# Patient Record
Sex: FEMALE | Race: WHITE | Employment: OTHER | URBAN - METROPOLITAN AREA
[De-identification: names, ages, dates, MRNs, and addresses within clinical notes are randomized per-mention and may not be internally consistent; named-entity substitution may affect disease eponyms.]

---

## 2019-08-05 ENCOUNTER — IP HISTORICAL/CONVERTED ENCOUNTER (OUTPATIENT)
Dept: OTHER | Age: 74
End: 2019-08-05

## 2020-08-08 ENCOUNTER — IP HISTORICAL/CONVERTED ENCOUNTER (OUTPATIENT)
Dept: OTHER | Age: 75
End: 2020-08-08

## 2020-11-25 ENCOUNTER — APPOINTMENT (OUTPATIENT)
Dept: GENERAL RADIOLOGY | Age: 75
DRG: 853 | End: 2020-11-25
Attending: EMERGENCY MEDICINE
Payer: MEDICARE

## 2020-11-25 ENCOUNTER — HOSPITAL ENCOUNTER (INPATIENT)
Age: 75
LOS: 22 days | Discharge: NURSING, REHAB OR LONG-TERM CARE | DRG: 853 | End: 2020-12-17
Attending: EMERGENCY MEDICINE | Admitting: FAMILY MEDICINE
Payer: MEDICARE

## 2020-11-25 DIAGNOSIS — L03.119 CELLULITIS OF LOWER EXTREMITY, UNSPECIFIED LATERALITY: ICD-10-CM

## 2020-11-25 DIAGNOSIS — R41.82 ALTERED MENTAL STATUS, UNSPECIFIED ALTERED MENTAL STATUS TYPE: ICD-10-CM

## 2020-11-25 DIAGNOSIS — I96 GANGRENE (HCC): ICD-10-CM

## 2020-11-25 DIAGNOSIS — L97.929 ULCERS OF BOTH LOWER LEGS (HCC): ICD-10-CM

## 2020-11-25 DIAGNOSIS — L97.919 ULCERS OF BOTH LOWER LEGS (HCC): ICD-10-CM

## 2020-11-25 DIAGNOSIS — J18.9 PNEUMONIA DUE TO INFECTIOUS ORGANISM, UNSPECIFIED LATERALITY, UNSPECIFIED PART OF LUNG: Primary | ICD-10-CM

## 2020-11-25 LAB
ALBUMIN SERPL-MCNC: 2.5 G/DL (ref 3.5–5)
ALBUMIN/GLOB SERPL: 0.5 {RATIO} (ref 1.1–2.2)
ALP SERPL-CCNC: 147 U/L (ref 45–117)
ALT SERPL-CCNC: 12 U/L (ref 12–78)
ANION GAP SERPL CALC-SCNC: 6 MMOL/L (ref 5–15)
APPEARANCE UR: CLEAR
APTT PPP: 32 SEC (ref 23–35.7)
APTT PPP: 32.1 SEC (ref 23–35.7)
AST SERPL W P-5'-P-CCNC: 20 U/L (ref 15–37)
ATRIAL RATE: 133 BPM
BACTERIA URNS QL MICRO: NEGATIVE /HPF
BASOPHILS # BLD: 0 K/UL (ref 0–0.1)
BASOPHILS # BLD: 0 K/UL (ref 0–0.1)
BASOPHILS NFR BLD: 0 % (ref 0–1)
BASOPHILS NFR BLD: 0 % (ref 0–1)
BILIRUB SERPL-MCNC: 0.5 MG/DL (ref 0.2–1)
BILIRUB UR QL: NEGATIVE
BNP SERPL-MCNC: ABNORMAL PG/ML
BUN SERPL-MCNC: 65 MG/DL (ref 6–20)
BUN/CREAT SERPL: 72 (ref 12–20)
CA-I BLD-MCNC: 9.5 MG/DL (ref 8.5–10.1)
CALCULATED R AXIS, ECG10: 102 DEGREES
CALCULATED T AXIS, ECG11: 106 DEGREES
CHLORIDE SERPL-SCNC: 106 MMOL/L (ref 97–108)
CO2 SERPL-SCNC: 28 MMOL/L (ref 21–32)
COLOR UR: ABNORMAL
COVID-19 RAPID TEST, COVR: NOT DETECTED
CREAT SERPL-MCNC: 0.9 MG/DL (ref 0.55–1.02)
DIAGNOSIS, 93000: NORMAL
DIFFERENTIAL METHOD BLD: ABNORMAL
DIFFERENTIAL METHOD BLD: ABNORMAL
EOSINOPHIL # BLD: 0 K/UL (ref 0–0.4)
EOSINOPHIL # BLD: 0 K/UL (ref 0–0.4)
EOSINOPHIL NFR BLD: 0 % (ref 0–7)
EOSINOPHIL NFR BLD: 0 % (ref 0–7)
ERYTHROCYTE [DISTWIDTH] IN BLOOD BY AUTOMATED COUNT: 13.9 % (ref 11.5–14.5)
ERYTHROCYTE [DISTWIDTH] IN BLOOD BY AUTOMATED COUNT: 14.1 % (ref 11.5–14.5)
GLOBULIN SER CALC-MCNC: 5 G/DL (ref 2–4)
GLUCOSE SERPL-MCNC: 124 MG/DL (ref 65–100)
GLUCOSE UR STRIP.AUTO-MCNC: NEGATIVE MG/DL
HCT VFR BLD AUTO: 29.9 % (ref 35–47)
HCT VFR BLD AUTO: 32.1 % (ref 35–47)
HGB BLD-MCNC: 10.2 G/DL (ref 11.5–16)
HGB BLD-MCNC: 9.4 G/DL (ref 11.5–16)
HGB UR QL STRIP: ABNORMAL
HYALINE CASTS URNS QL MICRO: ABNORMAL /LPF (ref 0–5)
IMM GRANULOCYTES # BLD AUTO: 0.1 K/UL (ref 0–0.04)
IMM GRANULOCYTES # BLD AUTO: 0.1 K/UL (ref 0–0.04)
IMM GRANULOCYTES NFR BLD AUTO: 0 % (ref 0–0.5)
IMM GRANULOCYTES NFR BLD AUTO: 0 % (ref 0–0.5)
INR PPP: 1.3 (ref 0.9–1.1)
KETONES UR QL STRIP.AUTO: NEGATIVE MG/DL
LACTATE SERPL-SCNC: 2.1 MMOL/L (ref 0.4–2)
LEUKOCYTE ESTERASE UR QL STRIP.AUTO: NEGATIVE
LYMPHOCYTES # BLD: 0.3 K/UL (ref 0.8–3.5)
LYMPHOCYTES # BLD: 0.8 K/UL (ref 0.8–3.5)
LYMPHOCYTES NFR BLD: 2 % (ref 12–49)
LYMPHOCYTES NFR BLD: 3 % (ref 12–49)
MAGNESIUM SERPL-MCNC: 2.7 MG/DL (ref 1.6–2.4)
MCH RBC QN AUTO: 29 PG (ref 26–34)
MCH RBC QN AUTO: 29.7 PG (ref 26–34)
MCHC RBC AUTO-ENTMCNC: 31.4 G/DL (ref 30–36.5)
MCHC RBC AUTO-ENTMCNC: 31.8 G/DL (ref 30–36.5)
MCV RBC AUTO: 92.3 FL (ref 80–99)
MCV RBC AUTO: 93.6 FL (ref 80–99)
MONOCYTES # BLD: 0.4 K/UL (ref 0–1)
MONOCYTES # BLD: 0.6 K/UL (ref 0–1)
MONOCYTES NFR BLD: 2 % (ref 5–13)
MONOCYTES NFR BLD: 2 % (ref 5–13)
MUCOUS THREADS URNS QL MICRO: ABNORMAL /LPF
NEUTS SEG # BLD: 17.9 K/UL (ref 1.8–8)
NEUTS SEG # BLD: 25.7 K/UL (ref 1.8–8)
NEUTS SEG NFR BLD: 95 % (ref 32–75)
NEUTS SEG NFR BLD: 96 % (ref 32–75)
NITRITE UR QL STRIP.AUTO: NEGATIVE
P-R INTERVAL, ECG05: 64 MS
PH UR STRIP: 5 [PH] (ref 5–8)
PLATELET # BLD AUTO: 435 K/UL (ref 150–400)
PLATELET # BLD AUTO: 545 K/UL (ref 150–400)
PMV BLD AUTO: 9.2 FL (ref 8.9–12.9)
PMV BLD AUTO: 9.8 FL (ref 8.9–12.9)
POTASSIUM SERPL-SCNC: 4.6 MMOL/L (ref 3.5–5.1)
PROCALCITONIN SERPL-MCNC: 2.21 NG/ML
PROT SERPL-MCNC: 7.5 G/DL (ref 6.4–8.2)
PROT UR STRIP-MCNC: NEGATIVE MG/DL
PROTHROMBIN TIME: 15.7 SEC (ref 11.9–14.7)
Q-T INTERVAL, ECG07: 324 MS
QRS DURATION, ECG06: 182 MS
QTC CALCULATION (BEZET), ECG08: 482 MS
RBC # BLD AUTO: 3.24 M/UL (ref 3.8–5.2)
RBC # BLD AUTO: 3.43 M/UL (ref 3.8–5.2)
RBC #/AREA URNS HPF: ABNORMAL /HPF (ref 0–5)
SARS-COV-2, COV2: NORMAL
SODIUM SERPL-SCNC: 140 MMOL/L (ref 136–145)
SP GR UR REFRACTOMETRY: 1.01 (ref 1–1.03)
SPECIMEN SOURCE: NORMAL
THERAPEUTIC RANGE,PTTT: NORMAL SEC (ref 68–109)
THERAPEUTIC RANGE,PTTT: NORMAL SEC (ref 68–109)
TROPONIN I SERPL-MCNC: 0.26 NG/ML
TROPONIN I SERPL-MCNC: 0.27 NG/ML
TROPONIN I SERPL-MCNC: 0.29 NG/ML
TSH SERPL DL<=0.05 MIU/L-ACNC: 0.26 UIU/ML (ref 0.36–3.74)
UA: UC IF INDICATED,UAUC: ABNORMAL
UROBILINOGEN UR QL STRIP.AUTO: 0.1 EU/DL (ref 0.1–1)
VENTRICULAR RATE, ECG03: 133 BPM
WBC # BLD AUTO: 18.6 K/UL (ref 3.6–11)
WBC # BLD AUTO: 27.1 K/UL (ref 3.6–11)
WBC URNS QL MICRO: ABNORMAL /HPF (ref 0–4)

## 2020-11-25 PROCEDURE — 84443 ASSAY THYROID STIM HORMONE: CPT

## 2020-11-25 PROCEDURE — 84145 PROCALCITONIN (PCT): CPT

## 2020-11-25 PROCEDURE — 83605 ASSAY OF LACTIC ACID: CPT

## 2020-11-25 PROCEDURE — 93005 ELECTROCARDIOGRAM TRACING: CPT

## 2020-11-25 PROCEDURE — 36415 COLL VENOUS BLD VENIPUNCTURE: CPT

## 2020-11-25 PROCEDURE — 96375 TX/PRO/DX INJ NEW DRUG ADDON: CPT

## 2020-11-25 PROCEDURE — 71045 X-RAY EXAM CHEST 1 VIEW: CPT

## 2020-11-25 PROCEDURE — 74011250637 HC RX REV CODE- 250/637: Performed by: FAMILY MEDICINE

## 2020-11-25 PROCEDURE — 74011250637 HC RX REV CODE- 250/637: Performed by: EMERGENCY MEDICINE

## 2020-11-25 PROCEDURE — 74011250636 HC RX REV CODE- 250/636: Performed by: EMERGENCY MEDICINE

## 2020-11-25 PROCEDURE — 80053 COMPREHEN METABOLIC PANEL: CPT

## 2020-11-25 PROCEDURE — 83880 ASSAY OF NATRIURETIC PEPTIDE: CPT

## 2020-11-25 PROCEDURE — 87635 SARS-COV-2 COVID-19 AMP PRB: CPT

## 2020-11-25 PROCEDURE — 84484 ASSAY OF TROPONIN QUANT: CPT

## 2020-11-25 PROCEDURE — 74011250636 HC RX REV CODE- 250/636: Performed by: FAMILY MEDICINE

## 2020-11-25 PROCEDURE — 85730 THROMBOPLASTIN TIME PARTIAL: CPT

## 2020-11-25 PROCEDURE — 85025 COMPLETE CBC W/AUTO DIFF WBC: CPT

## 2020-11-25 PROCEDURE — 65270000029 HC RM PRIVATE

## 2020-11-25 PROCEDURE — 74011000258 HC RX REV CODE- 258: Performed by: FAMILY MEDICINE

## 2020-11-25 PROCEDURE — 83735 ASSAY OF MAGNESIUM: CPT

## 2020-11-25 PROCEDURE — 99284 EMERGENCY DEPT VISIT MOD MDM: CPT

## 2020-11-25 PROCEDURE — 74011000258 HC RX REV CODE- 258: Performed by: EMERGENCY MEDICINE

## 2020-11-25 PROCEDURE — 87205 SMEAR GRAM STAIN: CPT

## 2020-11-25 PROCEDURE — 81001 URINALYSIS AUTO W/SCOPE: CPT

## 2020-11-25 PROCEDURE — 85610 PROTHROMBIN TIME: CPT

## 2020-11-25 PROCEDURE — 96374 THER/PROPH/DIAG INJ IV PUSH: CPT

## 2020-11-25 RX ORDER — ATORVASTATIN CALCIUM 40 MG/1
40 TABLET, FILM COATED ORAL
Status: DISCONTINUED | OUTPATIENT
Start: 2020-11-25 | End: 2020-12-17 | Stop reason: HOSPADM

## 2020-11-25 RX ORDER — ACETAMINOPHEN 650 MG/1
650 SUPPOSITORY RECTAL
Status: DISCONTINUED | OUTPATIENT
Start: 2020-11-25 | End: 2020-12-17 | Stop reason: HOSPADM

## 2020-11-25 RX ORDER — ASPIRIN 325 MG
325 TABLET ORAL
Status: COMPLETED | OUTPATIENT
Start: 2020-11-25 | End: 2020-11-25

## 2020-11-25 RX ORDER — ONDANSETRON 4 MG/1
4 TABLET, ORALLY DISINTEGRATING ORAL
Status: DISCONTINUED | OUTPATIENT
Start: 2020-11-25 | End: 2020-12-17 | Stop reason: HOSPADM

## 2020-11-25 RX ORDER — ACETAMINOPHEN 325 MG/1
650 TABLET ORAL
Status: DISCONTINUED | OUTPATIENT
Start: 2020-11-25 | End: 2020-12-17 | Stop reason: HOSPADM

## 2020-11-25 RX ORDER — POLYETHYLENE GLYCOL 3350 17 G/17G
17 POWDER, FOR SOLUTION ORAL DAILY PRN
Status: DISCONTINUED | OUTPATIENT
Start: 2020-11-25 | End: 2020-12-17 | Stop reason: HOSPADM

## 2020-11-25 RX ORDER — LEVOTHYROXINE SODIUM 75 UG/1
75 TABLET ORAL
Status: DISCONTINUED | OUTPATIENT
Start: 2020-11-26 | End: 2020-12-17 | Stop reason: HOSPADM

## 2020-11-25 RX ORDER — HEPARIN SODIUM 10000 [USP'U]/100ML
12-25 INJECTION, SOLUTION INTRAVENOUS
Status: DISCONTINUED | OUTPATIENT
Start: 2020-11-25 | End: 2020-11-25

## 2020-11-25 RX ORDER — SODIUM CHLORIDE 9 MG/ML
50 INJECTION, SOLUTION INTRAVENOUS CONTINUOUS
Status: DISCONTINUED | OUTPATIENT
Start: 2020-11-25 | End: 2020-11-30

## 2020-11-25 RX ORDER — SODIUM CHLORIDE 9 MG/ML
50 INJECTION, SOLUTION INTRAVENOUS CONTINUOUS
Status: DISCONTINUED | OUTPATIENT
Start: 2020-11-25 | End: 2020-11-26

## 2020-11-25 RX ORDER — ONDANSETRON 2 MG/ML
4 INJECTION INTRAMUSCULAR; INTRAVENOUS
Status: DISCONTINUED | OUTPATIENT
Start: 2020-11-25 | End: 2020-12-17 | Stop reason: HOSPADM

## 2020-11-25 RX ORDER — SODIUM CHLORIDE 9 MG/ML
50 INJECTION, SOLUTION INTRAVENOUS CONTINUOUS
Status: DISCONTINUED | OUTPATIENT
Start: 2020-11-26 | End: 2020-11-30

## 2020-11-25 RX ORDER — FUROSEMIDE 10 MG/ML
40 INJECTION INTRAMUSCULAR; INTRAVENOUS
Status: COMPLETED | OUTPATIENT
Start: 2020-11-25 | End: 2020-11-25

## 2020-11-25 RX ORDER — ASPIRIN 325 MG
325 TABLET ORAL DAILY
Status: DISCONTINUED | OUTPATIENT
Start: 2020-11-26 | End: 2020-12-17 | Stop reason: HOSPADM

## 2020-11-25 RX ADMIN — SODIUM CHLORIDE 1000 ML: 9 INJECTION, SOLUTION INTRAVENOUS at 13:27

## 2020-11-25 RX ADMIN — ASPIRIN 325 MG ORAL TABLET 325 MG: 325 PILL ORAL at 15:40

## 2020-11-25 RX ADMIN — PIPERACILLIN AND TAZOBACTAM 3.38 G: 3; .375 INJECTION, POWDER, LYOPHILIZED, FOR SOLUTION INTRAVENOUS at 23:55

## 2020-11-25 RX ADMIN — SODIUM CHLORIDE 50 ML/HR: 9 INJECTION, SOLUTION INTRAVENOUS at 23:56

## 2020-11-25 RX ADMIN — VANCOMYCIN HYDROCHLORIDE 1000 MG: 1 INJECTION, POWDER, LYOPHILIZED, FOR SOLUTION INTRAVENOUS at 13:22

## 2020-11-25 RX ADMIN — PIPERACILLIN AND TAZOBACTAM 3.38 G: 3; .375 INJECTION, POWDER, LYOPHILIZED, FOR SOLUTION INTRAVENOUS at 13:22

## 2020-11-25 RX ADMIN — SODIUM CHLORIDE 50 ML/HR: 9 INJECTION, SOLUTION INTRAVENOUS at 15:40

## 2020-11-25 RX ADMIN — FUROSEMIDE 40 MG: 10 INJECTION, SOLUTION INTRAMUSCULAR; INTRAVENOUS at 15:40

## 2020-11-25 RX ADMIN — ATORVASTATIN CALCIUM 40 MG: 40 TABLET, FILM COATED ORAL at 23:56
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
## 2020-11-25 NOTE — PROGRESS NOTES
11/25/20. APS called via hot line ( [phone] ) informed of pts living  conditions & safety concerns. Report # [id number].

## 2020-11-25 NOTE — PROGRESS NOTES
CM asked by sister Ms. Daniel to call sister Brien Castillo @ [phone]) who knows more about pt clinically. Per [place]. [address] in home. Pt uses walker & cane on ambulation. PCP is Dr. Fam Merlos & pt sees Dr. Cailin Pantoja.
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
## 2020-11-25 NOTE — ACP (ADVANCE CARE PLANNING)
11/25/20. CM spoke with pts sister /healthcare decision maker ( Heidy Daniel @ [phone])  Regarding the Covid 19 questionnaire. Her back up is sister Helder Love @ [phone]). Preference is for CPR if pts heart stops, & Ventilator Support if health worsened from Covid - recovery likely/not.

## 2020-11-25 NOTE — ED NOTES
Called Dr. Jose Little and informed him of pt new troponin results of 0.28. Consult to cardiology was placed and he wants a low dose heparin drip ordered.

## 2020-11-26 ENCOUNTER — APPOINTMENT (OUTPATIENT)
Dept: NON INVASIVE DIAGNOSTICS | Age: 75
DRG: 853 | End: 2020-11-26
Attending: FAMILY MEDICINE
Payer: MEDICARE

## 2020-11-26 LAB
ALBUMIN SERPL-MCNC: 2.1 G/DL (ref 3.5–5)
ALBUMIN/GLOB SERPL: 0.5 {RATIO} (ref 1.1–2.2)
ALP SERPL-CCNC: 112 U/L (ref 45–117)
ALT SERPL-CCNC: 11 U/L (ref 12–78)
ANION GAP SERPL CALC-SCNC: 6 MMOL/L (ref 5–15)
APTT PPP: 34.8 SEC (ref 23–35.7)
APTT PPP: 37.2 SEC (ref 23–35.7)
APTT PPP: 39 SEC (ref 23–35.7)
AST SERPL W P-5'-P-CCNC: 25 U/L (ref 15–37)
BASOPHILS # BLD: 0 K/UL (ref 0–0.1)
BASOPHILS NFR BLD: 0 % (ref 0–1)
BILIRUB SERPL-MCNC: 0.5 MG/DL (ref 0.2–1)
BUN SERPL-MCNC: 54 MG/DL (ref 6–20)
BUN/CREAT SERPL: 70 (ref 12–20)
CA-I BLD-MCNC: 8.5 MG/DL (ref 8.5–10.1)
CHLORIDE SERPL-SCNC: 110 MMOL/L (ref 97–108)
CO2 SERPL-SCNC: 27 MMOL/L (ref 21–32)
CREAT SERPL-MCNC: 0.77 MG/DL (ref 0.55–1.02)
DIFFERENTIAL METHOD BLD: ABNORMAL
EOSINOPHIL # BLD: 0 K/UL (ref 0–0.4)
EOSINOPHIL NFR BLD: 0 % (ref 0–7)
ERYTHROCYTE [DISTWIDTH] IN BLOOD BY AUTOMATED COUNT: 14 % (ref 11.5–14.5)
GLOBULIN SER CALC-MCNC: 4.2 G/DL (ref 2–4)
GLUCOSE SERPL-MCNC: 108 MG/DL (ref 65–100)
HCT VFR BLD AUTO: 28.3 % (ref 35–47)
HGB BLD-MCNC: 8.9 G/DL (ref 11.5–16)
IMM GRANULOCYTES # BLD AUTO: 0.1 K/UL (ref 0–0.04)
IMM GRANULOCYTES NFR BLD AUTO: 0 % (ref 0–0.5)
LYMPHOCYTES # BLD: 0.4 K/UL (ref 0.8–3.5)
LYMPHOCYTES NFR BLD: 2 % (ref 12–49)
MCH RBC QN AUTO: 29.2 PG (ref 26–34)
MCHC RBC AUTO-ENTMCNC: 31.4 G/DL (ref 30–36.5)
MCV RBC AUTO: 92.8 FL (ref 80–99)
MONOCYTES # BLD: 0.5 K/UL (ref 0–1)
MONOCYTES NFR BLD: 2 % (ref 5–13)
NEUTS SEG # BLD: 19.6 K/UL (ref 1.8–8)
NEUTS SEG NFR BLD: 96 % (ref 32–75)
PLATELET # BLD AUTO: 450 K/UL (ref 150–400)
PMV BLD AUTO: 9.2 FL (ref 8.9–12.9)
POTASSIUM SERPL-SCNC: 3.7 MMOL/L (ref 3.5–5.1)
PROT SERPL-MCNC: 6.3 G/DL (ref 6.4–8.2)
RBC # BLD AUTO: 3.05 M/UL (ref 3.8–5.2)
SODIUM SERPL-SCNC: 143 MMOL/L (ref 136–145)
THERAPEUTIC RANGE,PTTT: ABNORMAL SEC (ref 68–109)
THERAPEUTIC RANGE,PTTT: ABNORMAL SEC (ref 68–109)
THERAPEUTIC RANGE,PTTT: NORMAL SEC (ref 68–109)
WBC # BLD AUTO: 20.4 K/UL (ref 3.6–11)

## 2020-11-26 PROCEDURE — 99221 1ST HOSP IP/OBS SF/LOW 40: CPT | Performed by: INTERNAL MEDICINE

## 2020-11-26 PROCEDURE — 74011250637 HC RX REV CODE- 250/637: Performed by: FAMILY MEDICINE

## 2020-11-26 PROCEDURE — 87077 CULTURE AEROBIC IDENTIFY: CPT

## 2020-11-26 PROCEDURE — 85730 THROMBOPLASTIN TIME PARTIAL: CPT

## 2020-11-26 PROCEDURE — 87040 BLOOD CULTURE FOR BACTERIA: CPT

## 2020-11-26 PROCEDURE — 65270000029 HC RM PRIVATE

## 2020-11-26 PROCEDURE — 36415 COLL VENOUS BLD VENIPUNCTURE: CPT

## 2020-11-26 PROCEDURE — 74011250636 HC RX REV CODE- 250/636: Performed by: FAMILY MEDICINE

## 2020-11-26 PROCEDURE — 80053 COMPREHEN METABOLIC PANEL: CPT

## 2020-11-26 PROCEDURE — 77010033678 HC OXYGEN DAILY

## 2020-11-26 PROCEDURE — 87086 URINE CULTURE/COLONY COUNT: CPT

## 2020-11-26 PROCEDURE — 87186 SC STD MICRODIL/AGAR DIL: CPT

## 2020-11-26 PROCEDURE — 94760 N-INVAS EAR/PLS OXIMETRY 1: CPT

## 2020-11-26 PROCEDURE — 74011000258 HC RX REV CODE- 258: Performed by: FAMILY MEDICINE

## 2020-11-26 PROCEDURE — 74011250636 HC RX REV CODE- 250/636: Performed by: INTERNAL MEDICINE

## 2020-11-26 PROCEDURE — 85025 COMPLETE CBC W/AUTO DIFF WBC: CPT

## 2020-11-26 RX ORDER — POTASSIUM CHLORIDE 20 MEQ/1
20 TABLET, EXTENDED RELEASE ORAL DAILY
COMMUNITY

## 2020-11-26 RX ORDER — CARVEDILOL 3.12 MG/1
6.25 TABLET ORAL 2 TIMES DAILY WITH MEALS
COMMUNITY
End: 2020-12-17

## 2020-11-26 RX ORDER — FUROSEMIDE 10 MG/ML
20 INJECTION INTRAMUSCULAR; INTRAVENOUS 2 TIMES DAILY
Status: DISCONTINUED | OUTPATIENT
Start: 2020-11-26 | End: 2020-11-29

## 2020-11-26 RX ORDER — METHOCARBAMOL 500 MG/1
500 TABLET, FILM COATED ORAL 3 TIMES DAILY
COMMUNITY
End: 2020-12-17

## 2020-11-26 RX ORDER — OMEPRAZOLE 20 MG/1
20 CAPSULE, DELAYED RELEASE ORAL DAILY
COMMUNITY

## 2020-11-26 RX ORDER — FUROSEMIDE 40 MG/1
40 TABLET ORAL DAILY
COMMUNITY

## 2020-11-26 RX ORDER — LISINOPRIL 5 MG/1
5 TABLET ORAL DAILY
COMMUNITY
End: 2020-12-17

## 2020-11-26 RX ORDER — TRAMADOL HYDROCHLORIDE 50 MG/1
50 TABLET ORAL
Status: COMPLETED | OUTPATIENT
Start: 2020-11-26 | End: 2020-11-26

## 2020-11-26 RX ADMIN — ACETAMINOPHEN 650 MG: 325 TABLET, FILM COATED ORAL at 21:34

## 2020-11-26 RX ADMIN — ATORVASTATIN CALCIUM 40 MG: 40 TABLET, FILM COATED ORAL at 21:32

## 2020-11-26 RX ADMIN — PIPERACILLIN SODIUM AND TAZOBACTAM SODIUM 3.38 G: 3; .375 INJECTION, POWDER, LYOPHILIZED, FOR SOLUTION INTRAVENOUS at 10:15

## 2020-11-26 RX ADMIN — SODIUM CHLORIDE 50 ML/HR: 9 INJECTION, SOLUTION INTRAVENOUS at 00:00

## 2020-11-26 RX ADMIN — SODIUM CHLORIDE 50 ML/HR: 9 INJECTION, SOLUTION INTRAVENOUS at 17:00

## 2020-11-26 RX ADMIN — FUROSEMIDE 20 MG: 10 INJECTION, SOLUTION INTRAMUSCULAR; INTRAVENOUS at 19:54

## 2020-11-26 RX ADMIN — VANCOMYCIN HYDROCHLORIDE 1000 MG: 1 INJECTION, POWDER, LYOPHILIZED, FOR SOLUTION INTRAVENOUS at 16:56

## 2020-11-26 RX ADMIN — ASPIRIN 325 MG ORAL TABLET 325 MG: 325 PILL ORAL at 10:16

## 2020-11-26 RX ADMIN — TRAMADOL HYDROCHLORIDE 50 MG: 50 TABLET, COATED ORAL at 21:32

## 2020-11-26 RX ADMIN — PIPERACILLIN SODIUM AND TAZOBACTAM SODIUM 3.38 G: 3; .375 INJECTION, POWDER, LYOPHILIZED, FOR SOLUTION INTRAVENOUS at 20:15

## 2020-11-26 NOTE — PROGRESS NOTES
Problem: Falls - Risk of  Goal: *Absence of Falls  Description: Document Melissa Vannesa Fall Risk and appropriate interventions in the flowsheet.   11/26/2020 0043 by Karolina Urena RN  Note: Fall Risk Interventions:  Mobility Interventions: Bed/chair exit alarm         Medication Interventions: Bed/chair exit alarm    Elimination Interventions: Bed/chair exit alarm, Patient to call for help with toileting needs           11/26/2020 0042 by Karolina Urena RN  Outcome: Progressing Towards Goal  Note: Fall Risk Interventions:  Mobility Interventions: Bed/chair exit alarm         Medication Interventions: Bed/chair exit alarm    Elimination Interventions: Bed/chair exit alarm, Patient to call for help with toileting needs

## 2020-11-26 NOTE — CONSULTS
[facility] CARDIOLOGY  CARDIOLOGY CONSULTATION    REASON FOR CONSULT: CHF    REQUESTING PROVIDER: Dr. Jackson Brayan:  Elevated trop/SOB    HISTORY OF PRESENT ILLNESS:  Charly Edwards is a 66y.o. year-old female with past medical history significant for hypothyroidism, PPM  who was evaluated today due to shortness of breath. Patient was found to be cyanotic and in poor living conditions at home. She is a poor historian and does not appear to be in pain. No c/o chest pain. Records from hospital admission course thus far reviewed. Telemetry reviewed. No events overnight. .    INPATIENT MEDICATIONS:  Home medications reviewed.     Current Facility-Administered Medications:     piperacillin-tazobactam (ZOSYN) 3.375 g in 0.9% sodium chloride (MBP/ADV) 100 mL MBP, 3.375 g, IntraVENous, Q8H, Keith Solano MD, Last Rate: 25 mL/hr at 11/26/20 1015, 3.375 g at 11/26/20 1015    furosemide (LASIX) injection 20 mg, 20 mg, IntraVENous, BID, Andrew Goins MD    0.9% sodium chloride infusion, 50 mL/hr, IntraVENous, CONTINUOUS, Blake Solano MD, Stopped at 11/25/20 1430    levothyroxine (SYNTHROID) tablet 75 mcg, 75 mcg, Oral, 6am, Keith Solano MD    0.9% sodium chloride infusion, 50 mL/hr, IntraVENous, CONTINUOUS, Keith Solano MD, Last Rate: 50 mL/hr at 11/26/20 0000, 50 mL/hr at 11/26/20 0000    acetaminophen (TYLENOL) tablet 650 mg, 650 mg, Oral, Q6H PRN **OR** acetaminophen (TYLENOL) suppository 650 mg, 650 mg, Rectal, Q6H PRN, Blake Solano MD    polyethylene glycol (MIRALAX) packet 17 g, 17 g, Oral, DAILY PRN, Blake Solano MD    ondansetron (ZOFRAN ODT) tablet 4 mg, 4 mg, Oral, Q8H PRN **OR** ondansetron (ZOFRAN) injection 4 mg, 4 mg, IntraVENous, Q6H PRN, Keith Solano MD    aspirin tablet 325 mg, 325 mg, Oral, DAILY, Keith Solano MD, 325 mg at 11/26/20 1016    atorvastatin (LIPITOR) tablet 40 mg, 40 mg, Oral, QHS, Keith Solano MD, 40 mg at 11/25/20 7394    Vancomycin Pharmacy Dosing, , Other, PRN, Lary Solano MD    vancomycin (VANCOCIN) 1,000 mg in 0.9% sodium chloride 250 mL (VIAL-MATE), 1,000 mg, IntraVENous, Q24H, Keith Solano MD    [START ON 11/27/2020] Vancomycin Trough Level to be Drawn at 1400, , Other, Franco Rodriguez MD     ALLERGIES:  Allergies reviewed with the patient,  Allergies   Allergen Reactions    Tetracycline Other (comments)     Causes mouth sores    Morphine Nausea and Vomiting    . FAMILY HISTORY:  Family history reviewed. SOCIAL HISTORY:  Unknown. REVIEW OF SYSTEMS:  Complete review of systems performed, pertinents noted above, all other systems are negative. PHYSICAL EXAMINATION:  Vital sign assessment reveal a blood pressure of 113/50    and pulse rate of . Cardiovascular exam has a heart with a regular rate and rhythm, normal S1 and S2. No murmur present. There are no rubs or gallops. Good peripheral pulses. Elevated jugular venous distension. No carotid bruits are present. Respiratory exam reveals decreased breath sounds. Gastrointestinal exam has soft, nontender abdomen with normal bowel sounds. Lymphatic exam reveals severe edema with dressings with purulent drainage. EKG shows paced rhythm. Recent labs results and imaging reviewed. .       IMPRESSION AND RECOMMENDATIONS:  1. Acute CHF: Unknown type. Could be from chronic RV pacing. Unclear medical history. Will start lasix 20 mg iv bid and follow up 2D TTE.  2. Elevated troponin: Likely demand ischemia from CHF. Will follow up echo. 3. Pneumonia: Rx per primary. Thank you for involving us in the care of this patient. Please do not hesitate to call if additional questions arise.

## 2020-11-26 NOTE — ED NOTES
Nurse spoke with Dr. Belen Thomas via phone regarding consult. Informed MD of elevated troponins. Dr. Belen Thomas gave verbal order for repeat trop at 0700 tomorrow and to DC heparin.

## 2020-11-26 NOTE — ROUTINE PROCESS
Bedside report given to [facility] RN. Report consisted of patients Situation, Background, Assessment and  
Recommendations(SBAR). Opportunity for questions and clarification was provided

## 2020-11-26 NOTE — H&P
History and Physical    NAME: Yosef Garcia   :  1945   MRN:  [id number]     Date/Time:  2020 7:23 PM    Patient PCP: Karl Bardales MD  ______________________________________________________________________             Subjective:     CHIEF COMPLAINT:     Generalized weakness generalized weakness    HISTORY OF PRESENT ILLNESS:       Patient is a 66y.o. year old female with past medical history unknown came to the ER ER under medical TDO patient was found by her home health aide on the last visit patient will hypoxic with her oxygen off there was trash around the house approximately 1 week patient in poor hygiene patient had bilateral leg wounds and patient using tissue paper and spraying with water seen by the ER physician chest x-ray shows pneumonia the patient was admitted for further evaluation treatment    Patient is alert awake and upset why she is in the hospital    No past medical history on file. Unknown    No past surgical history on file. Unknown    Social History     Tobacco Use    Smoking status: Not on file   Substance Use Topics    Alcohol use: Not on file        No family history on file. Allergies   Allergen Reactions    Tetracycline Other (comments)     Causes mouth sores    Morphine Nausea and Vomiting        Prior to Admission medications    Medication Sig Start Date End Date Taking? Authorizing Provider   levothyroxine (SYNTHROID) 75 mcg tablet Take 75 mcg by mouth daily (before breakfast).  Indications: HYPOTHYROIDISM    Provider, Historical         Current Facility-Administered Medications:     0.9% sodium chloride infusion, 50 mL/hr, IntraVENous, CONTINUOUS, Mila Adamson MD, Last Rate: 50 mL/hr at 20 1540, 50 mL/hr at 20 1540    0.9% sodium chloride infusion, 50 mL/hr, IntraVENous, CONTINUOUS, Mila Adamson MD, Stopped at 20 1430    heparin (porcine) 25,000 units in 0.45% saline 250 ml infusion, 12-25 Units/kg/hr (Adjusted), IntraVENous, TITRATE, René Solano MD    piperacillin-tazobactam (ZOSYN) 3.375 g in 0.9% sodium chloride (MBP/ADV) 100 mL MBP, 3.375 g, IntraVENous, Q8H, Keith Solano MD    [START ON 11/26/2020] aspirin tablet 325 mg, 325 mg, Oral, DAILY, Keith Solano MD    atorvastatin (LIPITOR) tablet 40 mg, 40 mg, Oral, QHS, Keith Solano MD    Current Outpatient Medications:     levothyroxine (SYNTHROID) 75 mcg tablet, Take 75 mcg by mouth daily (before breakfast). Indications: HYPOTHYROIDISM, Disp: , Rfl:     LAB DATA REVIEWED:    Recent Results (from the past 24 hour(s))   CBC WITH AUTOMATED DIFF    Collection Time: 11/25/20 12:30 PM   Result Value Ref Range    WBC 27.1 (H) 3.6 - 11.0 K/uL    RBC 3.43 (L) 3.80 - 5.20 M/uL    HGB 10.2 (L) 11.5 - 16.0 g/dL    HCT 32.1 (L) 35.0 - 47.0 %    MCV 93.6 80.0 - 99.0 FL    MCH 29.7 26.0 - 34.0 PG    MCHC 31.8 30.0 - 36.5 g/dL    RDW 14.1 11.5 - 14.5 %    PLATELET 078 (H) 857 - 400 K/uL    MPV 9.8 8.9 - 12.9 FL    NEUTROPHILS 95 (H) 32 - 75 %    LYMPHOCYTES 3 (L) 12 - 49 %    MONOCYTES 2 (L) 5 - 13 %    EOSINOPHILS 0 0 - 7 %    BASOPHILS 0 0 - 1 %    IMMATURE GRANULOCYTES 0 0.0 - 0.5 %    ABS. NEUTROPHILS 25.7 (H) 1.8 - 8.0 K/UL    ABS. LYMPHOCYTES 0.8 0.8 - 3.5 K/UL    ABS. MONOCYTES 0.6 0.0 - 1.0 K/UL    ABS. EOSINOPHILS 0.0 0.0 - 0.4 K/UL    ABS. BASOPHILS 0.0 0.0 - 0.1 K/UL    ABS. IMM.  GRANS. 0.1 (H) 0.00 - 0.04 K/UL    DF AUTOMATED     METABOLIC PANEL, COMPREHENSIVE    Collection Time: 11/25/20 12:30 PM   Result Value Ref Range    Sodium 140 136 - 145 mmol/L    Potassium 4.6 3.5 - 5.1 mmol/L    Chloride 106 97 - 108 mmol/L    CO2 28 21 - 32 mmol/L    Anion gap 6 5 - 15 mmol/L    Glucose 124 (H) 65 - 100 mg/dL    BUN 65 (H) 6 - 20 mg/dL    Creatinine 0.90 0.55 - 1.02 mg/dL    BUN/Creatinine ratio 72 (H) 12 - 20      GFR est AA >60 >60 ml/min/1.73m2    GFR est non-AA >60 >60 ml/min/1.73m2    Calcium 9.5 8.5 - 10.1 mg/dL    Bilirubin, total 0.5 0.2 - 1.0 mg/dL AST (SGOT) 20 15 - 37 U/L    ALT (SGPT) 12 12 - 78 U/L    Alk.  phosphatase 147 (H) 45 - 117 U/L    Protein, total 7.5 6.4 - 8.2 g/dL    Albumin 2.5 (L) 3.5 - 5.0 g/dL    Globulin 5.0 (H) 2.0 - 4.0 g/dL    A-G Ratio 0.5 (L) 1.1 - 2.2     TROPONIN I    Collection Time: 11/25/20 12:30 PM   Result Value Ref Range    Troponin-I, Qt. 0.26 (H) <0.05 ng/mL   BNP    Collection Time: 11/25/20 12:30 PM   Result Value Ref Range    NT pro-BNP 12,741 (H) <450 pg/mL   PROTHROMBIN TIME + INR    Collection Time: 11/25/20 12:30 PM   Result Value Ref Range    Prothrombin time 15.7 (H) 11.9 - 14.7 sec    INR 1.3 (H) 0.9 - 1.1     PTT    Collection Time: 11/25/20 12:30 PM   Result Value Ref Range    aPTT 32.0 23.0 - 35.7 sec    aPTT, therapeutic range   68 - 109 sec   LACTIC ACID    Collection Time: 11/25/20 12:30 PM   Result Value Ref Range    Lactic acid 2.1 (HH) 0.4 - 2.0 mmol/L   TSH 3RD GENERATION    Collection Time: 11/25/20 12:30 PM   Result Value Ref Range    TSH 0.26 (L) 0.36 - 3.74 uIU/mL   MAGNESIUM    Collection Time: 11/25/20 12:30 PM   Result Value Ref Range    Magnesium 2.7 (H) 1.6 - 2.4 mg/dL   SARS-COV-2    Collection Time: 11/25/20  2:45 PM   Result Value Ref Range    SARS-CoV-2 Please find results under separate order     COVID-19 RAPID TEST    Collection Time: 11/25/20  2:45 PM   Result Value Ref Range    Specimen source Nasopharyngeal      COVID-19 rapid test Not Detected Not Detected     URINALYSIS W/ REFLEX CULTURE    Collection Time: 11/25/20  4:50 PM    Specimen: Urine   Result Value Ref Range    Color Yellow/Straw      Appearance Clear Clear      Specific gravity 1.013 1.003 - 1.030      pH (UA) 5.0 5.0 - 8.0      Protein Negative Negative mg/dL    Glucose Negative Negative mg/dL    Ketone Negative Negative mg/dL    Bilirubin Negative Negative      Blood Moderate (A) Negative      Urobilinogen 0.1 0.1 - 1.0 EU/dL    Nitrites Negative Negative      Leukocyte Esterase Negative Negative      WBC 5-10 0 - 4 /hpf RBC 0-5 0 - 5 /hpf    Bacteria Negative Negative /hpf    UA:UC IF INDICATED Culture not indicated by UA result Culture not indicated by UA result      Mucus 1+ (A) Negative /lpf    Hyaline cast 5-10 0 - 5 /lpf   TROPONIN I    Collection Time: 11/25/20  5:25 PM   Result Value Ref Range    Troponin-I, Qt. 0.29 (H) <0.05 ng/mL       XR Results (most recent):  Results from Hospital Encounter encounter on 11/25/20   XR CHEST SNGL V    Narrative Chest single view. Comparison single view chest August 11, 2020    Stripe-like opacity noted involving inferior left upper lobe lung; finding  concerning for pneumonia. Right-sided cardiac device with unchanged leads right  heart. Cardiac silhouette enlargement. Atherosclerotic change thoracic aorta. No  pneumothorax or pleural effusion. XR CHEST SNGL V   Final Result           Review of Systems:  Constitutional: Negative for chills and fever. HENT: Negative. Eyes: Negative. Respiratory: Negative. Cardiovascular: Negative. Gastrointestinal: Negative for abdominal pain and nausea. Skin: Negative. Neurological: Negative. Objective:   VITALS:    Visit Vitals  BP (!) 102/55   Pulse 67   Temp 98 °F (36.7 °C)   Resp 16   Ht 5' 1\" (1.549 m)   Wt 56.7 kg (125 lb)   SpO2 90%   BMI 23.62 kg/m²       Physical Exam:   Constitutional: pt is oriented to person, place, and time. HENT:   Head: Normocephalic and atraumatic. Eyes: Pupils are equal, round, and reactive to light. EOM are normal.   Cardiovascular: Normal rate, regular rhythm and normal heart sounds. Pulmonary/Chest: Breath sounds normal. No wheezes. No rales. Exhibits no tenderness. Abdominal: Soft. Bowel sounds are normal. There is no abdominal tenderness. There is no rebound and no guarding. Musculoskeletal: Normal range of motion. Neurological: pt is alert and oriented to person, place, and time. Alert. Normal strength. No cranial nerve deficit or sensory deficit.  Displays a negative Romberg sign. Bilateral leg open wounds    ASSESSMENT & PLAN:      Acute cellulitis of bilateral legs  Community-acquired pneumonia  Poor hygiene  Evaded troponin        Current Facility-Administered Medications:     0.9% sodium chloride infusion, 50 mL/hr, IntraVENous, CONTINUOUS, Mila Adamson MD, Last Rate: 50 mL/hr at 11/25/20 1540, 50 mL/hr at 11/25/20 1540    0.9% sodium chloride infusion, 50 mL/hr, IntraVENous, CONTINUOUS, Mila Adamson MD, Stopped at 11/25/20 1430    heparin (porcine) 25,000 units in 0.45% saline 250 ml infusion, 12-25 Units/kg/hr (Adjusted), IntraVENous, TITRATE, Amy Solano MD    piperacillin-tazobactam (ZOSYN) 3.375 g in 0.9% sodium chloride (MBP/ADV) 100 mL MBP, 3.375 g, IntraVENous, Q8H, Keith Solano MD    [START ON 11/26/2020] aspirin tablet 325 mg, 325 mg, Oral, DAILY, Keith Solano MD    atorvastatin (LIPITOR) tablet 40 mg, 40 mg, Oral, QHS, Keith Solano MD    Current Outpatient Medications:     levothyroxine (SYNTHROID) 75 mcg tablet, Take 75 mcg by mouth daily (before breakfast).  Indications: HYPOTHYROIDISM, Disp: , Rfl:     Consult wound care nurse consult infectious disease consult  Order blood cultures wound cultures start on Zosyn and vancomycin    Elevated troponin started on heparin drip cardiology consult start on aspirin  Statin in order 2D complete echo    ________________________________________________________________________    Signed: Surjit Estrada MD

## 2020-11-26 NOTE — ROUTINE PROCESS
Primary Nurse Nayana Carlson RN and Horacio Starr RN performed a dual skin assessment on this patient on admission to [address] [place] [address]. Patient has large open draining wounds to bilateral anterior legs, xeroforn, abd and kerlix dressing applied to both legs, 3+ pitting edema noted to bilateral lower extremities with erythema. BLE hot to touch. Patient also has some blanchable redness and open wound to sacrum, mepilex dressing applied. No other skin issues identified at thiis time. Patient has wound care consult scheduled for tomorrow. marlon continue to monitor patient.

## 2020-11-26 NOTE — ROUTINE PROCESS
TRANSFER - OUT REPORT: 
 
Verbal report given to 2e(name) on Miguelina Oakland  being transferred to 2e(unit) for routine progression of care Report consisted of patients Situation, Background, Assessment and  
Recommendations(SBAR). Information from the following report(s) SBAR, ED Summary and MAR was reviewed with the receiving nurse. Lines:  
Peripheral IV 11/25/20 Left Antecubital (Active) Opportunity for questions and clarification was provided. Patient transported with: 
 O2 @ 2 liters Tech

## 2020-11-26 NOTE — PROGRESS NOTES
Per CM note, patient declined SNF, IRF, and HH. CM will continue to follow patient for discharge planning needs.

## 2020-11-26 NOTE — CONSULTS
Consult Date: 11/26/2020    Consults:  Sepsis    Subjective      This is a 66year old female who was brought to ED because of concern for hypoxia when she was apparently found at home cyanotic and without oxygen. Living conditiions described as deplorable. Noted to have leg swelling and drainage. She was afebrile but WBC was 27,100 elevated procalcitonin and lactic acid. Urinalysis showed mild pyuria but no bacteria. CXR showed stripe-like opacity noted involving inferior left upper lobe lung; finding concerning for pneumonia (image reviewed by me, see below). Blood cultures and leg wound culture  were sent and patient was started on Vancomycin and Zosyn. ID has been consulted for sepsis. Patient resting comfortably. She does not why she was brought in except \"to get check out\". She denies any cough or SOB and does not remember whether she is on oxygen at home. She was unable to provide any information on her leg swelling. No past medical history on file. No past surgical history on file. No family history on file.    Social History     Tobacco Use    Smoking status: Not on file   Substance Use Topics    Alcohol use: Not on file       Current Facility-Administered Medications   Medication Dose Route Frequency Provider Last Rate Last Dose    piperacillin-tazobactam (ZOSYN) 3.375 g in 0.9% sodium chloride (MBP/ADV) 100 mL MBP  3.375 g IntraVENous Q8H Keith Solano MD        0.9% sodium chloride infusion  50 mL/hr IntraVENous CONTINUOUS Keith Solano MD 50 mL/hr at 11/25/20 2356 50 mL/hr at 11/25/20 2356    0.9% sodium chloride infusion  50 mL/hr IntraVENous CONTINUOUS Nina Solano MD   Stopped at 11/25/20 1430    levothyroxine (SYNTHROID) tablet 75 mcg  75 mcg Oral 6am Kieth Solano MD        0.9% sodium chloride infusion  50 mL/hr IntraVENous CONTINUOUS Keith Solano MD 50 mL/hr at 11/26/20 0000 50 mL/hr at 11/26/20 0000    acetaminophen (TYLENOL) tablet 650 mg  650 mg Oral Q6H PRN Xiang Solano MD        Or   [facility] acetaminophen (TYLENOL) suppository 650 mg  650 mg Rectal Q6H PRN Xiang Solano MD        polyethylene glycol (MIRALAX) packet 17 g  17 g Oral DAILY PRN Xiang Solano MD        ondansetron (ZOFRAN ODT) tablet 4 mg  4 mg Oral Q8H PRN Xiang Solano MD        Or    ondansetron [facility]) injection 4 mg  4 mg IntraVENous Q6H PRN Xiang Solano MD        aspirin tablet 325 mg  325 mg Oral DAILY Xiang Solano MD        atorvastatin (LIPITOR) tablet 40 mg  40 mg Oral QHS Keith Solano MD   40 mg at 11/25/20 2356    Vancomycin Pharmacy Dosing   Other PRN Xiang Solano MD        vancomycin (VANCOCIN) 1,000 mg in 0.9% sodium chloride 250 mL (VIAL-MATE)  1,000 mg IntraVENous Q24H Xiang Solano MD       [facility] [START ON 11/27/2020] Vancomycin Trough Level to be Drawn at 1400   Other Myriam Diamond MD            Review of Systems   Unable to perform ROS: Mental status change       Objective     Vital signs for last 24 hours:  Visit Vitals  BP (!) 159/75 (BP 1 Location: Right arm, BP Patient Position: At rest)   Pulse (!) 58   Temp 98.1 °F (36.7 °C)   Resp 16   Ht 5' 1\" (1.549 m)   Wt 91 lb 7.9 oz (41.5 kg)   SpO2 100%   BMI 17.29 kg/m²       Intake/Output this shift:  Current Shift: No intake/output data recorded. Last 3 Shifts: No intake/output data recorded. Data Review:     WBC 20,400  Lactic acid 2.1  Procalcitonin 2.21    Blood cultures (11/26) Pending  Blood cultures (11/26) Pending    CXR (11/25)        Physical Exam  Constitutional:       General: She is not in acute distress. Appearance: She is ill-appearing. She is not diaphoretic. HENT:      Head: Normocephalic and atraumatic. Nose:      Comments: Nasal O2 cannula     Mouth/Throat:      Pharynx: Oropharynx is clear. Eyes:      Pupils: Pupils are equal, round, and reactive to light. Neck:      Musculoskeletal: Neck supple.    Cardiovascular:      Rate and Rhythm: Normal rate and regular rhythm. Heart sounds: No murmur. Gallop present. Pulmonary:      Breath sounds: No wheezing, rhonchi or rales. Abdominal:      Palpations: Abdomen is soft. Tenderness: There is no abdominal tenderness. Genitourinary:     Comments: Purewick catheter  Musculoskeletal:         General: Swelling present. No tenderness. Right lower leg: Edema present. Left lower leg: Edema present. Comments: Both calves are bandaged at this time with bulky gauze dressing, however, evidence of swelling obvious along with intense generalized erythem bilaterally   Skin:     Findings: Erythema (see above) present. No rash. Neurological:      General: No focal deficit present. Mental Status: She is alert. She is disoriented. Psychiatric:         Behavior: Behavior normal.       ASSESSMENT/PLAN    1. Cellulitis, bilateral lower extremities, severe, etiology unclear. 2. Sepsis with leukocytosis, elevated procalcitonin and lactic acid  3. Abnormal CXR with SHO infiltrate, significance unclear  4. SOB  5. Pyuria, rule out UTI    Comment:  It appears that the most likely source of her sepsis are her lower extremities, probably S. Aureus aureus or Beta strep. SOB and CXR findings raise concern for pneumonia but unable to elicit other symptoms to support this. Only mild pyuria, but should rule out UTI. 1. Continue IV Vancomycin and Zosyn  2. Follow-up blood and wound cultures  3. Send urine culture  4. In am, repeat CBC, lactic acid, procalcitonin; check CRP, ASO, Anti-Dnase beta strep antibody    Biju Moscoso MD

## 2020-11-26 NOTE — PROGRESS NOTES
General Daily Progress Note          Patient Name:   Tanner Mcdermott       YOB: 1945       Age:  66 y.o. Admit Date: 11/25/2020      Subjective:     Resting the bed alert awake confused  Eating breakfast             Objective:     Visit Vitals  BP (!) 159/75 (BP 1 Location: Right arm, BP Patient Position: At rest)   Pulse (!) 58   Temp 98.1 °F (36.7 °C)   Resp 16   Ht 5' 1\" (1.549 m)   Wt 41.5 kg (91 lb 7.9 oz)   SpO2 100%   BMI 17.29 kg/m²          [unfilled]      Review of Systems    Constitutional: Negative for chills and fever. HENT: Negative. Eyes: Negative. Respiratory: Negative. Cardiovascular: Negative. Gastrointestinal: Negative for abdominal pain and nausea. Skin: Negative. Neurological: Negative. Physical Exam:      Constitutional: Alert awake  HENT:   Head: Normocephalic and atraumatic. Eyes: Pupils are equal, round, and reactive to light. EOM are normal.   Cardiovascular: Normal rate, regular rhythm and normal heart sounds. Pulmonary/Chest: Breath sounds normal. No wheezes. No rales. Exhibits no tenderness. Abdominal: Soft. Bowel sounds are normal. There is no abdominal tenderness. There is no rebound and no guarding. Musculoskeletal: Normal range of motion.    Neurological: Bilateral lower leg red swollen    XR CHEST SNGL V   Final Result               Results     Procedure Component Value Units Date/Time    CULTURE, URINE [[id number]]     Order Status:  Sent Specimen:  Cath Urine     CULTURE, BLOOD #2 [[id number]] Collected:  11/26/20 0122    Order Status:  Completed Specimen:  Blood Updated:  11/26/20 0156    CULTURE, BLOOD #1 [[id number]] Collected:  11/26/20 0112    Order Status:  Completed Specimen:  Blood Updated:  11/26/20 0153    CULTURE, Jacquie Gold STAIN [[id number]]     Order Status:  Sent Specimen:  Leg     COVID-19 RAPID TEST [[id number]] Collected:  11/25/20 1445    Order Status:  Completed Specimen:  Nasopharyngeal Updated:  11/25/20 1553     Specimen source Nasopharyngeal        COVID-19 rapid test Not Detected        Comment: Rapid Abbott ID Now   Rapid NAAT:  The specimen is NEGATIVE for SARS-CoV-2, the novel coronavirus associated with COVID-19. Negative results should be treated as presumptive and, if inconsistent with clinical signs and symptoms or necessary for patient management, should be tested with an alternative molecular assay. Negative results do not preclude SARS-CoV-2 infection and should not be used as the sole basis for patient management decisions. This test has been authorized by the FDA under   an Emergency Use Authorization (EUA) for use by authorized laboratories. Fact sheet for Healthcare Providers: ConventionShmoopdate.co.nz Fact sheet for Patients: ConventionShmoopdate.co.nz   Methodology: Isothermal Nucleic Acid Amplification                Labs:     Recent Labs     11/26/20 0122 11/25/20 1928   WBC 20.4* 18.6*   HGB 8.9* 9.4*   HCT 28.3* 29.9*   * 435*     Recent Labs     11/26/20 0122 11/25/20  1230    140   K 3.7 4.6   * 106   CO2 27 28   BUN 54* 65*   CREA 0.77 0.90   * 124*   CA 8.5 9.5   MG  --  2.7*     Recent Labs     11/26/20 0122 11/25/20  1230   ALT 11* 12    147*   TBILI 0.5 0.5   TP 6.3* 7.5   ALB 2.1* 2.5*   GLOB 4.2* 5.0*     Recent Labs     11/26/20 0122 11/25/20 1928 11/25/20  1230   INR  --   --  1.3*   PTP  --   --  15.7*   APTT 34.8 32.1 32.0      No results for input(s): FE, TIBC, PSAT, FERR in the last 72 hours. No results found for: FOL, RBCF   No results for input(s): PH, PCO2, PO2 in the last 72 hours.   Recent Labs     11/25/20 1928 11/25/20 1725 11/25/20  1230   TROIQ 0.27* 0.29* 0.26*     No results found for: CHOL, CHOLX, CHLST, CHOLV, HDL, HDLP, LDL, LDLC, DLDLP, TGLX, TRIGL, TRIGP, CHHD, CHHDX  No results found for: [facility]  Lab Results   Component Value Date/Time    Color Yellow/Straw 11/25/2020 04:50 PM Appearance Clear 11/25/2020 04:50 PM    Specific gravity 1.013 11/25/2020 04:50 PM    pH (UA) 5.0 11/25/2020 04:50 PM    Protein Negative 11/25/2020 04:50 PM    Glucose Negative 11/25/2020 04:50 PM    Ketone Negative 11/25/2020 04:50 PM    Bilirubin Negative 11/25/2020 04:50 PM    Urobilinogen 0.1 11/25/2020 04:50 PM    Nitrites Negative 11/25/2020 04:50 PM    Leukocyte Esterase Negative 11/25/2020 04:50 PM    Bacteria Negative 11/25/2020 04:50 PM    WBC 5-10 11/25/2020 04:50 PM    RBC 0-5 11/25/2020 04:50 PM         Assessment:       Acute cellulitis of bilateral legs  Sepsis leukocytosis elevated procalcitonin and lactic acid  Community-acquired pneumonia  Poor hygiene  Elevated troponin  Leukocytosis  Hypothyroidism    Plan:     Continue aspirin Lipitor levothyroxine    On IV Zosyn IV vancomycin    Follow-up with infectious disease  Cardiology consult for elevated troponin  PT OT consult and wound care nurse consult        Current Facility-Administered Medications:     piperacillin-tazobactam (ZOSYN) 3.375 g in 0.9% sodium chloride (MBP/ADV) 100 mL MBP, 3.375 g, IntraVENous, Q8H, Keith Solano MD    0.9% sodium chloride infusion, 50 mL/hr, IntraVENous, CONTINUOUS, Keith Solano MD, Last Rate: 50 mL/hr at 11/25/20 2356, 50 mL/hr at 11/25/20 2356    0.9% sodium chloride infusion, 50 mL/hr, IntraVENous, CONTINUOUS, Keith Solano MD, Stopped at 11/25/20 1430    levothyroxine (SYNTHROID) tablet 75 mcg, 75 mcg, Oral, 6am, Keith Solano MD    0.9% sodium chloride infusion, 50 mL/hr, IntraVENous, CONTINUOUS, Keith Solano MD, Last Rate: 50 mL/hr at 11/26/20 0000, 50 mL/hr at 11/26/20 0000    acetaminophen (TYLENOL) tablet 650 mg, 650 mg, Oral, Q6H PRN **OR** acetaminophen (TYLENOL) suppository 650 mg, 650 mg, Rectal, Q6H PRN, Keith Solano MD    polyethylene glycol (MIRALAX) packet 17 g, 17 g, Oral, DAILY PRN, Keith Solano MD    ondansetron (ZOFRAN ODT) tablet 4 mg, 4 mg, Oral, Q8H PRN **OR** ondansetron (ZOFRAN) injection 4 mg, 4 mg, IntraVENous, Q6H PRN, Keith Solano MD    aspirin tablet 325 mg, 325 mg, Oral, DAILY, Keith Solano MD    atorvastatin (LIPITOR) tablet 40 mg, 40 mg, Oral, QHS, Keith Solano MD, 40 mg at 11/25/20 2356    Vancomycin Pharmacy Dosing, , Other, PRN, Vito Pereyra MD    vancomycin (VANCOCIN) 1,000 mg in 0.9% sodium chloride 250 mL (VIAL-MATE), 1,000 mg, IntraVENous, Q24H, Mary Solano MD    [START ON 11/27/2020] Vancomycin Trough Level to be Drawn at 1400, , Other, Fela Fernandez MD

## 2020-11-26 NOTE — PROGRESS NOTES
Comprehensive Nutrition Assessment    Type and Reason for Visit: Initial(low BMI)    Nutrition Recommendations/Plan:     Continue Cardiac diet  Add Ensure Enlive TID  Honor pt preferences    Obtain updated measured wt as LE edema improves    Nursing to document %meal and supplement intakes in I/Os      Nutrition Assessment:  Brought to ED after found hypoxic and cyanotic at home, noted poor living conditions. Pt unclear as to reasoning of being in hospital. CXR concerning finding SHO infiltrates. Bilat LE stasis wounds present on admit- WCN consulted. Dx sepsis, cellulitis, PNA, pyruria. On 2L NC. Ordered Cardiac diet with no intakes yet available to assess. Per MD notes, pt ate breakfast, quantity unknown. Pt reported sleeping through breakfast and consuming late. Eating lunch during visit. Appears to have good appetite. Agreeable to Ensure Enlive strawberry. Limited interview d/t trying to finish meal and [place]. Labs: H/H: 8.9/28.3, BUN 54, , ALT 11, Mg 2.7. Malnutrition Assessment:  Malnutrition Status: Moderate malnutrition    Context:  Chronic illness     Findings of the 6 clinical characteristics of malnutrition:   Energy Intake:  No significant decrease in energy intake  Weight Loss:  7.0 - Greater than 7.5% over 3 months(>9% x 3-4 months)     Body Fat Loss:  No significant body fat loss,     Muscle Mass Loss:  1 - Mild muscle mass loss, Clavicles (pectoralis &deltoids)  Fluid Accumulation:  No significant fluid accumulation,        Estimated Daily Nutrient Needs:  Energy (kcal): 1235kcal (32kcal/kg EDW); Weight Used for Energy Requirements: (EDW)  Protein (g): 50g pro (1.3g/kg); Weight Used for Protein Requirements: (EDW)  Fluid (ml/day): 1235mL; Method Used for Fluid Requirements: 1 ml/kcal      Nutrition Related Findings:  NFPE deferred d/t pt eating. On visual assessment, noted muscle wasting to deltoid and clavicle. Pt denied c/s difficulty n/v or c/d. Last BM yesterday.  3+pitting LE edema present. Wounds:    Venous stasis(bilat LE ulcers)       Current Nutrition Therapies:  DIET CARDIAC Regular    Anthropometric Measures:  · Height:  5' 0.98\" (154.9 cm)  · Current Body Wt:  38.6 kg (85 lb)(EDW)   · Admission Body Wt:  91 lb 7.9 oz(bed scale 11/26; noted 3+pitting edema present)    · Usual Body Wt:  50.3 kg (111 lb)(Pt reported weight from 'several months ago' at PCP office)     · Ideal Body Wt:  105 lbs:  81 %   · BMI Category:  Underweight (BMI less than 18.5)     No wt hx available to assess per EMR. Pt reports UBW of 111lbs several months ago - indicating 20lb wt loss (>9%BW) in estimated 3-4 months - significant. Nutrition Diagnosis:   · Underweight related to (suboptimal PO intakes to maintain appropriate BW) as evidenced by BMI, weight loss      Nutrition Interventions:   Food and/or Nutrient Delivery: Continue current diet, Start oral nutrition supplement  Nutrition Education and Counseling: No recommendations at this time  Coordination of Nutrition Care: Continue to monitor while inpatient    Goals:  Meet >75% EENs x 5-7 days  Wt gain 1kg/week from EDW  Lytes and BG WNL    Nutrition Monitoring and Evaluation:   Behavioral-Environmental Outcomes: None identified  Food/Nutrient Intake Outcomes: Food and nutrient intake, Supplement intake  Physical Signs/Symptoms Outcomes: GI status, Weight, Skin    Discharge Planning:     Too soon to determine     Electronically signed by Roman Smart on 11/26/2020 at 2:24 PM    Contact:  [phone]

## 2020-11-27 ENCOUNTER — APPOINTMENT (OUTPATIENT)
Dept: NON INVASIVE DIAGNOSTICS | Age: 75
DRG: 853 | End: 2020-11-27
Attending: FAMILY MEDICINE
Payer: MEDICARE

## 2020-11-27 ENCOUNTER — APPOINTMENT (OUTPATIENT)
Dept: GENERAL RADIOLOGY | Age: 75
DRG: 853 | End: 2020-11-27
Attending: FAMILY MEDICINE
Payer: MEDICARE

## 2020-11-27 LAB
APTT PPP: 34.2 SEC (ref 23–35.7)
BASOPHILS # BLD: 0 K/UL (ref 0–0.1)
BASOPHILS NFR BLD: 0 % (ref 0–1)
CRP SERPL-MCNC: 16 MG/DL (ref 0–0.6)
DATE LAST DOSE: NORMAL
DIFFERENTIAL METHOD BLD: ABNORMAL
EOSINOPHIL # BLD: 0.1 K/UL (ref 0–0.4)
EOSINOPHIL NFR BLD: 0 % (ref 0–7)
ERYTHROCYTE [DISTWIDTH] IN BLOOD BY AUTOMATED COUNT: 14.1 % (ref 11.5–14.5)
HCT VFR BLD AUTO: 26.1 % (ref 35–47)
HGB BLD-MCNC: 7.9 G/DL (ref 11.5–16)
IMM GRANULOCYTES # BLD AUTO: 0.1 K/UL (ref 0–0.04)
IMM GRANULOCYTES NFR BLD AUTO: 0 % (ref 0–0.5)
LACTATE SERPL-SCNC: 1.2 MMOL/L (ref 0.4–2)
LYMPHOCYTES # BLD: 0.6 K/UL (ref 0.8–3.5)
LYMPHOCYTES NFR BLD: 3 % (ref 12–49)
MCH RBC QN AUTO: 29.3 PG (ref 26–34)
MCHC RBC AUTO-ENTMCNC: 30.3 G/DL (ref 30–36.5)
MCV RBC AUTO: 96.7 FL (ref 80–99)
MONOCYTES # BLD: 1 K/UL (ref 0–1)
MONOCYTES NFR BLD: 5 % (ref 5–13)
NEUTS SEG # BLD: 19.2 K/UL (ref 1.8–8)
NEUTS SEG NFR BLD: 92 % (ref 32–75)
NRBC # BLD: 0.03 K/UL (ref 0–0.01)
NRBC BLD-RTO: 0.1 PER 100 WBC
PLATELET # BLD AUTO: 398 K/UL (ref 150–400)
PMV BLD AUTO: 9.1 FL (ref 8.9–12.9)
PROCALCITONIN SERPL-MCNC: 1.19 NG/ML
RBC # BLD AUTO: 2.7 M/UL (ref 3.8–5.2)
REPORTED DOSE,DOSE: NORMAL UNITS
THERAPEUTIC RANGE,PTTT: NORMAL SEC (ref 68–109)
TROPONIN I SERPL-MCNC: 0.21 NG/ML
VANCOMYCIN TROUGH SERPL-MCNC: 6.6 UG/ML (ref 5–10)
WBC # BLD AUTO: 20.8 K/UL (ref 3.6–11)

## 2020-11-27 PROCEDURE — 93970 EXTREMITY STUDY: CPT

## 2020-11-27 PROCEDURE — 94760 N-INVAS EAR/PLS OXIMETRY 1: CPT

## 2020-11-27 PROCEDURE — 84145 PROCALCITONIN (PCT): CPT

## 2020-11-27 PROCEDURE — 87205 SMEAR GRAM STAIN: CPT

## 2020-11-27 PROCEDURE — 83605 ASSAY OF LACTIC ACID: CPT

## 2020-11-27 PROCEDURE — 36415 COLL VENOUS BLD VENIPUNCTURE: CPT

## 2020-11-27 PROCEDURE — 74011250637 HC RX REV CODE- 250/637: Performed by: FAMILY MEDICINE

## 2020-11-27 PROCEDURE — 99232 SBSQ HOSP IP/OBS MODERATE 35: CPT | Performed by: INTERNAL MEDICINE

## 2020-11-27 PROCEDURE — 74011250636 HC RX REV CODE- 250/636: Performed by: FAMILY MEDICINE

## 2020-11-27 PROCEDURE — 86215 DEOXYRIBONUCLEASE ANTIBODY: CPT

## 2020-11-27 PROCEDURE — 65270000029 HC RM PRIVATE

## 2020-11-27 PROCEDURE — 71046 X-RAY EXAM CHEST 2 VIEWS: CPT

## 2020-11-27 PROCEDURE — 74011250636 HC RX REV CODE- 250/636: Performed by: INTERNAL MEDICINE

## 2020-11-27 PROCEDURE — 74011000258 HC RX REV CODE- 258: Performed by: FAMILY MEDICINE

## 2020-11-27 PROCEDURE — 80053 COMPREHEN METABOLIC PANEL: CPT

## 2020-11-27 PROCEDURE — 85730 THROMBOPLASTIN TIME PARTIAL: CPT

## 2020-11-27 PROCEDURE — 93306 TTE W/DOPPLER COMPLETE: CPT

## 2020-11-27 PROCEDURE — 80202 ASSAY OF VANCOMYCIN: CPT

## 2020-11-27 PROCEDURE — 87077 CULTURE AEROBIC IDENTIFY: CPT

## 2020-11-27 PROCEDURE — 87186 SC STD MICRODIL/AGAR DIL: CPT

## 2020-11-27 PROCEDURE — 84484 ASSAY OF TROPONIN QUANT: CPT

## 2020-11-27 PROCEDURE — 85025 COMPLETE CBC W/AUTO DIFF WBC: CPT

## 2020-11-27 PROCEDURE — 77010033678 HC OXYGEN DAILY

## 2020-11-27 PROCEDURE — 86140 C-REACTIVE PROTEIN: CPT

## 2020-11-27 PROCEDURE — 86060 ANTISTREPTOLYSIN O TITER: CPT

## 2020-11-27 RX ADMIN — PIPERACILLIN SODIUM AND TAZOBACTAM SODIUM 3.38 G: 3; .375 INJECTION, POWDER, LYOPHILIZED, FOR SOLUTION INTRAVENOUS at 15:46

## 2020-11-27 RX ADMIN — ATORVASTATIN CALCIUM 40 MG: 40 TABLET, FILM COATED ORAL at 21:45

## 2020-11-27 RX ADMIN — PIPERACILLIN SODIUM AND TAZOBACTAM SODIUM 3.38 G: 3; .375 INJECTION, POWDER, LYOPHILIZED, FOR SOLUTION INTRAVENOUS at 22:37

## 2020-11-27 RX ADMIN — FUROSEMIDE 20 MG: 10 INJECTION, SOLUTION INTRAMUSCULAR; INTRAVENOUS at 21:45

## 2020-11-27 RX ADMIN — VANCOMYCIN HYDROCHLORIDE 1000 MG: 1 INJECTION, POWDER, LYOPHILIZED, FOR SOLUTION INTRAVENOUS at 18:25

## 2020-11-27 RX ADMIN — PIPERACILLIN SODIUM AND TAZOBACTAM SODIUM 3.38 G: 3; .375 INJECTION, POWDER, LYOPHILIZED, FOR SOLUTION INTRAVENOUS at 03:08

## 2020-11-27 RX ADMIN — ASPIRIN 325 MG ORAL TABLET 325 MG: 325 PILL ORAL at 15:46

## 2020-11-27 NOTE — PROGRESS NOTES
Progress Note    Patient: Brigette Luz MRN: [id number]  SSN: [id number]    YOB: 1945  Age: 66 y.o. Sex: female      Admit Date: 11/25/2020    LOS: 2 days     Subjective:   Patient followed for sepsis with suspected cellulitis both legs and pyuria. Blood, wound and urine cultures pending. She remains afebrile. Currently on Vancomycin and Zosyn. Patient is off the floor at this time. Objective:     Vitals:    11/26/20 1940 11/27/20 0303 11/27/20 0700 11/27/20 0740   BP: 119/60 97/60 (!) 102/55    Pulse: (!) 107 88 (!) 48    Resp: 22 20 22    Temp: 98.2 °F (36.8 °C) 98.2 °F (36.8 °C) 98.2 °F (36.8 °C)    SpO2: 94% 97% 92% 96%   Weight:       Height:            Intake and Output:  Current Shift: No intake/output data recorded. Last three shifts: 11/25 1901 - 11/27 0700  In: 18 [P.O.:990]  Out: 700 [Urine:700]    Physical Exam:   Patient unavailable for re-examination  Constitutional:       General: She is not in acute distress. Appearance: She is ill-appearing. She is not diaphoretic. HENT:      Head: Normocephalic and atraumatic. Nose:      Comments: Nasal O2 cannula     Mouth/Throat:      Pharynx: Oropharynx is clear. Eyes:      Pupils: Pupils are equal, round, and reactive to light. Neck:      Musculoskeletal: Neck supple. Cardiovascular:      Rate and Rhythm: Normal rate and regular rhythm. Heart sounds: No murmur. Gallop present. Pulmonary:      Breath sounds: No wheezing, rhonchi or rales. Abdominal:      Palpations: Abdomen is soft. Tenderness: There is no abdominal tenderness. Genitourinary:     Comments: Purewick catheter  Musculoskeletal:         General: Swelling present. No tenderness. Right lower leg: Edema present. Left lower leg: Edema present. Comments:  Both calves are bandaged at this time with bulky gauze dressing, however, evidence of swelling obvious along with intense generalized erythem bilaterally   Skin:     Findings: Erythema (see above) present. No rash. Neurological:      General: No focal deficit present. Mental Status: She is alert. She is disoriented. Psychiatric:         Behavior: Behavior normal.     Lab/Data Review:     WBC Pending  Procalcitoni 2.21  CRP Pending    Covid-19 negative    Blood cultures (11/26) Pending  Blood cultures (11/26) Pending  Urine culture (11/26) Pending  Wound culture (11/27) Pending  Assessment:     Active Problems:    PNA (pneumonia) (11/25/2020)      Pneumonia (11/25/2020)    1. Cellulitis, bilateral lower extremities, severe, etiology unclear, Day #2 IV Vancomycin and Zosyn. 2. Sepsis with leukocytosis, elevated procalcitonin and lactic acid  3. Abnormal CXR with SHO infiltrate, significance unclear  4. SOB  5. Pyuria, rule out UTI     Comment:  It appears that the most likely source of her sepsis are her lower extremities, probably S. Aureus aureus or Beta strep. SOB and CXR findings raise concern for pneumonia but unable to elicit other symptoms to support this. Only mild pyuria, but should rule out UTI. Plan:   1. Continue IV Vancomycin and Zosyn  2. Follow-up blood, wound and urine cultures  3. In am, repeat CBC, lactic acid, procalcitonin, CRP  4.  Follow-up ASO, Anti-Dnase beta strep antibody           Signed By: Patt Rey MD     November 27, 2020

## 2020-11-27 NOTE — WOUND CARE
Christopher Harrington and Juvenal have been notified of consults. Dr. Fantasma Hills is out of town and will see Monday 11/30/2020. Dr. Joe Harrington is in-house today and will see. Dr. Joe Harrington will enter his own wound care orders.

## 2020-11-27 NOTE — PROGRESS NOTES
Problem: Falls - Risk of  Goal: *Absence of Falls  Description: Document Alma Contrerasjoselito Fall Risk and appropriate interventions in the flowsheet.   11/26/2020 2322 by David Delaney RN  Note: Fall Risk Interventions:  Mobility Interventions: Patient to call before getting OOB         Medication Interventions: Bed/chair exit alarm    Elimination Interventions: Bed/chair exit alarm, Call light in reach           11/26/2020 2321 by David Delaney RN  Outcome: Progressing Towards Goal  Note: Fall Risk Interventions:  Mobility Interventions: Patient to call before getting OOB         Medication Interventions: Bed/chair exit alarm    Elimination Interventions: Bed/chair exit alarm, Call light in reach

## 2020-11-27 NOTE — PROGRESS NOTES
General Daily Progress Note          Patient Name:   Ernst Styles       YOB: 1945       Age:  66 y.o. Admit Date: 11/25/2020      Subjective:         Seen awake not in distress confused             Objective:     Visit Vitals  BP (!) 102/55 (BP 1 Location: Right arm, BP Patient Position: At rest)   Pulse (!) 48   Temp 98.2 °F (36.8 °C)   Resp 22   Ht 5' 0.98\" (1.549 m)   Wt 41.5 kg (91 lb 7.9 oz)   SpO2 96%   BMI 17.30 kg/m²          [unfilled]      Review of Systems    Constitutional: Negative for chills and fever. HENT: Negative. Eyes: Negative. Respiratory: Negative. Cardiovascular: Negative. Gastrointestinal: Negative for abdominal pain and nausea. Skin: Negative. Neurological: Negative. Physical Exam:      Constitutional: Alert awake  HENT:   Head: Normocephalic and atraumatic. Eyes: Pupils are equal, round, and reactive to light. EOM are normal.   Cardiovascular: Normal rate, regular rhythm and normal heart sounds. Pulmonary/Chest: Breath sounds normal. No wheezes. No rales. Exhibits no tenderness. Abdominal: Soft. Bowel sounds are normal. There is no abdominal tenderness. There is no rebound and no guarding. Musculoskeletal: Normal range of motion.    Neurological: Bilateral lower leg red swollen    XR CHEST SNGL V   Final Result               Results     Procedure Component Value Units Date/Time    CULTURE, URINE [[id number]] Collected:  11/26/20 0945    Order Status:  Completed Specimen:  Urine Updated:  11/26/20 1003    CULTURE, BLOOD #2 [[id number]] Collected:  11/26/20 0122    Order Status:  Completed Specimen:  Blood Updated:  11/26/20 0156    CULTURE, BLOOD #1 [[id number]] Collected:  11/26/20 0112    Order Status:  Completed Specimen:  Blood Updated:  11/26/20 0153    CULTURE, Tsosie Beat STAIN [[id number]]     Order Status:  Sent Specimen:  Leg     COVID-19 RAPID TEST [[id number]] Collected:  11/25/20 1445    Order Status:  Completed Specimen: Nasopharyngeal Updated:  11/25/20 1553     Specimen source Nasopharyngeal        COVID-19 rapid test Not Detected        Comment: Rapid Abbott ID Now   Rapid NAAT:  The specimen is NEGATIVE for SARS-CoV-2, the novel coronavirus associated with COVID-19. Negative results should be treated as presumptive and, if inconsistent with clinical signs and symptoms or necessary for patient management, should be tested with an alternative molecular assay. Negative results do not preclude SARS-CoV-2 infection and should not be used as the sole basis for patient management decisions. This test has been authorized by the FDA under   an Emergency Use Authorization (EUA) for use by authorized laboratories. Fact sheet for Healthcare Providers: ConventionUpdate.co.nz Fact sheet for Patients: ConventionEmitlessdate.co.nz   Methodology: Isothermal Nucleic Acid Amplification                Labs:     Recent Labs     11/26/20 0122 11/25/20 1928   WBC 20.4* 18.6*   HGB 8.9* 9.4*   HCT 28.3* 29.9*   * 435*     Recent Labs     11/26/20 0122 11/25/20  1230    140   K 3.7 4.6   * 106   CO2 27 28   BUN 54* 65*   CREA 0.77 0.90   * 124*   CA 8.5 9.5   MG  --  2.7*     Recent Labs     11/26/20 0122 11/25/20  1230   ALT 11* 12    147*   TBILI 0.5 0.5   TP 6.3* 7.5   ALB 2.1* 2.5*   GLOB 4.2* 5.0*     Recent Labs     11/26/20  2125 11/26/20  1100 11/26/20 0122 11/25/20  1230   INR  --   --   --   --  1.3*   PTP  --   --   --   --  15.7*   APTT 37.2* 39.0* 34.8   < > 32.0    < > = values in this interval not displayed. No results for input(s): FE, TIBC, PSAT, FERR in the last 72 hours. No results found for: FOL, RBCF   No results for input(s): PH, PCO2, PO2 in the last 72 hours.   Recent Labs     11/25/20  1928 11/25/20  1725 11/25/20  1230   TROIQ 0.27* 0.29* 0.26*     No results found for: CHOL, CHOLX, CHLST, CHOLV, HDL, HDLP, LDL, LDLC, DLDLP, TGLX, TRIGL, TRIGP, Annie Buck  No results found for: Tavon [place]  Lab Results   Component Value Date/Time    Color Yellow/Straw 11/25/2020 04:50 PM    Appearance Clear 11/25/2020 04:50 PM    Specific gravity 1.013 11/25/2020 04:50 PM    pH (UA) 5.0 11/25/2020 04:50 PM    Protein Negative 11/25/2020 04:50 PM    Glucose Negative 11/25/2020 04:50 PM    Ketone Negative 11/25/2020 04:50 PM    Bilirubin Negative 11/25/2020 04:50 PM    Urobilinogen 0.1 11/25/2020 04:50 PM    Nitrites Negative 11/25/2020 04:50 PM    Leukocyte Esterase Negative 11/25/2020 04:50 PM    Bacteria Negative 11/25/2020 04:50 PM    WBC 5-10 11/25/2020 04:50 PM    RBC 0-5 11/25/2020 04:50 PM         Assessment:       Acute cellulitis of bilateral legs  Sepsis leukocytosis elevated procalcitonin and lactic acid  Community-acquired pneumonia  Poor hygiene  Elevated troponin  Leukocytosis  Hypothyroidism    Plan:     Continue aspirin Lipitor levothyroxine    On IV Zosyn IV vancomycin  Order Doppler studies of the both leg  Follow-up with infectious disease  Cardiology consult for elevated troponin  PT OT consult and wound care nurse consult        Current Facility-Administered Medications:     piperacillin-tazobactam (ZOSYN) 3.375 g in 0.9% sodium chloride (MBP/ADV) 100 mL MBP, 3.375 g, IntraVENous, Q8H, Keith Solano MD, Last Rate: 25 mL/hr at 11/27/20 0308, 3.375 g at 11/27/20 0308    furosemide (LASIX) injection 20 mg, 20 mg, IntraVENous, BID, Junito Zendejas MD, Stopped at 11/26/20 2100    0.9% sodium chloride infusion, 50 mL/hr, IntraVENous, CONTINUOUS, Keith Solano MD, Last Rate: 50 mL/hr at 11/26/20 1700, 50 mL/hr at 11/26/20 1700    levothyroxine (SYNTHROID) tablet 75 mcg, 75 mcg, Oral, 6am, Keith Solano MD    0.9% sodium chloride infusion, 50 mL/hr, IntraVENous, CONTINUOUS, Keith Solano MD, Last Rate: 50 mL/hr at 11/26/20 0000, 50 mL/hr at 11/26/20 0000    acetaminophen (TYLENOL) tablet 650 mg, 650 mg, Oral, Q6H PRN, 650 mg at 11/26/20 6792 **OR** acetaminophen (TYLENOL) suppository 650 mg, 650 mg, Rectal, Q6H PRN, Jose A Solano MD    polyethylene glycol (MIRALAX) packet 17 g, 17 g, Oral, DAILY PRN, Jose A Solano MD    ondansetron (ZOFRAN ODT) tablet 4 mg, 4 mg, Oral, Q8H PRN **OR** ondansetron (ZOFRAN) injection 4 mg, 4 mg, IntraVENous, Q6H PRN, Keith Solano MD    aspirin tablet 325 mg, 325 mg, Oral, DAILY, Keith Solano MD, 325 mg at 11/26/20 1016    atorvastatin (LIPITOR) tablet 40 mg, 40 mg, Oral, QHS, Keith Solano MD, 40 mg at 11/26/20 2132    Vancomycin Pharmacy Dosing, , Other, PRN, Randal Shanks MD    vancomycin (VANCOCIN) 1,000 mg in 0.9% sodium chloride 250 mL (VIAL-MATE), 1,000 mg, IntraVENous, Q24H, Keith Solano MD, 1,000 mg at 11/26/20 1656    Vancomycin Trough Level to be Drawn at 1400, , Other, Javier Tony MD

## 2020-11-27 NOTE — WOUND CARE
IP WOUND CONSULT Bar Hager MEDICAL RECORD NUMBER:  [id number] AGE: 66 y.o. GENDER: female  : 1945 TODAY'S DATE:  2020 GENERAL  
 
[] Follow-up [x] New Consult Bar Hager is a 66 y.o. female referred by:  
[x] Physician 
[] Nursing 
[] Other: PAST MEDICAL HISTORY No past medical history on file. PAST SURGICAL HISTORY No past surgical history on file. FAMILY HISTORY No family history on file. ALLERGIES Allergies Allergen Reactions  Tetracycline Other (comments) Causes mouth sores  Morphine Nausea and Vomiting MEDICATIONS No current facility-administered medications on file prior to encounter. Current Outpatient Medications on File Prior to Encounter Medication Sig Dispense Refill  carvediloL (COREG) 3.125 mg tablet Take 6.25 mg by mouth two (2) times daily (with meals).  furosemide (LASIX) 40 mg tablet Take 40 mg by mouth daily.  potassium chloride (K-DUR, KLOR-CON) 20 mEq tablet Take 20 mEq by mouth daily.  lisinopriL (PRINIVIL, ZESTRIL) 5 mg tablet Take 5 mg by mouth daily.  methocarbamoL (ROBAXIN) 500 mg tablet Take 500 mg by mouth three (3) times daily.  omeprazole (PRILOSEC) 20 mg capsule Take 20 mg by mouth daily.  apixaban (ELIQUIS) 2.5 mg tablet Take 2.5 mg by mouth two (2) times a day.  levothyroxine (SYNTHROID) 75 mcg tablet Take 75 mcg by mouth daily (before breakfast). Indications: HYPOTHYROIDISM    
 
 
 
[unfilled] Visit Vitals BP (!) 102/55 (BP 1 Location: Right arm, BP Patient Position: At rest) Pulse (!) 48 Temp 98.2 °F (36.8 °C) Resp 22 Ht 5' 0.98\" (1.549 m) Wt 41.5 kg (91 lb 7.9 oz) SpO2 96% BMI 17.30 kg/m² ASSESSMENT Wound Identification: BLEs anterior Wound Type: possible stasis ulcers with cellulitis; malodorous, smell like gangrene Dressing change: Yes, exufiber ag, ABD pad, and kerlix Verbal consent for picture: Contributing Factors: edema, venous stasis, decreased mobility, shear force, malnutrition, poor hygiene and non-adherence Wound Pretibial Left; Anterior; Lateral (Active) Wound Etiology Other (Comment) 11/27/20 1127 Dressing Status Intact; New dressing applied 11/27/20 1127 Cleansed Cleansed with saline 11/27/20 1127 Dressing/Treatment ABD pad;Alginate with Ag;Roll gauze 11/27/20 1127 Wound Length (cm) 15 cm 11/27/20 1127 Wound Width (cm) 14 cm 11/27/20 1127 Wound Surface Area (cm^2) 210 cm^2 11/27/20 1127 Wound Assessment Erythema;Eschar moist;Slough 11/27/20 1127 Drainage Amount Small 11/27/20 1127 Drainage Description Serosanguinous;Brown 11/27/20 1127 Wound Odor Malodorous/Putrid 11/27/20 1127 Dorothy-Wound/Incision Assessment Fragile; Warm 11/27/20 1127 Edges Undefined edges 11/27/20 1127 Wound Thickness Description Full thickness 11/27/20 1127 Number of days: 1 Wound Pretibial Right; Anterior; Lower (Active) Wound Etiology Other (Comment) 11/27/20 1127 Dressing Status Intact; New dressing applied 11/27/20 1127 Cleansed Cleansed with saline 11/27/20 1127 Dressing/Treatment ABD pad;Alginate with Ag;Roll gauze 11/27/20 1127 Wound Length (cm) 16.5 cm 11/27/20 1127 Wound Width (cm) 15 cm 11/27/20 1127 Wound Surface Area (cm^2) 247.5 cm^2 11/27/20 1127 Wound Assessment Erythema;Eschar moist;Slough 11/27/20 1127 Drainage Amount Small 11/27/20 1127 Drainage Description Serosanguinous 11/27/20 1127 Wound Odor Malodorous/Putrid 11/27/20 1127 Dorothy-Wound/Incision Assessment Fragile; Warm 11/27/20 1127 Edges Undefined edges 11/27/20 1127 Wound Thickness Description Full thickness 11/27/20 1127 Number of days: 1 PLAN Skin Care & Pressure Relief Recommendations Speciality bed Waffle Overlay Minimize layers of linen Turn/reposition approximately every 2 hours Pillow wedges Manage incontinence Please use in bed position system Offload heels pillows Physician/Provider notified: Dr. Bárbara Morales, Dr. Rochelle Ontiveros, and Dr. Danyel Kruger Recommendations: Rule out or confirm presence of gangrene; vascular work up; possible debridement. Patient has hair and other fibers in wound bed. I removed as much as she would allow. Weak dorsalis pedis pulses. Teaching completed with:  
[] Patient          
[] Family member      
[] Caregiver         
[] Nursing 
[] Other Patient/Caregiver Teaching: 
Level of patient/caregiver understanding able to:  
[] Indicates understanding       [] Needs reinforcement 
[] Unsuccessful      [] Verbal Understanding 
[] Demonstrated understanding       [] No evidence of learning 
[] Refused teaching         [] N/A Electronically signed by Jonah Uriarte RN on 11/27/2020 at 11:44 AM

## 2020-11-28 LAB
ALBUMIN SERPL-MCNC: 1.5 G/DL (ref 3.5–5)
ALBUMIN/GLOB SERPL: 0.5 {RATIO} (ref 1.1–2.2)
ALP SERPL-CCNC: 90 U/L (ref 45–117)
ALT SERPL-CCNC: 14 U/L (ref 12–78)
ANION GAP SERPL CALC-SCNC: 4 MMOL/L (ref 5–15)
APTT PPP: 31.9 SEC (ref 23–35.7)
APTT PPP: 34.8 SEC (ref 23–35.7)
AST SERPL W P-5'-P-CCNC: 18 U/L (ref 15–37)
ATRIAL RATE: 100 BPM
BACTERIA SPEC CULT: NORMAL
BASOPHILS # BLD: 0 K/UL (ref 0–0.1)
BASOPHILS NFR BLD: 0 % (ref 0–1)
BILIRUB SERPL-MCNC: 0.3 MG/DL (ref 0.2–1)
BUN SERPL-MCNC: 25 MG/DL (ref 6–20)
BUN/CREAT SERPL: 66 (ref 12–20)
CA-I BLD-MCNC: 8.1 MG/DL (ref 8.5–10.1)
CALCULATED P AXIS, ECG09: -55 DEGREES
CALCULATED R AXIS, ECG10: -61 DEGREES
CALCULATED T AXIS, ECG11: 105 DEGREES
CHLORIDE SERPL-SCNC: 109 MMOL/L (ref 97–108)
CO2 SERPL-SCNC: 34 MMOL/L (ref 21–32)
CREAT SERPL-MCNC: 0.38 MG/DL (ref 0.55–1.02)
CREAT SERPL-MCNC: 0.49 MG/DL (ref 0.55–1.02)
CRP SERPL-MCNC: 9.26 MG/DL (ref 0–0.6)
DIAGNOSIS, 93000: NORMAL
DIFFERENTIAL METHOD BLD: ABNORMAL
ECHO AV PEAK GRADIENT: 12 MMHG
ECHO EST RA PRESSURE: 8 MMHG
ECHO LV E' SEPTAL VELOCITY: 6.82 CM/S
ECHO LV EDV A2C: 242 CM3
ECHO LV EJECTION FRACTION A2C: 22 %
ECHO LV EJECTION FRACTION BIPLANE: 52.1 % (ref 55–100)
ECHO LV ESV A2C: 116 CM3
ECHO LV INTERNAL DIMENSION DIASTOLIC: 6.23 CM (ref 3.9–5.3)
ECHO LV INTERNAL DIMENSION SYSTOLIC: 4.88 CM
ECHO LV IVSD: 1.38 CM (ref 0.6–0.9)
ECHO LV MASS 2D: 455.8 G (ref 67–162)
ECHO LV MASS INDEX 2D: 337.9 G/M2 (ref 43–95)
ECHO LV POSTERIOR WALL DIASTOLIC: 1.63 CM (ref 0.6–0.9)
ECHO LVOT PEAK GRADIENT: 3 MMHG
ECHO MV A VELOCITY: 61.1 CM/S
ECHO MV E DECELERATION TIME (DT): 130 MS
ECHO MV E VELOCITY: 168 CM/S
ECHO MV E/A RATIO: 2.75
ECHO MV E/E' SEPTAL: 24.63
ECHO MV REGURGITANT VTIA: 154 CM
ECHO PV PEAK INSTANTANEOUS GRADIENT SYSTOLIC: 7 MMHG
ECHO PV REGURGITANT MAX VELOCITY: 133 CM/S
ECHO PV REGURGITANT MAX VELOCITY: 172 CM/S
ECHO PV REGURGITANT MAX VELOCITY: 520 CM/S
ECHO PV REGURGITANT MAX VELOCITY: 87.7 CM/S
ECHO PVEIN A DURATION: 106 MS
ECHO PVEIN A VELOCITY: 41.6 CM/S
ECHO RIGHT VENTRICULAR SYSTOLIC PRESSURE (RVSP): 55 MMHG
ECHO RV INTERNAL DIMENSION: 4.27 CM
ECHO TV MAX VELOCITY: 341 CM/S
ECHO TV MEAN GRADIENT: 60 MMHG
ECHO TV REGURGITANT PEAK GRADIENT: 47 MMHG
EOSINOPHIL # BLD: 0.1 K/UL (ref 0–0.4)
EOSINOPHIL NFR BLD: 0 % (ref 0–7)
ERYTHROCYTE [DISTWIDTH] IN BLOOD BY AUTOMATED COUNT: 14 % (ref 11.5–14.5)
GLOBULIN SER CALC-MCNC: 2.9 G/DL (ref 2–4)
GLUCOSE SERPL-MCNC: 88 MG/DL (ref 65–100)
HCT VFR BLD AUTO: 24.5 % (ref 35–47)
HGB BLD-MCNC: 7.4 G/DL (ref 11.5–16)
IMM GRANULOCYTES # BLD AUTO: 0.1 K/UL (ref 0–0.04)
IMM GRANULOCYTES NFR BLD AUTO: 0 % (ref 0–0.5)
LACTATE SERPL-SCNC: 0.6 MMOL/L (ref 0.4–2)
LYMPHOCYTES # BLD: 0.8 K/UL (ref 0.8–3.5)
LYMPHOCYTES NFR BLD: 5 % (ref 12–49)
MCH RBC QN AUTO: 29 PG (ref 26–34)
MCHC RBC AUTO-ENTMCNC: 30.2 G/DL (ref 30–36.5)
MCV RBC AUTO: 96.1 FL (ref 80–99)
MONOCYTES # BLD: 0.9 K/UL (ref 0–1)
MONOCYTES NFR BLD: 5 % (ref 5–13)
NEUTS SEG # BLD: 15.1 K/UL (ref 1.8–8)
NEUTS SEG NFR BLD: 90 % (ref 32–75)
P-R INTERVAL, ECG05: 152 MS
PLATELET # BLD AUTO: 392 K/UL (ref 150–400)
PMV BLD AUTO: 9.1 FL (ref 8.9–12.9)
POTASSIUM SERPL-SCNC: 3.9 MMOL/L (ref 3.5–5.1)
PROCALCITONIN SERPL-MCNC: 0.69 NG/ML
PROT SERPL-MCNC: 4.4 G/DL (ref 6.4–8.2)
Q-T INTERVAL, ECG07: 382 MS
QRS DURATION, ECG06: 184 MS
QTC CALCULATION (BEZET), ECG08: 504 MS
RBC # BLD AUTO: 2.55 M/UL (ref 3.8–5.2)
SODIUM SERPL-SCNC: 147 MMOL/L (ref 136–145)
SPECIAL REQUESTS,SREQ: NORMAL
THERAPEUTIC RANGE,PTTT: NORMAL SEC (ref 68–109)
THERAPEUTIC RANGE,PTTT: NORMAL SEC (ref 68–109)
VENTRICULAR RATE, ECG03: 105 BPM
WBC # BLD AUTO: 16.9 K/UL (ref 3.6–11)

## 2020-11-28 PROCEDURE — 84145 PROCALCITONIN (PCT): CPT

## 2020-11-28 PROCEDURE — 74011250637 HC RX REV CODE- 250/637: Performed by: FAMILY MEDICINE

## 2020-11-28 PROCEDURE — 86140 C-REACTIVE PROTEIN: CPT

## 2020-11-28 PROCEDURE — 65270000029 HC RM PRIVATE

## 2020-11-28 PROCEDURE — 85025 COMPLETE CBC W/AUTO DIFF WBC: CPT

## 2020-11-28 PROCEDURE — 36415 COLL VENOUS BLD VENIPUNCTURE: CPT

## 2020-11-28 PROCEDURE — 74011250636 HC RX REV CODE- 250/636: Performed by: FAMILY MEDICINE

## 2020-11-28 PROCEDURE — 85730 THROMBOPLASTIN TIME PARTIAL: CPT

## 2020-11-28 PROCEDURE — 74011000258 HC RX REV CODE- 258: Performed by: FAMILY MEDICINE

## 2020-11-28 PROCEDURE — 83605 ASSAY OF LACTIC ACID: CPT

## 2020-11-28 PROCEDURE — 74011250636 HC RX REV CODE- 250/636: Performed by: INTERNAL MEDICINE

## 2020-11-28 RX ADMIN — ACETAMINOPHEN 650 MG: 325 TABLET, FILM COATED ORAL at 03:51

## 2020-11-28 RX ADMIN — PIPERACILLIN SODIUM AND TAZOBACTAM SODIUM 3.38 G: 3; .375 INJECTION, POWDER, LYOPHILIZED, FOR SOLUTION INTRAVENOUS at 13:28

## 2020-11-28 RX ADMIN — FUROSEMIDE 20 MG: 10 INJECTION, SOLUTION INTRAMUSCULAR; INTRAVENOUS at 10:57

## 2020-11-28 RX ADMIN — PIPERACILLIN SODIUM AND TAZOBACTAM SODIUM 3.38 G: 3; .375 INJECTION, POWDER, LYOPHILIZED, FOR SOLUTION INTRAVENOUS at 21:29

## 2020-11-28 RX ADMIN — VANCOMYCIN HYDROCHLORIDE 750 MG: 750 INJECTION, POWDER, LYOPHILIZED, FOR SOLUTION INTRAVENOUS at 21:29

## 2020-11-28 RX ADMIN — ASPIRIN 325 MG ORAL TABLET 325 MG: 325 PILL ORAL at 10:57

## 2020-11-28 RX ADMIN — PIPERACILLIN SODIUM AND TAZOBACTAM SODIUM 3.38 G: 3; .375 INJECTION, POWDER, LYOPHILIZED, FOR SOLUTION INTRAVENOUS at 06:00

## 2020-11-28 RX ADMIN — FUROSEMIDE 20 MG: 10 INJECTION, SOLUTION INTRAMUSCULAR; INTRAVENOUS at 21:29

## 2020-11-28 NOTE — PROGRESS NOTES
Bedside and Verbal shift change report given to Jack Jones (oncoming nurse) by Tania Harrison RN   (offgoing nurse). Report included the following information SBAR.

## 2020-11-28 NOTE — PROGRESS NOTES
General Daily Progress Note          Patient Name:   Georgette Dodson       YOB: 1945       Age:  66 y.o. Admit Date: 11/25/2020      Subjective:         Seen awake not in distress confused             Objective:     Visit Vitals  BP (!) 105/57 (BP 1 Location: Right arm, BP Patient Position: At rest)   Pulse 65   Temp 97.8 °F (36.6 °C)   Resp 28   Ht 5' 0.63\" (1.54 m)   Wt 41.5 kg (91 lb 7.9 oz)   SpO2 100%   BMI 17.50 kg/m²          [unfilled]      Review of Systems    Constitutional: Negative for chills and fever. HENT: Negative. Eyes: Negative. Respiratory: Negative. Cardiovascular: Negative. Gastrointestinal: Negative for abdominal pain and nausea. Skin: Negative. Neurological: Negative. Physical Exam:      Constitutional: Alert awake  HENT:   Head: Normocephalic and atraumatic. Eyes: Pupils are equal, round, and reactive to light. EOM are normal.   Cardiovascular: Normal rate, regular rhythm and normal heart sounds. Pulmonary/Chest: Breath sounds normal. No wheezes. No rales. Exhibits no tenderness. Abdominal: Soft. Bowel sounds are normal. There is no abdominal tenderness. There is no rebound and no guarding. Musculoskeletal: Normal range of motion. Neurological: Bilateral lower leg red swollen    XR CHEST PA LAT   Final Result   Impression: The cardiomediastinal silhouette is appropriate for age, technique,   and lung expansion. Pulmonary vasculature is not congested. The lungs are   essentially clear. No effusion or pneumothorax is seen.          XR CHEST SNGL V   Final Result         Gram-negative bacteremia      Results     Procedure Component Value Units Date/Time    CULTURE, Elgin Doan [[id number]] Collected:  11/27/20 1115    Order Status:  Completed Specimen:  Wound Updated:  11/27/20 9532     Special Requests: No Special Requests        GRAM STAIN Rare WBCs seen         Few Gram Negative Rods               Occasional Gram Positive Cocci in clusters           Culture result: PENDING    CULTURE, URINE [[id number]] Collected:  11/26/20 0945    Order Status:  Completed Specimen:  Urine Updated:  11/28/20 1050     Special Requests: No Special Requests        Culture result: No Growth (<1000 cfu/mL)       CULTURE, BLOOD #2 [[id number]] Collected:  11/26/20 0122    Order Status:  Completed Specimen:  Blood Updated:  11/27/20 1720     Special Requests: No Special Requests        Culture result:       Four of four bottles have been flagged positive byinstrument. Bottles have been sent to [facility] laboratory to assess for possible growth. Gram Negative Rods CALLED TO AND READ BACK BY Matt Lua R.N. 8507 11/27/20          CULTURE, BLOOD #1 [[id number]] Collected:  11/26/20 0112    Order Status:  Completed Specimen:  Blood Updated:  11/27/20 1722     Special Requests: No Special Requests        Culture result:       Two of two bottles have been flagged positive by instrument. Bottles have been sent to [facility] laboratory to assess for possible growth. Gram Negative Rods CALLED TO Matt Lua R.N. 2159 11/27/20          CULTURE, Ermalinda Rued STAIN [[id number]] Collected:  11/25/20 1930    Order Status:  Completed Specimen:  Wound Updated:  11/27/20 2345     Special Requests: No Special Requests        GRAM STAIN Rare WBCs seen         Rare Gram Negative Rods               Rare Gram Positive Cocci in pairs           Culture result: PENDING    COVID-19 RAPID TEST [[id number]] Collected:  11/25/20 1445    Order Status:  Completed Specimen:  Nasopharyngeal Updated:  11/25/20 1553     Specimen source Nasopharyngeal        COVID-19 rapid test Not Detected        Comment: Rapid Abbott ID Now   Rapid NAAT:  The specimen is NEGATIVE for SARS-CoV-2, the novel coronavirus associated with COVID-19.    Negative results should be treated as presumptive and, if inconsistent with clinical signs and symptoms or necessary for patient management, should be tested with an alternative molecular assay. Negative results do not preclude SARS-CoV-2 infection and should not be used as the sole basis for patient management decisions. This test has been authorized by the FDA under   an Emergency Use Authorization (EUA) for use by authorized laboratories. Fact sheet for Healthcare Providers: ConventionUpdate.co.nz Fact sheet for Patients: ConventionUpdate.co.nz   Methodology: Isothermal Nucleic Acid Amplification                Labs:     Recent Labs     11/28/20  0915 11/27/20  1600   WBC 16.9* 20.8*   HGB 7.4* 7.9*   HCT 24.5* 26.1*    398     Recent Labs     11/28/20  0915 11/27/20  1600 11/26/20  0122   *  --  143   K 3.9  --  3.7   *  --  110*   CO2 34*  --  27   BUN 25*  --  54*   CREA 0.38* 0.49* 0.77   GLU 88  --  108*   CA 8.1*  --  8.5     Recent Labs     11/28/20  0915 11/26/20  0122   ALT 14 11*   AP 90 112   TBILI 0.3 0.5   TP 4.4* 6.3*   ALB 1.5* 2.1*   GLOB 2.9 4.2*     Recent Labs     11/28/20  0056 11/27/20  1600 11/26/20  2125   APTT 31.9 34.2 37.2*      No results for input(s): FE, TIBC, PSAT, FERR in the last 72 hours. No results found for: FOL, RBCF   No results for input(s): PH, PCO2, PO2 in the last 72 hours.   Recent Labs     11/27/20  1600 11/25/20  1928 11/25/20  1725   TROIQ 0.21* 0.27* 0.29*     No results found for: CHOL, CHOLX, CHLST, CHOLV, HDL, HDLP, LDL, LDLC, DLDLP, TGLX, TRIGL, TRIGP, CHHD, CHHDX  No results found for: [facility]  Lab Results   Component Value Date/Time    Color Yellow/Straw 11/25/2020 04:50 PM    Appearance Clear 11/25/2020 04:50 PM    Specific gravity 1.013 11/25/2020 04:50 PM    pH (UA) 5.0 11/25/2020 04:50 PM    Protein Negative 11/25/2020 04:50 PM    Glucose Negative 11/25/2020 04:50 PM    Ketone Negative 11/25/2020 04:50 PM    Bilirubin Negative 11/25/2020 04:50 PM    Urobilinogen 0.1 11/25/2020 04:50 PM    Nitrites Negative 11/25/2020 04:50 PM    Leukocyte Esterase Negative 11/25/2020 04:50 PM    Bacteria Negative 11/25/2020 04:50 PM    WBC 5-10 11/25/2020 04:50 PM    RBC 0-5 11/25/2020 04:50 PM         Assessment:       Acute cellulitis of bilateral legs  Sepsis leukocytosis elevated procalcitonin and lactic acid  Community-acquired pneumonia  Poor hygiene  Elevated troponin  Leukocytosis  Hypothyroidism  Possible gram-negative bacteremia  No evidence of DVT by duplex  Plan:     Continue aspirin Lipitor levothyroxine    On IV Zosyn IV vancomycin  Order Doppler studies of the both leg  Follow-up with infectious disease  PT OT consult and wound care nurse consult        Current Facility-Administered Medications:     Vancomycin Trough to be drawn at 1800 - Informational Use Only, , Other, ONCE, Sohail Khan MD    piperacillin-tazobactam (ZOSYN) 3.375 g in 0.9% sodium chloride (MBP/ADV) 100 mL MBP, 3.375 g, IntraVENous, Q8H, Keith Solano MD, Last Rate: 25 mL/hr at 11/28/20 0600, 3.375 g at 11/28/20 0600    furosemide (LASIX) injection 20 mg, 20 mg, IntraVENous, BID, Dan Cranker, MD, 20 mg at 11/28/20 1057    0.9% sodium chloride infusion, 50 mL/hr, IntraVENous, CONTINUOUS, Keith Solano MD, Last Rate: 50 mL/hr at 11/26/20 1700, 50 mL/hr at 11/26/20 1700    levothyroxine (SYNTHROID) tablet 75 mcg, 75 mcg, Oral, 6am, Keith Solano MD    0.9% sodium chloride infusion, 50 mL/hr, IntraVENous, CONTINUOUS, Keith Solano MD, Last Rate: 50 mL/hr at 11/26/20 0000, 50 mL/hr at 11/26/20 0000    acetaminophen (TYLENOL) tablet 650 mg, 650 mg, Oral, Q6H PRN, 650 mg at 11/28/20 0351 **OR** acetaminophen (TYLENOL) suppository 650 mg, 650 mg, Rectal, Q6H PRN, Chandana Solano MD    polyethylene glycol (MIRALAX) packet 17 g, 17 g, Oral, DAILY PRN, Chandana Solano MD    ondansetron (ZOFRAN ODT) tablet 4 mg, 4 mg, Oral, Q8H PRN **OR** ondansetron (ZOFRAN) injection 4 mg, 4 mg, IntraVENous, Q6H PRN, Keith Solano MD    aspirin tablet 325 mg, 325 mg, Oral, DAILY, Isis Rey MD, 325 mg at 11/28/20 1057    atorvastatin (LIPITOR) tablet 40 mg, 40 mg, Oral, QHS, Keith Solano MD, 40 mg at 11/27/20 2145    Vancomycin Pharmacy Dosing, , Other, PRN, Isis Rey MD

## 2020-11-28 NOTE — PROGRESS NOTES
Cardiology Progress Note    Patient seen and examined. This is a patient who is followed for ADHF. Wants to go home, denies chest pain, appears to be mildly dyspneic. No other complaints reported. Telemetry reviewed, paced with PVC'. Pertinent review of system items noted above, all other systems are negative. Current medications reviewed. Physical Examination  Vital signs are stable, Blood Pressure 121/71 , Pulse 76  No apparent distress. Heart is regular, rate and rhythm. Normal S1, S2, no murmurs are appreciated. Lungs are clear bilaterally. Abdomen is soft, nontender, normal bowel sounds. Extremities have no edema. Labs reviewed     Impression and Recommendations  :  1. Acute systolic CHF: EF 96-62, UNCLEAR IF NEW OR OLD. Continue diuresis. Will need outpatient ischemic work up once sepsis resolves. Will add low dose ACE/BB once euvolemic. Repeat BNP and CXR Monday. 2. PPM:? Will check with device rep from all companies regarding type and battery life. 3. Elevated troponin: Likely demand ischemia from CHF. No active chest pain. 4. Pneumonia and cellulitis: Rx per primary. Please do no hesitate to call if additional questions arise.

## 2020-11-28 NOTE — PROGRESS NOTES
Problem: Falls - Risk of  Goal: *Absence of Falls  Description: Document Darshan Merino Fall Risk and appropriate interventions in the flowsheet.   Outcome: Progressing Towards Goal  Note: Fall Risk Interventions:  Mobility Interventions: Patient to call before getting OOB, Bed/chair exit alarm    Mentation Interventions: Adequate sleep, hydration, pain control    Medication Interventions: Bed/chair exit alarm    Elimination Interventions: Bed/chair exit alarm, Call light in reach

## 2020-11-28 NOTE — PROGRESS NOTES
Notified of critical lab report  with all four blood cultures testing as positive for gram negative remigio. Procalcitonin lab reported as 1. 19. Dr. Rosi Justin paged to inform. No contact was made. Critical lab report of Troponin level 0.21 reported to Dr. Teague Seen.

## 2020-11-29 LAB
APTT PPP: 33.4 SEC (ref 23–35.7)
CREAT SERPL-MCNC: 0.38 MG/DL (ref 0.55–1.02)
MAGNESIUM SERPL-MCNC: 1.6 MG/DL (ref 1.6–2.4)
POTASSIUM SERPL-SCNC: 3.4 MMOL/L (ref 3.5–5.1)
THERAPEUTIC RANGE,PTTT: NORMAL SEC (ref 68–109)

## 2020-11-29 PROCEDURE — 74011250637 HC RX REV CODE- 250/637: Performed by: FAMILY MEDICINE

## 2020-11-29 PROCEDURE — 99222 1ST HOSP IP/OBS MODERATE 55: CPT | Performed by: PODIATRIST

## 2020-11-29 PROCEDURE — 82565 ASSAY OF CREATININE: CPT

## 2020-11-29 PROCEDURE — 65270000029 HC RM PRIVATE

## 2020-11-29 PROCEDURE — 74011250636 HC RX REV CODE- 250/636: Performed by: INTERNAL MEDICINE

## 2020-11-29 PROCEDURE — 85730 THROMBOPLASTIN TIME PARTIAL: CPT

## 2020-11-29 PROCEDURE — 74011000258 HC RX REV CODE- 258: Performed by: FAMILY MEDICINE

## 2020-11-29 PROCEDURE — 74011250636 HC RX REV CODE- 250/636: Performed by: FAMILY MEDICINE

## 2020-11-29 PROCEDURE — 94760 N-INVAS EAR/PLS OXIMETRY 1: CPT

## 2020-11-29 PROCEDURE — 77010033678 HC OXYGEN DAILY

## 2020-11-29 PROCEDURE — 83735 ASSAY OF MAGNESIUM: CPT

## 2020-11-29 PROCEDURE — 84132 ASSAY OF SERUM POTASSIUM: CPT

## 2020-11-29 PROCEDURE — 36415 COLL VENOUS BLD VENIPUNCTURE: CPT

## 2020-11-29 RX ORDER — FUROSEMIDE 10 MG/ML
40 INJECTION INTRAMUSCULAR; INTRAVENOUS 2 TIMES DAILY
Status: DISCONTINUED | OUTPATIENT
Start: 2020-11-29 | End: 2020-12-01

## 2020-11-29 RX ADMIN — ASPIRIN 325 MG ORAL TABLET 325 MG: 325 PILL ORAL at 10:15

## 2020-11-29 RX ADMIN — FUROSEMIDE 40 MG: 10 INJECTION, SOLUTION INTRAMUSCULAR; INTRAVENOUS at 21:37

## 2020-11-29 RX ADMIN — PIPERACILLIN SODIUM AND TAZOBACTAM SODIUM 3.38 G: 3; .375 INJECTION, POWDER, LYOPHILIZED, FOR SOLUTION INTRAVENOUS at 21:55

## 2020-11-29 RX ADMIN — PIPERACILLIN SODIUM AND TAZOBACTAM SODIUM 3.38 G: 3; .375 INJECTION, POWDER, LYOPHILIZED, FOR SOLUTION INTRAVENOUS at 05:15

## 2020-11-29 RX ADMIN — SODIUM CHLORIDE 50 ML/HR: 9 INJECTION, SOLUTION INTRAVENOUS at 21:39

## 2020-11-29 RX ADMIN — VANCOMYCIN HYDROCHLORIDE 750 MG: 750 INJECTION, POWDER, LYOPHILIZED, FOR SOLUTION INTRAVENOUS at 10:15

## 2020-11-29 RX ADMIN — ATORVASTATIN CALCIUM 40 MG: 40 TABLET, FILM COATED ORAL at 10:15

## 2020-11-29 RX ADMIN — PIPERACILLIN SODIUM AND TAZOBACTAM SODIUM 3.38 G: 3; .375 INJECTION, POWDER, LYOPHILIZED, FOR SOLUTION INTRAVENOUS at 13:24

## 2020-11-29 RX ADMIN — VANCOMYCIN HYDROCHLORIDE 750 MG: 750 INJECTION, POWDER, LYOPHILIZED, FOR SOLUTION INTRAVENOUS at 21:37

## 2020-11-29 NOTE — PROGRESS NOTES
General Daily Progress Note          Patient Name:   Ernst Styles       YOB: 1945       Age:  66 y.o. Admit Date: 11/25/2020      Subjective:         Seen awake not in distress confused             Objective:     Visit Vitals  /66   Pulse 69   Temp 98 °F (36.7 °C)   Resp 16   Ht 5' 0.63\" (1.54 m)   Wt 41.5 kg (91 lb 7.9 oz)   SpO2 96%   BMI 17.50 kg/m²          [unfilled]      Review of Systems    Constitutional: Negative for chills and fever. HENT: Negative. Eyes: Negative. Respiratory: Negative. Cardiovascular: Negative. Gastrointestinal: Negative for abdominal pain and nausea. Skin: Negative. Neurological: Negative. Physical Exam:      Constitutional: Alert awake  HENT:   Head: Normocephalic and atraumatic. Eyes: Pupils are equal, round, and reactive to light. EOM are normal.   Cardiovascular: Normal rate, regular rhythm and normal heart sounds. Pulmonary/Chest: Breath sounds normal. No wheezes. No rales. Exhibits no tenderness. Abdominal: Soft. Bowel sounds are normal. There is no abdominal tenderness. There is no rebound and no guarding. Musculoskeletal: Normal range of motion. Neurological: Bilateral lower leg red swollen    XR CHEST PA LAT   Final Result   Impression: The cardiomediastinal silhouette is appropriate for age, technique,   and lung expansion. Pulmonary vasculature is not congested. The lungs are   essentially clear. No effusion or pneumothorax is seen.          XR CHEST SNGL V   Final Result         Gram-negative bacteremia      Results     Procedure Component Value Units Date/Time    CULTURE, Reena Lai [[id number]] Collected:  11/27/20 1115    Order Status:  Completed Specimen:  Wound Updated:  11/29/20 1343     Special Requests: No Special Requests        GRAM STAIN Rare WBCs seen         Few Gram Negative Rods               Occasional Gram Positive Cocci in clusters           Culture result:       Heavy MIXED ENTERIC Gram Negative Rods                  Heavy  preliminary report of probable S. Aureus (Negative for antigen detection) confirmation and sensitivities to follow.   Sensitivity to follow        Heavy  Mixed skin nica isolated       CULTURE, URINE [[id number]] Collected:  11/26/20 0945    Order Status:  Completed Specimen:  Urine Updated:  11/28/20 1050     Special Requests: No Special Requests        Culture result: No Growth (<1000 cfu/mL)       CULTURE, BLOOD #2 [[id number]]  (Abnormal) Collected:  11/26/20 0122    Order Status:  Completed Specimen:  Blood Updated:  11/29/20 1115     Special Requests: No Special Requests        Culture result:       Providencia rettgeri GROWING IN THE AEROBIC AND ANAEROBIC BOTTLE (SITE = ARM) Sensitivity to follow                  Gram Negative Rods CALLED TO AND READ BACK BY Noa Naranjo R.N. 1730 11/27/20          CULTURE, BLOOD #1 [[id number]]  (Abnormal) Collected:  11/26/20 0112    Order Status:  Completed Specimen:  Blood Updated:  11/29/20 0729     Special Requests: No Special Requests        Culture result:       Gram Negative Rods growing in both bottles drawn (SITE =ARM)                  Gram Negative Rods CALLED TO Noa Naranjo R.N. 7192 11/27/20          CULTURE, Doris Blair STAIN [[id number]] Collected:  11/25/20 1930    Order Status:  Completed Specimen:  Wound Updated:  11/29/20 1344     Special Requests: No Special Requests        GRAM STAIN Rare WBCs seen         Rare Gram Negative Rods               Rare Gram Positive Cocci in pairs           Culture result:       Heavy MIXED ENTERIC Gram Negative Rods                  Heavy Staphylococcus aureus REFER TO [facility] [id number] FOR SENSITIVITIES            Heavy  Mixed skin nica isolated       COVID-19 RAPID TEST [[id number]] Collected:  11/25/20 1445    Order Status:  Completed Specimen:  Nasopharyngeal Updated:  11/25/20 1553     Specimen source Nasopharyngeal        COVID-19 rapid test Not Detected        Comment: Rapid Abbott ID Now   Rapid NAAT:  The specimen is NEGATIVE for SARS-CoV-2, the novel coronavirus associated with COVID-19. Negative results should be treated as presumptive and, if inconsistent with clinical signs and symptoms or necessary for patient management, should be tested with an alternative molecular assay. Negative results do not preclude SARS-CoV-2 infection and should not be used as the sole basis for patient management decisions. This test has been authorized by the FDA under   an Emergency Use Authorization (EUA) for use by authorized laboratories. Fact sheet for Healthcare Providers: ConventionUpdate.co.nz Fact sheet for Patients: ConventionUpdate.co.nz   Methodology: Isothermal Nucleic Acid Amplification                Labs:     Recent Labs     11/28/20  0915 11/27/20  1600   WBC 16.9* 20.8*   HGB 7.4* 7.9*   HCT 24.5* 26.1*    398     Recent Labs     11/29/20  1149 11/28/20  0915 11/27/20  1600   NA  --  147*  --    K 3.4* 3.9  --    CL  --  109*  --    CO2  --  34*  --    BUN  --  25*  --    CREA 0.38* 0.38* 0.49*   GLU  --  88  --    CA  --  8.1*  --    MG 1.6  --   --      Recent Labs     11/28/20  0915   ALT 14   AP 90   TBILI 0.3   TP 4.4*   ALB 1.5*   GLOB 2.9     Recent Labs     11/29/20  1149 11/28/20  1323 11/28/20  0056   APTT 33.4 34.8 31.9      No results for input(s): FE, TIBC, PSAT, FERR in the last 72 hours. No results found for: FOL, RBCF   No results for input(s): PH, PCO2, PO2 in the last 72 hours.   Recent Labs     11/27/20  1600   TROIQ 0.21*     No results found for: CHOL, CHOLX, CHLST, CHOLV, HDL, HDLP, LDL, LDLC, DLDLP, TGLX, TRIGL, TRIGP, CHHD, CHHDX  No results found for: [facility]  Lab Results   Component Value Date/Time    Color Yellow/Straw 11/25/2020 04:50 PM    Appearance Clear 11/25/2020 04:50 PM    Specific gravity 1.013 11/25/2020 04:50 PM    pH (UA) 5.0 11/25/2020 04:50 PM    Protein Negative 11/25/2020 04:50 PM Glucose Negative 11/25/2020 04:50 PM    Ketone Negative 11/25/2020 04:50 PM    Bilirubin Negative 11/25/2020 04:50 PM    Urobilinogen 0.1 11/25/2020 04:50 PM    Nitrites Negative 11/25/2020 04:50 PM    Leukocyte Esterase Negative 11/25/2020 04:50 PM    Bacteria Negative 11/25/2020 04:50 PM    WBC 5-10 11/25/2020 04:50 PM    RBC 0-5 11/25/2020 04:50 PM         Assessment:       Acute cellulitis of bilateral legs  Sepsis leukocytosis elevated procalcitonin and lactic acid  Community-acquired pneumonia  Poor hygiene  Elevated troponin  Leukocytosis  Hypothyroidism  Possible gram-negative bacteremia  No evidence of DVT by duplex  Systolic heart failure cardiology following.   Adjusted diuretics due to poor output  PAD  Bilateral leg wounds  Plan:     Continue diuretics and also continue with IV antibiotics  Vascular surgeon evaluation pending            Current Facility-Administered Medications:     furosemide (LASIX) injection 40 mg, 40 mg, IntraVENous, BID, Dani Bonner MD    vancomycin (VANCOCIN) 750 mg in 0.9% sodium chloride 250 mL (VIAL-MATE), 750 mg, IntraVENous, Q12H, Keith Solano MD, 750 mg at 11/29/20 1015    piperacillin-tazobactam (ZOSYN) 3.375 g in 0.9% sodium chloride (MBP/ADV) 100 mL MBP, 3.375 g, IntraVENous, Q8H, Keith Solano MD, Last Rate: 25 mL/hr at 11/29/20 1324, 3.375 g at 11/29/20 1324    0.9% sodium chloride infusion, 50 mL/hr, IntraVENous, CONTINUOUS, Keith Solano MD, Last Rate: 50 mL/hr at 11/26/20 1700, 50 mL/hr at 11/26/20 1700    levothyroxine (SYNTHROID) tablet 75 mcg, 75 mcg, Oral, 6am, Keith Solano MD    0.9% sodium chloride infusion, 50 mL/hr, IntraVENous, CONTINUOUS, Keith Solano MD, Last Rate: 50 mL/hr at 11/26/20 0000, 50 mL/hr at 11/26/20 0000    acetaminophen (TYLENOL) tablet 650 mg, 650 mg, Oral, Q6H PRN, 650 mg at 11/28/20 0351 **OR** acetaminophen (TYLENOL) suppository 650 mg, 650 mg, Rectal, Q6H PRN, Andrzej Solano MD    polyethylene glycol (MIRALAX) packet 17 g, 17 g, Oral, DAILY PRN, Esperanza Solano MD    ondansetron (ZOFRAN ODT) tablet 4 mg, 4 mg, Oral, Q8H PRN **OR** ondansetron (ZOFRAN) injection 4 mg, 4 mg, IntraVENous, Q6H PRN, Keith Solano MD    aspirin tablet 325 mg, 325 mg, Oral, DAILY, Keith Solano MD, 325 mg at 11/29/20 1015    atorvastatin (LIPITOR) tablet 40 mg, 40 mg, Oral, QHS, Keith Solano MD, 40 mg at 11/29/20 1015    Vancomycin Pharmacy Dosing, , Other, PRN, Vashti Warner MD

## 2020-11-29 NOTE — CONSULTS
[facility] CON[facility]ULT NOTE    Subjective:         Date of Consultation: November 29, 2020     Referring Physician: Elicia Rodriguez MD    Patient is a 66 y.o. female who is being seen for bilateral leg ulcers. Patient has no documented past medical hx. States her PCP sent her to the ED to get checked out. She is a poor historian. States she has extreme pain in her legs, rising to 9/10, that is exacerbated with dressing changes. She denies any trauma. Per admit H&P, pt has poor hygiene/poor living conditions. Pt was admitted having SOB and appeared to be septic. Patient Active Problem List    Diagnosis Date Noted    PNA (pneumonia) 11/25/2020    Pneumonia 11/25/2020     No past medical history on file. No past surgical history on file. No family history on file.    Social History     Tobacco Use    Smoking status: Not on file   Substance Use Topics    Alcohol use: Not on file     Current Facility-Administered Medications   Medication Dose Route Frequency Provider Last Rate Last Dose    furosemide (LASIX) injection 40 mg  40 mg IntraVENous BID Dan Cranker, MD        vancomycin (VANCOCIN) 750 mg in 0.9% sodium chloride 250 mL (VIAL-MATE)  750 mg IntraVENous Q12H Keith Solano MD   750 mg at 11/29/20 1015    piperacillin-tazobactam (ZOSYN) 3.375 g in 0.9% sodium chloride (MBP/ADV) 100 mL MBP  3.375 g IntraVENous Q8H Keith Solano MD 25 mL/hr at 11/29/20 0515 3.375 g at 11/29/20 0515    0.9% sodium chloride infusion  50 mL/hr IntraVENous CONTINUOUS Keith Solano MD 50 mL/hr at 11/26/20 1700 50 mL/hr at 11/26/20 1700    levothyroxine (SYNTHROID) tablet 75 mcg  75 mcg Oral 6am Keith Solano MD        0.9% sodium chloride infusion  50 mL/hr IntraVENous CONTINUOUS Keith Solano MD 50 mL/hr at 11/26/20 0000 50 mL/hr at 11/26/20 0000    acetaminophen (TYLENOL) tablet 650 mg  650 mg Oral Q6H PRN Chandana Solano MD   650 mg at 11/28/20 0351    Or    acetaminophen (TYLENOL) suppository 650 mg  650 mg Rectal Q6H PRN Kristyn Solano MD        polyethylene glycol (MIRALAX) packet 17 g  17 g Oral DAILY PRN Kristyn Solano MD        ondansetron (ZOFRAN ODT) tablet 4 mg  4 mg Oral Q8H PRN Kristyn Solano MD        Or    ondansetron [facility]) injection 4 mg  4 mg IntraVENous Q6H PRN Kristyn Solano MD        aspirin tablet 325 mg  325 mg Oral DAILY Keith Solano MD   325 mg at 20 1015    atorvastatin (LIPITOR) tablet 40 mg  40 mg Oral QHS Keith Solano MD   40 mg at 20 1015    Vancomycin Pharmacy Dosing   Other PRN Kristyn Solano MD          Allergies   Allergen Reactions    Tetracycline Other (comments)     Causes mouth sores    Morphine Nausea and Vomiting        Review of Systems:    Constitutional: No fever, No chills, No sweats, + weakness, No fatigue  Respiratory: No shortness of breath, No cough, No sputum production, No hemoptysis, No wheezing, No cyanosis. Cardiovascular: No chest pain, No palpitations, No bradycardia, No tachycardia, No peripheral edema, No syncope. Gastrointestinal: No nausea, No vomiting, No diarrhea, No constipation, No heartburn, No abdominal pain. Endocrine: No excessive thirst, No polyuria, No cold intolerance, No heat intolerance, No excessive hunger. Immunologic: No immunocompromised, No recurrent fevers, No recurrent infections. Musculoskeletal: No back pain, No neck pain, No joint pain, No muscle pain, No claudication, + decreased range of motion, No trauma. Integumentary: + Bilateral legs ulcers, No rash, No pruritus, No abrasions. Neurologic: Alert and oriented X4, No abnormal balance, No headache, + confusion, No numbness, No tingling. Psychiatric: + anxiety, No depression, No hussain.     Objective:     Patient Vitals for the past 8 hrs:   SpO2   20 0924 96 %     Temp (24hrs), Av.9 °F (36.6 °C), Min:97.8 °F (36.6 °C), Max:98 °F (36.7 °C)      Physical Exam:    GEN: Pt is AAOx4 and in NAD. Dressings noted to B/L LE are C/D/I. No family noted at [facility]  DERM: Wounds noted to both LE with purulence and eschar. Malodor noted. Periwound erythema noted. No dry skin noted to B/L LE. No proximal lymphatic streaking noted to B/L LE. VASC: Pedal pulses (DP/PT) faintly palpable to B/L LE. CFT<3sec to all digits of B/L LE. No pedal hair growth noted to the level of the digits for B/L LE. Skin temp is warm to warm from proximal to distal for B/L LE. Neg homans/maggi signs to B/L LE. No varicosities or telangectasias noted to B/L LE.  NEURO: Protective and epicritic sensations grossly intact to B/L LE  MSK: (+) POP, No gross deformities. Decreased muscle tone and bulk noted to B/L LE.  PSYCH: Cooperative with normal mood and affect    Lab Review:   Recent Results (from the past 24 hour(s))   PTT    Collection Time: 11/28/20  1:23 PM   Result Value Ref Range    aPTT 34.8 23.0 - 35.7 sec    aPTT, therapeutic range   68 - 109 sec       Impression:     - Bilateral LE Wounds  - PAD    Recommendation:     Patient seen and evaluated at bedside  - Current labs personally reviewed and findings dicussed with patient  - Wound care performed  - Venous US personally reviewed noting no DVT. Arterial US ordered to eval inflow for wound healing potential. Pending vascular consult and recs  - XR ordered of B/L tib/fib area to eval for osseous involvement  - Abx per ID  - I will follow closely    Thank you for the consult! Pt will need a surgical debridement. Timing dependent on diagnostic findings and vascular recs      Scott Long DPM, [facility]  [address] [place] [address], [address] [place] [address]  Evelin Abhinav:  [phone]  F:  [phone]  C:  [phone]

## 2020-11-29 NOTE — PROGRESS NOTES
Consult for Vancomycin Dosing by Pharmacy by Dr. Master Najera provided for this 66y.o. year old female , for indication of SSTI. Day of Therapy: 4  Goal of Level(s): 15-20mcg/dL    Other Current Antibiotics: Zosyn    Significant Cultures:       Date                             Culture                                       Organism      11/27                            Wound                                      GNR, S. Aureus      11/26                            Blood x4                                    GNR    Serum Creatinine Creatinine   Date Value Ref Range Status   11/28/2020 0.38 (L) 0.55 - 1.02 mg/dL Final   11/27/2020 0.49 (L) 0.55 - 1.02 mg/dL Final   11/26/2020 0.77 0.55 - 1.02 mg/dL Final      Creatinine Clearance Estimated Creatinine Clearance: 44.1 mL/min (A) (by C-G formula based on SCr of 0.38 mg/dL (L)). BUN Lab Results   Component Value Date/Time    BUN 25 (H) 11/28/2020 09:15 AM      WBC Lab Results   Component Value Date/Time    WBC 16.9 (H) 11/28/2020 09:15 AM      Temp 97.8 °F (36.6 °C)     Last Level:   Vancomycin,trough   Date Value Ref Range Status   11/27/2020 6.6 5.0 - 10.0 ug/mL Final     Comment:        Trough levels of 15-20 ug/mL should be targeted for patients with coagulase negative Staphylococcus and MRSA pneumonia, endocarditis,osteomyelitis, meningitis, and bacteremia, as well as patients not responding to lower levels. Trough levels of 10-15 ug/mL for infections from other sources (e.g. urinary tract, cellulitis) are appropriate. All patients receiving concomitant nephrotoxic therapies should have their function closely monitored regardless of peak or trough levels. Ht Readings from Last 1 Encounters:   11/27/20 154 cm (60.63\")        Wt Readings from Last 1 Encounters:   11/27/20 41.5 kg (91 lb 7.9 oz)     Ideal body weight: 51.5 kg (113 lb 8.7 oz)     Previous Regimen: 1000mg IV q24h    New Regimen:   Patient's trough level was subtherapeutic.   Increased Vancomycin 750mg IV q12h. Pharmacy to follow daily and will make changes to dose and/or frequency based on clinical status.     _________________________________     Pharmacist Haven Bright

## 2020-11-29 NOTE — PROGRESS NOTES
Problem: Falls - Risk of  Goal: *Absence of Falls  Description: Document Karly Alegre Fall Risk and appropriate interventions in the flowsheet.   Outcome: Progressing Towards Goal  Note: Fall Risk Interventions:  Mobility Interventions: Patient to call before getting OOB, Bed/chair exit alarm    Mentation Interventions: Door open when patient unattended, Bed/chair exit alarm    Medication Interventions: Patient to call before getting OOB, Bed/chair exit alarm    Elimination Interventions: Bed/chair exit alarm, Call light in reach

## 2020-11-29 NOTE — PROGRESS NOTES
Cardiology Progress Note    Patient seen and examined. This is a patient who is followed for ADHF. Wants to go home, denies chest pain, appears to be mildly dyspneic. No other complaints reported. Telemetry reviewed, paced with PVC'. Pertinent review of system items noted above, all other systems are negative. Current medications reviewed. Physical Examination  Vital signs are stable, Blood Pressure 102/60 , Pulse 76  No apparent distress. Heart is regular, rate and rhythm. Normal S1, S2, no murmurs are appreciated. Lungs are clear bilaterally. Abdomen is soft, nontender, normal bowel sounds. Extremities have no edema. Labs reviewed     Impression and Recommendations  :  1. Acute systolic CHF: EF 72-33, UNCLEAR IF NEW OR OLD. Continue diuresis, will increase lasix to 40 bid as poor output response. Will need outpatient ischemic work up once sepsis resolves. Will add low dose ACE/BB once euvolemic. Repeat BNP and CXR Monday. 2. PPM:? Will check with device rep from all companies regarding type and battery life. 3. Elevated troponin: Likely demand ischemia from CHF. No active chest pain. 4. Pneumonia and cellulitis: Rx per primary. Please do no hesitate to call if additional questions arise.

## 2020-11-30 LAB
BACTERIA SPEC CULT: ABNORMAL
BNP SERPL-MCNC: ABNORMAL PG/ML
BNP SERPL-MCNC: ABNORMAL PG/ML
CREAT SERPL-MCNC: 0.41 MG/DL (ref 0.55–1.02)
SPECIAL REQUESTS,SREQ: ABNORMAL
SPECIAL REQUESTS,SREQ: ABNORMAL
VANCOMYCIN TROUGH SERPL-MCNC: 8.6 UG/ML (ref 5–10)

## 2020-11-30 PROCEDURE — 36415 COLL VENOUS BLD VENIPUNCTURE: CPT

## 2020-11-30 PROCEDURE — 99232 SBSQ HOSP IP/OBS MODERATE 35: CPT | Performed by: PODIATRIST

## 2020-11-30 PROCEDURE — 83880 ASSAY OF NATRIURETIC PEPTIDE: CPT

## 2020-11-30 PROCEDURE — 77010033678 HC OXYGEN DAILY

## 2020-11-30 PROCEDURE — 74011250637 HC RX REV CODE- 250/637: Performed by: FAMILY MEDICINE

## 2020-11-30 PROCEDURE — 80202 ASSAY OF VANCOMYCIN: CPT

## 2020-11-30 PROCEDURE — 74011250636 HC RX REV CODE- 250/636: Performed by: INTERNAL MEDICINE

## 2020-11-30 PROCEDURE — 74011250636 HC RX REV CODE- 250/636: Performed by: FAMILY MEDICINE

## 2020-11-30 PROCEDURE — 94760 N-INVAS EAR/PLS OXIMETRY 1: CPT

## 2020-11-30 PROCEDURE — 65270000029 HC RM PRIVATE

## 2020-11-30 PROCEDURE — 99232 SBSQ HOSP IP/OBS MODERATE 35: CPT | Performed by: INTERNAL MEDICINE

## 2020-11-30 PROCEDURE — 74011000258 HC RX REV CODE- 258: Performed by: FAMILY MEDICINE

## 2020-11-30 PROCEDURE — 82565 ASSAY OF CREATININE: CPT

## 2020-11-30 RX ADMIN — ATORVASTATIN CALCIUM 40 MG: 40 TABLET, FILM COATED ORAL at 21:59

## 2020-11-30 RX ADMIN — PIPERACILLIN SODIUM AND TAZOBACTAM SODIUM 3.38 G: 3; .375 INJECTION, POWDER, LYOPHILIZED, FOR SOLUTION INTRAVENOUS at 05:33

## 2020-11-30 RX ADMIN — FUROSEMIDE 40 MG: 10 INJECTION, SOLUTION INTRAMUSCULAR; INTRAVENOUS at 10:32

## 2020-11-30 RX ADMIN — ASPIRIN 325 MG ORAL TABLET 325 MG: 325 PILL ORAL at 10:32

## 2020-11-30 RX ADMIN — VANCOMYCIN HYDROCHLORIDE 750 MG: 750 INJECTION, POWDER, LYOPHILIZED, FOR SOLUTION INTRAVENOUS at 16:27

## 2020-11-30 RX ADMIN — FUROSEMIDE 40 MG: 10 INJECTION, SOLUTION INTRAMUSCULAR; INTRAVENOUS at 21:59

## 2020-11-30 RX ADMIN — PIPERACILLIN SODIUM AND TAZOBACTAM SODIUM 3.38 G: 3; .375 INJECTION, POWDER, LYOPHILIZED, FOR SOLUTION INTRAVENOUS at 11:56

## 2020-11-30 RX ADMIN — PIPERACILLIN SODIUM AND TAZOBACTAM SODIUM 3.38 G: 3; .375 INJECTION, POWDER, LYOPHILIZED, FOR SOLUTION INTRAVENOUS at 22:19

## 2020-11-30 RX ADMIN — VANCOMYCIN HYDROCHLORIDE 750 MG: 750 INJECTION, POWDER, LYOPHILIZED, FOR SOLUTION INTRAVENOUS at 10:32

## 2020-11-30 RX ADMIN — ATORVASTATIN CALCIUM 40 MG: 40 TABLET, FILM COATED ORAL at 10:32

## 2020-11-30 NOTE — PROGRESS NOTES
Consult for Vancomycin Dosing by Pharmacy by Dr. Cohen Alt provided for this 66y.o. year old female , for indication of SSTI. Day of Therapy: 6  Goal of Level(s): 15-20mcg/dL    Other Current Antibiotics: Zosyn    Significant Cultures:       Date                             Culture                                       Organism      11/27                            Wound                                      GNR, S. Aureus      11/26                            Blood x4                                    GNR    Serum Creatinine Creatinine   Date Value Ref Range Status   11/28/2020 0.38 (L) 0.55 - 1.02 mg/dL Final   11/27/2020 0.49 (L) 0.55 - 1.02 mg/dL Final   11/26/2020 0.77 0.55 - 1.02 mg/dL Final      Creatinine Clearance Estimated Creatinine Clearance: 44.1 mL/min (A) (by C-G formula based on SCr of 0.38 mg/dL (L)). BUN Lab Results   Component Value Date/Time    BUN 25 (H) 11/28/2020 09:15 AM      WBC Lab Results   Component Value Date/Time    WBC 16.9 (H) 11/28/2020 09:15 AM      Temp 97.8 °F (36.6 °C)     Last Level:   Vancomycin,trough   Date Value Ref Range Status   11/27/2020 6.6 5.0 - 10.0 ug/mL Final     Comment:        Trough levels of 15-20 ug/mL should be targeted for patients with coagulase negative Staphylococcus and MRSA pneumonia, endocarditis,osteomyelitis, meningitis, and bacteremia, as well as patients not responding to lower levels. Trough levels of 10-15 ug/mL for infections from other sources (e.g. urinary tract, cellulitis) are appropriate. All patients receiving concomitant nephrotoxic therapies should have their function closely monitored regardless of peak or trough levels. New Regimen:  Changed  vancomycin to 750 mg q8hrs. A trough level was ordered on 12/1/2020 at 1400. Pharmacy to follow daily and will make changes to dose and/or frequency based on clinical status.     _________________________________     Leticia REN

## 2020-11-30 NOTE — PROGRESS NOTES
Progress Note    Patient: Linus Hudson MRN: [id number]  SSN: [id number]    YOB: 1945  Age: 66 y.o. Sex: female      Admit Date: 11/25/2020    LOS: 5 days     Subjective:   Patient followed for sepsis with cellulitis both legs and pyuria. Blood cultures grew Providencia. Wound culture with mixed Gram negative nica and S. Aureus. Urine culture negative. She remains afebrile. WBC, procalcitonin and CRP decreasing. Currently on Vancomycin and Zosyn. She is resting comfortably. Objective:     Vitals:    11/29/20 1937 11/30/20 0220 11/30/20 0739 11/30/20 0901   BP: 108/67 135/89 115/68    Pulse: 90 67 99    Resp: 17 16 16    Temp: 97.9 °F (36.6 °C) 97.5 °F (36.4 °C) 97.7 °F (36.5 °C)    SpO2: 97% 99% 97% 96%   Weight:       Height:            Intake and Output:  Current Shift: No intake/output data recorded. Last three shifts: 11/28 1901 - 11/30 0700  In: 1950 [P.O.:1600; I.V.:350]  Out: 1700 [Urine:1700]    Physical Exam:   Patient unavailable for re-examination  Constitutional:       General: She is not in acute distress. Appearance: She is ill-appearing. She is not diaphoretic. HENT:      Head: Normocephalic and atraumatic. Nose:      Comments: Nasal O2 cannula  Genitourinary:     Comments: Purewick catheter  Musculoskeletal:         General: Swelling present. No tenderness. Right lower leg: Edema present. Left lower leg: Edema present. Comments: Both calves are bandaged at this time with bulky gauze dressing, however, erythema has markedly decreased along with swelling. Skin:     Findings: Erythema (see above) present. No rash. Neurological:      General: No focal deficit present. Mental Status: She is alert. She is disoriented.    Psychiatric:         Behavior: Behavior normal.     Lab/Data Review:     WBC Pending <16,900  Procalcitonin 0.69 <1.19  CRP Pending 9.26 <16.00    Covid-19 negative    Blood cultures (11/26) Providejosé rettgeri  Blood cultures (11/26) Providenica rettgeri  Urine culture (11/26) No growth FiNAL  Wound culture (11/27) Heavy mixed enteric Gram negative rods, possible S. aureus  Assessment:     Active Problems:    PNA (pneumonia) (11/25/2020)      Pneumonia (11/25/2020)    1. Cellulitis, bilateral lower extremities, severe, culture pending, Day #5 IV Vancomycin and Zosyn. 2. Gram negative bacteremia with Providencia rettgeri, probably secondary to above, on IV Zosyn. 3. Sepsis with leukocytosis, elevated procalcitonin, CRP and lactic acid, resolving. 3. Abnormal CXR with SHO infiltrate, significance unclear  4. SOB  5. Pyuria, negative urine culture    Plan:   1. Continue IV Vancomycin and Zosyn  2. Follow-up wound cultures  3. In am, repeat CBC, procalcitonin, CRP  4.  Follow-up ASO, Anti-Dnase beta strep antibody           Signed By: Primo Delgado MD     November 30, 2020

## 2020-11-30 NOTE — PROGRESS NOTES
General Daily Progress Note          Patient Name:   Charly Daily       YOB: 1945       Age:  66 y.o. Admit Date: 11/25/2020      Subjective:         Seen awake not in distress confused             Objective:     Visit Vitals  /68   Pulse 99   Temp 97.7 °F (36.5 °C)   Resp 16   Ht 5' 0.63\" (1.54 m)   Wt 41.5 kg (91 lb 7.9 oz)   SpO2 96%   BMI 17.50 kg/m²          [unfilled]      Review of Systems    Constitutional: Negative for chills and fever. HENT: Negative. Eyes: Negative. Respiratory: Negative. Cardiovascular: Negative. Gastrointestinal: Negative for abdominal pain and nausea. Skin: Negative. Neurological: Negative. Physical Exam:      Constitutional: Alert awake  HENT:   Head: Normocephalic and atraumatic. Eyes: Pupils are equal, round, and reactive to light. EOM are normal.   Cardiovascular: Normal rate, regular rhythm and normal heart sounds. Pulmonary/Chest: Breath sounds normal. No wheezes. No rales. Exhibits no tenderness. Abdominal: Soft. Bowel sounds are normal. There is no abdominal tenderness. There is no rebound and no guarding. Musculoskeletal: Normal range of motion. Neurological: Bilateral lower leg red swollen    XR CHEST PA LAT   Final Result   Impression: The cardiomediastinal silhouette is appropriate for age, technique,   and lung expansion. Pulmonary vasculature is not congested. The lungs are   essentially clear. No effusion or pneumothorax is seen.          XR CHEST SNGL V   Final Result               Results     Procedure Component Value Units Date/Time    CULTURE, Emilia Calli [[id number]] Collected:  11/27/20 1115    Order Status:  Completed Specimen:  Wound Updated:  11/29/20 1343     Special Requests: No Special Requests        GRAM STAIN Rare WBCs seen         Few Gram Negative Rods               Occasional Gram Positive Cocci in clusters           Culture result:       Heavy MIXED ENTERIC Gram Negative Rods Heavy  preliminary report of probable S. Aureus (Negative for antigen detection) confirmation and sensitivities to follow.   Sensitivity to follow        Heavy  Mixed skin nica isolated       CULTURE, URINE [[id number]] Collected:  11/26/20 0945    Order Status:  Completed Specimen:  Urine Updated:  11/28/20 1050     Special Requests: No Special Requests        Culture result: No Growth (<1000 cfu/mL)       CULTURE, BLOOD #2 [[id number]]  (Abnormal) Collected:  11/26/20 0122    Order Status:  Completed Specimen:  Blood Updated:  11/29/20 1115     Special Requests: No Special Requests        Culture result:       Providencia rettgeri GROWING IN THE AEROBIC AND ANAEROBIC BOTTLE (SITE = ARM) Sensitivity to follow                  Gram Negative Rods CALLED TO AND READ BACK BY Bebeto Mancera R.N. 1730 11/27/20          CULTURE, BLOOD #1 [[id number]]  (Abnormal) Collected:  11/26/20 0112    Order Status:  Completed Specimen:  Blood Updated:  11/29/20 0729     Special Requests: No Special Requests        Culture result:       Gram Negative Rods growing in both bottles drawn (SITE =ARM)                  Gram Negative Rods CALLED TO Bebeto Mancera R.N. 0613 11/27/20          CULTURE, Mahesh Bark STAIN [[id number]] Collected:  11/25/20 1930    Order Status:  Completed Specimen:  Wound Updated:  11/29/20 1344     Special Requests: No Special Requests        GRAM STAIN Rare WBCs seen         Rare Gram Negative Rods               Rare Gram Positive Cocci in pairs           Culture result:       Heavy MIXED ENTERIC Gram Negative Rods                  Heavy Staphylococcus aureus REFER TO [facility] [id number] FOR SENSITIVITIES            Heavy  Mixed skin nica isolated       COVID-19 RAPID TEST [[id number]] Collected:  11/25/20 1445    Order Status:  Completed Specimen:  Nasopharyngeal Updated:  11/25/20 1553     Specimen source Nasopharyngeal        COVID-19 rapid test Not Detected        Comment: Rapid Abbott ID Now   Rapid NAAT:  The specimen is NEGATIVE for SARS-CoV-2, the novel coronavirus associated with COVID-19. Negative results should be treated as presumptive and, if inconsistent with clinical signs and symptoms or necessary for patient management, should be tested with an alternative molecular assay. Negative results do not preclude SARS-CoV-2 infection and should not be used as the sole basis for patient management decisions. This test has been authorized by the FDA under   an Emergency Use Authorization (EUA) for use by authorized laboratories. Fact sheet for Healthcare Providers: ConventionUpdate.co.nz Fact sheet for Patients: ConventionUpdate.co.nz   Methodology: Isothermal Nucleic Acid Amplification                Labs:     Recent Labs     11/28/20  0915 11/27/20  1600   WBC 16.9* 20.8*   HGB 7.4* 7.9*   HCT 24.5* 26.1*    398     Recent Labs     11/29/20  1149 11/28/20  0915 11/27/20  1600   NA  --  147*  --    K 3.4* 3.9  --    CL  --  109*  --    CO2  --  34*  --    BUN  --  25*  --    CREA 0.38* 0.38* 0.49*   GLU  --  88  --    CA  --  8.1*  --    MG 1.6  --   --      Recent Labs     11/28/20  0915   ALT 14   AP 90   TBILI 0.3   TP 4.4*   ALB 1.5*   GLOB 2.9     Recent Labs     11/29/20  1149 11/28/20  1323 11/28/20  0056   APTT 33.4 34.8 31.9      No results for input(s): FE, TIBC, PSAT, FERR in the last 72 hours. No results found for: FOL, RBCF   No results for input(s): PH, PCO2, PO2 in the last 72 hours.   Recent Labs     11/27/20  1600   TROIQ 0.21*     No results found for: CHOL, CHOLX, CHLST, CHOLV, HDL, HDLP, LDL, LDLC, DLDLP, TGLX, TRIGL, TRIGP, CHHD, CHHDX  No results found for: Santino Hayes  Lab Results   Component Value Date/Time    Color Yellow/Straw 11/25/2020 04:50 PM    Appearance Clear 11/25/2020 04:50 PM    Specific gravity 1.013 11/25/2020 04:50 PM    pH (UA) 5.0 11/25/2020 04:50 PM    Protein Negative 11/25/2020 04:50 PM    Glucose Negative 11/25/2020 04:50 PM    Ketone Negative 11/25/2020 04:50 PM    Bilirubin Negative 11/25/2020 04:50 PM    Urobilinogen 0.1 11/25/2020 04:50 PM    Nitrites Negative 11/25/2020 04:50 PM    Leukocyte Esterase Negative 11/25/2020 04:50 PM    Bacteria Negative 11/25/2020 04:50 PM    WBC 5-10 11/25/2020 04:50 PM    RBC 0-5 11/25/2020 04:50 PM         Assessment:     Acute systolic heart failure with ejection fraction of 15 to 20%  Acute cellulitis of bilateral legs  Sepsis leukocytosis elevated procalcitonin and lactic acid  Community-acquired pneumonia  Poor hygiene  Elevated troponin  Leukocytosis  Hypothyroidism  Anemia hemoglobin of 7.4 on 11/28    Plan:     Continue aspirin Lipitor levothyroxine    On IV Zosyn IV vancomycin  Doppler studies negative for DVT  Lasix 40 mg IV twice daily  Follow-up with infectious disease  Cardiology consult for elevated troponin  PT OT consult and wound care nurse consult  Repeat the labs in the morning  If hemoglobin less than 7.0 will transfuse        Current Facility-Administered Medications:     furosemide (LASIX) injection 40 mg, 40 mg, IntraVENous, BID, Matt Jimenez MD, 40 mg at 11/29/20 2137    vancomycin (VANCOCIN) 750 mg in 0.9% sodium chloride 250 mL (VIAL-MATE), 750 mg, IntraVENous, Q12H, Keith Solano MD, 750 mg at 11/29/20 2137    piperacillin-tazobactam (ZOSYN) 3.375 g in 0.9% sodium chloride (MBP/ADV) 100 mL MBP, 3.375 g, IntraVENous, Q8H, Keith Solano MD, Last Rate: 25 mL/hr at 11/30/20 0533, 3.375 g at 11/30/20 0533    0.9% sodium chloride infusion, 50 mL/hr, IntraVENous, CONTINUOUS, Keith Solano MD, Last Rate: 50 mL/hr at 11/26/20 1700, 50 mL/hr at 11/26/20 1700    levothyroxine (SYNTHROID) tablet 75 mcg, 75 mcg, Oral, 6am, Keith Solano MD    0.9% sodium chloride infusion, 50 mL/hr, IntraVENous, CONTINUOUS, Keith Solano MD, Last Rate: 50 mL/hr at 11/29/20 2139, 50 mL/hr at 11/29/20 2139    acetaminophen (TYLENOL) tablet 650 mg, 650 mg, Oral, Q6H PRN, 650 mg at 11/28/20 0351 **OR** acetaminophen (TYLENOL) suppository 650 mg, 650 mg, Rectal, Q6H PRN, Princess Sapphire Solano MD    polyethylene glycol (MIRALAX) packet 17 g, 17 g, Oral, DAILY PRN, Princess Sapphire Solano MD    ondansetron (ZOFRAN ODT) tablet 4 mg, 4 mg, Oral, Q8H PRN **OR** ondansetron (ZOFRAN) injection 4 mg, 4 mg, IntraVENous, Q6H PRN, Keith Solano MD    aspirin tablet 325 mg, 325 mg, Oral, DAILY, Keith Solano MD, 325 mg at 11/29/20 1015    atorvastatin (LIPITOR) tablet 40 mg, 40 mg, Oral, QHS, Keith Solano MD, 40 mg at 11/29/20 1015    Vancomycin Pharmacy Dosing, , Other, PRN, Micaela Mixon MD

## 2020-11-30 NOTE — PROGRESS NOTES
Progress Note  Date:2020       Room:208/01  Patient Name:Naa Osorio     Date of Birth:     Age:75 y.o. Subjective    Subjective   Pt seen at [facility]. Denies any new complaints but states she is still having mild leg pain. Per nursing, no acute overnight changes. Review of Systems   Constitutional: No fever, No chills, No sweats, + weakness, No fatigue  Immunologic: No immunocompromised, No recurrent fevers, No recurrent infections. Musculoskeletal: No back pain, No neck pain, No joint pain, No muscle pain, No claudication, + decreased range of motion, No trauma. Integumentary: + Bilateral legs ulcers, No rash, No pruritus, No abrasions. Neurologic: Alert and oriented X4, No abnormal balance, No headache, + confusion, No numbness, No tingling. Psychiatric: + anxiety, No depression, No hussain. Objective         Vitals Last 24 Hours:  TEMPERATURE:  Temp  Av.8 °F (36.6 °C)  Min: 97.5 °F (36.4 °C)  Max: 98 °F (36.7 °C)  RESPIRATIONS RANGE: Resp  Av.6  Min: 16  Max: 17  PULSE OXIMETRY RANGE: SpO2  Av.7 %  Min: 92 %  Max: 99 %  PULSE RANGE: Pulse  Av.4  Min: 67  Max: 104  BLOOD PRESSURE RANGE: Systolic (18KED), XOW:659 , Min:107 , ZDK:085   ; Diastolic (82URQ), TKY:68, Min:65, Max:89    I/O (24Hr): Intake/Output Summary (Last 24 hours) at 2020 7790  Last data filed at 2020 0221  Gross per 24 hour   Intake 700 ml   Output 500 ml   Net 200 ml     Objective   GEN: Pt is AAOx4 and in NAD. Dressings noted to B/L LE are C/D/I. No family noted at [facility]  DERM: Wounds noted to both LE with purulence and eschar. Malodor noted. Periwound erythema noted. No dry skin noted to B/L LE. No proximal lymphatic streaking noted to B/L LE. VASC: Pedal pulses (DP/PT) faintly palpable to B/L LE. CFT<3sec to all digits of B/L LE. No pedal hair growth noted to the level of the digits for B/L LE. Skin temp is warm to warm from proximal to distal for B/L LE. Neg homans/maggi signs to B/L LE. No varicosities or telangectasias noted to B/L LE.  NEURO: Protective and epicritic sensations grossly intact to B/L LE  MSK: (+) POP, No gross deformities. Decreased muscle tone and bulk noted to B/L LE.  PSYCH: Cooperative with normal mood and affect    Labs/Imaging/Diagnostics    Labs:  CBC:  Recent Labs     11/28/20  0915   WBC 16.9*   RBC 2.55*   HGB 7.4*   HCT 24.5*   MCV 96.1   RDW 14.0        CHEMISTRIES:  Recent Labs     11/29/20  1149 11/28/20  0915   NA  --  147*   K 3.4* 3.9   CL  --  109*   CO2  --  34*   BUN  --  25*   CA  --  8.1*   MG 1.6  --    PT/INR:No results for input(s): INR, INREXT in the last 72 hours. No lab exists for component: PROTIME  APTT:  Recent Labs     11/29/20  1149 11/28/20  1323 11/28/20  0056   APTT 33.4 34.8 31.9     LIVER PROFILE:  Recent Labs     11/28/20  0915   AST 18   ALT 14     Lab Results   Component Value Date/Time    ALT (SGPT) 14 11/28/2020 09:15 AM    AST (SGOT) 18 11/28/2020 09:15 AM    Alk. phosphatase 90 11/28/2020 09:15 AM    Bilirubin, total 0.3 11/28/2020 09:15 AM       Imaging Last 24 Hours:  No results found. Assessment//Plan   Active Problems:    PNA (pneumonia) (11/25/2020)      Pneumonia (11/25/2020)      Assessment & Plan    - Bilateral LE Wounds  - PAD        Patient seen and evaluated at bedside  - No recent labs to review  - Cont current wound care  - Pending arterial US to eval inflow for healing potential. Also, pending vascular consult and recs  - Pending XR ordered of B/L tib/fib area to eval for osseous involvement  - Abx per ID  - I will follow closely     Pt will need a surgical debridement. Timing dependent on diagnostic findings and vascular recs        Scott Long DPM, [facility]  [address], [address] [facility]  [place] Maid:  [phone]  F:  [phone]  C:  [phone]  Electronically signed by Emi Murillo Miladis Coyle on 11/30/2020 at 5:37 PM

## 2020-11-30 NOTE — PROGRESS NOTES
Problem: Falls - Risk of  Goal: *Absence of Falls  Description: Document Mazin Szymanski Fall Risk and appropriate interventions in the flowsheet. Outcome: Progressing Towards Goal  Note: Fall Risk Interventions:  Mobility Interventions: Patient to call before getting OOB, PT Consult for mobility concerns, OT consult for ADLs, Bed/chair exit alarm, Utilize walker, cane, or other assistive device    Mentation Interventions: Door open when patient unattended, Bed/chair exit alarm    Medication Interventions: Bed/chair exit alarm    Elimination Interventions: Bed/chair exit alarm, Call light in reach              Problem: Patient Education: Go to Patient Education Activity  Goal: Patient/Family Education  Outcome: Progressing Towards Goal     Problem: Pressure Injury - Risk of  Goal: *Prevention of pressure injury  Description: Document Dustin Scale and appropriate interventions in the flowsheet.   Outcome: Progressing Towards Goal  Note: Pressure Injury Interventions:  Sensory Interventions: Minimize linen layers    Moisture Interventions: Absorbent underpads    Activity Interventions: PT/OT evaluation    Mobility Interventions: PT/OT evaluation    Nutrition Interventions: Document food/fluid/supplement intake    Friction and Shear Interventions: Minimize layers, HOB 30 degrees or less                Problem: Patient Education: Go to Patient Education Activity  Goal: Patient/Family Education  Outcome: Progressing Towards Goal     Problem: Nutrition Deficit  Goal: *Optimize nutritional status  Outcome: Progressing Towards Goal

## 2020-11-30 NOTE — PROGRESS NOTES
Comprehensive Nutrition Assessment    Type and Reason for Visit: Reassess(Interim)    Nutrition Recommendations/Plan:     Continue Cardiac diet  Continue Ensure Enlive TID  Add Bert daily  [place] pt preferences     Obtain updated measured wt as LE edema improves     Nursing to document %meal and supplement intakes in I/Os      Nutrition Assessment:  Brought to ED after found hypoxic and cyanotic at home, noted poor living conditions. Pt unclear as to reasoning of being in hospital. CXR concerning finding SHO infiltrates. Bilat LE stasis wounds present on admit- [facility] following, noted wounds concerning for gangrene. Plans for surgical debridement per Podiatry. Dx sepsis, cellulitis, PNA, pyruria. Cardio following for acute CHF. On 3L NC. Ordered Cardiac diet with good intake documentation per nsg as follows: (11/26) 90%, 85%, 85%; (11/27) 35%, 40%, 45%; (11/28) 30%, 50%; (11/29) 40%, 80%. Good acceptance of Ensure Enlive, will also add Bert to promote wound healing. Pt resting, remains confused per MD notes. On initial visit, pt appeared to have good appetite, eating during interview. Agreead to [employer]. Limited interview d/t trying to finish meal and [place]. Labs: H/H: 7.4/24.5, K 3.4, Cr 0.38, BUN WNL, BG WNL. Meds: Zosyn, vanc, statin, lasix. Malnutrition Assessment:  Malnutrition Status: Moderate malnutrition    Context:  Chronic illness       Estimated Daily Nutrient Needs:  Energy (kcal): 1235kcal (32kcal/kg EDW); Weight Used for Energy Requirements: (EDW)  Protein (g): 50g pro (1.3g/kg); Weight Used for Protein Requirements: (EDW)  Fluid (ml/day): 1235mL; Method Used for Fluid Requirements: 1 ml/kcal      Needs for wt gain, wound healing    Nutrition Related Findings: Will f/u for NFPE as able, pt resting. On visual assessment, noted muscle wasting to deltoid and clavicle. Pt denied c/s difficulty n/v or c/d. Last BM 11/28 per EMR. Edema improving, reduced from 3+ to 2+pitting bilat LE edema. Wounds:    Venous stasis(bilat LE ulcers concerning for [place] [facility]       Current Nutrition Therapies:  DIET CARDIAC Regular  DIET NUTRITIONAL SUPPLEMENTS Breakfast, Lunch, Dinner; Ensure [employer] (strawberry)    Anthropometric Measures:  · Height:  5' 0.63\" (154 cm)  · Current Body Wt:  38.6 kg (85 lb)(EDW)   · Admission Body Wt:  91 lb 7.9 oz(bed scale 11/26; noted 3+pitting edema present)    · Usual Body Wt:  50.3 kg (111 lb)(Pt reported weight from 'several months ago' at PCP office)     · Ideal Body Wt:  105 lbs:  81 %    · BMI Category:  Underweight (BMI less than 18.5)     No wt hx available to assess per EMR. Pt reports UBW of 111lbs several months ago - indicating 20lb wt loss (>9%BW) in estimated 3-4 months - significant.     Nutrition Diagnosis:   · Underweight related to (suboptimal PO intakes to maintain appropriate BW) as evidenced by BMI, weight loss    Nutrition Interventions:   Food and/or Nutrient Delivery: Continue current diet, Modify oral nutrition supplement  Nutrition Education and Counseling: No recommendations at this time  Coordination of Nutrition Care: Continue to monitor while inpatient    Goals:  Meet >75% EENs x 5-7 days (progressing)  Wt gain 1kg/week from EDW (NEDA)  Lytes and BG WNL (not progressing)      Nutrition Monitoring and Evaluation:   Behavioral-Environmental Outcomes: None identified  Food/Nutrient Intake Outcomes: Food and nutrient intake, Supplement intake  Physical Signs/Symptoms Outcomes: GI status, Weight, Skin    Discharge Planning:    Continue oral nutrition supplement(to promote wound healing)     Electronically signed by Yuko Staton on 11/30/2020 at 10:23 AM    Contact: [phone] [phone]

## 2020-11-30 NOTE — PROGRESS NOTES
Cardiology Progress Note    Patient seen and examined. This is a patient who is followed for Acute HF. Mildly confused and restless in bed. She admits to shortness of breath. No chest pain. Nursing at bedside. Telemetry reviewed, paced with PVCs. Pertinent review of system items noted above, all other systems are negative. Current medications reviewed. Physical Examination  Vital signs are stable, Blood Pressure 115/68 , Pulse 99  No apparent distress. Heart is regular, rate and rhythm. Normal S1, S2, no murmurs are appreciated. Lungs are clear bilaterally. Abdomen is soft, nontender, normal bowel sounds. Extremities have non pitting Edema, wrapped with krillex    Labs reviewed   No labs today     Case discussed with Dr. Frances Jacobo and our impression and recommendations are as follows:  1. Acute systolic CHF: EF 36-34, UNCLEAR IF NEW OR OLD. Continue diuresis, will increase lasix to 40 bid. BNP 83984, still elevated. Renal function pending. Will need outpatient ischemic work up once sepsis resolves. Will add low dose ACE/BB once euvolemic. 2. PPM:? Appears to be a [employer] PPM. Will contact appropriate rep for interrogation  3. Elevated troponin: Likely demand ischemia from CHF. No active chest pain. 4. Pneumonia and cellulitis: Rx per primary. Please do no hesitate to call if additional questions arise.

## 2020-12-01 ENCOUNTER — ANESTHESIA EVENT (OUTPATIENT)
Dept: SURGERY | Age: 75
DRG: 853 | End: 2020-12-01
Payer: MEDICARE

## 2020-12-01 ENCOUNTER — APPOINTMENT (OUTPATIENT)
Dept: GENERAL RADIOLOGY | Age: 75
DRG: 853 | End: 2020-12-01
Attending: INTERNAL MEDICINE
Payer: MEDICARE

## 2020-12-01 ENCOUNTER — APPOINTMENT (OUTPATIENT)
Dept: NUCLEAR MEDICINE | Age: 75
DRG: 853 | End: 2020-12-01
Attending: INTERNAL MEDICINE
Payer: MEDICARE

## 2020-12-01 ENCOUNTER — ANESTHESIA (OUTPATIENT)
Dept: SURGERY | Age: 75
DRG: 853 | End: 2020-12-01
Payer: MEDICARE

## 2020-12-01 ENCOUNTER — APPOINTMENT (OUTPATIENT)
Dept: NON INVASIVE DIAGNOSTICS | Age: 75
DRG: 853 | End: 2020-12-01
Attending: INTERNAL MEDICINE
Payer: MEDICARE

## 2020-12-01 LAB
ALBUMIN SERPL-MCNC: 1.5 G/DL (ref 3.5–5)
ALBUMIN/GLOB SERPL: 0.4 {RATIO} (ref 1.1–2.2)
ALP SERPL-CCNC: 96 U/L (ref 45–117)
ALT SERPL-CCNC: 12 U/L (ref 12–78)
ANION GAP SERPL CALC-SCNC: 2 MMOL/L (ref 5–15)
AST SERPL W P-5'-P-CCNC: 12 U/L (ref 15–37)
BACTERIA SPEC CULT: NORMAL
BASOPHILS # BLD: 0 K/UL (ref 0–0.1)
BASOPHILS NFR BLD: 0 % (ref 0–1)
BILIRUB SERPL-MCNC: 0.2 MG/DL (ref 0.2–1)
BNP SERPL-MCNC: ABNORMAL PG/ML
BUN SERPL-MCNC: 11 MG/DL (ref 6–20)
BUN/CREAT SERPL: 28 (ref 12–20)
CA-I BLD-MCNC: 8 MG/DL (ref 8.5–10.1)
CHLORIDE SERPL-SCNC: 101 MMOL/L (ref 97–108)
CO2 SERPL-SCNC: 44 MMOL/L (ref 21–32)
CREAT SERPL-MCNC: 0.4 MG/DL (ref 0.55–1.02)
CRP SERPL-MCNC: 3.04 MG/DL (ref 0–0.6)
DATE LAST DOSE: ABNORMAL
DIFFERENTIAL METHOD BLD: ABNORMAL
EOSINOPHIL # BLD: 0.2 K/UL (ref 0–0.4)
EOSINOPHIL NFR BLD: 1 % (ref 0–7)
ERYTHROCYTE [DISTWIDTH] IN BLOOD BY AUTOMATED COUNT: 13.7 % (ref 11.5–14.5)
GLOBULIN SER CALC-MCNC: 4.2 G/DL (ref 2–4)
GLUCOSE SERPL-MCNC: 87 MG/DL (ref 65–100)
GRAM STN SPEC: NORMAL
HCT VFR BLD AUTO: 27.9 % (ref 35–47)
HGB BLD-MCNC: 8 G/DL (ref 11.5–16)
IMM GRANULOCYTES # BLD AUTO: 0.2 K/UL (ref 0–0.04)
IMM GRANULOCYTES NFR BLD AUTO: 1 % (ref 0–0.5)
LYMPHOCYTES # BLD: 0.7 K/UL (ref 0.8–3.5)
LYMPHOCYTES NFR BLD: 5 % (ref 12–49)
MCH RBC QN AUTO: 28.3 PG (ref 26–34)
MCHC RBC AUTO-ENTMCNC: 28.7 G/DL (ref 30–36.5)
MCV RBC AUTO: 98.6 FL (ref 80–99)
MONOCYTES # BLD: 0.9 K/UL (ref 0–1)
MONOCYTES NFR BLD: 7 % (ref 5–13)
NEUTS SEG # BLD: 10.9 K/UL (ref 1.8–8)
NEUTS SEG NFR BLD: 86 % (ref 32–75)
PLATELET # BLD AUTO: 525 K/UL (ref 150–400)
PMV BLD AUTO: 8.9 FL (ref 8.9–12.9)
POTASSIUM SERPL-SCNC: 2.9 MMOL/L (ref 3.5–5.1)
PROCALCITONIN SERPL-MCNC: 0.1 NG/ML
PROT SERPL-MCNC: 5.7 G/DL (ref 6.4–8.2)
RBC # BLD AUTO: 2.83 M/UL (ref 3.8–5.2)
REPORTED DOSE,DOSE: ABNORMAL UNITS
SODIUM SERPL-SCNC: 147 MMOL/L (ref 136–145)
SPECIAL REQUESTS,SREQ: NORMAL
STREP DNASE B SER-ACNC: 87 U/ML
STRESS BASELINE DIAS BP: 68 MMHG
STRESS BASELINE HR: 82 BPM
STRESS BASELINE SYS BP: 115 MMHG
STRESS PERCENT HR ACHIEVED: 85 %
STRESS POST PEAK HR: 122 BPM
STRESS ST DEPRESSION: 0 MM
STRESS ST ELEVATION: 0 MM
STRESS STAGE 1 DURATION: NORMAL MIN:SEC
STRESS STAGE 1 HR: 93 BPM
STRESS STAGE 2 BP: NORMAL MMHG
STRESS STAGE 2 DURATION: NORMAL MIN:SEC
STRESS STAGE 2 HR: 95 BPM
STRESS STAGE 3 BP: NORMAL MMHG
STRESS STAGE 3 DURATION: NORMAL MIN:SEC
STRESS STAGE 3 HR: 93 BPM
STRESS STAGE 4 DURATION: NORMAL MIN:SEC
STRESS STAGE 4 HR: 95 BPM
STRESS TARGET HR: 143 BPM
VANCOMYCIN TROUGH SERPL-MCNC: 19.2 UG/ML (ref 5–10)
WBC # BLD AUTO: 12.7 K/UL (ref 3.6–11)

## 2020-12-01 PROCEDURE — 84145 PROCALCITONIN (PCT): CPT

## 2020-12-01 PROCEDURE — 71045 X-RAY EXAM CHEST 1 VIEW: CPT

## 2020-12-01 PROCEDURE — 94640 AIRWAY INHALATION TREATMENT: CPT

## 2020-12-01 PROCEDURE — 74011250636 HC RX REV CODE- 250/636: Performed by: FAMILY MEDICINE

## 2020-12-01 PROCEDURE — 74011250636 HC RX REV CODE- 250/636: Performed by: INTERNAL MEDICINE

## 2020-12-01 PROCEDURE — P9047 ALBUMIN (HUMAN), 25%, 50ML: HCPCS | Performed by: INTERNAL MEDICINE

## 2020-12-01 PROCEDURE — 86140 C-REACTIVE PROTEIN: CPT

## 2020-12-01 PROCEDURE — 74011000250 HC RX REV CODE- 250: Performed by: FAMILY MEDICINE

## 2020-12-01 PROCEDURE — 85025 COMPLETE CBC W/AUTO DIFF WBC: CPT

## 2020-12-01 PROCEDURE — 36415 COLL VENOUS BLD VENIPUNCTURE: CPT

## 2020-12-01 PROCEDURE — 78452 HT MUSCLE IMAGE SPECT MULT: CPT

## 2020-12-01 PROCEDURE — 99232 SBSQ HOSP IP/OBS MODERATE 35: CPT | Performed by: INTERNAL MEDICINE

## 2020-12-01 PROCEDURE — 74011000258 HC RX REV CODE- 258: Performed by: FAMILY MEDICINE

## 2020-12-01 PROCEDURE — 99232 SBSQ HOSP IP/OBS MODERATE 35: CPT | Performed by: PODIATRIST

## 2020-12-01 PROCEDURE — 93017 CV STRESS TEST TRACING ONLY: CPT

## 2020-12-01 PROCEDURE — 80053 COMPREHEN METABOLIC PANEL: CPT

## 2020-12-01 PROCEDURE — 74011250636 HC RX REV CODE- 250/636

## 2020-12-01 PROCEDURE — 65270000029 HC RM PRIVATE

## 2020-12-01 PROCEDURE — 83880 ASSAY OF NATRIURETIC PEPTIDE: CPT

## 2020-12-01 PROCEDURE — 80202 ASSAY OF VANCOMYCIN: CPT

## 2020-12-01 PROCEDURE — 94760 N-INVAS EAR/PLS OXIMETRY 1: CPT

## 2020-12-01 PROCEDURE — 74011250636 HC RX REV CODE- 250/636: Performed by: NURSE PRACTITIONER

## 2020-12-01 PROCEDURE — 77010033678 HC OXYGEN DAILY

## 2020-12-01 PROCEDURE — 74011250637 HC RX REV CODE- 250/637: Performed by: FAMILY MEDICINE

## 2020-12-01 PROCEDURE — 74011250637 HC RX REV CODE- 250/637: Performed by: INTERNAL MEDICINE

## 2020-12-01 RX ORDER — FUROSEMIDE 10 MG/ML
40 INJECTION INTRAMUSCULAR; INTRAVENOUS 2 TIMES DAILY
Status: DISCONTINUED | OUTPATIENT
Start: 2020-12-01 | End: 2020-12-02

## 2020-12-01 RX ORDER — IPRATROPIUM BROMIDE AND ALBUTEROL SULFATE 2.5; .5 MG/3ML; MG/3ML
3 SOLUTION RESPIRATORY (INHALATION)
Status: DISCONTINUED | OUTPATIENT
Start: 2020-12-01 | End: 2020-12-12

## 2020-12-01 RX ORDER — POTASSIUM CHLORIDE 750 MG/1
40 TABLET, FILM COATED, EXTENDED RELEASE ORAL EVERY 4 HOURS
Status: COMPLETED | OUTPATIENT
Start: 2020-12-01 | End: 2020-12-02

## 2020-12-01 RX ORDER — ALBUMIN HUMAN 250 G/1000ML
25 SOLUTION INTRAVENOUS EVERY 6 HOURS
Status: COMPLETED | OUTPATIENT
Start: 2020-12-01 | End: 2020-12-02

## 2020-12-01 RX ORDER — FUROSEMIDE 10 MG/ML
40 INJECTION INTRAMUSCULAR; INTRAVENOUS DAILY
Status: DISCONTINUED | OUTPATIENT
Start: 2020-12-02 | End: 2020-12-01

## 2020-12-01 RX ADMIN — PIPERACILLIN SODIUM AND TAZOBACTAM SODIUM 3.38 G: 3; .375 INJECTION, POWDER, LYOPHILIZED, FOR SOLUTION INTRAVENOUS at 15:08

## 2020-12-01 RX ADMIN — ACETAMINOPHEN 650 MG: 325 TABLET, FILM COATED ORAL at 04:49

## 2020-12-01 RX ADMIN — PIPERACILLIN SODIUM AND TAZOBACTAM SODIUM 3.38 G: 3; .375 INJECTION, POWDER, LYOPHILIZED, FOR SOLUTION INTRAVENOUS at 20:17

## 2020-12-01 RX ADMIN — LEVOTHYROXINE SODIUM 75 MCG: 0.07 TABLET ORAL at 04:49

## 2020-12-01 RX ADMIN — FUROSEMIDE 40 MG: 10 INJECTION, SOLUTION INTRAMUSCULAR; INTRAVENOUS at 08:00

## 2020-12-01 RX ADMIN — ATORVASTATIN CALCIUM 40 MG: 40 TABLET, FILM COATED ORAL at 22:28

## 2020-12-01 RX ADMIN — PIPERACILLIN SODIUM AND TAZOBACTAM SODIUM 3.38 G: 3; .375 INJECTION, POWDER, LYOPHILIZED, FOR SOLUTION INTRAVENOUS at 03:15

## 2020-12-01 RX ADMIN — IPRATROPIUM BROMIDE AND ALBUTEROL SULFATE 3 ML: .5; 3 SOLUTION RESPIRATORY (INHALATION) at 20:10

## 2020-12-01 RX ADMIN — POTASSIUM CHLORIDE 40 MEQ: 750 TABLET, FILM COATED, EXTENDED RELEASE ORAL at 20:17

## 2020-12-01 RX ADMIN — ALBUMIN (HUMAN) 25 G: 0.25 INJECTION, SOLUTION INTRAVENOUS at 17:41

## 2020-12-01 RX ADMIN — POTASSIUM CHLORIDE 40 MEQ: 750 TABLET, FILM COATED, EXTENDED RELEASE ORAL at 15:09

## 2020-12-01 RX ADMIN — FUROSEMIDE 40 MG: 10 INJECTION, SOLUTION INTRAMUSCULAR; INTRAVENOUS at 20:39

## 2020-12-01 RX ADMIN — IPRATROPIUM BROMIDE AND ALBUTEROL SULFATE 3 ML: .5; 3 SOLUTION RESPIRATORY (INHALATION) at 13:23

## 2020-12-01 RX ADMIN — REGADENOSON 0.4 MG: 0.08 INJECTION, SOLUTION INTRAVENOUS at 11:07

## 2020-12-01 RX ADMIN — VANCOMYCIN HYDROCHLORIDE 750 MG: 750 INJECTION, POWDER, LYOPHILIZED, FOR SOLUTION INTRAVENOUS at 01:59

## 2020-12-01 RX ADMIN — ASPIRIN 325 MG ORAL TABLET 325 MG: 325 PILL ORAL at 07:59

## 2020-12-01 RX ADMIN — VANCOMYCIN HYDROCHLORIDE 750 MG: 750 INJECTION, POWDER, LYOPHILIZED, FOR SOLUTION INTRAVENOUS at 08:00

## 2020-12-01 NOTE — PROGRESS NOTES
Cardiology Progress Note    Patient seen and examined. This is a patient who is followed for Acute HF. Seen during nuclear stress test. Mildly confused. She admits to shortness of breath. No chest pain. She is hungry and thirsty. Otherwise she wants to be left alone and no other complaints reported. Telemetry reviewed, V paced with frequent PVCs. Pertinent review of system items noted above, all other systems are negative. Current medications reviewed. Physical Examination  Vital signs are stable, Blood Pressure 113/70 , Pulse 99  No apparent distress. Heart is regular, rate and rhythm. Normal S1, S2, no murmurs are appreciated. Lungs are clear bilaterally. Abdomen is soft, nontender, normal bowel sounds. Extremities have non pitting Edema, wrapped with krillex    Labs reviewed   K 2.9   Crt 0.40      Case discussed with Dr. Angie Min and our impression and recommendations are as follows:  1. Acute systolic CHF: EF 58-55, UNCLEAR IF NEW OR OLD. Continue diuresis, with lasix IV 40 bid. BNP 66241, still elevated. Renal function low normal. Will monitor. Stress test done today and pending reading. Will add low dose ACE/BB once euvolemic. Please keep serum potassium between 4-5 and serum magnesium > 2. K 2.9, repletion have been ordered already. Will monitor with diuresis. 2. PPM: [employer] PPM. Contacted rep. Per documents implanted in 2005 by Dr. [facility] for complete heart block. Interrogated today showing several NSVT, unsure if they correlate with possible syncopal episode. 3. Elevated troponin: Likely demand ischemia from CHF. No active chest pain. 4. Pneumonia and cellulitis: Rx per primary. Pending lower extremity wound debridement. Please do no hesitate to call if additional questions arise.

## 2020-12-01 NOTE — PROGRESS NOTES
Progress Note    Patient: Rayne Peñaloza MRN: [id number]  SSN: [id number]    YOB: 1945  Age: 66 y.o. Sex: female      Admit Date: 11/25/2020    LOS: 6 days     Subjective:   Patient followed for sepsis with cellulitis both legs and pyuria. Blood cultures grew Providencia. Wound culture with mixed Gram negative nica and MSSA. She remains afebrile. WBC, procalcitonin and CRP still decreasing. Currently on Vancomycin and Zosyn. She was seen by Pulmonary because of SOB attributed to pulmlonary edema, but patient states it is because of how she is lying in bed. Also seen by Vascular Surgery because of leg pain, felt to be venous stasis disease and not ischemic. Objective:     Vitals:    12/01/20 0203 12/01/20 0845 12/01/20 1104 12/01/20 1324   BP: 117/64 113/70 115/68    Pulse: 76 81     Resp: 18 18     Temp: 98.2 °F (36.8 °C) 97.5 °F (36.4 °C)     SpO2: 99% 100%  99%   Weight:   91 lb (41.3 kg)    Height:   5' (1.524 m)         Intake and Output:  Current Shift: No intake/output data recorded. Last three shifts: 11/29 1901 - 12/01 0700  In: 920 [P.O.:470; I.V.:450]  Out: 1450 [Urine:1450]    Physical Exam:   Patient unavailable for re-examination  Constitutional:       General: She is not in acute distress. Appearance: She is ill-appearing. She is not diaphoretic. HENT:      Head: Normocephalic and atraumatic. Nose:      Comments: Nasal O2 cannula  Genitourinary:     Comments: Purewick catheter  Musculoskeletal:         General: Swelling present. No tenderness. Right lower leg: Edema present. Left lower leg: Edema present. Comments: Both calves are bandaged at this time with bulky gauze dressing, however, erythema has markedly decreased along with swelling. Skin:     Findings: Erythema (see above) present. No rash. Neurological:      General: No focal deficit present. Mental Status: She is alert. She is disoriented.    Psychiatric:         Behavior: Behavior normal.     Lab/Data Review:     WBC 12,700  Procalcitonin 0.69 <1.19  CRP 3.04 <9.26 <16.00    Covid-19 negative    Blood cultures (11/26) Providenica rettgeri  Blood cultures (11/26) Providenica rettgeri  Urine culture (11/26) No growth FiNAL  Wound culture (11/27) Heavy mixed enteric Gram negative rods, MSSA  Assessment:     Active Problems:    PNA (pneumonia) (11/25/2020)      Pneumonia (11/25/2020)    1. Cellulitis, bilateral lower extremities, severe, culture pending, Day #6 IV Vancomycin and Zosyn. 2. Gram negative bacteremia with Providencia rettgeri, probably secondary to above, on IV Zosyn. 3. Sepsis with leukocytosis, elevated procalcitonin, CRP and lactic acid, resolving. 3. Abnormal CXR with SHO infiltrate, significance unclear  4. SOB  5. Pyuria, negative urine culture    Comment:  WBC, procalcitonin and CRP decreasing. With MSSA identified, we should be able to cover with Zosyn and stop Vancomycin. Plan:   1. Discontinue Vancomycin  2. Continue IV Zosyn for 8 more days; if discharging to home, may be able to transition to oral Levaquin and Augmentin, once WBC normalizes. 3.  In am, repeat CBC, procalcitonin, CRP     Signed By: Kristal Boone MD     December 1, 2020

## 2020-12-01 NOTE — PROGRESS NOTES
Problem: Falls - Risk of  Goal: *Absence of Falls  Description: Document Yakov Curiel Fall Risk and appropriate interventions in the flowsheet.   Outcome: Progressing Towards Goal  Note: Fall Risk Interventions:  Mobility Interventions: Bed/chair exit alarm    Mentation Interventions: Bed/chair exit alarm    Medication Interventions: Bed/chair exit alarm    Elimination Interventions: Call light in reach

## 2020-12-01 NOTE — PROGRESS NOTES
Progress Note  Date:2020       Room:208/01  Patient Name:Naa Osorio     Date of Birth:     Age:75 y.o. Subjective    Subjective   Pt seen at [facility]. Denies any new complaints but states she is still having mild leg pain and stiffness. Per nursing, no acute overnight changes. Review of Systems   Constitutional: No fever, No chills, No sweats, + weakness, No fatigue  Immunologic: No immunocompromised, No recurrent fevers, No recurrent infections. Musculoskeletal: No back pain, No neck pain, No joint pain, No muscle pain, No claudication, + decreased range of motion, No trauma. Integumentary: + Bilateral legs ulcers, No rash, No pruritus, No abrasions. Neurologic: Alert and oriented X4, No abnormal balance, No headache, + confusion, No numbness, No tingling. Psychiatric: + anxiety, No depression, No hussain. Objective         Vitals Last 24 Hours:  TEMPERATURE:  Temp  Av.7 °F (36.5 °C)  Min: 97.5 °F (36.4 °C)  Max: 98.2 °F (36.8 °C)  RESPIRATIONS RANGE: Resp  Av  Min: 18  Max: 18  PULSE OXIMETRY RANGE: SpO2  Av.5 %  Min: 92 %  Max: 100 %  PULSE RANGE: Pulse  Av  Min: 76  Max: 89  BLOOD PRESSURE RANGE: Systolic (03YYU), DQW:866 , Min:113 , XHM:777   ; Diastolic (08UDQ), WSU:47, Min:64, Max:84    I/O (24Hr): Intake/Output Summary (Last 24 hours) at 2020 1828  Last data filed at 2020 0559  Gross per 24 hour   Intake 220 ml   Output 950 ml   Net -730 ml     Objective   GEN: Pt is AAOx4 and in NAD. Dressings noted to B/L LE are C/D/I. No family noted at [facility]  DERM: Wounds noted to both LE with purulence and eschar. Malodor noted. Periwound erythema noted. No dry skin noted to B/L LE. No proximal lymphatic streaking noted to B/L LE. VASC: Pedal pulses (DP/PT) faintly palpable to B/L LE. CFT<3sec to all digits of B/L LE. No pedal hair growth noted to the level of the digits for B/L LE.  Skin temp is warm to warm from proximal to distal for B/L LE. Neg homans/maggi signs to B/L LE. No varicosities or telangectasias noted to B/L LE.  NEURO: Protective and epicritic sensations grossly intact to B/L LE  MSK: (+) POP, No gross deformities. Decreased muscle tone and bulk noted to B/L LE.  PSYCH: Cooperative with normal mood and affect    Labs/Imaging/Diagnostics    Labs:  CBC:  Recent Labs     12/01/20  1340   WBC 12.7*   RBC 2.83*   HGB 8.0*   HCT 27.9*   MCV 98.6   RDW 13.7   *     CHEMISTRIES:  Recent Labs     12/01/20  1340 11/29/20  1149   *  --    K 2.9* 3.4*     --    CO2 44*  --    BUN 11  --    CA 8.0*  --    MG  --  1.6   PT/INR:No results for input(s): INR, INREXT in the last 72 hours. No lab exists for component: PROTIME  APTT:  Recent Labs     11/29/20  1149   APTT 33.4     LIVER PROFILE:  Recent Labs     12/01/20  1340   AST 12*   ALT 12     Lab Results   Component Value Date/Time    ALT (SGPT) 12 12/01/2020 01:40 PM    AST (SGOT) 12 (L) 12/01/2020 01:40 PM    Alk. phosphatase 96 12/01/2020 01:40 PM    Bilirubin, total 0.2 12/01/2020 01:40 PM       Imaging Last 24 Hours:  Xr Chest Port    Result Date: 12/1/2020  Chest, frontal view, 12/1/2020 History: CHF. Comparison: Including chest 11/27/2020. Findings: The patient's head obscures the left upper lung zone. Right-sided pacemaker device with 2 leads is in place. The cardiac silhouette again demonstrates globular enlargement which can indicate cardiomyopathy or pericardial effusion. The lungs are underexpanded with bibasilar atelectasis, increased as compared to chest 11/27/2020. Right pleural effusion is possible. There is pulmonary vascular congestion and hydrostatic edema. No pneumothorax is identified. The osseous structures are stable. Impression: Underexpanded lungs with bibasilar atelectasis, increased. Possible right pleural effusion. Hydrostatic edema.     Assessment//Plan   Active Problems:    PNA (pneumonia) (11/25/2020)      Pneumonia (11/25/2020)      Assessment & Plan    - Bilateral LE Wounds  - PAD        Patient seen and evaluated at bedside  - Most recent labs personally reviewed  - Cont current wound care  - Pt cleared from vascular standpoint  - XR cancelled for some reason, will not re-order and will just move forward with debridement  - Abx per ID  - I will follow closely     Plan debridement tomorrow am, NPO at midnight and consent to be signed        Scott HERBERT  [facility] Magy Harris DPM, [address] [place] [address] and [place] [id number] Surgery  [address] [place] [address], [address] [place] [address],[address]  [facility]:  [phone]  F:  [phone]  C:  [phone]    Electronically signed by Juliette Dyson DPM on 12/1/2020 at 6:28 PM

## 2020-12-01 NOTE — CONSULTS
Consult  Pulmonary, Critical Care    Name: Yosef Garcia MRN: [id number]   : 1945 Hospital: [address] [place] [address]   Date: 2020  Admission date: 2020 Hospital Day: 7       Subjective/Interval History:   Seen on the medical floor. Consult requested for acute hypoxic respiratory failure. She is an extremely poor historian states she feels fine to leave her alone because she is trying to eat    Hospital Problems  Never Reviewed          Codes Class Noted POA    PNA (pneumonia) ICD-10-CM: J18.9  ICD-9-CM: 486  2020 Unknown        Pneumonia ICD-10-CM: J18.9  ICD-9-CM: 985  2020 Unknown              IMPRESSION:   1. Acute hypoxic respiratory failure secondary to pulmonary edema  2. Hypokalemia to be repleted  3. Severe cardiomyopathy  4. Severe mitral regurg  5. Bilateral pleural effusions secondary to pulmonary edema  6. Perry CSF dysemia  7. Anemia  8. Hypothyroidism treated  9. Questionable early dementia  8. Body mass index is 17.77 kg/m². 11.       RECOMMENDATIONS/PLAN:   1. Agree IV Lasix twice daily  2. We will add albumin to try and improve oncotic pressure to improve diuresis  3. Follow renal function  4. Follow H&H  5.           [x] High complexity decision making was performed  [x] See my orders for details      Subjective/Initial History:     I was asked by Surjit Estrada MD to see Yosef Garcia  a 66 y.o.    female in consultation for a chief complaint of acute hypoxic respiratory failure and pleural effusions      Allergies   Allergen Reactions    Tetracycline Other (comments)     Causes mouth sores    Morphine Nausea and Vomiting        MAR reviewed and pertinent medications noted or modified as needed     Current Facility-Administered Medications   Medication    albuterol-ipratropium (DUO-NEB) 2.5 MG-0.5 MG/3 ML    albumin human 25% (BUMINATE) solution 25 g    vancomycin (VANCOCIN) 750 mg in 0.9% sodium chloride 250 mL (VIAL-MATE)    Draw Vancomycin trough level on 2020 at 1400.  furosemide (LASIX) injection 40 mg    piperacillin-tazobactam (ZOSYN) 3.375 g in 0.9% sodium chloride (MBP/ADV) 100 mL MBP    levothyroxine (SYNTHROID) tablet 75 mcg    acetaminophen (TYLENOL) tablet 650 mg    Or    acetaminophen (TYLENOL) suppository 650 mg    polyethylene glycol (MIRALAX) packet 17 g    ondansetron (ZOFRAN ODT) tablet 4 mg    Or    ondansetron (ZOFRAN) injection 4 mg    aspirin tablet 325 mg    atorvastatin (LIPITOR) tablet 40 mg    Vancomycin Pharmacy Dosing      Patient PCP: Jeffy, MD Karl  PMH:  has no past medical history on file. PSH:   has no past surgical history on file. FHX: family history is not on file. SHX:       Completely unreliable systemic review she states there is nothing wrong with her to leave her alone    Objective:     Vital Signs: Telemetry:    normal sinus rhythm Intake/Output:   Visit Vitals  /68   Pulse 81   Temp 97.5 °F (36.4 °C)   Resp 18   Ht 5' (1.524 m)   Wt 41.3 kg (91 lb)   SpO2 99%   BMI 17.77 kg/m²       Temp (24hrs), Av.8 °F (36.6 °C), Min:97.5 °F (36.4 °C), Max:98.2 °F (36.8 °C)        O2 Device: Nasal cannula O2 Flow Rate (L/min): 3 l/min       Wt Readings from Last 4 Encounters:   20 41.3 kg (91 lb)   16 57.2 kg (126 lb)   12/07/10 64 kg (141 lb)          Intake/Output Summary (Last 24 hours) at 2020 1433  Last data filed at 2020 0559  Gross per 24 hour   Intake 220 ml   Output 950 ml   Net -730 ml       Last shift:      No intake/output data recorded.   Last 3 shifts:  1901 -  0700  In: 920 [P.O.:470; I.V.:450]  Out: 1450 [Urine:1450]       Physical Exam:   General:  female; chronically ill appearing frail  HEENT: NCAT, oral mucosa clear  Eyes: anicteric; conjunctiva clear extraocular movements intact  Neck: no nodes, neck veins visible no accessory muscle use  Chest: no deformity,   Cardiac: Regular rate and rhythm systolic murmur present has edema of the extremities  Lungs: distant breath sounds; no wheezing rales are heard posteriorly no breath sounds in the bases  Abd: Soft positive bowel sounds no tenderness she has some posterior abdominal wall edema  Ext: Edema of the legs arms and sacrum  : clear urine  Neuro: Awake mildly lethargic mildly confused states she does not know why she is in the hospital will move all 4 extremities  Psych- no agitation, oriented to person; does not know why she is in the hospital or exactly where she is  Skin: Lower extremities have very flaky skin with erythema  Pulses: Brachial radial femoral pulses intact  Capillary: Slow capillary refill    Labs:    Recent Labs     12/01/20  1340 11/29/20  1149   WBC 12.7*  --    HGB 8.0*  --    *  --    APTT  --  33.4     Recent Labs     12/01/20  1340 11/30/20  1230 11/29/20  1149   *  --   --    K 2.9*  --  3.4*     --   --    CO2 44*  --   --    GLU 87  --   --    BUN 11  --   --    CREA 0.40* 0.41* 0.38*   CA 8.0*  --   --    MG  --   --  1.6   ALB 1.5*  --   --    ALT 12  --   --      3 L nasal oxygen with oxygen saturation 99%  BNP 16,597  CRP 3.04  11/26 blood culture with Ivan he is sensitive to Zosyn  11/27 echo ejection fraction 20% dilated left atrium grade 2 diastolic dysfunction moderate to severe mitral regurg IVC dilatation RVSP 55  12/1 nuclear cardiac scan with ejection fraction of 19%  Lab Results   Component Value Date/Time    Culture result: Heavy  Mixed skin nica isolated   11/27/2020 11:15 AM    Culture result: Heavy Staphylococcus aureus 11/27/2020 11:15 AM    Culture result:  11/27/2020 11:15 AM     Heavy Gram Negative Rods MIXED COLONY TYPES. ..  Idenfitication and susceptibility to follow     Lab Results   Component Value Date/Time    TSH 0.26 (L) 11/25/2020 12:30 PM       Imaging:    CXR Results  (Last 48 hours)    None        Results from [place] encounter on 11/25/20   XR CHEST PA LAT    Narrative 2 view comparison the 25th      Impression Impression: The cardiomediastinal silhouette is appropriate for age, technique,  and lung expansion. Pulmonary vasculature is not congested. The lungs are  essentially clear. No effusion or pneumothorax is seen. XR CHEST SNGL V    Narrative Chest single view. Comparison single view chest August 11, 2020    Stripe-like opacity noted involving inferior left upper lobe lung; finding  concerning for pneumonia. Right-sided cardiac device with unchanged leads right  heart. Cardiac silhouette enlargement. Atherosclerotic change thoracic aorta. No  pneumothorax or pleural effusion. · [address] she has septicemia questionably urinary tract or from skin site. Urine culture was no growth I do not know if it was collected after antibiotics were given. She is currently on Zosyn and vancomycin. Probably does not need vancomycin will discontinue it. She has pleural effusions and accentuated interstitial markings consistent with CHF/pulmonary edema. She is receiving Lasix. She is severely hypoalbuminemic will give albumin to try and help diuresis. She has developed hypokalemia with the diuresis will supplement potassium. · Acute  · Thank you for allowing me to see Ms. Osorio  · Time of care 30 minutes    Alejandra Fox MD

## 2020-12-01 NOTE — PROGRESS NOTES
General Daily Progress Note          Patient Name:   Bar Hager       YOB: 1945       Age:  66 y.o. Admit Date: 11/25/2020      Subjective:         Seen awake not in distress confused  On 5 L oxygen by nasal cannula             Objective:     Visit Vitals  /70 (BP 1 Location: Right arm, BP Patient Position: At rest)   Pulse 81   Temp 97.5 °F (36.4 °C)   Resp 18   Ht 5' 0.63\" (1.54 m)   Wt 41.5 kg (91 lb 7.9 oz)   SpO2 100%   BMI 17.50 kg/m²          [unfilled]      Review of Systems    Constitutional: Negative for chills and fever. HENT: Negative. Eyes: Negative. Respiratory: Negative. Cardiovascular: Negative. Gastrointestinal: Negative for abdominal pain and nausea. Skin: Negative. Neurological: Negative. Physical Exam:      Constitutional: Alert awake  HENT:   Head: Normocephalic and atraumatic. Eyes: Pupils are equal, round, and reactive to light. EOM are normal.   Cardiovascular: Normal rate, regular rhythm and normal heart sounds. Pulmonary/Chest: Breath sounds normal. No wheezes. No rales. Exhibits no tenderness. Abdominal: Soft. Bowel sounds are normal. There is no abdominal tenderness. There is no rebound and no guarding. Musculoskeletal: Normal range of motion. Neurological: Bilateral lower leg red swollen    XR CHEST PA LAT   Final Result   Impression: The cardiomediastinal silhouette is appropriate for age, technique,   and lung expansion. Pulmonary vasculature is not congested. The lungs are   essentially clear. No effusion or pneumothorax is seen.          XR CHEST SNGL V   Final Result               Results     Procedure Component Value Units Date/Time    CULTURE, Lucio Smith [[id number]]  (Susceptibility) Collected:  11/27/20 1115    Order Status:  Completed Specimen:  Wound Updated:  11/30/20 1028     Special Requests: No Special Requests        GRAM STAIN Rare WBCs seen         Few Gram Negative Rods Occasional Gram Positive Cocci in clusters           Culture result:       Heavy MIXED  Gram Negative Rods Idenfitication and susceptibility to follow                  Heavy Staphylococcus aureus            Heavy  Mixed skin nica isolated       Susceptibility      Staphylococcus aureus     WILLIAM (Preliminary)     Ciprofloxacin ($) Resistant     Clindamycin ($) Susceptible     Daptomycin ($$$$$) Susceptible     Doxycycline ($$) Susceptible     Erythromycin ($$$$) Resistant     Gentamicin ($) Susceptible     Levofloxacin ($) Intermediate     Linezolid ($$$$$) Susceptible     Moxifloxacin ($$$$) Susceptible     Oxacillin Susceptible     Rifampin ($$$$) Susceptible [1]      Tetracycline Susceptible     Trimeth/Sulfa Susceptible     Vancomycin ($) Susceptible            [1]   Rifampin is not to be used for mono-therapy.                  CULTURE, URINE [[id number]] Collected:  11/26/20 0945    Order Status:  Completed Specimen:  Urine Updated:  11/28/20 1050     Special Requests: No Special Requests        Culture result: No Growth (<1000 cfu/mL)       CULTURE, BLOOD #2 [[id number]]  (Abnormal)  (Susceptibility) Collected:  11/26/20 0122    Order Status:  Completed Specimen:  Blood Updated:  11/30/20 0927     Special Requests: No Special Requests        Culture result:       Providencia rettgeri GROWING IN THE AEROBIC AND ANAEROBIC BOTTLE (SITE = ARM)                  Gram Negative Rods CALLED TO AND READ BACK BY Catrachito HEREDIANAdele 1515 11/27/20          Susceptibility      Providencia rettgeri     WILLIAM     Amikacin ($) Susceptible     Ampicillin ($) Resistant     Ampicillin/sulbactam ($) Susceptible     Cefazolin ($) Resistant     Cefepime ($$) Susceptible     Cefoxitin Susceptible     Ceftazidime ($) Susceptible     Ceftriaxone ($) Susceptible     Ciprofloxacin ($) Susceptible     Gentamicin ($) Susceptible     Levofloxacin ($) Susceptible     Piperacillin/Tazobac ($) Susceptible     Tobramycin ($) Susceptible CULTURE, BLOOD #1 [[id number]]  (Abnormal) Collected:  11/26/20 0112    Order Status:  Completed Specimen:  Blood Updated:  11/30/20 0928     Special Requests: No Special Requests        Culture result:       Providencia rettgeri growing in both bottles drawn (SITE =ARM)                  Gram Negative Rods CALLED TO Yohana Foley R.N. 1476 11/27/20                  Please refer to previous blood culture  [id number], COLLECTED 11/26/20 FOR SENSITIVITIES      CULTURE, Maria Elena Perez STAIN [[id number]] Collected:  11/25/20 1930    Order Status:  Completed Specimen:  Wound Updated:  11/29/20 1344     Special Requests: No Special Requests        GRAM STAIN Rare WBCs seen         Rare Gram Negative Rods               Rare Gram Positive Cocci in pairs           Culture result:       Heavy MIXED ENTERIC Gram Negative Rods                  Heavy Staphylococcus aureus REFER TO [facility] [id number] FOR SENSITIVITIES            Heavy  Mixed skin nica isolated       COVID-19 RAPID TEST [[id number]] Collected:  11/25/20 1445    Order Status:  Completed Specimen:  Nasopharyngeal Updated:  11/25/20 1553     Specimen source Nasopharyngeal        COVID-19 rapid test Not Detected        Comment: Rapid Abbott ID Now   Rapid NAAT:  The specimen is NEGATIVE for SARS-CoV-2, the novel coronavirus associated with COVID-19. Negative results should be treated as presumptive and, if inconsistent with clinical signs and symptoms or necessary for patient management, should be tested with an alternative molecular assay. Negative results do not preclude SARS-CoV-2 infection and should not be used as the sole basis for patient management decisions. This test has been authorized by the FDA under   an Emergency Use Authorization (EUA) for use by authorized laboratories.  Fact sheet for Healthcare Providers: ConventionUpdate.co.nz Fact sheet for Patients: ConventionUpdate.co.nz   Methodology: Isothermal Nucleic Acid Amplification                Labs:     Recent Labs     11/28/20  0915   WBC 16.9*   HGB 7.4*   HCT 24.5*        Recent Labs     11/30/20  1230 11/29/20  1149 11/28/20  0915   NA  --   --  147*   K  --  3.4* 3.9   CL  --   --  109*   CO2  --   --  34*   BUN  --   --  25*   CREA 0.41* 0.38* 0.38*   GLU  --   --  88   CA  --   --  8.1*   MG  --  1.6  --      Recent Labs     11/28/20  0915   ALT 14   AP 90   TBILI 0.3   TP 4.4*   ALB 1.5*   GLOB 2.9     Recent Labs     11/29/20  1149 11/28/20  1323   APTT 33.4 34.8      No results for input(s): FE, TIBC, PSAT, FERR in the last 72 hours. No results found for: FOL, RBCF   No results for input(s): PH, PCO2, PO2 in the last 72 hours. No results for input(s): CPK, CKNDX, TROIQ in the last 72 hours.     No lab exists for component: CPKMB  No results found for: CHOL, CHOLX, CHLST, CHOLV, HDL, HDLP, LDL, LDLC, DLDLP, TGLX, TRIGL, TRIGP, CHHD, CHHDX  No results found for: Darrall Nails  Lab Results   Component Value Date/Time    Color Yellow/Straw 11/25/2020 04:50 PM    Appearance Clear 11/25/2020 04:50 PM    Specific gravity 1.013 11/25/2020 04:50 PM    pH (UA) 5.0 11/25/2020 04:50 PM    Protein Negative 11/25/2020 04:50 PM    Glucose Negative 11/25/2020 04:50 PM    Ketone Negative 11/25/2020 04:50 PM    Bilirubin Negative 11/25/2020 04:50 PM    Urobilinogen 0.1 11/25/2020 04:50 PM    Nitrites Negative 11/25/2020 04:50 PM    Leukocyte Esterase Negative 11/25/2020 04:50 PM    Bacteria Negative 11/25/2020 04:50 PM    WBC 5-10 11/25/2020 04:50 PM    RBC 0-5 11/25/2020 04:50 PM         Assessment:   Acute hypoxemic respiratory failure on 5 L  Acute systolic heart failure with ejection fraction of 15 to 20%  Acute cellulitis of bilateral legs  Gram-negative bacteremia with providentia  Sepsis leukocytosis elevated procalcitonin and lactic acid  Community-acquired pneumonia  Poor hygiene  Elevated troponin  Leukocytosis  Hypothyroidism  Anemia hemoglobin of 7.4 on 11/28    Plan: Continue aspirin Lipitor levothyroxine    On IV Zosyn IV vancomycin  Doppler studies negative for DVT  Lasix 40 mg IV twice daily  Follow-up with infectious disease  Cardiology consult for elevated troponin  PT OT consult and wound care nurse consult  Repeat the labs in the morning  If hemoglobin less than 7.0 will transfuse  Respiratory consult for weaning of oxygen  Pulmonary consult  Nebulizer treatment      No labs in 2 days  Order stat labs  PT OT consult  Discussed with the  regarding discharge planning        Current Facility-Administered Medications:     vancomycin (VANCOCIN) 750 mg in 0.9% sodium chloride 250 mL (VIAL-MATE), 750 mg, IntraVENous, Q8H, Raysa Finn MD, 750 mg at 12/01/20 0800    Draw Vancomycin trough level on 12/1/2020 at 1400., , Other, Sulaiman Jiménez MD    furosemide (LASIX) injection 40 mg, 40 mg, IntraVENous, BID, Zenda Merlin, MD, 40 mg at 12/01/20 0800    piperacillin-tazobactam (ZOSYN) 3.375 g in 0.9% sodium chloride (MBP/ADV) 100 mL MBP, 3.375 g, IntraVENous, Q8H, Keith Solano MD, Last Rate: 25 mL/hr at 12/01/20 0315, 3.375 g at 12/01/20 0315    levothyroxine (SYNTHROID) tablet 75 mcg, 75 mcg, Oral, 6am, Keith Solano MD, 75 mcg at 12/01/20 0449    acetaminophen (TYLENOL) tablet 650 mg, 650 mg, Oral, Q6H PRN, 650 mg at 12/01/20 0449 **OR** acetaminophen (TYLENOL) suppository 650 mg, 650 mg, Rectal, Q6H PRN, Cristy Solano MD    polyethylene glycol (MIRALAX) packet 17 g, 17 g, Oral, DAILY PRN, Cristy Solano MD    ondansetron (ZOFRAN ODT) tablet 4 mg, 4 mg, Oral, Q8H PRN **OR** ondansetron (ZOFRAN) injection 4 mg, 4 mg, IntraVENous, Q6H PRN, Keith Solano MD    aspirin tablet 325 mg, 325 mg, Oral, DAILY, Keith Solano MD, 325 mg at 12/01/20 0759    atorvastatin (LIPITOR) tablet 40 mg, 40 mg, Oral, QHS, Keith Solano MD, 40 mg at 11/30/20 8401    Vancomycin Pharmacy Dosing, , Other, PRN, Allison Moreno MD

## 2020-12-01 NOTE — PROGRESS NOTES
Patient was accepted by [facility] for resumption of care. CM will continue to follow the patient for discharge planning needs.

## 2020-12-01 NOTE — PROGRESS NOTES
CM spoke to patient at bedside regarding SNF choice. Patient declined SNF. Patient reported \"I want to go home\". Patient asked CM to contacct her sister for [facility] choice. CM spoke to sister (Dc Bueno [phone]) regarding [facility] choice. Sister instructed CM to contact other sister Karine Abreu [phone]). Sister stated that patient currently receives [facility] with [facility]. CM sent a referral to [facility] on [address]. Sister reported that patient needs Medicaid insurance. CM emailed eligibility about screening patient for Medicaid. CM will continue to follow the patient for discharge planning needs.

## 2020-12-01 NOTE — PROGRESS NOTES
Physician Progress Note      Sarah Panda  CSN #:                  [id number]  :                       1945  ADMIT DATE:       2020 11:23 AM  DISCH DATE:  RESPONDING  PROVIDER #:        Jeferson CLAIRE MD          QUERY TEXT:    Pt admitted with SOB, PNA. Pt noted to have O2 sat 90% on 4 L O2. If possible, please document in the progress notes and discharge summary if you are evaluating and/or treating any of the following: The medical record reflects the following:  Risk Factors: Sepsis, PNA  Clinical Indicators: SOB, documentation in ER: Pt found by home health nurse without oxygen and \"blue\" per home health nurse RR 28,  , O 2 sat 90% on 4 L O2    Treatment: O2          Thank you. Renu Bose RN CDI, CCS  Ext [phone]  Options provided:  -- Acute respiratory failure with hypoxia  -- Chronic respiratory failure with hypoxia  -- Acute on chronic respiratory failure with hypoxia  -- Other - I will add my own diagnosis  -- Disagree - Not applicable / Not valid  -- Disagree - Clinically unable to determine / Unknown  -- Refer to Clinical Documentation Reviewer    PROVIDER RESPONSE TEXT:    This patient is in acute respiratory failure with hypoxia.     Query created by: Fabián Calderon on 2020 2:36 PM      Electronically signed by:  Russell Nida MD Marylen Caddy MD 2020 9:37 PM

## 2020-12-01 NOTE — CONSULTS
Consult    Subjective:     Johanna Berry is a 66 y.o. pleasant woman currently hospitalized with bilateral leg wounds. I was consulted to evaluate patient's vascular status. Patient is examined at bedside. Patient seems to be coherent but patient complaining significant pain in both legs. Both legs being covered with dry gauze and Kerlix rolls. Patient is not keen on how this wound started. Patient is claims that she had this wound for weeks. Patient says she is having constant pain she denies any fever or chills. Patient also has been diabetic for a while. Patient is also currently being evaluated by cardiology for heart failure. On examination.:  Patient has palpable femoral pulses bilaterally. Both feet is warm to touch. Patient has a thready palpable pulse on both dorsalis pedis. Strong Doppler signals noted as well. Patient has a large a dried up black scab superficial wounds in pretibial area bilateral.  Right side is being covered with Aquacel Ag. No past medical history on file. No past surgical history on file. No family history on file. Social History     Tobacco Use    Smoking status: Not on file   Substance Use Topics    Alcohol use: Not on file       Current Facility-Administered Medications   Medication Dose Route Frequency Provider Last Rate Last Dose    vancomycin (VANCOCIN) 750 mg in 0.9% sodium chloride 250 mL (VIAL-MATE)  750 mg IntraVENous Q8H Karissa Nolan MD   750 mg at 11/30/20 1627    [START ON 12/1/2020] Draw Vancomycin trough level on 12/1/2020 at 1400.    Other Sheree Galvan MD        furosemide (LASIX) injection 40 mg  40 mg IntraVENous BID Curtis Damon MD   40 mg at 11/30/20 1032    piperacillin-tazobactam (ZOSYN) 3.375 g in 0.9% sodium chloride (MBP/ADV) 100 mL MBP  3.375 g IntraVENous Q8H Keith Solano MD 25 mL/hr at 11/30/20 1156 3.375 g at 11/30/20 1156    levothyroxine (SYNTHROID) tablet 75 mcg  75 mcg Oral Sparkle Acosta MD  acetaminophen (TYLENOL) tablet 650 mg  650 mg Oral Q6H PRN Esperanza Solano MD   650 mg at 11/28/20 6389    Or    acetaminophen (TYLENOL) suppository 650 mg  650 mg Rectal Q6H PRN Esperanza Solano MD        polyethylene glycol (MIRALAX) packet 17 g  17 g Oral DAILY PRN Esperanza Solano MD        ondansetron (ZOFRAN ODT) tablet 4 mg  4 mg Oral Q8H PRN Esperanza Solano MD        Or    [facility] injection 4 mg  4 mg IntraVENous Q6H PRN Esperanza Solano MD        aspirin tablet 325 mg  325 mg Oral DAILY Keith Solano MD   325 mg at 11/30/20 1032    atorvastatin (LIPITOR) tablet 40 mg  40 mg Oral QHS Keith Solano MD   40 mg at 11/30/20 1032    Vancomycin Pharmacy Dosing   Other PRN Esperanza Solano MD            Allergies   Allergen Reactions    Tetracycline Other (comments)     Causes mouth sores    Morphine Nausea and Vomiting        Review of Systems:  I was not able to review rest of the organ systems due to patient's VACTERL. Objective: Intake and Output:    No intake/output data recorded. 11/29 0701 - 11/30 1900  In: 8231 [P.O.:1300; I.V.:350]  Out: 500 [Urine:500]    Physical Exam:   Patient makes good eye contact. Head and neck otherwise unremarkable atraumatic neck supple. Cardiovascular system irregular. Lung sounds coarse. Chest excursion is normal.  Abdomen soft nontender distended neurologically intact nonfocal.  Musculoskeletal system within normal limits. Vascular examination discussed above.     ECG:    Data Review:   Recent Results (from the past 24 hour(s))   BNP    Collection Time: 11/30/20  6:20 AM   Result Value Ref Range    NT pro-BNP 14,243 (H) <450 pg/mL   Nalini Solid    Collection Time: 11/30/20 12:30 PM   Result Value Ref Range    Vancomycin,trough 8.6 5.0 - 10.0 ug/mL   CREATININE    Collection Time: 11/30/20 12:30 PM   Result Value Ref Range    Creatinine 0.41 (L) 0.55 - 1.02 mg/dL   BNP    Collection Time: 11/30/20 12:30 PM   Result Value Ref Range    NT pro-BNP 16,597 (H) <450 pg/mL       Chest x-ray     Assessment:     Active Problems:    PNA (pneumonia) (11/25/2020)      Pneumonia (11/25/2020)        Plan:     Most likely patient has a complication from venous stasis and chronic venous hypertension. Vascular lab has been ordered. This wound is not related arterial system. Again this is most likely venous stasis ulcer with a complicated by poor wound management. Bilateral pretibial eschar should have come off easily with appropriate wound debridement. Dr. Rochelle Ontiveros is planning for wound debridement. I agree and proceed with wound debridement. At some point once wounds are cleaned patient will need multilayer compression wraps if she can tolerate distressing. Anyways I will follow-up the vascular lab tests.

## 2020-12-02 LAB
ALBUMIN SERPL-MCNC: 2.2 G/DL (ref 3.5–5)
ALBUMIN SERPL-MCNC: 2.2 G/DL (ref 3.5–5)
ALBUMIN/GLOB SERPL: 0.6 {RATIO} (ref 1.1–2.2)
ALP SERPL-CCNC: 95 U/L (ref 45–117)
ALT SERPL-CCNC: 10 U/L (ref 12–78)
ANION GAP SERPL CALC-SCNC: 2 MMOL/L (ref 5–15)
ANION GAP SERPL CALC-SCNC: 2 MMOL/L (ref 5–15)
AST SERPL W P-5'-P-CCNC: 12 U/L (ref 15–37)
BACTERIA SPEC CULT: NORMAL
BASOPHILS # BLD: 0 K/UL (ref 0–0.1)
BASOPHILS NFR BLD: 0 % (ref 0–1)
BILIRUB SERPL-MCNC: 0.4 MG/DL (ref 0.2–1)
BUN SERPL-MCNC: 14 MG/DL (ref 6–20)
BUN SERPL-MCNC: 14 MG/DL (ref 6–20)
BUN/CREAT SERPL: 39 (ref 12–20)
BUN/CREAT SERPL: 41 (ref 12–20)
CA-I BLD-MCNC: 8.4 MG/DL (ref 8.5–10.1)
CA-I BLD-MCNC: 8.4 MG/DL (ref 8.5–10.1)
CHLORIDE SERPL-SCNC: 101 MMOL/L (ref 97–108)
CHLORIDE SERPL-SCNC: 101 MMOL/L (ref 97–108)
CO2 SERPL-SCNC: 42 MMOL/L (ref 21–32)
CO2 SERPL-SCNC: 42 MMOL/L (ref 21–32)
CREAT SERPL-MCNC: 0.34 MG/DL (ref 0.55–1.02)
CREAT SERPL-MCNC: 0.36 MG/DL (ref 0.55–1.02)
CRP SERPL-MCNC: 1.98 MG/DL (ref 0–0.6)
DIFFERENTIAL METHOD BLD: ABNORMAL
EOSINOPHIL # BLD: 0.2 K/UL (ref 0–0.4)
EOSINOPHIL NFR BLD: 1 % (ref 0–7)
ERYTHROCYTE [DISTWIDTH] IN BLOOD BY AUTOMATED COUNT: 13.9 % (ref 11.5–14.5)
GLOBULIN SER CALC-MCNC: 4 G/DL (ref 2–4)
GLUCOSE SERPL-MCNC: 70 MG/DL (ref 65–100)
GLUCOSE SERPL-MCNC: 72 MG/DL (ref 65–100)
GRAM STN SPEC: NORMAL
HCT VFR BLD AUTO: 26.6 % (ref 35–47)
HGB BLD-MCNC: 7.6 G/DL (ref 11.5–16)
IMM GRANULOCYTES # BLD AUTO: 0.1 K/UL (ref 0–0.04)
IMM GRANULOCYTES NFR BLD AUTO: 1 % (ref 0–0.5)
LYMPHOCYTES # BLD: 0.7 K/UL (ref 0.8–3.5)
LYMPHOCYTES NFR BLD: 6 % (ref 12–49)
MAGNESIUM SERPL-MCNC: 1.7 MG/DL (ref 1.6–2.4)
MCH RBC QN AUTO: 27.9 PG (ref 26–34)
MCHC RBC AUTO-ENTMCNC: 28.6 G/DL (ref 30–36.5)
MCV RBC AUTO: 97.8 FL (ref 80–99)
MONOCYTES # BLD: 0.6 K/UL (ref 0–1)
MONOCYTES NFR BLD: 5 % (ref 5–13)
NEUTS SEG # BLD: 10.4 K/UL (ref 1.8–8)
NEUTS SEG NFR BLD: 87 % (ref 32–75)
PHOSPHATE SERPL-MCNC: 2.2 MG/DL (ref 2.6–4.7)
PLATELET # BLD AUTO: 601 K/UL (ref 150–400)
PMV BLD AUTO: 9 FL (ref 8.9–12.9)
POTASSIUM SERPL-SCNC: 3.4 MMOL/L (ref 3.5–5.1)
POTASSIUM SERPL-SCNC: 3.4 MMOL/L (ref 3.5–5.1)
PROCALCITONIN SERPL-MCNC: 0.11 NG/ML
PROT SERPL-MCNC: 6.2 G/DL (ref 6.4–8.2)
RBC # BLD AUTO: 2.72 M/UL (ref 3.8–5.2)
SODIUM SERPL-SCNC: 145 MMOL/L (ref 136–145)
SODIUM SERPL-SCNC: 145 MMOL/L (ref 136–145)
SPECIAL REQUESTS,SREQ: NORMAL
WBC # BLD AUTO: 11.9 K/UL (ref 3.6–11)

## 2020-12-02 PROCEDURE — P9047 ALBUMIN (HUMAN), 25%, 50ML: HCPCS | Performed by: INTERNAL MEDICINE

## 2020-12-02 PROCEDURE — 86900 BLOOD TYPING SEROLOGIC ABO: CPT

## 2020-12-02 PROCEDURE — 74011250637 HC RX REV CODE- 250/637: Performed by: FAMILY MEDICINE

## 2020-12-02 PROCEDURE — 74011250636 HC RX REV CODE- 250/636: Performed by: ANESTHESIOLOGY

## 2020-12-02 PROCEDURE — 97165 OT EVAL LOW COMPLEX 30 MIN: CPT

## 2020-12-02 PROCEDURE — 94640 AIRWAY INHALATION TREATMENT: CPT

## 2020-12-02 PROCEDURE — 65270000029 HC RM PRIVATE

## 2020-12-02 PROCEDURE — 74011250637 HC RX REV CODE- 250/637: Performed by: INTERNAL MEDICINE

## 2020-12-02 PROCEDURE — 74011250636 HC RX REV CODE- 250/636: Performed by: FAMILY MEDICINE

## 2020-12-02 PROCEDURE — 86923 COMPATIBILITY TEST ELECTRIC: CPT

## 2020-12-02 PROCEDURE — 74011000250 HC RX REV CODE- 250: Performed by: FAMILY MEDICINE

## 2020-12-02 PROCEDURE — 74011250636 HC RX REV CODE- 250/636: Performed by: NURSE PRACTITIONER

## 2020-12-02 PROCEDURE — 77010033678 HC OXYGEN DAILY

## 2020-12-02 PROCEDURE — 99232 SBSQ HOSP IP/OBS MODERATE 35: CPT | Performed by: PODIATRIST

## 2020-12-02 PROCEDURE — 97530 THERAPEUTIC ACTIVITIES: CPT

## 2020-12-02 PROCEDURE — 86140 C-REACTIVE PROTEIN: CPT

## 2020-12-02 PROCEDURE — 97163 PT EVAL HIGH COMPLEX 45 MIN: CPT

## 2020-12-02 PROCEDURE — 80053 COMPREHEN METABOLIC PANEL: CPT

## 2020-12-02 PROCEDURE — 94760 N-INVAS EAR/PLS OXIMETRY 1: CPT

## 2020-12-02 PROCEDURE — 36415 COLL VENOUS BLD VENIPUNCTURE: CPT

## 2020-12-02 PROCEDURE — 84145 PROCALCITONIN (PCT): CPT

## 2020-12-02 PROCEDURE — 74011250636 HC RX REV CODE- 250/636: Performed by: INTERNAL MEDICINE

## 2020-12-02 PROCEDURE — 80069 RENAL FUNCTION PANEL: CPT

## 2020-12-02 PROCEDURE — 85025 COMPLETE CBC W/AUTO DIFF WBC: CPT

## 2020-12-02 PROCEDURE — 99232 SBSQ HOSP IP/OBS MODERATE 35: CPT | Performed by: INTERNAL MEDICINE

## 2020-12-02 PROCEDURE — 83735 ASSAY OF MAGNESIUM: CPT

## 2020-12-02 PROCEDURE — 74011250637 HC RX REV CODE- 250/637: Performed by: ANESTHESIOLOGY

## 2020-12-02 PROCEDURE — 74011000258 HC RX REV CODE- 258: Performed by: FAMILY MEDICINE

## 2020-12-02 RX ORDER — FUROSEMIDE 10 MG/ML
40 INJECTION INTRAMUSCULAR; INTRAVENOUS ONCE
Status: ACTIVE | OUTPATIENT
Start: 2020-12-02 | End: 2020-12-02

## 2020-12-02 RX ORDER — MAGNESIUM SULFATE 1 G/100ML
1 INJECTION INTRAVENOUS ONCE
Status: COMPLETED | OUTPATIENT
Start: 2020-12-02 | End: 2020-12-02

## 2020-12-02 RX ORDER — LABETALOL HCL 20 MG/4 ML
10 SYRINGE (ML) INTRAVENOUS
Status: DISCONTINUED | OUTPATIENT
Start: 2020-12-02 | End: 2020-12-15

## 2020-12-02 RX ORDER — SODIUM CHLORIDE AND POTASSIUM CHLORIDE .9; .15 G/100ML; G/100ML
SOLUTION INTRAVENOUS ONCE
Status: COMPLETED | OUTPATIENT
Start: 2020-12-02 | End: 2020-12-02

## 2020-12-02 RX ORDER — SODIUM CHLORIDE 0.9 % (FLUSH) 0.9 %
5-40 SYRINGE (ML) INJECTION EVERY 8 HOURS
Status: CANCELLED | OUTPATIENT
Start: 2020-12-02

## 2020-12-02 RX ORDER — FUROSEMIDE 10 MG/ML
40 INJECTION INTRAMUSCULAR; INTRAVENOUS DAILY
Status: DISCONTINUED | OUTPATIENT
Start: 2020-12-03 | End: 2020-12-03

## 2020-12-02 RX ORDER — POTASSIUM CHLORIDE 20 MEQ/1
40 TABLET, EXTENDED RELEASE ORAL
Status: COMPLETED | OUTPATIENT
Start: 2020-12-02 | End: 2020-12-02

## 2020-12-02 RX ORDER — METOPROLOL TARTRATE 5 MG/5ML
2.5 INJECTION INTRAVENOUS
Status: DISCONTINUED | OUTPATIENT
Start: 2020-12-02 | End: 2020-12-17 | Stop reason: HOSPADM

## 2020-12-02 RX ORDER — SODIUM CHLORIDE 9 MG/ML
250 INJECTION, SOLUTION INTRAVENOUS AS NEEDED
Status: DISCONTINUED | OUTPATIENT
Start: 2020-12-02 | End: 2020-12-17 | Stop reason: HOSPADM

## 2020-12-02 RX ADMIN — PIPERACILLIN SODIUM AND TAZOBACTAM SODIUM 3.38 G: 3; .375 INJECTION, POWDER, LYOPHILIZED, FOR SOLUTION INTRAVENOUS at 20:52

## 2020-12-02 RX ADMIN — ATORVASTATIN CALCIUM 40 MG: 40 TABLET, FILM COATED ORAL at 20:56

## 2020-12-02 RX ADMIN — NITROGLYCERIN 0.5 INCH: 20 OINTMENT TOPICAL at 17:20

## 2020-12-02 RX ADMIN — LEVOTHYROXINE SODIUM 75 MCG: 0.07 TABLET ORAL at 05:41

## 2020-12-02 RX ADMIN — PIPERACILLIN SODIUM AND TAZOBACTAM SODIUM 3.38 G: 3; .375 INJECTION, POWDER, LYOPHILIZED, FOR SOLUTION INTRAVENOUS at 14:06

## 2020-12-02 RX ADMIN — ALBUMIN (HUMAN) 25 G: 0.25 INJECTION, SOLUTION INTRAVENOUS at 05:42

## 2020-12-02 RX ADMIN — IPRATROPIUM BROMIDE AND ALBUTEROL SULFATE 3 ML: .5; 3 SOLUTION RESPIRATORY (INHALATION) at 14:20

## 2020-12-02 RX ADMIN — FUROSEMIDE 40 MG: 10 INJECTION, SOLUTION INTRAMUSCULAR; INTRAVENOUS at 08:38

## 2020-12-02 RX ADMIN — POTASSIUM CHLORIDE 40 MEQ: 750 TABLET, FILM COATED, EXTENDED RELEASE ORAL at 01:13

## 2020-12-02 RX ADMIN — POTASSIUM CHLORIDE 40 MEQ: 1500 TABLET, EXTENDED RELEASE ORAL at 11:50

## 2020-12-02 RX ADMIN — POTASSIUM CHLORIDE AND SODIUM CHLORIDE: 900; 150 INJECTION, SOLUTION INTRAVENOUS at 08:38

## 2020-12-02 RX ADMIN — IPRATROPIUM BROMIDE AND ALBUTEROL SULFATE 3 ML: .5; 3 SOLUTION RESPIRATORY (INHALATION) at 19:37

## 2020-12-02 RX ADMIN — ALBUMIN (HUMAN) 25 G: 0.25 INJECTION, SOLUTION INTRAVENOUS at 00:00

## 2020-12-02 RX ADMIN — PIPERACILLIN SODIUM AND TAZOBACTAM SODIUM 3.38 G: 3; .375 INJECTION, POWDER, LYOPHILIZED, FOR SOLUTION INTRAVENOUS at 03:41

## 2020-12-02 RX ADMIN — MAGNESIUM SULFATE HEPTAHYDRATE 1 G: 1 INJECTION, SOLUTION INTRAVENOUS at 11:03

## 2020-12-02 RX ADMIN — ASPIRIN 325 MG ORAL TABLET 325 MG: 325 PILL ORAL at 08:38

## 2020-12-02 RX ADMIN — ALBUMIN (HUMAN) 25 G: 0.25 INJECTION, SOLUTION INTRAVENOUS at 11:36

## 2020-12-02 NOTE — ANESTHESIA PREPROCEDURE EVALUATION
Relevant Problems   No relevant active problems       Anesthetic History   No history of anesthetic complications            Review of Systems / Medical History  Patient summary reviewed, nursing notes reviewed and pertinent labs reviewed    Pulmonary    COPD      Pneumonia and shortness of breath      Comments: Home Oxygen  Acute PNA   Neuro/Psych   Within defined limits           Cardiovascular          CHF: NYHA Classification IV, dyspnea on exertion  Dysrhythmias   Pacemaker and CAD    Exercise tolerance: <4 METS: Cardiology Consult Assessment and Plan:  Case discussed with Dr. Beatris Pennington and our impression and recommendations are as follows:  1. Acute systolic CHF: EF 45-76, UNCLEAR IF NEW OR OLD. Continue diuresis, with lasix IV 40 bid. BNP 37191, still elevated. Renal function low normal. Will monitor. Stress test done today and pending reading. Will add low dose ACE/BB once euvolemic. Please keep serum potassium between 4-5 and serum magnesium > 2. K 2.9, repletion have been ordered already. Will monitor with diuresis. 2. PPM: [facility] PPM. Contacted rep. Per documents implanted in 2005 by Dr. Bijan Thomas for complete heart block. Interrogated today showing several NSVT, unsure if they correlate with possible syncopal episode. 3. Elevated troponin: Likely demand ischemia from CHF. No active chest pain. 4. Pneumonia and cellulitis: Rx per primary. Pending lower extremity wound debridement.          Comments: V paced - hx of complete heart block    Acute CHF - BNP 16,970    Nuclear Stress Test 12/1/2020  · Baseline ECG: Normal EKG, rare PVCs. · Gated SPECT: Left ventricular function post-stress was abnormal. Calculated ejection fraction is 19%. · Myocardial perfusion imaging defect 1: There is a defect that is large in size with a severe reduction in uptake present in the inferior and apex location(s) that is non-reversible. There is abnormal wall motion in the defect area. Viability in the area is poor.  The defect appears to be infarction. Perfusion defect was visually and quantitatively present. · Myocardial perfusion imaging defect 2: There is a defect that is small in size affecting the anteroseptal location(s) that is partially reversible. The defect appears to be infarction with baldo-infarct ischemia. Perfusion defect was visually and quantitatively present. · Left ventricular perfusion is abnormal.  · Myocardial perfusion imaging supports an intermediate risk stress test.       ECHO 11/27/2020  · LV: Estimated LVEF is 15 - 20%. Moderately dilated left ventricle. Severe concentric hypertrophy. Moderate-to-severely reduced systolic function. Akinesis of the basal anteroseptal and basal anterior wall(s). Refused definity. · LA: Severely dilated left atrium. · RV: Moderately dilated right ventricle. Pacing wire present  · RA: Mildly dilated right atrium. · AV: Cannot rule out bicuspid aortic valve. · MV: Mitral valve non-specific thickening. Moderate to severe mitral valve regurgitation is present. · TV: Moderate tricuspid valve regurgitation is present. · IVC: Moderately elevated central venous pressure (10-15 mmHg); IVC diameter is larger than 21 mm and collapses more than 50% with respiration. · RVSP 55.  · Pericardium: There is a small left pleural effusion. GI/Hepatic/Renal         Renal disease: CRI       Endo/Other      Hypothyroidism       Other Findings          No past medical history on file. No past surgical history on file.     Current Outpatient Medications   Medication Instructions    apixaban (ELIQUIS) 2.5 mg, Oral, 2 TIMES DAILY    carvediloL (COREG) 6.25 mg, Oral, 2 TIMES DAILY WITH MEALS    furosemide (LASIX) 40 mg, Oral, DAILY    levothyroxine (SYNTHROID) 75 mcg, DAILY BEFORE BREAKFAST    lisinopriL (PRINIVIL, ZESTRIL) 5 mg, Oral, DAILY    methocarbamoL (ROBAXIN) 500 mg, Oral, 3 TIMES DAILY    omeprazole (PRILOSEC) 20 mg, Oral, DAILY    potassium chloride (K-DUR, KLOR-CON) 20 mEq tablet 20 mEq, Oral, DAILY       Current Facility-Administered Medications   Medication Dose Route Frequency    potassium chloride SR (KLOR-CON 10) tablet 40 mEq  40 mEq Oral DAILY    acetaZOLAMIDE SR (DIAMOX) capsule 500 mg  500 mg Oral ONCE    furosemide (LASIX) injection 40 mg  40 mg IntraVENous BID    0.9% sodium chloride infusion 250 mL  250 mL IntraVENous PRN    labetaloL (NORMODYNE;TRANDATE) 20 mg/4 mL (5 mg/mL) injection 10 mg  10 mg IntraVENous Q5MIN PRN    metoprolol (LOPRESSOR) injection 2.5 mg  2.5 mg IntraVENous Q5MIN PRN    nitroglycerin (NITROBID) 2 % ointment 0.5 Inch  0.5 Inch Topical Q6H    albuterol-ipratropium (DUO-NEB) 2.5 MG-0.5 MG/3 ML  3 mL Nebulization Q6H RT    piperacillin-tazobactam (ZOSYN) 3.375 g in 0.9% sodium chloride (MBP/ADV) 100 mL MBP  3.375 g IntraVENous Q8H    levothyroxine (SYNTHROID) tablet 75 mcg  75 mcg Oral 6am    acetaminophen (TYLENOL) tablet 650 mg  650 mg Oral Q6H PRN    Or    acetaminophen (TYLENOL) suppository 650 mg  650 mg Rectal Q6H PRN    polyethylene glycol (MIRALAX) packet 17 g  17 g Oral DAILY PRN    ondansetron (ZOFRAN ODT) tablet 4 mg  4 mg Oral Q8H PRN    Or    ondansetron (ZOFRAN) injection 4 mg  4 mg IntraVENous Q6H PRN    aspirin tablet 325 mg  325 mg Oral DAILY    atorvastatin (LIPITOR) tablet 40 mg  40 mg Oral QHS       Patient Vitals for the past 24 hrs:   Temp Pulse Resp BP SpO2   12/04/20 0811     98 %   12/04/20 0700 36.6 °C (97.8 °F) 82 22 108/69 99 %   12/04/20 0637  80  112/73    12/04/20 0344 36.4 °C (97.6 °F) 84 18 (!) 101/53 93 %   12/04/20 0025 36.6 °C (97.9 °F) 83 18 106/64 92 %   12/03/20 1927     98 %   12/03/20 1855 36.9 °C (98.4 °F) 86 18 128/71 94 %   12/03/20 1619 36.3 °C (97.4 °F) 89 19 136/81 91 %   12/03/20 1235 36.3 °C (97.4 °F) 80 19 112/64 97 %       Lab Results   Component Value Date/Time    WBC 10.9 12/04/2020 06:23 AM    HGB 11.3 (L) 12/04/2020 06:23 AM    HCT 37.9 12/04/2020 06:23 AM PLATELET 444 (H) 02/20/5677 06:23 AM    MCV 96.2 12/04/2020 06:23 AM     Lab Results   Component Value Date/Time    Sodium 139 12/04/2020 06:23 AM    Potassium 3.4 (L) 12/04/2020 06:23 AM    Chloride 98 12/04/2020 06:23 AM    CO2 37 (H) 12/04/2020 06:23 AM    Anion gap 4 (L) 12/04/2020 06:23 AM    Glucose 64 (L) 12/04/2020 06:23 AM    BUN 10 12/04/2020 06:23 AM    Creatinine 0.49 (L) 12/04/2020 06:23 AM    BUN/Creatinine ratio 20 12/04/2020 06:23 AM    GFR est AA >60 12/04/2020 06:23 AM    GFR est non-AA >60 12/04/2020 06:23 AM    Calcium 8.6 12/04/2020 06:23 AM     No results found for: APTT, PTP, INR, INREXT, INREXT  Lab Results   Component Value Date/Time    Glucose 64 (L) 12/04/2020 06:23 AM     Physical Exam    Airway  Mallampati: II  TM Distance: 4 - 6 cm  Neck ROM: normal range of motion   Mouth opening: Normal     Cardiovascular    Rhythm: regular  Rate: normal        Comments: V paced Dental    Dentition: Poor dentition     Pulmonary          Rales       Abdominal  GI exam deferred       Other Findings            Anesthetic Plan    ASA: 4  Anesthesia type: general          Induction: Intravenous  Anesthetic plan and risks discussed with: Patient and Family

## 2020-12-02 NOTE — PROGRESS NOTES
Problem: Mobility Impaired (Adult and Pediatric)  Goal: *Acute Goals and Plan of Care (Insert Text)  Description: Patient will move from supine to sit and sit to supine , scoot up and down, and roll side to side in bed with minimal assistance/contact guard assist within 7 day(s). Patient will transfer from bed to chair and chair to bed with minimal assistance/contact guard assist using the least restrictive device within 7 day(s). Patient will improve static sitting balance to modified independence within 1 week(s). Patient will ambulate 10 feet with moderate assistance with least restrictive device within 1 weeks. Outcome: Not Met   PHYSICAL THERAPY EVALUATION  Patient: Vin Valencia (7 y.o. female)  Date: 12/2/2020  Primary Diagnosis: PNA (pneumonia) [J18.9]  Pneumonia [J18.9]  Procedure(s) (LRB):  Debridement Of Ulcers Bilateral Legs (Bilateral) Day of Surgery   Precautions: fall  ASSESSMENT  Based on the objective data described below,   Patient is a 66y.o. year old female with past medical history unknown came to the ER ER under medical TDO patient was found by her home health aide on the last visit patient will hypoxic with her oxygen off there was trash around the house approximately 1 week patient in poor hygiene patient had bilateral leg wounds and patient using tissue paper and spraying with water seen by the ER physician chest x-ray shows pneumonia the patient was admitted for further evaluation treatment   As per podiatrist notes Pt will need a surgical debridement. Timing dependent on diagnostic findings and vascular recs   Patient desat yesterday and was put on 4L O2. This morning she was found in bed and agreeable to work with therapist. patient required max to total assist fro all mobility. was wet so needed change of gown, linen and purwick. student nurse assisted therapist with all mobility. patient very tired and unable to tolerate a lot of activities. difficulty laying down flat due to kyphosis and SOB. patient was assisted with getting comfortable in bed. HOB was raised. patient will benefit from cont. of therapy. SNF upon DC  Current Level of Function Impacting Discharge (mobility/balance): decreased Rom, strength, endurance and mobility. required total assist to max assist for all. Other factors to consider for discharge: lives alone with no family support and APS involved for unsafe home situ. Patient will benefit from skilled therapy intervention to address the above noted impairments. PLAN :  Recommendations and Planned Interventions: bed mobility training, transfer training, gait training, therapeutic exercises, patient and family training/education, and therapeutic activities      Frequency/Duration: Patient will be followed by physical therapy:  5 times a week to address goals. Recommendation for discharge: (in order for the patient to meet his/her long term goals)  Therapy up to 5 days/week in SNF setting    This discharge recommendation:  Has been made in collaboration with the attending provider and/or case management    IF patient discharges home will need the following DME: hospital bed and wheelchair         SUBJECTIVE:   Patient stated i am not doing well.     OBJECTIVE DATA SUMMARY:   HISTORY:    No past medical history on file. No past surgical history on file. Personal factors and/or comorbidities impacting plan of care: Home Situation  Home Environment: Private residence  One/Two Story Residence: One story  Living Alone: Yes  Support Systems: None  Patient Expects to be Discharged to[de-identified] Skilled nursing facility  Current DME Used/Available at Home: Hospital bed, Walker, rolling    EXAMINATION/PRESENTATION/DECISION MAKING:   Critical Behavior:  Neurologic State: Alert  Orientation Level: Disoriented to person  Cognition: Decreased attention/concentration, Impaired decision making, Memory loss, Poor safety awareness     Hearing:   Auditory  Auditory Impairment: None  Skin: multiple bruises all over the body,dressing intact pravin legs  Range Of Motion:  AROM: Grossly decreased, non-functional                       Strength:    Strength: Grossly decreased, non-functional                    Tone & Sensation:                  Sensation: Intact               Functional Mobility:  Bed Mobility:  Rolling: Maximum assistance;Assist x2  Supine to Sit: Maximum assistance;Assist x2  Sit to Supine: Maximum assistance;Assist x2  Scooting: Maximum assistance;Assist x2  Transfers:                             Balance:   Sitting: Impaired; With support;High guard  Sitting - Static: Poor (constant support)  Sitting - Dynamic: Poor (constant support)  Standing: (not tested)  Ambulation/Gait Training:                                                    Therapeutic Exercises: passive ROM pravin les     Functional Measure:  [address] [facility] [address] AM-PAC 6 Clicks         Basic Mobility Inpatient Short Form  How much difficulty does the patient currently have. .. Unable A Lot A Little None   1. Turning over in bed (including adjusting bedclothes, sheets and blankets)? [] 1   [x] 2   [] 3   [] 4   2. Sitting down on and standing up from a chair with arms ( e.g., wheelchair, bedside commode, etc.)   [x] 1   [] 2   [] 3   [] 4   3. Moving from lying on back to sitting on the side of the bed? [x] 1   [] 2   [] 3   [] 4          How much help from another person does the patient currently need. .. Total A Lot A Little None   4. Moving to and from a bed to a chair (including a wheelchair)? [x] 1   [] 2   [] 3   [] 4   5. Need to walk in hospital room? [x] 1   [] 2   [] 3   [] 4   6. Climbing 3-5 steps with a railing? [x] 1   [] 2   [] 3   [] 4   © 2007, Trustees of [address] [place] [address] [address] [address], under license to [employer].  All rights reserved     Score:  Initial: 07/24 Most Recent: X (Date: 12/2/2020 )   Interpretation of Tool:  Represents activities that are increasingly more difficult (i.e. Bed mobility, Transfers, Gait). Score 24 23 22-20 19-15 14-10 9-7 6   Modifier CH CI CJ CK CL CM CN           Physical Therapy Evaluation Charge Determination   History Examination Presentation Decision-Making   MEDIUM  Complexity : 1-2 comorbidities / personal factors will impact the outcome/ POC  HIGH Complexity : 4+ Standardized tests and measures addressing body structure, function, activity limitation and / or participation in recreation  HIGH Complexity : Unstable and unpredictable characteristics  MEDIUM Complexity : FOTO score of 26-74      Based on the above components, the patient evaluation is determined to be of the following complexity level: MEDIUM    Pain Rating:  Not verbalized    Activity Tolerance:   Fair  Please refer to the flowsheet for vital signs taken during this treatment. After treatment patient left in no apparent distress:   Supine in bed, Call bell within reach, Bed / chair alarm activated, Restraints, and acre mattress    COMMUNICATION/EDUCATION:   The patients plan of care was discussed with: Occupational therapist, Registered nurse, and Certified nursing assistant/patient care technician. Fall prevention education was provided and the patient/caregiver indicated understanding. and Patient understands intent and goals of therapy, but is neutral about his/her participation.     Thank you for this referral.  Yolie Roberts PT   Time Calculation: 34 mins

## 2020-12-02 NOTE — PROGRESS NOTES
CM spoke to [employer] worker Manuel Khan [phone]) regarding APS referral.    Ms Lauren Benitez said that when she went to patient's home, patient would not allow her inside home. Ms. Lauren Benitez stated that from the front door, it appeared that patient is a hoarder; \"there was stuff everywhere\". Ms. Lauren Benitez stated that she did not think that it was appropriate for patient to live alone. CM will discuss SNF with the patient. CM will continue to follow the patient for discharge planning needs.

## 2020-12-02 NOTE — PROGRESS NOTES
OCCUPATIONAL THERAPY EVALUATION  Patient: Rosario Cedeño (66 y.o. female)  Date: 12/2/2020  Primary Diagnosis: PNA (pneumonia) [J18.9]  Pneumonia [J18.9]  Procedure(s) (LRB):  Debridement Of Ulcers Bilateral Legs (Bilateral) Day of Surgery   Precautions: Fall risk       ASSESSMENT  Pt is a 65 y/o F with unknown PMH presenting to [facility] after being found by her home health aide on the last visit hypoxic with O2 off. Per notes, there was trash around the house and pt noted to be in poor hygiene with B leg wounds. CXR shows PNA. Pt admitted 11/25/20 for acute cellulitis of B legs, community acquired PNA, poor hygiene, elevated troponin, acute systolic HF with EF 02-62%. Pt received semi-supine in bed upon arrival, on 3L O2 via NC, AXO to person, place and cues for year, and agreeable to OT evaluation at this time. Per pt, she lives alone in a one-story home with 2 TANYA B HR, required assist with ADLs from [facility] and and Mod I using rollator for mobility at [facility]. Based on current observations, pt currently presents with deficits in generalized strength/AROM, bed mobility, functional activity tolerance, and cognition impacting overall performance of ADLs and functional transfers/mobility. Pt noted to be wet upon OT arrival, RN present at bedside and pt currently requires max A x2 for rolling to each side, total A for bladder hygiene/pericare. Clean gown donned with max A and pt repositioned up in bed with max A x2. With setup of items for retrieval, pt completes basic face washing task s/p setup and is able to demonstrate ability to open chapstick container and apply to lips in long sitting. Pt would benefit from continued skilled OT services during hospital stay and at next level of care to address current impairments and improve overall IND and safety with self cares and functional mobility upon d/c. OT recommending SNF at d/c once medically appropriate.     Other factors to consider for discharge: Family/social support, DME, time since onset, severity of deficits     Patient will benefit from skilled therapy intervention to address the above noted impairments. PLAN :  Recommendations and Planned Interventions: self care training, functional mobility training, therapeutic exercise, balance training, therapeutic activities, cognitive retraining, endurance activities, neuromuscular re-education, and patient education    Frequency/Duration: Patient will be followed by occupational therapy 4 times a week to address goals. Recommendation for discharge: (in order for the patient to meet his/her long term goals)  SNF    This discharge recommendation:  Has been made in collaboration with the attending provider and/or case management       SUBJECTIVE:   Patient stated i'm so hungry. I just want to eat    OBJECTIVE DATA SUMMARY:   HISTORY:   No past medical history on file. No past surgical history on file. Expanded or extensive additional review of patient history:     Home Situation  Home Environment: Private residence  # Steps to Enter: 2  Rails to Enter: Yes  Hand Rails : Bilateral  One/Two Story Residence: One story  Living Alone: Yes  Support Systems: Other (comments)(Home health aide)  Patient Expects to be Discharged to[de-identified] Skilled nursing facility  Current DME Used/Available at Home: Hospital bed, Walker, rollator, Shower chair, Cane, straight  Tub or Shower Type: Tub/Shower combination    EXAMINATION OF PERFORMANCE DEFICITS:  Cognitive/Behavioral Status:  Neurologic State: Alert  Orientation Level: Oriented to person;Oriented to place;Oriented to time(Cues for month)  Cognition: Decreased command following;Memory loss;Decreased attention/concentration    Hearing:   Auditory  Auditory Impairment: None    Vision/Perceptual:      WFL     Range of Motion:  AROM: Generally decreased, functional    Strength:  Strength: Generally decreased, functional   Grossly 3/5     Coordination:  Fine Motor Skills-Upper: Left Intact; Right Intact    Gross Motor Skills-Upper: Left Intact; Right Intact    Tone & Sensation:  Sensation: Intact    Balance:  Sitting: Impaired; With support;High guard  Sitting - Static: Poor (constant support)  Sitting - Dynamic: Poor (constant support)  Standing: (not tested)    Functional Mobility and Transfers for ADLs:  Bed Mobility:  Rolling: Maximum assistance;Assist x2  Supine to Sit: Maximum assistance;Assist x2  Sit to Supine: Maximum assistance;Assist x2  Scooting: Maximum assistance;Assist x2    ADL Intervention and task modifications:  Grooming  Grooming Assistance: Set-up; Supervision  Position Performed: Long sitting on bed  Washing Face: Set-up; Supervision  Applying Makeup: Set-up(Opens chapstick and applies to lips)    Lower Body Bathing  Perineal  : Total assistance (dependent)  Position Performed: Supine    Upper Body [address]: Maximum assistance    Toileting  Toileting Assistance: Total assistance(dependent)  Bladder Hygiene: Total assistance (dependent)  Clothing Management: Total assistance (dependent)  Cues: Visual cues provided;Verbal cues provided; Tactile cues provided    Therapeutic Exercise:  Pt would benefit from UE HEP to improve overall UE AROM/strength and can be further educated in next treatment session. Functional Measure:    [address] [facility] [address] [address] AM-PACTM \"6 Clicks\"                                                       Daily Activity Inpatient Short Form  How much help from another person does the patient currently need. .. Total; A Lot A Little None   1. Putting on and taking off regular lower body clothing? [x]  1 []  2 []  3 []  4   2. Bathing (including washing, rinsing, drying)? []  1 [x]  2 []  3 []  4   3. Toileting, which includes using toilet, bedpan or urinal? [x] 1 []  2 []  3 []  4   4. Putting on and taking off regular upper body clothing? []  1 [x]  2 []  3 []  4   5. Taking care of personal grooming such as brushing teeth?  []  1 []  2 [x]  3 []  4   6. Eating meals? []  1 []  2 [x]  3 []  4   © , Trustees of [facility], under license to [employer]. All rights reserved     Score:      Interpretation of Tool:  Represents clinically-significant functional categories (i.e. Activities of daily living). Percentage of Impairment CH    0%   CI    1-19% CJ    20-39% CK    40-59% CL    60-79% CM    80-99% CN     100%   [facility]  Score 6-24 24 23 20-22 15-19 10-14 7-9 6        Occupational Therapy Evaluation Charge Determination   History Examination Decision-Making   LOW Complexity : Brief history review  MEDIUM Complexity : 3-5 performance deficits relating to physical, cognitive , or psychosocial skils that result in activity limitations and / or participation restrictions HIGH Complexity : Patient presents with comorbidities that affect occupational performance. Signifigant modification of tasks or assistance (eg, physical or verbal) with assessment (s) is necessary to enable patient to complete evaluation       Based on the above components, the patient evaluation is determined to be of the following complexity level: LOW   Pain Ratin/10    Activity Tolerance:   Poor    After treatment patient left in no apparent distress:    Supine in bed, Patient positioned in R sidelying for pressure relief, Call bell within reach, Bed / chair alarm activated, and Side rails x 3    COMMUNICATION/EDUCATION:   The patients plan of care was discussed with: Registered nurse. Patient/family agree to work toward stated goals and plan of care. This patients plan of care is appropriate for delegation to \[facility] [place] [facility]\"".     Thank you for this referral.  Christian Roman  Time Calculation: 30 mins   Problem: Self Care Deficits Care Plan (Adult)  Goal: *Acute Goals and Plan of Care (Insert Text)  Description: Pt will be min A sup <> sit in prep for EOB ADLs  Pt will be SBA grooming sitting EOB  Pt will be min A LE dressing sitting EOB/long sit  Pt will be min A sit <>  prep for toileting LRAD  Pt will be min A toileting/toilet transfer/cloth mgmt LRAD  Pt will be SPV following UE HEP in prep for self care tasks  Outcome: Not Met

## 2020-12-02 NOTE — PROGRESS NOTES
18- dr. Baron Calhoun and dr. Glen Stafford notified that patient needed to be put on 4 liters nasal cannula to get sats in the 90's, was 70-80's on room air when patient is patient hold.

## 2020-12-02 NOTE — PROGRESS NOTES
Progress Note  Date:2020       Room:208/01  Patient Name:Naa Osorio     Date of Birth:     Age:75 y.o. Subjective    Subjective   Pt seen in preop holding this am and in her room this pm. Pt appears to be altered and is confused. Refusing any surgical intervention. Her sister states she will consent for her and that she is not usually like this. Per nursing, magnesium and potassium given this am. Consent has not been obtained as of yet    Review of Systems   Unable to accurately obtain due to altered mental status    Objective         Vitals Last 24 Hours:  TEMPERATURE:  Temp  Av.1 °F (36.7 °C)  Min: 97.8 °F (36.6 °C)  Max: 98.5 °F (36.9 °C)  RESPIRATIONS RANGE: Resp  Av  Min: 18  Max: 20  PULSE OXIMETRY RANGE: SpO2  Av.7 %  Min: 93 %  Max: 98 %  PULSE RANGE: Pulse  Av.3  Min: 78  Max: 89  BLOOD PRESSURE RANGE: Systolic (83RMN), OFN:098 , Min:111 , NIL:137   ; Diastolic (23XPB), ERJ:47, Min:66, Max:79    I/O (24Hr): No intake or output data in the 24 hours ending 20 0539  Objective   GEN: Pt is AAOx4 and in NAD. Dressings noted to B/L LE are C/D/I. No family noted at [facility]  DERM: Wounds noted to both LE with purulence and eschar. Malodor noted. Periwound erythema noted. No dry skin noted to B/L LE. No proximal lymphatic streaking noted to B/L LE. VASC: Pedal pulses (DP/PT) faintly palpable to B/L LE. CFT<3sec to all digits of B/L LE. No pedal hair growth noted to the level of the digits for B/L LE. Skin temp is warm to warm from proximal to distal for B/L LE. Neg homans/maggi signs to B/L LE.  No varicosities or telangectasias noted to B/L LE.  NEURO: Protective and epicritic sensations grossly intact to B/L LE  MSK: (+) POP, No gross deformities. Decreased muscle tone and bulk noted to B/L LE.  PSYCH: Cooperative with normal mood and affect    Labs/Imaging/Diagnostics    Labs:  CBC:  Recent Labs     20  0910 20  1340   WBC 11.9* 12.7*   RBC 2.72* 2.83*   HGB 7.6* 8.0*   HCT 26.6* 27.9*   MCV 97.8 98.6   RDW 13.9 13.7   * 525*     CHEMISTRIES:  Recent Labs     12/02/20  0910 12/01/20  1340     145 147*   K 3.4*  3.4* 2.9*     101 101   CO2 42*  42* 44*   BUN 14  14 11   CA 8.4*  8.4* 8.0*   PHOS 2.2*  --    MG 1.7  --    PT/INR:No results for input(s): INR, INREXT in the last 72 hours. No lab exists for component: PROTIME  APTT:No results for input(s): APTT in the last 72 hours. LIVER PROFILE:  Recent Labs     12/02/20  0910 12/01/20  1340   AST 12* 12*   ALT 10* 12     Lab Results   Component Value Date/Time    ALT (SGPT) 10 (L) 12/02/2020 09:10 AM    AST (SGOT) 12 (L) 12/02/2020 09:10 AM    Alk. phosphatase 95 12/02/2020 09:10 AM    Bilirubin, total 0.4 12/02/2020 09:10 AM       Imaging Last 24 Hours:  No results found. Assessment//Plan   Active Problems:    PNA (pneumonia) (11/25/2020)      Pneumonia (11/25/2020)      Assessment & Plan    - Bilateral LE Wounds  - PAD  - Sepsis (resolving)  - Altered mental status (acute)        Patient seen and evaluated at bedside  - Most recent labs personally reviewed, mg and k optimized  - Cont current wound care  - Pt cleared from vascular standpoint  - XR cancelled for some reason, will not re-order and will just move forward with debridement  - Abx per ID  - I will follow closely     Plan debridement when consent issue resolved        Scott HERBERT  [address] DPM, [address] [address] Surgery  [address] [place] [address], [address] [place] [address], [facility],[address] Floor  Ruben Damon:  [phone]  F:  [phone]  C:  [phone]    Electronically signed by Pattie Clemente DPM on 12/2/2020 at 5:39 PM

## 2020-12-02 NOTE — PROGRESS NOTES
General Daily Progress Note          Patient Name:   Tanner Mcdermott       YOB: 1945       Age:  66 y.o. Admit Date: 11/25/2020      Subjective:         Seen awake not in distress confused  On 3 L oxygen by nasal cannula  Hemoglobin is 8.0             Objective:     Visit Vitals  /79 (BP 1 Location: Right arm, BP Patient Position: At rest)   Pulse 88   Temp 97.9 °F (36.6 °C)   Resp 20   Ht 5' (1.524 m)   Wt 41.3 kg (91 lb)   SpO2 93%   BMI 17.77 kg/m²          [unfilled]      Review of Systems    Constitutional: Negative for chills and fever. HENT: Negative. Eyes: Negative. Respiratory: Negative. Cardiovascular: Negative. Gastrointestinal: Negative for abdominal pain and nausea. Skin: Negative. Neurological: Negative. Physical Exam:      Constitutional: Alert awake  HENT:   Head: Normocephalic and atraumatic. Eyes: Pupils are equal, round, and reactive to light. EOM are normal.   Cardiovascular: Normal rate, regular rhythm and normal heart sounds. Pulmonary/Chest: Breath sounds normal. No wheezes. No rales. Exhibits no tenderness. Abdominal: Soft. Bowel sounds are normal. There is no abdominal tenderness. There is no rebound and no guarding. Musculoskeletal: Normal range of motion. Neurological: Bilateral lower leg red swollen    XR CHEST PORT   Final Result   Impression: Underexpanded lungs with bibasilar atelectasis, increased. Possible   right pleural effusion. Hydrostatic edema. XR CHEST PA LAT   Final Result   Impression: The cardiomediastinal silhouette is appropriate for age, technique,   and lung expansion. Pulmonary vasculature is not congested. The lungs are   essentially clear. No effusion or pneumothorax is seen.          XR CHEST SNGL V   Final Result               Results     Procedure Component Value Units Date/Time    CULTURE, Jacquie Gold STAIN [[id number]]  (Susceptibility) Collected:  11/27/20 1115    Order Status:  Completed Specimen:  Wound Updated:  12/01/20 1044     Special Requests: No Special Requests        GRAM STAIN Rare WBCs seen         Few Gram Negative Rods               Occasional Gram Positive Cocci in clusters           Culture result: Heavy  Mixed skin nica isolated               Heavy Staphylococcus aureus                  Heavy Gram Negative Rods MIXED COLONY TYPES. .. Idenfitication and susceptibility to follow          Susceptibility      Staphylococcus aureus     WILLIAM (Preliminary)     Ciprofloxacin ($) Resistant     Clindamycin ($) Susceptible     Daptomycin ($$$$$) Susceptible     Doxycycline ($$) Susceptible     Erythromycin ($$$$) Resistant     Gentamicin ($) Susceptible     Levofloxacin ($) Intermediate     Linezolid ($$$$$) Susceptible     Moxifloxacin ($$$$) Susceptible     Oxacillin Susceptible     Rifampin ($$$$) Susceptible [1]      Tetracycline Susceptible     Trimeth/Sulfa Susceptible     Vancomycin ($) Susceptible            [1]   Rifampin is not to be used for mono-therapy.                  CULTURE, URINE [[id number]] Collected:  11/26/20 0945    Order Status:  Completed Specimen:  Urine Updated:  11/28/20 1050     Special Requests: No Special Requests        Culture result: No Growth (<1000 cfu/mL)       CULTURE, BLOOD #2 [[id number]]  (Abnormal)  (Susceptibility) Collected:  11/26/20 0122    Order Status:  Completed Specimen:  Blood Updated:  11/30/20 0927     Special Requests: No Special Requests        Culture result:       Providencia rettgeri GROWING IN THE AEROBIC AND ANAEROBIC BOTTLE (SITE = ARM)                  Gram Negative Rods CALLED TO AND READ BACK BY Linda Jamison R.N. [id number] 11/27/20          Susceptibility      Providencia rettgeri     WILLIAM     Amikacin ($) Susceptible     Ampicillin ($) Resistant     Ampicillin/sulbactam ($) Susceptible     Cefazolin ($) Resistant     Cefepime ($$) Susceptible     Cefoxitin Susceptible     Ceftazidime ($) Susceptible     Ceftriaxone ($) Susceptible Ciprofloxacin ($) Susceptible     Gentamicin ($) Susceptible     Levofloxacin ($) Susceptible     Piperacillin/Tazobac ($) Susceptible     Tobramycin ($) Susceptible                    CULTURE, BLOOD #1 [[id number]]  (Abnormal) Collected:  11/26/20 0112    Order Status:  Completed Specimen:  Blood Updated:  11/30/20 0928     Special Requests: No Special Requests        Culture result:       Providencia rettgeri growing in both bottles drawn (SITE =ARM)                  Gram Negative Rods CALLED TO Estuardo Hartman R.N. [id number] 11/27/20                  Please refer to previous blood culture  [id number], COLLECTED 11/26/20 FOR SENSITIVITIES      CULTURE, Grace People STAIN [[id number]] Collected:  11/25/20 1930    Order Status:  Completed Specimen:  Wound Updated:  12/01/20 1101     Special Requests: No Special Requests        GRAM STAIN Rare WBCs seen         Rare Gram Negative Rods               Rare Gram Positive Cocci in pairs           Culture result:       Heavy MIXED ENTERIC Gram Negative Rods REFER TO [id number] FOR ID AND SENSITIVITIES                  Heavy Staphylococcus aureus REFER TO [facility] [id number] FOR SENSITIVITIES            Heavy  Mixed skin nica isolated       COVID-19 RAPID TEST [[id number]] Collected:  11/25/20 1445    Order Status:  Completed Specimen:  Nasopharyngeal Updated:  11/25/20 1553     Specimen source Nasopharyngeal        COVID-19 rapid test Not Detected        Comment: Rapid Abbott ID Now   Rapid NAAT:  The specimen is NEGATIVE for SARS-CoV-2, the novel coronavirus associated with COVID-19. Negative results should be treated as presumptive and, if inconsistent with clinical signs and symptoms or necessary for patient management, should be tested with an alternative molecular assay. Negative results do not preclude SARS-CoV-2 infection and should not be used as the sole basis for patient management decisions.    This test has been authorized by the FDA under   an Emergency Use Authorization (EUA) for use by authorized laboratories. Fact sheet for Healthcare Providers: ConventionUpdate.co.nz Fact sheet for Patients: ConventionUpdate.co.nz   Methodology: Isothermal Nucleic Acid Amplification                Labs:     Recent Labs     12/01/20  1340   WBC 12.7*   HGB 8.0*   HCT 27.9*   *     Recent Labs     12/01/20  1340 11/30/20  1230 11/29/20  1149   *  --   --    K 2.9*  --  3.4*     --   --    CO2 44*  --   --    BUN 11  --   --    CREA 0.40* 0.41* 0.38*   GLU 87  --   --    CA 8.0*  --   --    MG  --   --  1.6     Recent Labs     12/01/20  1340   ALT 12   AP 96   TBILI 0.2   TP 5.7*   ALB 1.5*   GLOB 4.2*     Recent Labs     11/29/20  1149   APTT 33.4      No results for input(s): FE, TIBC, PSAT, FERR in the last 72 hours. No results found for: FOL, RBCF   No results for input(s): PH, PCO2, PO2 in the last 72 hours. No results for input(s): CPK, CKNDX, TROIQ in the last 72 hours.     No lab exists for component: CPKMB  No results found for: CHOL, CHOLX, CHLST, CHOLV, HDL, HDLP, LDL, LDLC, DLDLP, TGLX, TRIGL, TRIGP, CHHD, CHHDX  No results found for: [facility]  Lab Results   Component Value Date/Time    Color Yellow/Straw 11/25/2020 04:50 PM    Appearance Clear 11/25/2020 04:50 PM    Specific gravity 1.013 11/25/2020 04:50 PM    pH (UA) 5.0 11/25/2020 04:50 PM    Protein Negative 11/25/2020 04:50 PM    Glucose Negative 11/25/2020 04:50 PM    Ketone Negative 11/25/2020 04:50 PM    Bilirubin Negative 11/25/2020 04:50 PM    Urobilinogen 0.1 11/25/2020 04:50 PM    Nitrites Negative 11/25/2020 04:50 PM    Leukocyte Esterase Negative 11/25/2020 04:50 PM    Bacteria Negative 11/25/2020 04:50 PM    WBC 5-10 11/25/2020 04:50 PM    RBC 0-5 11/25/2020 04:50 PM         Assessment:   Acute hypoxemic respiratory failure on 5 L  Acute systolic heart failure with ejection fraction of 15 to 20%  Acute cellulitis of bilateral legs  Gram-negative bacteremia with providentia  Sepsis leukocytosis elevated procalcitonin and lactic acid  Community-acquired pneumonia  Poor hygiene  Elevated troponin  Leukocytosis  Hypothyroidism  Anemia   Plan:     Continue aspirin Lipitor levothyroxine    Discontinue vancomycin  Continue IV ZOSYN   Doppler studies negative for DVT  Lasix 40 mg IV twice daily  Follow-up with infectious disease  Cardiology consult for elevated troponin  PT OT consult and wound care nurse consult  Repeat the labs in the morning  Respiratory consult for weaning of oxygen  Nebulizer treatment  Transfuse 2 unit packed RBC    No labs in 2 days  Order stat labs  PT OT consult  Discussed with the  regarding discharge planning        Current Facility-Administered Medications:     magnesium sulfate 1 g/100 ml IVPB (premix or compounded), 1 g, IntraVENous, ONCE, Jodee Wong MD    albuterol-ipratropium (DUO-NEB) 2.5 MG-0.5 MG/3 ML, 3 mL, Nebulization, Q6H RT, Keith Solano MD, 3 mL at 12/01/20 2010    albumin human 25% (BUMINATE) solution 25 g, 25 g, IntraVENous, Q6H, Taylor Swanson MD, 25 g at 12/02/20 0542    furosemide (LASIX) injection 40 mg, 40 mg, IntraVENous, BID, Layla Mishra NP, 40 mg at 12/02/20 0838    piperacillin-tazobactam (ZOSYN) 3.375 g in 0.9% sodium chloride (MBP/ADV) 100 mL MBP, 3.375 g, IntraVENous, Q8H, Keith Solano MD, Last Rate: 25 mL/hr at 12/02/20 0341, 3.375 g at 12/02/20 0341    levothyroxine (SYNTHROID) tablet 75 mcg, 75 mcg, Oral, 6am, Keith Solano MD, 75 mcg at 12/02/20 0541    acetaminophen (TYLENOL) tablet 650 mg, 650 mg, Oral, Q6H PRN, 650 mg at 12/01/20 0449 **OR** acetaminophen (TYLENOL) suppository 650 mg, 650 mg, Rectal, Q6H PRN, Keith Solano MD    polyethylene glycol (MIRALAX) packet 17 g, 17 g, Oral, DAILY PRN, Keith Solano MD    ondansetron (ZOFRAN ODT) tablet 4 mg, 4 mg, Oral, Q8H PRN **OR** ondansetron (ZOFRAN) injection 4 mg, 4 mg, IntraVENous, Q6H PRN, Deepa Mixon MD  [facility] aspirin tablet 325 mg, 325 mg, Oral, DAILY, Keith Solano MD, 325 mg at 12/02/20 6415    atorvastatin (LIPITOR) tablet 40 mg, 40 mg, Oral, QHS, Keith Solano MD, 40 mg at 12/01/20 9931

## 2020-12-02 NOTE — PROGRESS NOTES
CM spoke to June Dior ([phone]) from [facility] regarding referral. June Dior stated that patient was discharged from [place] and stated that patient is not [facility]. June Dior reported that patient is paranoid and her house is disorderly. June Phillipss stated the stove was left on with paper plates sitting on top. June Dior reported that the SG from [place] [phone] [employer] went to the house and had a medical TDO on the patient. June Dior reported that APS is involved Byron Sondra [phone] June Dior reported that patient was spraying her legs with water and putting toilet paper on them. June Dior reported the patient does not have a strong family support system; her sons live in [place] and her sister Rock Betancur does not drive. June Dior reported that she would do well in a SNF. YOKASTA spoke to Dr. Bryn Montoya and asked for a psych consult on patient. CM contacted APS worker Byron Amaro ([phone] ); received no answer and left a voice message. CM will continue to follow the patient for discharge planning needs.

## 2020-12-02 NOTE — PROGRESS NOTES
Consult  Pulmonary, Critical Care    Name: Elton Leach MRN: [id number]   : 1945 Hospital: [facility]   Date: 2020  Admission date: 2020 Hospital Day: 8       Subjective/Interval History:   Seen on the medical floor. Consult requested for acute hypoxic respiratory failure. She is an extremely poor historian states she feels fine to leave her alone because she is trying to eat  12/2 respirations nonlabored she remains on 3 L of oxygen. States she is not having any surgery    Hospital Problems  Never Reviewed          Codes Class Noted POA    PNA (pneumonia) ICD-10-CM: J18.9  ICD-9-CM: 486  2020 Unknown        Pneumonia ICD-10-CM: J18.9  ICD-9-CM: 342  2020 Unknown              IMPRESSION:   1. Acute hypoxic respiratory failure secondary to pulmonary edema  2. Hypokalemia to be repleted labs pending today  3. Severe cardiomyopathy  4. Severe mitral regurg  5. Bilateral pleural effusions secondary to pulmonary edema  6. Providencia septicemia  7. Anemia  8. Hypothyroidism treated  9. Questionable early dementia  Body mass index is 17.77 kg/m². 10.       RECOMMENDATIONS/PLAN:   1. Agree IV Lasix twice daily  2. Repeat albumin to try and improve oncotic pressure to improve diuresis  3. Follow renal function  4. Follow H&H  5.           [x] High complexity decision making was performed  [x] See my orders for details      Subjective/Initial History:     I was asked by Monae Mendez MD to see Elton Leach  a 66 y.o.    female in consultation for a chief complaint of acute hypoxic respiratory failure and pleural effusions      Allergies   Allergen Reactions    Tetracycline Other (comments)     Causes mouth sores    Morphine Nausea and Vomiting        MAR reviewed and pertinent medications noted or modified as needed     Current Facility-Administered Medications   Medication    magnesium sulfate 1 g/100 ml IVPB (premix or compounded)    0.9% sodium chloride infusion 250 mL    furosemide (LASIX) injection 40 mg    albuterol-ipratropium (DUO-NEB) 2.5 MG-0.5 MG/3 ML    albumin human 25% (BUMINATE) solution 25 g    furosemide (LASIX) injection 40 mg    piperacillin-tazobactam (ZOSYN) 3.375 g in 0.9% sodium chloride (MBP/ADV) 100 mL MBP    levothyroxine (SYNTHROID) tablet 75 mcg    acetaminophen (TYLENOL) tablet 650 mg    Or    acetaminophen (TYLENOL) suppository 650 mg    polyethylene glycol (MIRALAX) packet 17 g    ondansetron (ZOFRAN ODT) tablet 4 mg    Or    ondansetron (ZOFRAN) injection 4 mg    aspirin tablet 325 mg    atorvastatin (LIPITOR) tablet 40 mg      Patient PCP: Karl Bardales MD  PMH:  has no past medical history on file. PSH:   has no past surgical history on file. FHX: family history is not on file. SHX:       Completely unreliable systemic review she states there is nothing wrong with her to leave her alone    Objective:     Vital Signs: Telemetry:    normal sinus rhythm Intake/Output:   Visit Vitals  /79 (BP 1 Location: Right arm, BP Patient Position: At rest)   Pulse 88   Temp 97.9 °F (36.6 °C)   Resp 20   Ht 5' (1.524 m)   Wt 41.3 kg (91 lb)   SpO2 93%   BMI 17.77 kg/m²       Temp (24hrs), Av.1 °F (36.7 °C), Min:97.9 °F (36.6 °C), Max:98.2 °F (36.8 °C)        O2 Device: Nasal cannula O2 Flow Rate (L/min): 3 l/min       Wt Readings from Last 4 Encounters:   20 41.3 kg (91 lb)   16 57.2 kg (126 lb)   12/07/10 64 kg (141 lb)        No intake or output data in the 24 hours ending 20 1036    Last shift:      No intake/output data recorded. Last 3 shifts:  1901 -  0700  In: 220 [P.O.:120;  I.V.:100]  Out: 950 [Urine:950]       Physical Exam:   General:  female; chronically ill appearing frail  HEENT: NCAT, oral mucosa clear  Eyes: anicteric; conjunctiva clear extraocular movements intact  Neck: no nodes, neck veins visible no accessory muscle use  Chest: no deformity,   Cardiac: Regular rate and rhythm systolic murmur present has edema of the extremities  Lungs: distant breath sounds; no wheezing rales are heard posteriorly no breath sounds in the bases  Abd: Soft positive bowel sounds no tenderness she has some posterior abdominal wall edema  Ext: Edema of the legs arms and sacrum  : clear urine  Neuro: Awake more alert mildly confused states she does not know why she is in the hospital will move all 4 extremities  Psych- no agitation, oriented to person; does not know why she is in the hospital or exactly where she is  Skin: Lower extremities have very flaky skin with erythema  Pulses: Brachial radial femoral pulses intact  Capillary: Slow capillary refill    Labs:    Recent Labs     12/01/20  1340 11/29/20  1149   WBC 12.7*  --    HGB 8.0*  --    *  --    APTT  --  33.4     Recent Labs     12/01/20  1340 11/30/20  1230 11/29/20  1149   *  --   --    K 2.9*  --  3.4*     --   --    CO2 44*  --   --    GLU 87  --   --    BUN 11  --   --    CREA 0.40* 0.41* 0.38*   CA 8.0*  --   --    MG  --   --  1.6   ALB 1.5*  --   --    ALT 12  --   --      3 L nasal oxygen with oxygen saturation 93%  BNP 16,597  CRP 3.04  11/26 blood culture with Providencia sensitive to Zosyn  Urine culture no growth  Wound culture with multiple organisms including methicillin sensitive staph aureus Klebsiella Providencia Enterobacter and Morganella  11/27 echo ejection fraction 20% dilated left atrium grade 2 diastolic dysfunction moderate to severe mitral regurg IVC dilatation RVSP 55  12/1 nuclear cardiac scan with ejection fraction of 19%  Lab Results   Component Value Date/Time    Culture result: Heavy Morganella morganii ssp morganii 11/27/2020 11:15 AM    Culture result:  11/27/2020 11:15 AM     Heavy Morganella morganii ssp morganii (2nd colony type/strain)    Culture result: Light Myroides sp 11/27/2020 11:15 AM    Culture result: Heavy  Mixed skin nica isolated   11/27/2020 11:15 AM    Culture result: Heavy Staphylococcus aureus 11/27/2020 11:15 AM    Culture result: Heavy Klebsiella oxytoca 11/27/2020 11:15 AM    Culture result: Heavy Providencia rettgeri 11/27/2020 11:15 AM    Culture result: Heavy Enterobacter cloacae complex 11/27/2020 11:15 AM     Lab Results   Component Value Date/Time    TSH 0.26 (L) 11/25/2020 12:30 PM       Imaging:    CXR Results  (Last 48 hours)               12/01/20 1621  XR CHEST PORT Final result    Impression:  Impression: Underexpanded lungs with bibasilar atelectasis, increased. Possible   right pleural effusion. Hydrostatic edema. Narrative:  Chest, frontal view, 12/1/2020       History: CHF. Comparison: Including chest 11/27/2020. Findings: The patient's head obscures the left upper lung zone. Right-sided   pacemaker device with 2 leads is in place. The cardiac silhouette again   demonstrates globular enlargement which can indicate cardiomyopathy or   pericardial effusion. The lungs are underexpanded with bibasilar atelectasis,   increased as compared to chest 11/27/2020. Right pleural effusion is possible. There is pulmonary vascular congestion and hydrostatic edema. No pneumothorax   is identified. The osseous structures are stable. Results from [place] encounter on 11/25/20   XR CHEST PORT    Narrative Chest, frontal view, 12/1/2020    History: CHF. Comparison: Including chest 11/27/2020. Findings: The patient's head obscures the left upper lung zone. Right-sided  pacemaker device with 2 leads is in place. The cardiac silhouette again  demonstrates globular enlargement which can indicate cardiomyopathy or  pericardial effusion. The lungs are underexpanded with bibasilar atelectasis,  increased as compared to chest 11/27/2020. Right pleural effusion is possible. There is pulmonary vascular congestion and hydrostatic edema. No pneumothorax  is identified. The osseous structures are stable. Impression Impression: Underexpanded lungs with bibasilar atelectasis, increased. Possible  right pleural effusion. Hydrostatic edema. XR CHEST PA LAT    Narrative 2 view comparison the 25th      Impression Impression: The cardiomediastinal silhouette is appropriate for age, technique,  and lung expansion. Pulmonary vasculature is not congested. The lungs are  essentially clear. No effusion or pneumothorax is seen. XR CHEST SNGL V    Narrative Chest single view. Comparison single view chest August 11, 2020    Stripe-like opacity noted involving inferior left upper lobe lung; finding  concerning for pneumonia. Right-sided cardiac device with unchanged leads right  heart. Cardiac silhouette enlargement. Atherosclerotic change thoracic aorta. No  pneumothorax or pleural effusion. · Providencia septicemia questionably urinary tract or from skin site. Urine culture was no growth I do not know if it was collected after antibiotics were given. She is currently on Zosyn   She has pleural effusions and accentuated interstitial markings consistent with CHF/pulmonary edema. She is receiving Lasix. She is severely hypoalbuminemic will give albumin to try and help diuresis. She has developed hypokalemia with the diuresis will supplement potassium. · Acute  · Thank you for allowing me to see Ms. Alina Marinelli  ·     Lloyd Taylor MD

## 2020-12-02 NOTE — PROGRESS NOTES
Cardiology Progress Note    Patient seen and examined. This is a patient who is followed for Acute HF. Seen resting in bed. Continues with mild shortness of breath. No chest pain. No other complaints offered. Telemetry reviewed, V paced with frequent PVCs. Pertinent review of system items noted above, all other systems are negative. Current medications reviewed. Physical Examination  Vital signs are stable, Blood Pressure 125/79 , Pulse 88  No apparent distress. Heart is regular, rate and rhythm. Normal S1, S2, no murmurs are appreciated. Lungs are coarse bilaterally. Abdomen is soft, nontender, normal bowel sounds. Extremities have non pitting Edema, wrapped with krillex    Labs reviewed   K 2.9   Crt 0.40    Myocardial Perfusion Study:   · Baseline ECG: Normal EKG, rare PVCs. · Gated SPECT: Left ventricular function post-stress was abnormal. Calculated ejection fraction is 19%. · Myocardial perfusion imaging defect 1: There is a defect that is large in size with a severe reduction in uptake present in the inferior and apex location(s) that is non-reversible. There is abnormal wall motion in the defect area. Viability in the area is poor. The defect appears to be infarction. Perfusion defect was visually and quantitatively present. · Myocardial perfusion imaging defect 2: There is a defect that is small in size affecting the anteroseptal location(s) that is partially reversible. The defect appears to be infarction with baldo-infarct ischemia. Perfusion defect was visually and quantitatively present. · Left ventricular perfusion is abnormal.  · Myocardial perfusion imaging supports an intermediate risk stress test.      Case discussed with Dr. Baljinder Bond and our impression and recommendations are as follows:  1. Acute systolic CHF: EF 66-92, UNCLEAR IF NEW OR OLD. Continue diuresis, will decrease to 40mg IV daily. I&Os not carted. Renal function low normal. Will monitor.  Stress test done showing large infarction and only a small area of baldo-infarct. We discussed options and she declines a cardiac cath, we will continue medical management. Will add low dose ACE/BB once euvolemic. Please keep serum potassium between 4-5 and serum magnesium > 2. K 3.4, repletion have been ordered already. Will monitor with diuresis. 2. PPM: [facility] PPM. Contacted rep. Per documents implanted in 2005 by Dr. Rajiv Watson for complete heart block. Interrogated today showing several NSVT, unsure if they correlate with possible syncopal episode. 3. Elevated troponin: Likely demand ischemia from CHF. No active chest pain. 4. Pneumonia and cellulitis: Rx per primary. Pending lower extremity wound debridement. Please do no hesitate to call if additional questions arise.

## 2020-12-02 NOTE — PROGRESS NOTES
Progress Note    Patient: Rona Aguirre MRN: [id number]  SSN: [id number]    YOB: 1945  Age: 66 y.o. Sex: female      Admit Date: 11/25/2020    LOS: 7 days     Subjective:   Patient followed for sepsis with cellulitis both legs and pyuria. Blood cultures grew Providencia. Wound culture with mixed Gram negative nica and MSSA. She remains afebrile. WBC, procalcitonin and CRP still decreasing. Currently on Zosyn alone. She apparently was taken down for debridement by Podiatry but brought back because of magnesium and potassium. Patient asleep at this time. Objective:     Vitals:    12/01/20 1324 12/01/20 2000 12/01/20 2010 12/02/20 0824   BP:  118/70  125/79   Pulse:  78  88   Resp:  18  20   Temp:  98.2 °F (36.8 °C)  97.9 °F (36.6 °C)   SpO2: 99% 98% 97% 93%   Weight:       Height:            Intake and Output:  Current Shift: No intake/output data recorded. Last three shifts: 11/30 1901 - 12/02 0700  In: 220 [P.O.:120; I.V.:100]  Out: 950 [Urine:950]    Physical Exam:   Constitutional:       General: She is not in acute distress. Appearance: She is chronically ill-appearing. HENT:      Head: Normocephalic and atraumatic. Nose:      Comments: Nasal O2 cannula  Genitourinary:     Comments: Purewick catheter  Musculoskeletal:         General: Swelling present. No tenderness. Right lower leg: Edema present. Left lower leg: Edema present. Comments: Both calves are bandaged at this time with bulky gauze dressing, however, erythema has markedly decreased along with swelling. Neurological:      General: No focal deficit present. Mental Status: She is alert. She is disoriented.    Psychiatric:         Behavior: Behavior normal.     Lab/Data Review:     WBC Pending <12,700  Procalcitonin Pending <0.10 <0.69 <1.19  CRP Pending <3.04 <9.26 <16.00    Covid-19 negative    Blood cultures (11/26) Providenica rettgeri  Blood cultures (11/26) Providenica rettgeri  Urine culture (11/26) No growth FiNAL  Wound culture (11/27) Heavy mixed enteric Gram negative rods, MSSA  Assessment:     Active Problems:    PNA (pneumonia) (11/25/2020)      Pneumonia (11/25/2020)    1. Cellulitis, bilateral lower extremities, severe, secondary to MSSA and Gram negative rods, Day #7 IV Zosyn. 2. Gram negative bacteremia with Providencia rettgeri, probably secondary to above, on IV Zosyn. 3. Sepsis with leukocytosis, elevated procalcitonin, CRP and lactic acid, resolving. 4. SOB  5. Pyuria, negative urine culture    Comment:  WBC, procalcitonin and CRP decreasing. Plan:   1. Continue IV Zosyn for 7 more days  2. In am, repeat CBC, procalcitonin, CRP   3.  Leg debridement planned  Signed By: Magaly Bella MD     December 2, 2020

## 2020-12-03 ENCOUNTER — APPOINTMENT (OUTPATIENT)
Dept: GENERAL RADIOLOGY | Age: 75
DRG: 853 | End: 2020-12-03
Attending: INTERNAL MEDICINE
Payer: MEDICARE

## 2020-12-03 LAB
ALBUMIN SERPL-MCNC: 2.4 G/DL (ref 3.5–5)
ALBUMIN SERPL-MCNC: 2.5 G/DL (ref 3.5–5)
ALBUMIN/GLOB SERPL: 0.6 {RATIO} (ref 1.1–2.2)
ALP SERPL-CCNC: 100 U/L (ref 45–117)
ALT SERPL-CCNC: 14 U/L (ref 12–78)
ANION GAP SERPL CALC-SCNC: 1 MMOL/L (ref 5–15)
ANION GAP SERPL CALC-SCNC: 2 MMOL/L (ref 5–15)
AST SERPL W P-5'-P-CCNC: 17 U/L (ref 15–37)
BASOPHILS # BLD: 0 K/UL (ref 0–0.1)
BASOPHILS NFR BLD: 0 % (ref 0–1)
BILIRUB SERPL-MCNC: 0.4 MG/DL (ref 0.2–1)
BUN SERPL-MCNC: 11 MG/DL (ref 6–20)
BUN SERPL-MCNC: 12 MG/DL (ref 6–20)
BUN/CREAT SERPL: 24 (ref 12–20)
BUN/CREAT SERPL: 26 (ref 12–20)
CA-I BLD-MCNC: 8.5 MG/DL (ref 8.5–10.1)
CA-I BLD-MCNC: 8.5 MG/DL (ref 8.5–10.1)
CHLORIDE SERPL-SCNC: 98 MMOL/L (ref 97–108)
CHLORIDE SERPL-SCNC: 98 MMOL/L (ref 97–108)
CO2 SERPL-SCNC: 42 MMOL/L (ref 21–32)
CO2 SERPL-SCNC: 43 MMOL/L (ref 21–32)
CREAT SERPL-MCNC: 0.46 MG/DL (ref 0.55–1.02)
CREAT SERPL-MCNC: 0.47 MG/DL (ref 0.55–1.02)
CRP SERPL-MCNC: 1.82 MG/DL (ref 0–0.6)
DIFFERENTIAL METHOD BLD: ABNORMAL
EOSINOPHIL # BLD: 0.2 K/UL (ref 0–0.4)
EOSINOPHIL NFR BLD: 2 % (ref 0–7)
ERYTHROCYTE [DISTWIDTH] IN BLOOD BY AUTOMATED COUNT: 14.8 % (ref 11.5–14.5)
GLOBULIN SER CALC-MCNC: 4 G/DL (ref 2–4)
GLUCOSE SERPL-MCNC: 131 MG/DL (ref 65–100)
GLUCOSE SERPL-MCNC: 132 MG/DL (ref 65–100)
HCT VFR BLD AUTO: 35.8 % (ref 35–47)
HGB BLD-MCNC: 10.7 G/DL (ref 11.5–16)
IMM GRANULOCYTES # BLD AUTO: 0.1 K/UL (ref 0–0.04)
IMM GRANULOCYTES NFR BLD AUTO: 1 % (ref 0–0.5)
LYMPHOCYTES # BLD: 0.9 K/UL (ref 0.8–3.5)
LYMPHOCYTES NFR BLD: 7 % (ref 12–49)
MCH RBC QN AUTO: 28.5 PG (ref 26–34)
MCHC RBC AUTO-ENTMCNC: 29.9 G/DL (ref 30–36.5)
MCV RBC AUTO: 95.5 FL (ref 80–99)
MONOCYTES # BLD: 0.7 K/UL (ref 0–1)
MONOCYTES NFR BLD: 6 % (ref 5–13)
NEUTS SEG # BLD: 10.2 K/UL (ref 1.8–8)
NEUTS SEG NFR BLD: 84 % (ref 32–75)
PHOSPHATE SERPL-MCNC: 2.9 MG/DL (ref 2.6–4.7)
PLATELET # BLD AUTO: 551 K/UL (ref 150–400)
PMV BLD AUTO: 9.2 FL (ref 8.9–12.9)
POTASSIUM SERPL-SCNC: 3.5 MMOL/L (ref 3.5–5.1)
POTASSIUM SERPL-SCNC: 3.5 MMOL/L (ref 3.5–5.1)
PROCALCITONIN SERPL-MCNC: <0.05 NG/ML
PROT SERPL-MCNC: 6.5 G/DL (ref 6.4–8.2)
RBC # BLD AUTO: 3.75 M/UL (ref 3.8–5.2)
SODIUM SERPL-SCNC: 142 MMOL/L (ref 136–145)
SODIUM SERPL-SCNC: 142 MMOL/L (ref 136–145)
WBC # BLD AUTO: 11.9 K/UL (ref 3.6–11)

## 2020-12-03 PROCEDURE — 80053 COMPREHEN METABOLIC PANEL: CPT

## 2020-12-03 PROCEDURE — 36430 TRANSFUSION BLD/BLD COMPNT: CPT

## 2020-12-03 PROCEDURE — 74011250637 HC RX REV CODE- 250/637: Performed by: INTERNAL MEDICINE

## 2020-12-03 PROCEDURE — 65270000029 HC RM PRIVATE

## 2020-12-03 PROCEDURE — 99232 SBSQ HOSP IP/OBS MODERATE 35: CPT | Performed by: INTERNAL MEDICINE

## 2020-12-03 PROCEDURE — 36415 COLL VENOUS BLD VENIPUNCTURE: CPT

## 2020-12-03 PROCEDURE — 77010033678 HC OXYGEN DAILY

## 2020-12-03 PROCEDURE — 71045 X-RAY EXAM CHEST 1 VIEW: CPT

## 2020-12-03 PROCEDURE — 74011250637 HC RX REV CODE- 250/637: Performed by: ANESTHESIOLOGY

## 2020-12-03 PROCEDURE — 84145 PROCALCITONIN (PCT): CPT

## 2020-12-03 PROCEDURE — 74011250636 HC RX REV CODE- 250/636: Performed by: FAMILY MEDICINE

## 2020-12-03 PROCEDURE — 30233N1 TRANSFUSION OF NONAUTOLOGOUS RED BLOOD CELLS INTO PERIPHERAL VEIN, PERCUTANEOUS APPROACH: ICD-10-PCS | Performed by: PODIATRIST

## 2020-12-03 PROCEDURE — 74011000250 HC RX REV CODE- 250: Performed by: FAMILY MEDICINE

## 2020-12-03 PROCEDURE — 74011250637 HC RX REV CODE- 250/637: Performed by: NURSE PRACTITIONER

## 2020-12-03 PROCEDURE — 80069 RENAL FUNCTION PANEL: CPT

## 2020-12-03 PROCEDURE — 74011000258 HC RX REV CODE- 258: Performed by: FAMILY MEDICINE

## 2020-12-03 PROCEDURE — 94760 N-INVAS EAR/PLS OXIMETRY 1: CPT

## 2020-12-03 PROCEDURE — 94640 AIRWAY INHALATION TREATMENT: CPT

## 2020-12-03 PROCEDURE — 74011250637 HC RX REV CODE- 250/637: Performed by: FAMILY MEDICINE

## 2020-12-03 PROCEDURE — 97530 THERAPEUTIC ACTIVITIES: CPT

## 2020-12-03 PROCEDURE — 86140 C-REACTIVE PROTEIN: CPT

## 2020-12-03 PROCEDURE — 85025 COMPLETE CBC W/AUTO DIFF WBC: CPT

## 2020-12-03 PROCEDURE — P9016 RBC LEUKOCYTES REDUCED: HCPCS

## 2020-12-03 PROCEDURE — 74011250636 HC RX REV CODE- 250/636: Performed by: NURSE PRACTITIONER

## 2020-12-03 RX ORDER — ACETAZOLAMIDE 500 MG/1
500 CAPSULE, EXTENDED RELEASE ORAL EVERY 6 HOURS
Status: COMPLETED | OUTPATIENT
Start: 2020-12-03 | End: 2020-12-03

## 2020-12-03 RX ORDER — FUROSEMIDE 10 MG/ML
40 INJECTION INTRAMUSCULAR; INTRAVENOUS 2 TIMES DAILY
Status: DISCONTINUED | OUTPATIENT
Start: 2020-12-03 | End: 2020-12-09

## 2020-12-03 RX ORDER — POTASSIUM CHLORIDE 750 MG/1
40 TABLET, FILM COATED, EXTENDED RELEASE ORAL ONCE
Status: COMPLETED | OUTPATIENT
Start: 2020-12-03 | End: 2020-12-03

## 2020-12-03 RX ORDER — POTASSIUM CHLORIDE 750 MG/1
40 TABLET, FILM COATED, EXTENDED RELEASE ORAL
Status: COMPLETED | OUTPATIENT
Start: 2020-12-03 | End: 2020-12-03

## 2020-12-03 RX ADMIN — POTASSIUM CHLORIDE 40 MEQ: 750 TABLET, FILM COATED, EXTENDED RELEASE ORAL at 10:06

## 2020-12-03 RX ADMIN — IPRATROPIUM BROMIDE AND ALBUTEROL SULFATE 3 ML: .5; 3 SOLUTION RESPIRATORY (INHALATION) at 08:21

## 2020-12-03 RX ADMIN — PIPERACILLIN SODIUM AND TAZOBACTAM SODIUM 3.38 G: 3; .375 INJECTION, POWDER, LYOPHILIZED, FOR SOLUTION INTRAVENOUS at 12:28

## 2020-12-03 RX ADMIN — ASPIRIN 325 MG ORAL TABLET 325 MG: 325 PILL ORAL at 10:06

## 2020-12-03 RX ADMIN — ATORVASTATIN CALCIUM 40 MG: 40 TABLET, FILM COATED ORAL at 20:26

## 2020-12-03 RX ADMIN — PIPERACILLIN SODIUM AND TAZOBACTAM SODIUM 3.38 G: 3; .375 INJECTION, POWDER, LYOPHILIZED, FOR SOLUTION INTRAVENOUS at 04:39

## 2020-12-03 RX ADMIN — LEVOTHYROXINE SODIUM 75 MCG: 0.07 TABLET ORAL at 06:26

## 2020-12-03 RX ADMIN — NITROGLYCERIN 0.5 INCH: 20 OINTMENT TOPICAL at 12:28

## 2020-12-03 RX ADMIN — IPRATROPIUM BROMIDE AND ALBUTEROL SULFATE 3 ML: .5; 3 SOLUTION RESPIRATORY (INHALATION) at 01:50

## 2020-12-03 RX ADMIN — IPRATROPIUM BROMIDE AND ALBUTEROL SULFATE 3 ML: .5; 3 SOLUTION RESPIRATORY (INHALATION) at 19:25

## 2020-12-03 RX ADMIN — ACETAZOLAMIDE 500 MG: 500 CAPSULE, EXTENDED RELEASE ORAL at 18:55

## 2020-12-03 RX ADMIN — POTASSIUM CHLORIDE 40 MEQ: 750 TABLET, FILM COATED, EXTENDED RELEASE ORAL at 20:20

## 2020-12-03 RX ADMIN — ACETAZOLAMIDE 500 MG: 500 CAPSULE, EXTENDED RELEASE ORAL at 12:28

## 2020-12-03 RX ADMIN — FUROSEMIDE 40 MG: 10 INJECTION, SOLUTION INTRAMUSCULAR; INTRAVENOUS at 10:06

## 2020-12-03 RX ADMIN — FUROSEMIDE 40 MG: 10 INJECTION, SOLUTION INTRAMUSCULAR; INTRAVENOUS at 20:20

## 2020-12-03 RX ADMIN — PIPERACILLIN SODIUM AND TAZOBACTAM SODIUM 3.38 G: 3; .375 INJECTION, POWDER, LYOPHILIZED, FOR SOLUTION INTRAVENOUS at 20:20

## 2020-12-03 RX ADMIN — NITROGLYCERIN 0.5 INCH: 20 OINTMENT TOPICAL at 06:28

## 2020-12-03 RX ADMIN — NITROGLYCERIN 0.5 INCH: 20 OINTMENT TOPICAL at 18:55

## 2020-12-03 NOTE — PROGRESS NOTES
Cardiology Progress Note    Patient seen and examined. This is a patient who is followed for Acute heart failure. Sleeping in bed. Wake to voice. Remains mildly confused. Continues with mild shortness of breath. No chest pain. No other complaints offered. Telemetry reviewed, V paced with frequent PVCs. Pertinent review of system items noted above, all other systems are negative. Current medications reviewed. Physical Examination  Vital signs are stable, Blood Pressure 114/72 , Pulse 77  No apparent distress. Heart is regular, rate and rhythm. Normal S1, S2, no murmurs are appreciated. Lungs are coarse bilaterally. Abdomen is soft, nontender, normal bowel sounds. Extremities have non pitting Edema, wrapped with krillex    Labs reviewed   K 3.5  Crt . 47    Myocardial Perfusion Study:   · Baseline ECG: Normal EKG, rare PVCs. · Gated SPECT: Left ventricular function post-stress was abnormal. Calculated ejection fraction is 19%. · Myocardial perfusion imaging defect 1: There is a defect that is large in size with a severe reduction in uptake present in the inferior and apex location(s) that is non-reversible. There is abnormal wall motion in the defect area. Viability in the area is poor. The defect appears to be infarction. Perfusion defect was visually and quantitatively present. · Myocardial perfusion imaging defect 2: There is a defect that is small in size affecting the anteroseptal location(s) that is partially reversible. The defect appears to be infarction with baldo-infarct ischemia. Perfusion defect was visually and quantitatively present. · Left ventricular perfusion is abnormal.  · Myocardial perfusion imaging supports an intermediate risk stress test.      Case discussed with Dr. Jasmeet Redman and our impression and recommendations are as follows:  1. Acute systolic CHF: EF 61-26, UNCLEAR IF NEW OR OLD. Continue diuresis, Continue IV 40mg BID. Received 2U PRBCs yesterday.  Stress test done showing large infarction and only a small area of baldo-infarct. We discussed options and she declines a cardiac cath, we will continue medical management. Will add low dose ACE/BB once euvolemic, volume status up. CXR on 12/1 with pleural effusions. Please keep serum potassium between 4-5 and serum magnesium > 2. K 3.5, repletions ordered. Will monitor with diuresis. 2. PPM: [facility] PPM. Per documents implanted in 2005 by Dr. Elizabeth Petit for complete heart block. Device interrogated showing several NSVT, unsure if they correlate with possible syncopal episode. One on 11/25 at 0942 lasting 3:36s. Will start BB once euvolemic. 3. Elevated troponin: Likely demand ischemia from CHF. No active chest pain. 4. Pneumonia and cellulitis: Rx per primary. Pending lower extremity wound debridement. Please do no hesitate to call if additional questions arise.

## 2020-12-03 NOTE — PROGRESS NOTES
Skin assessment done with Sebas Leyva RN. Blancheable redness noted to sacrum and thoracic spine with mepilex to both areas. Patient has draining ulcers to bilateral lower extremities with dressing in place. Redness also noted underneath bilateral breasts.

## 2020-12-03 NOTE — PROGRESS NOTES
Comprehensive Nutrition Assessment    Type and Reason for Visit: Reassess(Goal)    Nutrition Recommendations/Plan:     Continue Cardiac diet, Soft solids  Continue Ensure TID  Continue Bert daily    Obtain updated measured wt as pt diuresed  Nursing to document %meal and supplement intakes in I/Os    Nutrition Assessment:  Brought to ED after found hypoxic and cyanotic at home, noted poor living conditions. Pt confused per MD notes. Dx sepsis, cellulitis, PNA, pyruria. CXR concerning finding SHO infiltrates. Bilat LE stasis wounds present on admit- [facility] following, noted wounds concerning for gangrene. Plans for surgical debridement tomorrow per Podiatry, postponed d/t abnormal labs and awaiting consent. S/p x2 U PRBCs transfused for anemia. Cardio following for acute CHF. On 3L NC. MD indicated stress test showed large infarct, pt refused cardiac cath. Ordered Cardiac diet w/ Soft solids. While NPO during procedures pt reporting hunger cues and frequently requesting to eat. RD visualized 75% intake at B today while pt sleeping. Noted good intake documentation per nsg as follows: (11/26) 90%, 85%, 85%; (11/27) 35%, 40%, 45%; (11/28) 30%, 50%; (11/29) 40%, 80%. None recorded since. Good acceptance of Ensure Enlive, unclear if consuming Bert. On initial visit, pt appeared to have good appetite, eating during interview. Agreed to [employer]. Limited interview d/t trying to finish meal and [place]. Labs (12/2): H/H: 7.6/26.6, K 3.4, BUN WNL, Cr 0.36, BG WNL AST 12, ALT 10. Meds: Zosyn, statin, lasix, KCl. Malnutrition Assessment:  Malnutrition Status:   Moderate malnutrition    Context:  Chronic illness     Findings of the 6 clinical characteristics of malnutrition:   Energy Intake:  No significant decrease in energy intake  Weight Loss:  7.0 - Greater than 7.5% over 3 months(>9% x 3-4 months)     Body Fat Loss:  No significant body fat loss,     Muscle Mass Loss:  1 - Mild muscle mass loss, Clavicles (pectoralis &deltoids)  Fluid Accumulation:  No significant fluid accumulation,             Needs for wt gain, wound healing    Estimated Daily Nutrient Needs:  Energy (kcal): 1235kcal (32kcal/kg EDW); Weight Used for Energy Requirements: (EDW)  Protein (g): 50g pro (1.3g/kg); Weight Used for Protein Requirements: (EDW)  Fluid (ml/day): 1235mL; Method Used for Fluid Requirements: 1 ml/kcal      Nutrition Related Findings: Will f/u for NFPE as able. Did not disturb pt while resting. On visual assessment, noted muscle wasting to deltoid and clavicle. Pt denied c/s difficulty n/v or c/d. Last BM 12/2 per EMR. Edema stable, 2+pitting bilat LE edema. Wounds:    Venous stasis(bilat LE ulcers concerning for gangrene per [facility])       Current Nutrition Therapies:  DIET NUTRITIONAL SUPPLEMENTS Breakfast, Lunch, Dinner; Ensure [employer] (strawberry)  DIET NUTRITIONAL SUPPLEMENTS Lunch; Bert (alt flavor)  DIET CARDIAC Soft Solids    Anthropometric Measures:  · Height:  5' (152.4 cm)  · Current Body Wt:  38.6 kg (85 lb)(EDW)   · Admission Body Wt:  91 lb 7.9 oz(bed scale 11/26; noted 3+pitting edema present)    · Usual Body Wt:  50.3 kg (111 lb)(Pt reported weight from 'several months ago' at PCP office)     · Ideal Body Wt:  105 lbs:  81 %   · BMI Category:  Underweight (BMI less than 18.5)     No wt hx available to assess per EMR. Pt reports UBW of 111lbs several months ago - indicating 20lb wt loss (>9%BW) in estimated 3-4 months - significant.     Nutrition Diagnosis:   · Underweight related to (suboptimal PO intakes to maintain appropriate BW) as evidenced by BMI, weight loss    Nutrition Interventions:   Food and/or Nutrient Delivery: Continue current diet, Continue oral nutrition supplement  Nutrition Education and Counseling: No recommendations at this time  Coordination of Nutrition Care: Continue to monitor while inpatient    Goals:  Meet >75% EENs x 5-7 days (met)  Wt gain 1kg/week from EDW (NEDA, remains edematous)  Lytes and BG WNL (not met)       Nutrition Monitoring and Evaluation:   Behavioral-Environmental Outcomes: None identified  Food/Nutrient Intake Outcomes: Food and nutrient intake, Supplement intake  Physical Signs/Symptoms Outcomes: GI status, Weight, Skin    Discharge Planning:    Continue oral nutrition supplement(to promote wound healing)     Electronically signed by Sadie Hoff on 12/3/2020 at 1:42 PM    Contact: [phone]

## 2020-12-03 NOTE — PROGRESS NOTES
Consult  Pulmonary, Critical Care    Name: Blanca Cameron MRN: [id number]   : 1945 Hospital: [address] [place] [address]   Date: 12/3/2020  Admission date: 2020 Hospital Day: 9       Subjective/Interval History:   Seen on the medical floor. Consult requested for acute hypoxic respiratory failure. She is an extremely poor historian states she feels fine to leave her alone because she is trying to eat  12/ respirations nonlabored she remains on 3 L of oxygen. States she is not having any surgery  12/3 awake eating breakfast respirations nonlabored    Hospital Problems  Never Reviewed          Codes Class Noted POA    PNA (pneumonia) ICD-10-CM: J18.9  ICD-9-CM: 486  2020 Unknown        Pneumonia ICD-10-CM: J18.9  ICD-9-CM: 037  2020 Unknown              IMPRESSION:   1. Acute hypoxic respiratory failure secondary to pulmonary edema remains on 3 L of oxygen will decrease to 2  2. Hypokalemia to be repleted still low yesterday labs pending today  3. Hypercapnia likely metabolic alkalosis from diuresis we will give Diamox  4. Severe cardiomyopathy  5. Severe mitral regurg  6. Bilateral pleural effusions secondary to pulmonary edema we will repeat chest x-ray in a.m.  7. Providencia septicemia  8. Anemia  9. Hypothyroidism treated  10. Questionable early dementia  Body mass index is 21.53 kg/m². 11.       RECOMMENDATIONS/PLAN:   1. Agree IV Lasix twice daily  2. We will add daily potassium  3. We will give Diamox today  4. Follow renal function  5. Follow H&H  6.           [x] High complexity decision making was performed  [x] See my orders for details      Subjective/Initial History:     I was asked by Bear Forte MD to see Blanca Cameron  a 66 y.o.    female in consultation for a chief complaint of acute hypoxic respiratory failure and pleural effusions      Allergies   Allergen Reactions    Tetracycline Other (comments)     Causes mouth sores    Morphine Nausea and Vomiting        MAR reviewed and pertinent medications noted or modified as needed     Current Facility-Administered Medications   Medication    furosemide (LASIX) injection 40 mg    0.9% sodium chloride infusion 250 mL    labetaloL (NORMODYNE;TRANDATE) 20 mg/4 mL (5 mg/mL) injection 10 mg    metoprolol (LOPRESSOR) injection 2.5 mg    nitroglycerin (NITROBID) 2 % ointment 0.5 Inch    albuterol-ipratropium (DUO-NEB) 2.5 MG-0.5 MG/3 ML    piperacillin-tazobactam (ZOSYN) 3.375 g in 0.9% sodium chloride (MBP/ADV) 100 mL MBP    levothyroxine (SYNTHROID) tablet 75 mcg    acetaminophen (TYLENOL) tablet 650 mg    Or    acetaminophen (TYLENOL) suppository 650 mg    polyethylene glycol (MIRALAX) packet 17 g    ondansetron (ZOFRAN ODT) tablet 4 mg    Or    ondansetron (ZOFRAN) injection 4 mg    aspirin tablet 325 mg    atorvastatin (LIPITOR) tablet 40 mg      Patient PCP: Karl Bardales MD  PMH:  has no past medical history on file. PSH:   has no past surgical history on file. FHX: family history is not on file. SHX:       Completely unreliable systemic review she states there is nothing wrong with her to leave her alone    Objective:     Vital Signs: Telemetry:    normal sinus rhythm Intake/Output:   Visit Vitals  /72 (BP 1 Location: Left arm, BP Patient Position: At rest)   Pulse 77   Temp 97.4 °F (36.3 °C)   Resp 20   Ht 5' (1.524 m)   Wt 50 kg (110 lb 3.7 oz)   SpO2 100%   BMI 21.53 kg/m²       Temp (24hrs), Av.9 °F (36.6 °C), Min:97.4 °F (36.3 °C), Max:98.5 °F (36.9 °C)        O2 Device: Nasal cannula O2 Flow Rate (L/min): 3 l/min       Wt Readings from Last 4 Encounters:   20 50 kg (110 lb 3.7 oz)   16 57.2 kg (126 lb)   12/07/10 64 kg (141 lb)          Intake/Output Summary (Last 24 hours) at 12/3/2020 0924  Last data filed at 12/3/2020 0320  Gross per 24 hour   Intake 1221 ml   Output 800 ml   Net 421 ml       Last shift:      No intake/output data recorded.   Last 3 shifts:  1901 - 12/03 0700  In: 3849 [P.O.:150;  I.V.:800]  Out: 800 [Urine:800]       Physical Exam:   General:  female; chronically ill appearing frail  HEENT: NCAT, oral mucosa clear  Eyes: anicteric; conjunctiva clear extraocular movements intact  Neck: no nodes, neck veins visible no accessory muscle use  Chest: no deformity,   Cardiac: Regular rate and rhythm systolic murmur present has edema of the extremities  Lungs: distant breath sounds; no wheezing rales are heard posteriorly no breath sounds in the bases  Abd: Soft positive bowel sounds no tenderness she has some posterior abdominal wall edema  Ext: Edema of the legs arms and sacrum  : clear urine  Neuro: Awake more alert mildly confused states she does not know why she is in the hospital will move all 4 extremities  Psych- no agitation, oriented to person; does not know why she is in the hospital or exactly where she is  Skin: Lower extremities have very flaky skin with erythema  Pulses: Brachial radial femoral pulses intact  Capillary: Slow capillary refill    Labs:    Recent Labs     12/02/20  0910 12/01/20  1340   WBC 11.9* 12.7*   HGB 7.6* 8.0*   * 525*     Recent Labs     12/02/20  0910 12/01/20  1340 11/30/20  1230     145 147*  --    K 3.4*  3.4* 2.9*  --      101 101  --    CO2 42*  42* 44*  --    GLU 70  72 87  --    BUN 14  14 11  --    CREA 0.36*  0.34* 0.40* 0.41*   CA 8.4*  8.4* 8.0*  --    MG 1.7  --   --    PHOS 2.2*  --   --    ALB 2.2*  2.2* 1.5*  --    ALT 10* 12  --      3 L nasal oxygen with oxygen saturation 93%  BNP 16,597  CRP 3.04  11/26 blood culture with Providencia sensitive to Zosyn  Urine culture no growth  Wound culture with multiple organisms including methicillin sensitive staph aureus Klebsiella Providencia Enterobacter and Morganella  11/27 echo ejection fraction 20% dilated left atrium grade 2 diastolic dysfunction moderate to severe mitral regurg IVC dilatation RVSP 55  12/1 nuclear cardiac scan with ejection fraction of 19%  Lab Results   Component Value Date/Time    Culture result: Heavy Morganella morganii ssp morganii 11/27/2020 11:15 AM    Culture result:  11/27/2020 11:15 AM     Heavy Morganella morganii ssp morganii (2nd colony type/strain)    Culture result: Light Myroides sp 11/27/2020 11:15 AM    Culture result: Heavy  Mixed skin nica isolated   11/27/2020 11:15 AM    Culture result: Heavy Staphylococcus aureus 11/27/2020 11:15 AM    Culture result: Heavy Klebsiella oxytoca 11/27/2020 11:15 AM    Culture result: Heavy Providencia rettgeri 11/27/2020 11:15 AM    Culture result: Heavy Enterobacter cloacae complex 11/27/2020 11:15 AM     Lab Results   Component Value Date/Time    TSH 0.26 (L) 11/25/2020 12:30 PM       Imaging:    CXR Results  (Last 48 hours)               12/01/20 1621  XR CHEST PORT Final result    Impression:  Impression: Underexpanded lungs with bibasilar atelectasis, increased. Possible   right pleural effusion. Hydrostatic edema. Narrative:  Chest, frontal view, 12/1/2020       History: CHF. Comparison: Including chest 11/27/2020. Findings: The patient's head obscures the left upper lung zone. Right-sided   pacemaker device with 2 leads is in place. The cardiac silhouette again   demonstrates globular enlargement which can indicate cardiomyopathy or   pericardial effusion. The lungs are underexpanded with bibasilar atelectasis,   increased as compared to chest 11/27/2020. Right pleural effusion is possible. There is pulmonary vascular congestion and hydrostatic edema. No pneumothorax   is identified. The osseous structures are stable. Results from [place] encounter on 11/25/20   XR CHEST PORT    Narrative Chest, frontal view, 12/1/2020    History: CHF. Comparison: Including chest 11/27/2020. Findings: The patient's head obscures the left upper lung zone.   Right-sided  pacemaker device with 2 leads is in place.  The cardiac silhouette again  demonstrates globular enlargement which can indicate cardiomyopathy or  pericardial effusion. The lungs are underexpanded with bibasilar atelectasis,  increased as compared to chest 11/27/2020. Right pleural effusion is possible. There is pulmonary vascular congestion and hydrostatic edema. No pneumothorax  is identified. The osseous structures are stable. Impression Impression: Underexpanded lungs with bibasilar atelectasis, increased. Possible  right pleural effusion. Hydrostatic edema. XR CHEST PA LAT    Narrative 2 view comparison the 25th      Impression Impression: The cardiomediastinal silhouette is appropriate for age, technique,  and lung expansion. Pulmonary vasculature is not congested. The lungs are  essentially clear. No effusion or pneumothorax is seen. XR CHEST SNGL V    Narrative Chest single view. Comparison single view chest August 11, 2020    Stripe-like opacity noted involving inferior left upper lobe lung; finding  concerning for pneumonia. Right-sided cardiac device with unchanged leads right  heart. Cardiac silhouette enlargement. Atherosclerotic change thoracic aorta. No  pneumothorax or pleural effusion. · Providencia septicemia questionably urinary tract or from skin site. Urine culture was no growth I do not know if it was collected after antibiotics were given. She is currently on Zosyn   She has pleural effusions and accentuated interstitial markings consistent with CHF/pulmonary edema. She is receiving Lasix. She is severely hypoalbuminemic will give albumin to try and help diuresis. She has developed hypokalemia with the diuresis will supplement potassium. · Acute  · Thank you for allowing me to see Ms. Yohana Foley  ·     Abilio Blount MD

## 2020-12-03 NOTE — PROGRESS NOTES
PHYSICAL THERAPY TREATMENT  Patient: Rayne Peñaloza (77 y.o. female)  Date: 12/3/2020  Diagnosis: PNA (pneumonia) [J18.9]  Pneumonia [J18.9]   <principal problem not specified>  Procedure(s) (LRB):  Debridement Of Ulcers Bilateral Legs (Bilateral) * Surgery Date in Future *  Precautions:    Chart, physical therapy assessment, plan of care and goals were reviewed. ASSESSMENT  Patient continues with skilled PT services and is progressing towards goals. Pt. Semi supine in bed upon arrival and agreeable to therapy session. A&o x3, unable to answer birth year. Seems slightly confused, and very lethargic. Poor motivation. Follows commands for BLE TE. Fatigues quickly. Mod a for bed mobility and transfers. Tolerated BLE TE. Recommend DC to SNF. .     Current Level of Function Impacting Discharge (mobility/balance): poor endurance and cognition    Other factors to consider for discharge: TBD         PLAN :  Patient continues to benefit from skilled intervention to address the above impairments. Continue treatment per established plan of care. to address goals. Recommendation for discharge: (in order for the patient to meet his/her long term goals)  Therapy up to 5 days/week in SNF setting    This discharge recommendation:  Has been made in collaboration with the attending provider and/or case management    IF patient discharges home will need the following DME: to be determined (TBD)       SUBJECTIVE:   Patient stated IM OKAY.     OBJECTIVE DATA SUMMARY:   Critical Behavior:  Neurologic State: Alert  Orientation Level: Oriented to person, Oriented to time, Oriented to place  Cognition: Decreased attention/concentration     Functional Mobility Training:  Bed Mobility:  Rolling: Moderate assistance  Supine to Sit: Moderate assistance  Sit to Supine: Moderate assistance  Scooting: Moderate assistance        Transfers:                                   Balance:  Sitting: Impaired; With support;High guard  Sitting - Static: Poor (constant support)  Sitting - Dynamic: Poor (constant support)  Ambulation/Gait Training:                                                        Stairs: Therapeutic Exercises:   Therapeutic Exercises:       EXERCISE   Sets   Reps   Active Active Assist   Passive Self ROM   Comments   Ankle Pumps  10 [x] [] [] []    Quad Sets/Glut Sets   [] [] [] []    Hamstring Sets   [] [] [] []    Short Arc Quads   [] [] [] []    Heel Slides  10 [x] [] [] []    Straight Leg Raises   [] [] [] []    Hip abd/add  10 [x] [] [] []    Long Arc Quads   [] [] [] []    Marching   [] [] [] []       [] [] [] []        Pain Ratin    Activity Tolerance:   Poor  Please refer to the flowsheet for vital signs taken during this treatment. After treatment patient left in no apparent distress:   Supine in bed    COMMUNICATION/COLLABORATION:   The patients plan of care was discussed with: Physical therapy assistant.      Sakina Nuno   Time Calculation: 19 mins

## 2020-12-03 NOTE — PROGRESS NOTES
Procedure (Debridement) was re-scheduled on Friday as per Dr. Ara Bautista. Family members notified.

## 2020-12-03 NOTE — PROGRESS NOTES
Progress Note    Patient: Johanna Berry MRN: [id number]  SSN: [id number]    YOB: 1945  Age: 66 y.o. Sex: female      Admit Date: 11/25/2020    LOS: 8 days     Subjective:   Patient followed for sepsis with cellulitis both legs and pyuria. Blood cultures grew Providencia. Wound culture with mixed Gram negative nica and MSSA. She remains afebrile. WBC, procalcitonin and CRP still decreasing. Currently on Zosyn alone. She is still pending debridement of her legs. Patient reports subjective improvement with her leg pain. Objective:     Vitals:    12/03/20 0630 12/03/20 0820 12/03/20 0821 12/03/20 0834   BP: 119/69 109/67  114/72   Pulse: 73 76  77   Resp: 18 20 20   Temp: 98.2 °F (36.8 °C) 97.5 °F (36.4 °C)  97.4 °F (36.3 °C)   SpO2: 100% 100% 100% 100%   Weight:       Height:            Intake and Output:  Current Shift: No intake/output data recorded. Last three shifts: 12/01 1901 - 12/03 0700  In: 0658 [P.O.:150; I.V.:800]  Out: 800 [Urine:800]    Physical Exam:   Constitutional:       General: She is not in acute distress. Appearance: She is chronically ill-appearing. HENT:      Head: Normocephalic and atraumatic. Nose:      Comments: Nasal O2 cannula  Genitourinary:     Comments: Purewick catheter  Musculoskeletal:         General: Swelling present. No tenderness. Right lower leg: Edema present. Left lower leg: Edema present. Comments: Both calves are bandaged at this time with bulky gauze dressing, however, erythema has markedly decreased along with swelling. Neurological:      General: No focal deficit present. Mental Status: She is alert. She is disoriented.    Psychiatric:         Behavior: Behavior normal.     Lab/Data Review:     WBC 11,900  Procalcitonin 0.11 <0.10 <0.69 <1.19  CRP 1.98  <3.04 <9.26 <16.00    Covid-19 negative    Blood cultures (11/26) Providenica rettgeri  Blood cultures (11/26) Providenica rettgeri  Urine culture (11/26) No growth FiNAL  Wound culture (11/27) Heavy mixed enteric Gram negative rods, MSSA  Assessment:     Active Problems:    PNA (pneumonia) (11/25/2020)      Pneumonia (11/25/2020)    1. Cellulitis, bilateral lower extremities, severe, secondary to MSSA and Gram negative rods, Day #8 IV Zosyn. 2. Gram negative bacteremia with Providencia rettgeri, probably secondary to above, on IV Zosyn. 3. Sepsis with leukocytosis, elevated procalcitonin, CRP and lactic acid, resolving. 4. SOB  5. Pyuria, negative urine culture    Comment:  WBC, procalcitonin and CRP decreasing affirming clinical response. Plan:   1. Continue IV Zosyn for 6 more days  2. In am, repeat CBC, procalcitonin, CRP   3.  Leg debridement pending  Signed By: Livan Chavez MD     December 3, 2020

## 2020-12-03 NOTE — PROGRESS NOTES
General Daily Progress Note          Patient Name:   Ernst Styles       YOB: 1945       Age:  66 y.o. Admit Date: 11/25/2020      Subjective:         Seen awake not in distress confused  On 3 L oxygen by nasal cannula  Hemoglobin is 7.6             Objective:     Visit Vitals  /72 (BP 1 Location: Left arm, BP Patient Position: At rest)   Pulse 77   Temp 97.4 °F (36.3 °C)   Resp 20   Ht 5' (1.524 m)   Wt 50 kg (110 lb 3.7 oz)   SpO2 100%   BMI 21.53 kg/m²          [unfilled]      Review of Systems    Constitutional: Negative for chills and fever. HENT: Negative. Eyes: Negative. Respiratory: Negative. Cardiovascular: Negative. Gastrointestinal: Negative for abdominal pain and nausea. Skin: Negative. Neurological: Negative. Physical Exam:      Constitutional: Alert awake  HENT:   Head: Normocephalic and atraumatic. Eyes: Pupils are equal, round, and reactive to light. EOM are normal.   Cardiovascular: Normal rate, regular rhythm and normal heart sounds. Pulmonary/Chest: Breath sounds normal. No wheezes. No rales. Exhibits no tenderness. Abdominal: Soft. Bowel sounds are normal. There is no abdominal tenderness. There is no rebound and no guarding. Musculoskeletal: Normal range of motion. Neurological: Bilateral lower leg red swollen    XR CHEST PORT   Final Result   Impression: Underexpanded lungs with bibasilar atelectasis, increased. Possible   right pleural effusion. Hydrostatic edema. XR CHEST PA LAT   Final Result   Impression: The cardiomediastinal silhouette is appropriate for age, technique,   and lung expansion. Pulmonary vasculature is not congested. The lungs are   essentially clear. No effusion or pneumothorax is seen.          XR CHEST SNGL V   Final Result               Results     Procedure Component Value Units Date/Time    CULTURE, Tsosie Beat STAIN [[id number]]  (Susceptibility) Collected:  11/27/20 1115    Order Status: Completed Specimen:  Wound Updated:  12/02/20 1020     Special Requests: No Special Requests        GRAM STAIN Rare WBCs seen         Few Gram Negative Rods               Occasional Gram Positive Cocci in clusters           Culture result: Heavy  Mixed skin nica isolated               Heavy Staphylococcus aureus            Heavy Klebsiella oxytoca               Heavy Providencia rettgeri                  Heavy Enterobacter cloacae complex                  Heavy Morganella morganii ssp morganii                  Heavy Morganella morganii ssp morganii (2nd colony type/strain)            Light Myroides sp       Susceptibility      Staphylococcus aureus     WILLIAM     Ciprofloxacin ($) Resistant     Clindamycin ($) Susceptible     Daptomycin ($$$$$) Susceptible     Doxycycline ($$) Susceptible     Erythromycin ($$$$) Resistant     Gentamicin ($) Susceptible     Levofloxacin ($) Intermediate     Linezolid ($$$$$) Susceptible     Moxifloxacin ($$$$) Susceptible     Oxacillin Susceptible     Rifampin ($$$$) Susceptible [1]      Tetracycline Susceptible     Trimeth/Sulfa Susceptible     Vancomycin ($) Susceptible            [1]   Rifampin is not to be used for mono-therapy.              Susceptibility      Klebsiella oxytoca     WILLIAM     Amikacin ($) Susceptible     Ampicillin ($) Resistant     Ampicillin/sulbactam ($) Susceptible     Cefazolin ($) Susceptible     Cefepime ($$) Susceptible     Cefoxitin Susceptible     Ceftazidime ($) Susceptible     Ceftriaxone ($) Susceptible     Ciprofloxacin ($) Susceptible     Gentamicin ($) Susceptible     Levofloxacin ($) Susceptible     Meropenem ($$) Susceptible     Tobramycin ($) Susceptible     Trimeth/Sulfa Susceptible                Susceptibility      Providencia rettgeri     WILLIAM     Amikacin ($) Susceptible     Ampicillin ($) Resistant     Ampicillin/sulbactam ($) Susceptible     Cefazolin ($) Resistant     Cefepime ($$) Susceptible     Cefoxitin Susceptible     Ceftazidime ($) Susceptible     Ceftriaxone ($) Susceptible     Ciprofloxacin ($) Susceptible     Gentamicin ($) Susceptible     Levofloxacin ($) Susceptible     Piperacillin/Tazobac ($) Susceptible     Tobramycin ($) Susceptible                Susceptibility      Enterobacter cloacae complex     WILLIAM     Amikacin ($) Susceptible     Cefazolin ($) Resistant     Cefepime ($$) Susceptible     Cefoxitin Resistant     Ceftazidime ($) Susceptible     Ceftriaxone ($) Susceptible     Ciprofloxacin ($) Susceptible     Gentamicin ($) Susceptible     Levofloxacin ($) Susceptible     Meropenem ($$) Susceptible     Tobramycin ($) Susceptible     Trimeth/Sulfa Susceptible                Susceptibility      Morganella morganii ssp morganii (6)     WILLIAM     Amikacin ($) Susceptible     Ampicillin ($) Resistant     Ampicillin/sulbactam ($) Resistant     Cefazolin ($) Resistant     Cefepime ($$) Susceptible     Cefoxitin Intermediate     Ceftazidime ($) Susceptible     Ceftriaxone ($) Susceptible     Ciprofloxacin ($) Susceptible     Gentamicin ($) Susceptible     Levofloxacin ($) Susceptible     Meropenem ($$) Susceptible     Piperacillin/Tazobac ($) Susceptible     Tobramycin ($) Susceptible     Trimeth/Sulfa Susceptible                Susceptibility      Morganella morganii ssp morganii (7)     WILLIAM     Amikacin ($) Susceptible     Ampicillin ($) Resistant     Ampicillin/sulbactam ($) Resistant     Cefazolin ($) Resistant     Cefepime ($$) Susceptible     Cefoxitin Intermediate     Ceftazidime ($) Susceptible     Ceftriaxone ($) Susceptible     Ciprofloxacin ($) Susceptible     Gentamicin ($) Susceptible     Levofloxacin ($) Susceptible     Meropenem ($$) Susceptible     Piperacillin/Tazobac ($) Susceptible     Tobramycin ($) Susceptible     Trimeth/Sulfa Susceptible                Susceptibility      Myroides species     WILLIAM     Amikacin ($) Resistant     Cefepime ($$) Intermediate     Ceftazidime ($) Resistant     Ceftriaxone ($) Intermediate Ciprofloxacin ($) Susceptible     Gentamicin ($) Resistant     Tobramycin ($) Resistant                    CULTURE, URINE [[id number]] Collected:  11/26/20 0945    Order Status:  Completed Specimen:  Urine Updated:  11/28/20 1050     Special Requests: No Special Requests        Culture result: No Growth (<1000 cfu/mL)       CULTURE, BLOOD #2 [[id number]]  (Abnormal)  (Susceptibility) Collected:  11/26/20 0122    Order Status:  Completed Specimen:  Blood Updated:  11/30/20 0927     Special Requests: No Special Requests        Culture result:       Providencia rettgeri GROWING IN THE AEROBIC AND ANAEROBIC BOTTLE (SITE = ARM)                  Gram Negative Rods CALLED TO AND READ BACK BY Rosalind Serna R.N. [id number] 11/27/20          Susceptibility      Providencia rettgeri     WILLIAM     Amikacin ($) Susceptible     Ampicillin ($) Resistant     Ampicillin/sulbactam ($) Susceptible     Cefazolin ($) Resistant     Cefepime ($$) Susceptible     Cefoxitin Susceptible     Ceftazidime ($) Susceptible     Ceftriaxone ($) Susceptible     Ciprofloxacin ($) Susceptible     Gentamicin ($) Susceptible     Levofloxacin ($) Susceptible     Piperacillin/Tazobac ($) Susceptible     Tobramycin ($) Susceptible                    CULTURE, BLOOD #1 [[id number]]  (Abnormal) Collected:  11/26/20 0112    Order Status:  Completed Specimen:  Blood Updated:  11/30/20 0928     Special Requests: No Special Requests        Culture result:       Providencia rettgeri growing in both bottles drawn (SITE =ARM)                  Gram Negative Rods CALLED TO Rosalind Serna R.N. [id number] 11/27/20                  Please refer to previous blood culture  [id number], COLLECTED 11/26/20 FOR SENSITIVITIES      CULTURE, Ulysses Sand STAIN [[id number]] Collected:  11/25/20 1930    Order Status:  Completed Specimen:  Wound Updated:  12/01/20 1101     Special Requests: No Special Requests        GRAM STAIN Rare WBCs seen         Rare Gram Negative Rods               Rare Gram Positive Cocci in pairs           Culture result:       Heavy MIXED ENTERIC Gram Negative Rods REFER TO [id number] FOR ID AND SENSITIVITIES                  Heavy Staphylococcus aureus REFER TO [facility] [id number] FOR SENSITIVITIES            Heavy  Mixed skin nica isolated       COVID-19 RAPID TEST [[id number]] Collected:  11/25/20 1445    Order Status:  Completed Specimen:  Nasopharyngeal Updated:  11/25/20 9048     Specimen source Nasopharyngeal        COVID-19 rapid test Not Detected        Comment: Rapid Abbott ID Now   Rapid NAAT:  The specimen is NEGATIVE for SARS-CoV-2, the novel coronavirus associated with COVID-19. Negative results should be treated as presumptive and, if inconsistent with clinical signs and symptoms or necessary for patient management, should be tested with an alternative molecular assay. Negative results do not preclude SARS-CoV-2 infection and should not be used as the sole basis for patient management decisions. This test has been authorized by the FDA under   an Emergency Use Authorization (EUA) for use by authorized laboratories. Fact sheet for Healthcare Providers: ConventionAvailigentdate.co.nz Fact sheet for Patients: ConventionAvailigentdate.co.nz   Methodology: Isothermal Nucleic Acid Amplification                Labs:     Recent Labs     12/02/20  0910 12/01/20  1340   WBC 11.9* 12.7*   HGB 7.6* 8.0*   HCT 26.6* 27.9*   * 525*     Recent Labs     12/02/20  0910 12/01/20  1340 11/30/20  1230     145 147*  --    K 3.4*  3.4* 2.9*  --      101 101  --    CO2 42*  42* 44*  --    BUN 14  14 11  --    CREA 0.36*  0.34* 0.40* 0.41*   GLU 70  72 87  --    CA 8.4*  8.4* 8.0*  --    MG 1.7  --   --    PHOS 2.2*  --   --      Recent Labs     12/02/20  0910 12/01/20  1340   ALT 10* 12   AP 95 96   TBILI 0.4 0.2   TP 6.2* 5.7*   ALB 2.2*  2.2* 1.5*   GLOB 4.0 4.2*     No results for input(s): INR, PTP, APTT, INREXT, INREXT in the last 72 hours. No results for input(s): FE, TIBC, PSAT, FERR in the last 72 hours. No results found for: FOL, RBCF   No results for input(s): PH, PCO2, PO2 in the last 72 hours. No results for input(s): CPK, CKNDX, TROIQ in the last 72 hours.     No lab exists for component: CPKMB  No results found for: CHOL, CHOLX, CHLST, CHOLV, HDL, HDLP, LDL, LDLC, DLDLP, TGLX, TRIGL, TRIGP, CHHD, CHHDX  No results found for: Checo Zuleta  Lab Results   Component Value Date/Time    Color Yellow/Straw 11/25/2020 04:50 PM    Appearance Clear 11/25/2020 04:50 PM    Specific gravity 1.013 11/25/2020 04:50 PM    pH (UA) 5.0 11/25/2020 04:50 PM    Protein Negative 11/25/2020 04:50 PM    Glucose Negative 11/25/2020 04:50 PM    Ketone Negative 11/25/2020 04:50 PM    Bilirubin Negative 11/25/2020 04:50 PM    Urobilinogen 0.1 11/25/2020 04:50 PM    Nitrites Negative 11/25/2020 04:50 PM    Leukocyte Esterase Negative 11/25/2020 04:50 PM    Bacteria Negative 11/25/2020 04:50 PM    WBC 5-10 11/25/2020 04:50 PM    RBC 0-5 11/25/2020 04:50 PM         Assessment:   Acute hypoxemic respiratory failure on 3 L  Acute systolic heart failure with ejection fraction of 15 to 20%  Acute cellulitis of bilateral legs  Gram-negative bacteremia with providentia  Sepsis leukocytosis elevated procalcitonin and lactic acid  Community-acquired pneumonia  Poor hygiene  Elevated troponin  Leukocytosis  Hypothyroidism  Anemia   Stress test done showing large infarct/declined cardiac cath  Plan:     Continue aspirin Lipitor levothyroxine    Discontinue vancomycin  Continue IV ZOSYN   Doppler studies negative for DVT  Lasix 40 mg IV twice daily  Follow-up with infectious disease  Cardiology consult for elevated troponin  PT OT consult and wound care nurse consult  Repeat the labs in the morning  Respiratory consult for weaning of oxygen  Nebulizer treatment  Transfuse 2 unit packed RBC  Today's labs are pending  Discussed with cardiology patient is high risk  Receiving blood transfusion now    No labs in 2 days  Order stat labs  PT OT consult  Discussed with the  regarding discharge planning  Patient need placement to skilled care        Current Facility-Administered Medications:     0.9% sodium chloride infusion 250 mL, 250 mL, IntraVENous, PRN, Diego Solano MD    furosemide (LASIX) injection 40 mg, 40 mg, IntraVENous, DAILY, Jos Berger, NP    labetaloL (NORMODYNE;TRANDATE) 20 mg/4 mL (5 mg/mL) injection 10 mg, 10 mg, IntraVENous, Q5MIN PRN, Ashley Duffy MD    metoprolol (LOPRESSOR) injection 2.5 mg, 2.5 mg, IntraVENous, Q5MIN PRN, Ashley Duffy MD    nitroglycerin (NITROBID) 2 % ointment 0.5 Inch, 0.5 Inch, Topical, Q6H, Dipesh Moon MD, 0.5 Inch at 12/03/20 4386    albuterol-ipratropium (DUO-NEB) 2.5 MG-0.5 MG/3 ML, 3 mL, Nebulization, Q6H RT, Keith Solano MD, 3 mL at 12/03/20 0821    piperacillin-tazobactam (ZOSYN) 3.375 g in 0.9% sodium chloride (MBP/ADV) 100 mL MBP, 3.375 g, IntraVENous, Q8H, Keith Solano MD, Last Rate: 25 mL/hr at 12/03/20 0439, 3.375 g at 12/03/20 0439    levothyroxine (SYNTHROID) tablet 75 mcg, 75 mcg, Oral, 6am, Keith Solano MD, 75 mcg at 12/03/20 7015    acetaminophen (TYLENOL) tablet 650 mg, 650 mg, Oral, Q6H PRN, 650 mg at 12/01/20 0449 **OR** acetaminophen (TYLENOL) suppository 650 mg, 650 mg, Rectal, Q6H PRN, Diego Solano MD    polyethylene glycol (MIRALAX) packet 17 g, 17 g, Oral, DAILY PRN, Diego Solano MD    ondansetron (ZOFRAN ODT) tablet 4 mg, 4 mg, Oral, Q8H PRN **OR** ondansetron (ZOFRAN) injection 4 mg, 4 mg, IntraVENous, Q6H PRN, Keith Solano MD    aspirin tablet 325 mg, 325 mg, Oral, DAILY, Keith Solano MD, 325 mg at 12/02/20 0555    atorvastatin (LIPITOR) tablet 40 mg, 40 mg, Oral, QHS, Keith Solano MD, 40 mg at 12/02/20 4956

## 2020-12-03 NOTE — PROGRESS NOTES
Plan for debridement of B/L LE ulcers tomorrow in the OR  - NPO at midnight  - Verify consent is signed        Reynaldo Sevilla.  [facility], [address] [place] [address] and Foot Surgery  [address] [place] [address]  O: [phone]  F: [phone]  C: [phone]

## 2020-12-04 LAB
ABO + RH BLD: NORMAL
ALBUMIN SERPL-MCNC: 2.1 G/DL (ref 3.5–5)
ANION GAP SERPL CALC-SCNC: 4 MMOL/L (ref 5–15)
BASOPHILS # BLD: 0 K/UL (ref 0–0.1)
BASOPHILS NFR BLD: 0 % (ref 0–1)
BLD PROD TYP BPU: NORMAL
BLD PROD TYP BPU: NORMAL
BLOOD GROUP ANTIBODIES SERPL: NEGATIVE
BPU ID: NORMAL
BPU ID: NORMAL
BUN SERPL-MCNC: 10 MG/DL (ref 6–20)
BUN/CREAT SERPL: 20 (ref 12–20)
CA-I BLD-MCNC: 8.6 MG/DL (ref 8.5–10.1)
CHLORIDE SERPL-SCNC: 98 MMOL/L (ref 97–108)
CO2 SERPL-SCNC: 37 MMOL/L (ref 21–32)
CREAT SERPL-MCNC: 0.49 MG/DL (ref 0.55–1.02)
CROSSMATCH RESULT,%XM: NORMAL
CROSSMATCH RESULT,%XM: NORMAL
CRP SERPL-MCNC: 1.28 MG/DL (ref 0–0.6)
DIFFERENTIAL METHOD BLD: ABNORMAL
EOSINOPHIL # BLD: 0.3 K/UL (ref 0–0.4)
EOSINOPHIL NFR BLD: 3 % (ref 0–7)
ERYTHROCYTE [DISTWIDTH] IN BLOOD BY AUTOMATED COUNT: 14.5 % (ref 11.5–14.5)
GLUCOSE SERPL-MCNC: 64 MG/DL (ref 65–100)
HCT VFR BLD AUTO: 37.9 % (ref 35–47)
HGB BLD-MCNC: 11.3 G/DL (ref 11.5–16)
IMM GRANULOCYTES # BLD AUTO: 0.1 K/UL (ref 0–0.04)
IMM GRANULOCYTES NFR BLD AUTO: 1 % (ref 0–0.5)
LYMPHOCYTES # BLD: 0.7 K/UL (ref 0.8–3.5)
LYMPHOCYTES NFR BLD: 7 % (ref 12–49)
MAGNESIUM SERPL-MCNC: 2 MG/DL (ref 1.6–2.4)
MCH RBC QN AUTO: 28.7 PG (ref 26–34)
MCHC RBC AUTO-ENTMCNC: 29.8 G/DL (ref 30–36.5)
MCV RBC AUTO: 96.2 FL (ref 80–99)
MONOCYTES # BLD: 0.7 K/UL (ref 0–1)
MONOCYTES NFR BLD: 6 % (ref 5–13)
NEUTS SEG # BLD: 9.2 K/UL (ref 1.8–8)
NEUTS SEG NFR BLD: 83 % (ref 32–75)
PHOSPHATE SERPL-MCNC: 3 MG/DL (ref 2.6–4.7)
PLATELET # BLD AUTO: 556 K/UL (ref 150–400)
PMV BLD AUTO: 9.3 FL (ref 8.9–12.9)
POTASSIUM SERPL-SCNC: 3.4 MMOL/L (ref 3.5–5.1)
PROCALCITONIN SERPL-MCNC: <0.05 NG/ML
RBC # BLD AUTO: 3.94 M/UL (ref 3.8–5.2)
SODIUM SERPL-SCNC: 139 MMOL/L (ref 136–145)
SPECIMEN EXP DATE BLD: NORMAL
STATUS OF UNIT,%ST: NORMAL
STATUS OF UNIT,%ST: NORMAL
TRANSFUSION STATUS PATIENT QL: NORMAL
TRANSFUSION STATUS PATIENT QL: NORMAL
UNIT DIVISION, %UDIV: 0
UNIT DIVISION, %UDIV: 0
WBC # BLD AUTO: 10.9 K/UL (ref 3.6–11)

## 2020-12-04 PROCEDURE — 86140 C-REACTIVE PROTEIN: CPT

## 2020-12-04 PROCEDURE — 94760 N-INVAS EAR/PLS OXIMETRY 1: CPT

## 2020-12-04 PROCEDURE — 76060000032 HC ANESTHESIA 0.5 TO 1 HR: Performed by: PODIATRIST

## 2020-12-04 PROCEDURE — 76010000138 HC OR TIME 0.5 TO 1 HR: Performed by: PODIATRIST

## 2020-12-04 PROCEDURE — 80069 RENAL FUNCTION PANEL: CPT

## 2020-12-04 PROCEDURE — 74011250636 HC RX REV CODE- 250/636: Performed by: NURSE ANESTHETIST, CERTIFIED REGISTERED

## 2020-12-04 PROCEDURE — 11046 DBRDMT MUSC&/FSCA EA ADDL: CPT | Performed by: PODIATRIST

## 2020-12-04 PROCEDURE — 2709999900 HC NON-CHARGEABLE SUPPLY: Performed by: PODIATRIST

## 2020-12-04 PROCEDURE — 74011250637 HC RX REV CODE- 250/637: Performed by: INTERNAL MEDICINE

## 2020-12-04 PROCEDURE — 77030039464 HC TU IRR ENDO DISP MSNX -B: Performed by: PODIATRIST

## 2020-12-04 PROCEDURE — 74011250637 HC RX REV CODE- 250/637: Performed by: FAMILY MEDICINE

## 2020-12-04 PROCEDURE — 74011250636 HC RX REV CODE- 250/636: Performed by: FAMILY MEDICINE

## 2020-12-04 PROCEDURE — 85025 COMPLETE CBC W/AUTO DIFF WBC: CPT

## 2020-12-04 PROCEDURE — 77030000032 HC CUF TRNQT ZIMM -B: Performed by: PODIATRIST

## 2020-12-04 PROCEDURE — 99232 SBSQ HOSP IP/OBS MODERATE 35: CPT | Performed by: INTERNAL MEDICINE

## 2020-12-04 PROCEDURE — 74011000258 HC RX REV CODE- 258: Performed by: FAMILY MEDICINE

## 2020-12-04 PROCEDURE — 84145 PROCALCITONIN (PCT): CPT

## 2020-12-04 PROCEDURE — 36415 COLL VENOUS BLD VENIPUNCTURE: CPT

## 2020-12-04 PROCEDURE — 77030039465 HC PRB ASPIR SONICVAC MSNX -F: Performed by: PODIATRIST

## 2020-12-04 PROCEDURE — 77030041703 HC SLV COMPR DVT ARJO -B: Performed by: PODIATRIST

## 2020-12-04 PROCEDURE — 74011000250 HC RX REV CODE- 250: Performed by: FAMILY MEDICINE

## 2020-12-04 PROCEDURE — 74011250636 HC RX REV CODE- 250/636: Performed by: NURSE PRACTITIONER

## 2020-12-04 PROCEDURE — 0KBS0ZZ EXCISION OF RIGHT LOWER LEG MUSCLE, OPEN APPROACH: ICD-10-PCS | Performed by: PODIATRIST

## 2020-12-04 PROCEDURE — 97530 THERAPEUTIC ACTIVITIES: CPT

## 2020-12-04 PROCEDURE — 77010033678 HC OXYGEN DAILY

## 2020-12-04 PROCEDURE — 87077 CULTURE AEROBIC IDENTIFY: CPT

## 2020-12-04 PROCEDURE — 74011000250 HC RX REV CODE- 250: Performed by: NURSE ANESTHETIST, CERTIFIED REGISTERED

## 2020-12-04 PROCEDURE — 0KBT0ZZ EXCISION OF LEFT LOWER LEG MUSCLE, OPEN APPROACH: ICD-10-PCS | Performed by: PODIATRIST

## 2020-12-04 PROCEDURE — 74011250636 HC RX REV CODE- 250/636: Performed by: ANESTHESIOLOGY

## 2020-12-04 PROCEDURE — 83735 ASSAY OF MAGNESIUM: CPT

## 2020-12-04 PROCEDURE — 11043 DBRDMT MUSC&/FSCA 1ST 20/<: CPT | Performed by: PODIATRIST

## 2020-12-04 PROCEDURE — 65270000032 HC RM SEMIPRIVATE

## 2020-12-04 PROCEDURE — 87205 SMEAR GRAM STAIN: CPT

## 2020-12-04 PROCEDURE — 87186 SC STD MICRODIL/AGAR DIL: CPT

## 2020-12-04 PROCEDURE — 94640 AIRWAY INHALATION TREATMENT: CPT

## 2020-12-04 PROCEDURE — 76210000006 HC OR PH I REC 0.5 TO 1 HR: Performed by: PODIATRIST

## 2020-12-04 RX ORDER — FENTANYL CITRATE 50 UG/ML
50 INJECTION, SOLUTION INTRAMUSCULAR; INTRAVENOUS
Status: DISCONTINUED | OUTPATIENT
Start: 2020-12-04 | End: 2020-12-08 | Stop reason: HOSPADM

## 2020-12-04 RX ORDER — KETAMINE HYDROCHLORIDE 10 MG/ML
INJECTION, SOLUTION INTRAMUSCULAR; INTRAVENOUS AS NEEDED
Status: DISCONTINUED | OUTPATIENT
Start: 2020-12-04 | End: 2020-12-04 | Stop reason: HOSPADM

## 2020-12-04 RX ORDER — LABETALOL HCL 20 MG/4 ML
5 SYRINGE (ML) INTRAVENOUS
Status: DISCONTINUED | OUTPATIENT
Start: 2020-12-04 | End: 2020-12-08 | Stop reason: HOSPADM

## 2020-12-04 RX ORDER — SODIUM CHLORIDE 0.9 % (FLUSH) 0.9 %
5-40 SYRINGE (ML) INJECTION EVERY 8 HOURS
Status: DISCONTINUED | OUTPATIENT
Start: 2020-12-04 | End: 2020-12-04

## 2020-12-04 RX ORDER — FENTANYL CITRATE 50 UG/ML
INJECTION, SOLUTION INTRAMUSCULAR; INTRAVENOUS AS NEEDED
Status: DISCONTINUED | OUTPATIENT
Start: 2020-12-04 | End: 2020-12-04 | Stop reason: HOSPADM

## 2020-12-04 RX ORDER — POTASSIUM CHLORIDE 750 MG/1
40 TABLET, FILM COATED, EXTENDED RELEASE ORAL DAILY
Status: DISCONTINUED | OUTPATIENT
Start: 2020-12-04 | End: 2020-12-17 | Stop reason: HOSPADM

## 2020-12-04 RX ORDER — DEXAMETHASONE SODIUM PHOSPHATE 4 MG/ML
INJECTION, SOLUTION INTRA-ARTICULAR; INTRALESIONAL; INTRAMUSCULAR; INTRAVENOUS; SOFT TISSUE AS NEEDED
Status: DISCONTINUED | OUTPATIENT
Start: 2020-12-04 | End: 2020-12-04 | Stop reason: HOSPADM

## 2020-12-04 RX ORDER — SODIUM CHLORIDE 0.9 % (FLUSH) 0.9 %
5-40 SYRINGE (ML) INJECTION EVERY 8 HOURS
Status: DISCONTINUED | OUTPATIENT
Start: 2020-12-04 | End: 2020-12-11

## 2020-12-04 RX ORDER — ETOMIDATE 2 MG/ML
INJECTION INTRAVENOUS AS NEEDED
Status: DISCONTINUED | OUTPATIENT
Start: 2020-12-04 | End: 2020-12-04 | Stop reason: HOSPADM

## 2020-12-04 RX ORDER — LIDOCAINE HYDROCHLORIDE 20 MG/ML
INJECTION, SOLUTION EPIDURAL; INFILTRATION; INTRACAUDAL; PERINEURAL AS NEEDED
Status: DISCONTINUED | OUTPATIENT
Start: 2020-12-04 | End: 2020-12-04 | Stop reason: HOSPADM

## 2020-12-04 RX ORDER — PROPOFOL 10 MG/ML
INJECTION, EMULSION INTRAVENOUS AS NEEDED
Status: DISCONTINUED | OUTPATIENT
Start: 2020-12-04 | End: 2020-12-04 | Stop reason: HOSPADM

## 2020-12-04 RX ORDER — METOPROLOL TARTRATE 5 MG/5ML
2.5 INJECTION INTRAVENOUS
Status: DISCONTINUED | OUTPATIENT
Start: 2020-12-04 | End: 2020-12-08 | Stop reason: HOSPADM

## 2020-12-04 RX ORDER — HYDRALAZINE HYDROCHLORIDE 20 MG/ML
10 INJECTION INTRAMUSCULAR; INTRAVENOUS
Status: DISCONTINUED | OUTPATIENT
Start: 2020-12-04 | End: 2020-12-08 | Stop reason: HOSPADM

## 2020-12-04 RX ORDER — ACETAZOLAMIDE 500 MG/1
500 CAPSULE, EXTENDED RELEASE ORAL ONCE
Status: COMPLETED | OUTPATIENT
Start: 2020-12-04 | End: 2020-12-04

## 2020-12-04 RX ORDER — POTASSIUM CHLORIDE 750 MG/1
40 TABLET, FILM COATED, EXTENDED RELEASE ORAL
Status: COMPLETED | OUTPATIENT
Start: 2020-12-04 | End: 2020-12-04

## 2020-12-04 RX ORDER — SODIUM CHLORIDE 9 MG/ML
INJECTION, SOLUTION INTRAVENOUS
Status: DISCONTINUED | OUTPATIENT
Start: 2020-12-04 | End: 2020-12-04 | Stop reason: HOSPADM

## 2020-12-04 RX ORDER — SODIUM CHLORIDE 0.9 % (FLUSH) 0.9 %
5-40 SYRINGE (ML) INJECTION AS NEEDED
Status: DISCONTINUED | OUTPATIENT
Start: 2020-12-04 | End: 2020-12-13

## 2020-12-04 RX ORDER — HYDROMORPHONE HYDROCHLORIDE 1 MG/ML
0.5 INJECTION, SOLUTION INTRAMUSCULAR; INTRAVENOUS; SUBCUTANEOUS
Status: DISCONTINUED | OUTPATIENT
Start: 2020-12-04 | End: 2020-12-05

## 2020-12-04 RX ORDER — SODIUM CHLORIDE 0.9 % (FLUSH) 0.9 %
5-40 SYRINGE (ML) INJECTION AS NEEDED
Status: DISCONTINUED | OUTPATIENT
Start: 2020-12-04 | End: 2020-12-08 | Stop reason: HOSPADM

## 2020-12-04 RX ORDER — ONDANSETRON 2 MG/ML
4 INJECTION INTRAMUSCULAR; INTRAVENOUS AS NEEDED
Status: DISCONTINUED | OUTPATIENT
Start: 2020-12-04 | End: 2020-12-08 | Stop reason: HOSPADM

## 2020-12-04 RX ORDER — SODIUM CHLORIDE 9 MG/ML
1000 INJECTION, SOLUTION INTRAVENOUS CONTINUOUS
Status: DISCONTINUED | OUTPATIENT
Start: 2020-12-04 | End: 2020-12-08 | Stop reason: HOSPADM

## 2020-12-04 RX ADMIN — FUROSEMIDE 40 MG: 10 INJECTION, SOLUTION INTRAMUSCULAR; INTRAVENOUS at 21:30

## 2020-12-04 RX ADMIN — FENTANYL CITRATE 25 MCG: 50 INJECTION, SOLUTION INTRAMUSCULAR; INTRAVENOUS at 10:19

## 2020-12-04 RX ADMIN — IPRATROPIUM BROMIDE AND ALBUTEROL SULFATE 3 ML: .5; 3 SOLUTION RESPIRATORY (INHALATION) at 21:35

## 2020-12-04 RX ADMIN — IPRATROPIUM BROMIDE AND ALBUTEROL SULFATE 3 ML: .5; 3 SOLUTION RESPIRATORY (INHALATION) at 13:13

## 2020-12-04 RX ADMIN — ETOMIDATE 1 MG: 2 INJECTION INTRAVENOUS at 10:04

## 2020-12-04 RX ADMIN — PROPOFOL 10 MG: 10 INJECTION, EMULSION INTRAVENOUS at 10:40

## 2020-12-04 RX ADMIN — KETAMINE HYDROCHLORIDE 10 MG: 10 INJECTION INTRAMUSCULAR; INTRAVENOUS at 10:11

## 2020-12-04 RX ADMIN — DEXAMETHASONE SODIUM PHOSPHATE 4 MG: 4 INJECTION, SOLUTION INTRA-ARTICULAR; INTRALESIONAL; INTRAMUSCULAR; INTRAVENOUS; SOFT TISSUE at 10:17

## 2020-12-04 RX ADMIN — ETOMIDATE 0.5 MG: 2 INJECTION INTRAVENOUS at 10:14

## 2020-12-04 RX ADMIN — HYOSCYAMINE SULFATE: 16 SOLUTION at 21:31

## 2020-12-04 RX ADMIN — PROPOFOL 10 MG: 10 INJECTION, EMULSION INTRAVENOUS at 10:04

## 2020-12-04 RX ADMIN — Medication 10 ML: at 14:00

## 2020-12-04 RX ADMIN — SODIUM CHLORIDE: 9 INJECTION, SOLUTION INTRAVENOUS at 09:48

## 2020-12-04 RX ADMIN — ETOMIDATE 1 MG: 2 INJECTION INTRAVENOUS at 10:25

## 2020-12-04 RX ADMIN — PROPOFOL 10 MG: 10 INJECTION, EMULSION INTRAVENOUS at 10:14

## 2020-12-04 RX ADMIN — LIDOCAINE HYDROCHLORIDE 50 MG: 20 INJECTION, SOLUTION EPIDURAL; INFILTRATION; INTRACAUDAL; PERINEURAL at 10:03

## 2020-12-04 RX ADMIN — ETOMIDATE 0.5 MG: 2 INJECTION INTRAVENOUS at 10:11

## 2020-12-04 RX ADMIN — IPRATROPIUM BROMIDE AND ALBUTEROL SULFATE 3 ML: .5; 3 SOLUTION RESPIRATORY (INHALATION) at 08:11

## 2020-12-04 RX ADMIN — KETAMINE HYDROCHLORIDE 10 MG: 10 INJECTION INTRAMUSCULAR; INTRAVENOUS at 10:14

## 2020-12-04 RX ADMIN — ETOMIDATE 1 MG: 2 INJECTION INTRAVENOUS at 10:32

## 2020-12-04 RX ADMIN — KETAMINE HYDROCHLORIDE 10 MG: 10 INJECTION INTRAMUSCULAR; INTRAVENOUS at 10:04

## 2020-12-04 RX ADMIN — KETAMINE HYDROCHLORIDE 10 MG: 10 INJECTION INTRAMUSCULAR; INTRAVENOUS at 10:03

## 2020-12-04 RX ADMIN — PIPERACILLIN SODIUM AND TAZOBACTAM SODIUM 3.38 G: 3; .375 INJECTION, POWDER, LYOPHILIZED, FOR SOLUTION INTRAVENOUS at 13:23

## 2020-12-04 RX ADMIN — POTASSIUM CHLORIDE 40 MEQ: 750 TABLET, FILM COATED, EXTENDED RELEASE ORAL at 09:34

## 2020-12-04 RX ADMIN — PROPOFOL 10 MG: 10 INJECTION, EMULSION INTRAVENOUS at 10:25

## 2020-12-04 RX ADMIN — ETOMIDATE 1 MG: 2 INJECTION INTRAVENOUS at 10:21

## 2020-12-04 RX ADMIN — HYDROMORPHONE HYDROCHLORIDE 0.5 MG: 1 INJECTION, SOLUTION INTRAMUSCULAR; INTRAVENOUS; SUBCUTANEOUS at 11:36

## 2020-12-04 RX ADMIN — ETOMIDATE 1 MG: 2 INJECTION INTRAVENOUS at 10:18

## 2020-12-04 RX ADMIN — PROPOFOL 10 MG: 10 INJECTION, EMULSION INTRAVENOUS at 10:11

## 2020-12-04 RX ADMIN — ACETAZOLAMIDE 500 MG: 500 CAPSULE, EXTENDED RELEASE ORAL at 13:23

## 2020-12-04 RX ADMIN — FENTANYL CITRATE 25 MCG: 50 INJECTION, SOLUTION INTRAMUSCULAR; INTRAVENOUS at 09:58

## 2020-12-04 RX ADMIN — Medication 10 ML: at 21:30

## 2020-12-04 RX ADMIN — HYDROMORPHONE HYDROCHLORIDE 0.5 MG: 1 INJECTION, SOLUTION INTRAMUSCULAR; INTRAVENOUS; SUBCUTANEOUS at 11:28

## 2020-12-04 RX ADMIN — POTASSIUM CHLORIDE 40 MEQ: 750 TABLET, FILM COATED, EXTENDED RELEASE ORAL at 13:23

## 2020-12-04 RX ADMIN — PROPOFOL 10 MG: 10 INJECTION, EMULSION INTRAVENOUS at 10:36

## 2020-12-04 RX ADMIN — KETAMINE HYDROCHLORIDE 10 MG: 10 INJECTION INTRAMUSCULAR; INTRAVENOUS at 10:18

## 2020-12-04 RX ADMIN — PROPOFOL 20 MG: 10 INJECTION, EMULSION INTRAVENOUS at 10:21

## 2020-12-04 RX ADMIN — ETOMIDATE 1 MG: 2 INJECTION INTRAVENOUS at 10:03

## 2020-12-04 RX ADMIN — ATORVASTATIN CALCIUM 40 MG: 40 TABLET, FILM COATED ORAL at 21:30

## 2020-12-04 RX ADMIN — PIPERACILLIN SODIUM AND TAZOBACTAM SODIUM 3.38 G: 3; .375 INJECTION, POWDER, LYOPHILIZED, FOR SOLUTION INTRAVENOUS at 04:04

## 2020-12-04 RX ADMIN — FENTANYL CITRATE 25 MCG: 50 INJECTION, SOLUTION INTRAMUSCULAR; INTRAVENOUS at 10:09

## 2020-12-04 RX ADMIN — PROPOFOL 10 MG: 10 INJECTION, EMULSION INTRAVENOUS at 10:18

## 2020-12-04 RX ADMIN — PIPERACILLIN SODIUM AND TAZOBACTAM SODIUM 3.38 G: 3; .375 INJECTION, POWDER, LYOPHILIZED, FOR SOLUTION INTRAVENOUS at 21:30

## 2020-12-04 NOTE — PROGRESS NOTES
Cardiology Progress Note    Patient seen and examined. This is a patient who is followed for Acute heart failure. Seen following bilateral lower extremity debridement. Sleeping in bed. Wakes to voice. Continues with mild shortness of breath. No chest pain. No other complaints offered. Telemetry reviewed, V paced with PVCs. Pertinent review of system items noted above, all other systems are negative. Current medications reviewed. Physical Examination  Vital signs are stable, Blood Pressure 108/69 , Pulse 82  No apparent distress. Heart is regular, rate and rhythm. Normal S1, S2, no murmurs are appreciated. Lungs are coarse bilaterally. Abdomen is soft, nontender, normal bowel sounds. Extremities have non pitting Edema, wrapped with krillex    Labs reviewed   K 3.5  Crt . 47    Myocardial Perfusion Study:   · Baseline ECG: Normal EKG, rare PVCs. · Gated SPECT: Left ventricular function post-stress was abnormal. Calculated ejection fraction is 19%. · Myocardial perfusion imaging defect 1: There is a defect that is large in size with a severe reduction in uptake present in the inferior and apex location(s) that is non-reversible. There is abnormal wall motion in the defect area. Viability in the area is poor. The defect appears to be infarction. Perfusion defect was visually and quantitatively present. · Myocardial perfusion imaging defect 2: There is a defect that is small in size affecting the anteroseptal location(s) that is partially reversible. The defect appears to be infarction with baldo-infarct ischemia. Perfusion defect was visually and quantitatively present. · Left ventricular perfusion is abnormal.  · Myocardial perfusion imaging supports an intermediate risk stress test.      Case discussed with Dr. Claudia Shelley and our impression and recommendations are as follows:  1. Acute systolic CHF: EF 20-36, UNCLEAR IF NEW OR OLD. Continue diuresis, Continue IV 40mg BID. Received 2U PRBCs yesterday. Stress test done showing large infarction and only a small area of baldo-infarct. We discussed options and she declines a cardiac cath, we will continue medical management. Will add low dose ACE/BB once euvolemic. Please keep serum potassium between 4-5 and serum magnesium > 2. K 3.4, repletions ordered. Daily supplementation has say started. 2. PPM: [facility] PPM. Per documents implanted in 2005 by Dr. Jayla Weaver for complete heart block. Device interrogated showing several NSVT, unsure if they correlate with possible syncopal episode. One on 11/25 at 0942 lasting 3:36s. Will start BB once euvolemic. 3. Elevated troponin: Likely demand ischemia from CHF. No active chest pain. 4. Pneumonia and cellulitis: Rx per primary. Pending lower extremity wound debridement. Please do no hesitate to call if additional questions arise.

## 2020-12-04 NOTE — PROGRESS NOTES
Problem: Mobility Impaired (Adult and Pediatric)  Goal: *Acute Goals and Plan of Care (Insert Text)  Description: Patient will move from supine to sit and sit to supine , scoot up and down, and roll side to side in bed with minimal assistance/contact guard assist within 7 day(s). Patient will transfer from bed to chair and chair to bed with minimal assistance/contact guard assist using the least restrictive device within 7 day(s). Patient will improve static sitting balance to modified independence within 1 week(s). Patient will ambulate 10 feet with moderate assistance with least restrictive device within 1 weeks. Outcome: Progressing Towards Goal  PHYSICAL THERAPY REEVALUATION  Patient: Vin Valencia (71 y.o. female)  Date: 12/4/2020  Primary Diagnosis: PNA (pneumonia) [J18.9]  Pneumonia [J18.9]  Procedure(s) (LRB):  Debridement Of Ulcers Bilateral Legs (Bilateral) Day of Surgery   Precautions: fall         ASSESSMENT  Pt reassessed today due to debridement of B leg ulcers earlier today. Pt still drowsy from anesthesia but willing to participate in bed level PT treatment. Pt requiring mod A for rolling and max A for all other bed mobility. Pt sat EOB x 10 min as once she sat up to perform exercises, she began to have a BM. Pt with fair sitting balance, forward flexed due to kyphotic spine and tends to lean on the R side to support herself. Waited next to pt until she finished and after performing AAROM LAQ, assisted pt back to bed to get cleaned up. While sitting EOB, pt's O2 sats were dropping in the 80's as she was having her BM but nila to the 90's when she was not performing valsalva maneuver. Pt with loose stool following BM and worked on rolling side to side to get cleaned up. Once cleaned and repositioned, pt reported she started having another BM. Pt given call button and RN notified.        Other factors to consider for discharge: level of family support, severity of impairment Patient will benefit from skilled therapy intervention to address the above noted impairments. PLAN :  Recommendations and Planned Interventions: bed mobility training, transfer training, gait training, therapeutic exercises, patient and family training/education, and therapeutic activities      Frequency/Duration: Patient will be followed by physical therapy:  4 times a week to address goals. Recommendation for discharge: (in order for the patient to meet his/her long term goals)  SNF    This discharge recommendation:  Has been made in collaboration with the attending provider and/or case management    Equipment recommendations for successful discharge (if) home: none         SUBJECTIVE:   Patient stated my stomach is upset.     OBJECTIVE DATA SUMMARY:   HISTORY:    No past medical history on file. No past surgical history on file. Hospital course since last seen and reason for reevaluation: reassessed due to surgical debridement of B leg ulcers this morning    Personal factors and/or comorbidities impacting plan of care: severity of impairment    Home Situation  Home Environment: Private residence  # Steps to Enter: 2  Rails to Enter: Yes  Hand Rails : Bilateral  One/Two Story Residence: One story  Living Alone: Yes  Support Systems: Other (comments)(Home health aide)  Patient Expects to be Discharged to[de-identified] Skilled nursing facility  Current DME Used/Available at Home: Hospital bed, Alida Seeman, rollator, Shower chair, Cane, straight  Tub or Shower Type: Tub/Shower combination    EXAMINATION/PRESENTATION/DECISION MAKING:   Critical Behavior:  Neurologic State: Drowsy, Alert  Orientation Level: Oriented X4  Cognition: Decreased command following, Poor safety awareness, Recognition of people/places     Hearing: Auditory  Auditory Impairment: None  Functional Mobility:  Bed Mobility:  Rolling:  Moderate assistance;Assist x1  Supine to Sit: Maximum assistance;Assist x1  Sit to Supine: Maximum assistance;Assist x1  Scooting: Maximum assistance;Assist x1  Transfers:                             Balance:   Sitting: Impaired; With support    Pt's O2 sats dropping in the 80's as she sat EOB having BM. Ambulation/Gait Training:                                                            Therapeutic Exercises:   LAQ x 15 each         Pain Ratin/10    Activity Tolerance:   Fair and desaturates with exertion and requires oxygen  Please refer to the flowsheet for vital signs taken during this treatment. After treatment patient left in no apparent distress:   Supine in bed, Call bell within reach, and Side rails x 3    COMMUNICATION/EDUCATION:   The patients plan of care was discussed with: Registered nurse. Patient/family agree to work toward stated goals and plan of care.     Thank you for this referral.  Cary Funes   Time Calculation: 36 mins

## 2020-12-04 NOTE — ANESTHESIA POSTPROCEDURE EVALUATION
Procedure(s):  Debridement Of Ulcers Bilateral Legs.     general, total IV anesthesia    Anesthesia Post Evaluation      Multimodal analgesia: multimodal analgesia used between 6 hours prior to anesthesia start to PACU discharge  Patient location during evaluation: PACU  Patient participation: complete - patient participated  Level of consciousness: awake  Pain score: 0  Pain management: adequate  Airway patency: patent  Anesthetic complications: no  Cardiovascular status: acceptable  Respiratory status: acceptable  Hydration status: acceptable  Post anesthesia nausea and vomiting:  controlled  Final Post Anesthesia Temperature Assessment:  Normothermia (36.0-37.5 degrees C)      INITIAL Post-op Vital signs:   Vitals Value Taken Time   /78 12/4/2020 11:25 AM   Temp 36.3 °C (97.3 °F) 12/4/2020 10:55 AM   Pulse 80 12/4/2020 11:25 AM   Resp 23 12/4/2020 11:25 AM   SpO2 96 % 12/4/2020 11:25 AM

## 2020-12-04 NOTE — PROGRESS NOTES
Progress Note    Patient: Carloyn Green MRN: [id number]  SSN: [id number]    YOB: 1945  Age: 66 y.o. Sex: female      Admit Date: 11/25/2020    LOS: 9 days     Subjective:   Patient followed for sepsis with cellulitis both legs with blood cultures growing Providencia and wound culture with mixed Gram negative nica and MSSA. She remains afebrile. WBC, procalcitonin now nromal  and CRP decreasing. Currently on Zosyn alone. She is still pending debridement of her legs today. Objective:     Vitals:    12/04/20 0344 12/04/20 0637 12/04/20 0700 12/04/20 0811   BP: (!) 101/53 112/73 108/69    Pulse: 84 80 82    Resp: 18  22    Temp: 97.6 °F (36.4 °C)  97.8 °F (36.6 °C)    SpO2: 93%  99% 98%   Weight:       Height:            Intake and Output:  Current Shift: No intake/output data recorded. Last three shifts: 12/02 1901 - 12/04 0700  In: 1821 [P.O.:900; I.V.:650]  Out: 3100 [Urine:3100]    Physical Exam:   Constitutional:       General: She is not in acute distress. Appearance: She is chronically ill-appearing. HENT:      Head: Normocephalic and atraumatic. Nose:      Comments: Nasal O2 cannula  Genitourinary:     Comments: Purewick catheter  Musculoskeletal:         General: Swelling present. No tenderness. Right lower leg: Edema present. Left lower leg: Edema present. Comments: Both calves are bandaged at this time with bulky gauze dressing, however, erythema has markedly decreased along with swelling. Neurological:      General: No focal deficit present. Mental Status: She is alert. She is disoriented.    Psychiatric:         Behavior: Behavior normal.     Lab/Data Review:     WBC 10,900  Procalcitonin <0.05 <0.11 <0.10 <0.69 <1.19  CRP 1.28 <1.98  <3.04 <9.26 <16.00    Covid-19 negative    Blood cultures (11/26) Providenica rettgeri  Blood cultures (11/26) Providenica rettgeri  Urine culture (11/26) No growth FiNAL  Wound culture (11/27) Heavy mixed enteric Gram negative rods, MSSA  Assessment:     Active Problems:    PNA (pneumonia) (11/25/2020)      Pneumonia (11/25/2020)    1. Cellulitis, bilateral lower extremities, severe, secondary to MSSA and Gram negative rods, Day #9 IV Zosyn. 2. Gram negative bacteremia with Providencia rettgeri, probably secondary to above, on IV Zosyn. 3. Sepsis with leukocytosis, elevated procalcitonin, CRP and lactic acid, resolving. 4. SOB  5. Pyuria, negative urine culture    Comment:  WBC and procalcitonin now normal and CRP decreasing. Plan:   1. Continue IV Zosyn for 5 more days  2. In am, repeat CRP   3.  Leg debridement planned for today    Signed By: Merna Zhou MD     December 4, 2020

## 2020-12-04 NOTE — PROGRESS NOTES
Consult  Pulmonary, Critical Care    Name: Tanner Mcdermott MRN: [id number]   : 1945 Hospital: [facility]   Date: 2020  Admission date: 2020 Hospital Day: 10       Subjective/Interval History:   Seen on the medical floor. Consult requested for acute hypoxic respiratory failure. She is an extremely poor historian states she feels fine to leave her alone because she is trying to eat  12/ respirations nonlabored she remains on 3 L of oxygen. States she is not having any surgery  12/3 awake eating breakfast respirations nonlabored   sleeping but awakens easily denies shortness of breath    Hospital Problems  Never Reviewed          Codes Class Noted POA    PNA (pneumonia) ICD-10-CM: J18.9  ICD-9-CM: 486  2020 Unknown        Pneumonia ICD-10-CM: J18.9  ICD-9-CM: 223  2020 Unknown              IMPRESSION:   1. Acute hypoxic respiratory failure secondary to pulmonary edema remains on 2 L of oxygen   2. Hypokalemia to be repleted   3. Hypercapnia likely metabolic alkalosis improved after Diamox  4. Severe cardiomyopathy  5. Severe mitral regurg  6. Bilateral pleural effusions secondary to pulmonary edema no significant change on x-ray 12/3  7. Providencia septicemia  8. Anemia  9. Hypothyroidism treated  10. Questionable early dementia  Body mass index is 20.28 kg/m². 11.       RECOMMENDATIONS/PLAN:   1. Creatinine stable continue twice daily Lasix  2. Continue daily potassium  3. We will repeat 1 dose Diamox today  4. Follow renal function  5. Follow H&H  6.           [x] High complexity decision making was performed  [x] See my orders for details      Subjective/Initial History:     I was asked by Fady Man MD to see Tanner Mcdermott  a 66 y.o.    female in consultation for a chief complaint of acute hypoxic respiratory failure and pleural effusions      Allergies   Allergen Reactions    Tetracycline Other (comments)     Causes mouth sores    Morphine Nausea and Vomiting        MAR reviewed and pertinent medications noted or modified as needed     Current Facility-Administered Medications   Medication    furosemide (LASIX) injection 40 mg    0.9% sodium chloride infusion 250 mL    labetaloL (NORMODYNE;TRANDATE) 20 mg/4 mL (5 mg/mL) injection 10 mg    metoprolol (LOPRESSOR) injection 2.5 mg    nitroglycerin (NITROBID) 2 % ointment 0.5 Inch    albuterol-ipratropium (DUO-NEB) 2.5 MG-0.5 MG/3 ML    piperacillin-tazobactam (ZOSYN) 3.375 g in 0.9% sodium chloride (MBP/ADV) 100 mL MBP    levothyroxine (SYNTHROID) tablet 75 mcg    acetaminophen (TYLENOL) tablet 650 mg    Or    acetaminophen (TYLENOL) suppository 650 mg    polyethylene glycol (MIRALAX) packet 17 g    ondansetron (ZOFRAN ODT) tablet 4 mg    Or    ondansetron (ZOFRAN) injection 4 mg    aspirin tablet 325 mg    atorvastatin (LIPITOR) tablet 40 mg      Patient PCP: Karl Bardales MD  PMH:  has no past medical history on file. PSH:   has no past surgical history on file. FHX: family history is not on file.    SHX:       Completely unreliable systemic review she states there is nothing wrong with her to leave her alone    Objective:     Vital Signs: Telemetry:    normal sinus rhythm Intake/Output:   Visit Vitals  /69 (BP 1 Location: Left arm, BP Patient Position: At rest)   Pulse 82   Temp 97.8 °F (36.6 °C)   Resp 22   Ht 5' (1.524 m)   Wt 47.1 kg (103 lb 13.4 oz)   SpO2 98%   BMI 20.28 kg/m²       Temp (24hrs), Av.8 °F (36.6 °C), Min:97.4 °F (36.3 °C), Max:98.4 °F (36.9 °C)        O2 Device: Nasal cannula O2 Flow Rate (L/min): 2 l/min       Wt Readings from Last 4 Encounters:   20 47.1 kg (103 lb 13.4 oz)   16 57.2 kg (126 lb)   12/07/10 64 kg (141 lb)          Intake/Output Summary (Last 24 hours) at 2020 0858  Last data filed at 2020 0637  Gross per 24 hour   Intake 1550 ml   Output 3100 ml   Net -1550 ml       Last shift:      No intake/output data recorded. Last 3 shifts: 12/02 1901 - 12/04 0700  In: 1821 [P.O.:900;  I.V.:650]  Out: 3100 [Urine:3100]       Physical Exam:   General:  female; chronically ill appearing frail  HEENT: NCAT, oral mucosa clear  Eyes: anicteric; conjunctiva clear extraocular movements intact  Neck: no nodes, neck veins visible no accessory muscle use  Chest: no deformity,   Cardiac: Regular rate and rhythm systolic murmur present has edema of the extremities  Lungs: distant breath sounds; no wheezing rales are heard posteriorly no breath sounds in the bases  Abd: Soft positive bowel sounds no tenderness she has some posterior abdominal wall edema  Ext: Edema of the legs arms and sacrum  : clear urine  Neuro: Awake more alert mildly confused states she does not know why she is in the hospital will move all 4 extremities  Psych- no agitation, oriented to person; does not know why she is in the hospital or exactly where she is  Skin: Lower extremities have very flaky skin with erythema  Pulses: Brachial radial femoral pulses intact  Capillary: Slow capillary refill    Labs:    Recent Labs     12/04/20  0623 12/03/20  0400 12/02/20  0910   WBC 10.9 11.9* 11.9*   HGB 11.3* 10.7* 7.6*   * 551* 601*     Recent Labs     12/04/20  0623 12/03/20  0400 12/02/20  0910 12/01/20  1340    142  142 145  145 147*   K 3.4* 3.5  3.5 3.4*  3.4* 2.9*   CL 98 98  98 101  101 101   CO2 37* 43*  42* 42*  42* 44*   GLU 64* 132*  131* 70  72 87   BUN 10 12  11 14  14 11   CREA 0.49* 0.47*  0.46* 0.36*  0.34* 0.40*   CA 8.6 8.5  8.5 8.4*  8.4* 8.0*   MG 2.0  --  1.7  --    PHOS 3.0 2.9 2.2*  --    ALB 2.1* 2.5*  2.4* 2.2*  2.2* 1.5*   ALT  --  14 10* 12     2 L nasal oxygen with oxygen saturation 98%  BNP 16,597  CRP 3.04  11/26 blood culture with Providencia sensitive to Zosyn  Urine culture no growth  Wound culture with multiple organisms including methicillin sensitive staph aureus Klebsiella Providencia Enterobacter and Morganella  11/27 echo ejection fraction 20% dilated left atrium grade 2 diastolic dysfunction moderate to severe mitral regurg IVC dilatation RVSP 55  12/1 nuclear cardiac scan with ejection fraction of 19%  Lab Results   Component Value Date/Time    Culture result: Heavy Morganella morganii ssp morganii 11/27/2020 11:15 AM    Culture result:  11/27/2020 11:15 AM     Heavy Morganella morganii ssp morganii (2nd colony type/strain)    Culture result: Light Myroides sp 11/27/2020 11:15 AM    Culture result: Heavy  Mixed skin nica isolated   11/27/2020 11:15 AM    Culture result: Heavy Staphylococcus aureus 11/27/2020 11:15 AM    Culture result: Heavy Klebsiella oxytoca 11/27/2020 11:15 AM    Culture result: Heavy Providencia rettgeri 11/27/2020 11:15 AM    Culture result: Heavy Enterobacter cloacae complex 11/27/2020 11:15 AM     Lab Results   Component Value Date/Time    TSH 0.26 (L) 11/25/2020 12:30 PM       Imaging:    CXR Results  (Last 48 hours)               12/03/20 1215  XR CHEST SNGL V Final result    Impression:  Impression:   Little interval change. Narrative:  Study: XR CHEST SNGL V       Clinical indication: Pleural effusions       Comparison: Chest x-ray 12/1/2020. Findings: Only hazy bibasilar airspace disease/pleural effusions. No evidence of   pneumothorax. Cardiac silhouette is enlarged. Stable positioning of right chest wall pacemaker   device. Mild vascular congestion. No acute osseous abnormality identified. Results from Hospital Encounter encounter on 11/25/20   XR CHEST SNGL V    Narrative Study: XR CHEST SNGL V    Clinical indication: Pleural effusions    Comparison: Chest x-ray 12/1/2020. Findings: Only hazy bibasilar airspace disease/pleural effusions. No evidence of  pneumothorax. Cardiac silhouette is enlarged. Stable positioning of right chest wall pacemaker  device. Mild vascular congestion.      No acute osseous abnormality identified. Impression Impression:  Little interval change. XR CHEST PORT    Narrative Chest, frontal view, 12/1/2020    History: CHF. Comparison: Including chest 11/27/2020. Findings: The patient's head obscures the left upper lung zone. Right-sided  pacemaker device with 2 leads is in place. The cardiac silhouette again  demonstrates globular enlargement which can indicate cardiomyopathy or  pericardial effusion. The lungs are underexpanded with bibasilar atelectasis,  increased as compared to chest 11/27/2020. Right pleural effusion is possible. There is pulmonary vascular congestion and hydrostatic edema. No pneumothorax  is identified. The osseous structures are stable. Impression Impression: Underexpanded lungs with bibasilar atelectasis, increased. Possible  right pleural effusion. Hydrostatic edema. XR CHEST PA LAT    Narrative 2 view comparison the 25th      Impression Impression: The cardiomediastinal silhouette is appropriate for age, technique,  and lung expansion. Pulmonary vasculature is not congested. The lungs are  essentially clear. No effusion or pneumothorax is seen. · Providencia septicemia questionably urinary tract or from skin site. Urine culture was no growth I do not know if it was collected after antibiotics were given. She is currently on Zosyn   She has pleural effusions and accentuated interstitial markings consistent with CHF/pulmonary edema. She is receiving Lasix. She has developed hypokalemia with the diuresis will supplement potassium. ·   · Thank you for allowing me to see Ms. Fernanda Lea  ·     Ruslan Medley MD

## 2020-12-04 NOTE — OP NOTES
Operative Report    Patient: Khris Aguila MRN: [id number]  SSN: [id number]    YOB: 1945  Age: 66 y.o. Sex: female       Date of Surgery:   12/4/2020     Preoperative Diagnosis:   Bilateral Leg Ulcers     Postoperative Diagnosis:   Same     Surgeon(s) and Role:     * Phillingane, Gerhardt Earthly, WHITNEY - Primary    Anesthesia:   General     Procedure(s):  1. Debridement of Right Leg Ulcer  2. Debridement of Left Leg Ulcer    Procedure in Detail:   Pt was seen in the pre-operative holding area and all questions were answered and all concerns were addressed with her ANEESH. The operative procedures were discussed in great detail with her POA, with all possible complications highlighted. Her POA verbalized complete understanding and the consent was signed and witnessed. Pt was on scheduled abx, thus no additional abx ordered for surgical prophylaxis. The operative limbs were marked and the pt was transported to the operating room. The pt was transferred to the operating table and anesthesia was administered as indicated above. The bilateral lower extremities were scrubbed and draped in sterile fashion. A time out was performed to confirm the correct pt, correct procedure, correct limb, abx, allergies, fire risk and attendees within the operating room. Upon completion, procedure #1 commenced. Procedure #1: Debridement of Right Leg Ulcer  With a #15 blade, a sharp/excisional debridement was performed down to the level of bone (with no bone taken) for a total of 119.28cmsq (14.2cmx8.4cm) to the large ulcer noted to the anterior aspect of the right leg. At this time, an ultrasonic debridement was performed with the [employer] system to remove any remaining biofilm. Upon completion, the tissue was noted to be granular and bleeding. A dakins wet to dry dressing was applied with a light ace wrap covering.     Procedure #2: Debridement of Left Leg Ulcer  Attention was then directed to the left anterior aspect of the left leg. With a #15 blade, a sharp/excisional debridement was performed down to the level of bone (with no bone taken) for a total of cmsq 119.38 cmsq (12.7cmx9.4cm). At this time, an ultrasonic debridement was performed with the [employer] system to remove any remaining biofilm. Upon completion, the tissue was noted to be granular and bleeding. A dakins wet to dry dressing was applied with a light ace wrap covering. Pt tolerated the above procedures well and all post operative counts were correct. Pt was transferred to PACU without incident. A thorough neurovascular check was then performed. Upon meeting transfer criteria, pt was transferred back to the [facility].     Estimated Blood Loss:    30cc    Tourniquet Time:   No tourniquet was utilized    Implants:   No implants were utilized           Specimens:   ID Type Source Tests Collected by Time Destination   1 : Right Leg Wound Ulcer Wound Leg, Right CULTURE, WOUND W [facility] 12/4/2020 1025 Microbiology   2 : Left Leg Wound Ulcer Wound Leg, Left CULTURE, WOUND W [facility] 12/4/2020 1025 Microbiology         Drains:   No drains were utilized                Complications:   No complications were encountered      Signed By:  Christopher Gee DPM     December 4, 2020

## 2020-12-04 NOTE — PROGRESS NOTES
Problem: Falls - Risk of  Goal: *Absence of Falls  Description: Document Ricky Collins Fall Risk and appropriate interventions in the flowsheet. Outcome: Progressing Towards Goal  Note: Fall Risk Interventions:  Mobility Interventions: Bed/chair exit alarm    Mentation Interventions: Adequate sleep, hydration, pain control, Door open when patient unattended, Bed/chair exit alarm    Medication Interventions: Bed/chair exit alarm    Elimination Interventions: Call light in reach, Bed/chair exit alarm              Problem: Pressure Injury - Risk of  Goal: *Prevention of pressure injury  Description: Document Dustin Scale and appropriate interventions in the flowsheet.   Outcome: Progressing Towards Goal  Note: Pressure Injury Interventions:  Sensory Interventions: Minimize linen layers, Keep linens dry and wrinkle-free    Moisture Interventions: Absorbent underpads, Internal/External urinary devices    Activity Interventions: PT/OT evaluation, Pressure redistribution bed/mattress(bed type)    Mobility Interventions: HOB 30 degrees or less, PT/OT evaluation    Nutrition Interventions: Document food/fluid/supplement intake, Offer support with meals,snacks and hydration    Friction and Shear Interventions: Minimize layers, HOB 30 degrees or less                Problem: Nutrition Deficit  Goal: *Optimize nutritional status  Outcome: Progressing Towards Goal

## 2020-12-04 NOTE — PROGRESS NOTES
General Daily Progress Note          Patient Name:   Yosef Garcia       YOB: 1945       Age:  66 y.o. Admit Date: 11/25/2020      Subjective:         Seen awake not in distress confused  On 3 L oxygen by nasal cannula  Hemoglobin is 11.3             Objective:     Visit Vitals  /69 (BP 1 Location: Left arm, BP Patient Position: At rest)   Pulse 82   Temp 97.8 °F (36.6 °C)   Resp 22   Ht 5' (1.524 m)   Wt 47.1 kg (103 lb 13.4 oz)   SpO2 98%   BMI 20.28 kg/m²          [unfilled]      Review of Systems    Constitutional: Negative for chills and fever. HENT: Negative. Eyes: Negative. Respiratory: Negative. Cardiovascular: Negative. Gastrointestinal: Negative for abdominal pain and nausea. Skin: Negative. Neurological: Negative. Physical Exam:      Constitutional: Alert awake  HENT:   Head: Normocephalic and atraumatic. Eyes: Pupils are equal, round, and reactive to light. EOM are normal.   Cardiovascular: Normal rate, regular rhythm and normal heart sounds. Pulmonary/Chest: Breath sounds normal. No wheezes. No rales. Exhibits no tenderness. Abdominal: Soft. Bowel sounds are normal. There is no abdominal tenderness. There is no rebound and no guarding. Musculoskeletal: Normal range of motion. Neurological: Bilateral lower leg red swollen    XR CHEST SNGL V   Final Result   Impression:   Little interval change. XR CHEST PORT   Final Result   Impression: Underexpanded lungs with bibasilar atelectasis, increased. Possible   right pleural effusion. Hydrostatic edema. XR CHEST PA LAT   Final Result   Impression: The cardiomediastinal silhouette is appropriate for age, technique,   and lung expansion. Pulmonary vasculature is not congested. The lungs are   essentially clear. No effusion or pneumothorax is seen.          XR CHEST SNGL V   Final Result               Results     Procedure Component Value Units Date/Time    CULTURE, WOUND Elvis Bachelor STAIN [[id number]]  (Susceptibility) Collected:  11/27/20 1115    Order Status:  Completed Specimen:  Wound Updated:  12/02/20 1020     Special Requests: No Special Requests        GRAM STAIN Rare WBCs seen         Few Gram Negative Rods               Occasional Gram Positive Cocci in clusters           Culture result: Heavy  Mixed skin nica isolated               Heavy Staphylococcus aureus            Heavy Klebsiella oxytoca               Heavy Providencia rettgeri                  Heavy Enterobacter cloacae complex                  Heavy Morganella morganii ssp morganii                  Heavy Morganella morganii ssp morganii (2nd colony type/strain)            Light Myroides sp       Susceptibility      Staphylococcus aureus     WILLIAM     Ciprofloxacin ($) Resistant     Clindamycin ($) Susceptible     Daptomycin ($$$$$) Susceptible     Doxycycline ($$) Susceptible     Erythromycin ($$$$) Resistant     Gentamicin ($) Susceptible     Levofloxacin ($) Intermediate     Linezolid ($$$$$) Susceptible     Moxifloxacin ($$$$) Susceptible     Oxacillin Susceptible     Rifampin ($$$$) Susceptible [1]      Tetracycline Susceptible     Trimeth/Sulfa Susceptible     Vancomycin ($) Susceptible            [1]   Rifampin is not to be used for mono-therapy.              Susceptibility      Klebsiella oxytoca     WILLIAM     Amikacin ($) Susceptible     Ampicillin ($) Resistant     Ampicillin/sulbactam ($) Susceptible     Cefazolin ($) Susceptible     Cefepime ($$) Susceptible     Cefoxitin Susceptible     Ceftazidime ($) Susceptible     Ceftriaxone ($) Susceptible     Ciprofloxacin ($) Susceptible     Gentamicin ($) Susceptible     Levofloxacin ($) Susceptible     Meropenem ($$) Susceptible     Tobramycin ($) Susceptible     Trimeth/Sulfa Susceptible                Susceptibility      Providencia rettgeri     WILLIAM     Amikacin ($) Susceptible     Ampicillin ($) Resistant     Ampicillin/sulbactam ($) Susceptible     Cefazolin ($) Resistant     Cefepime ($$) Susceptible     Cefoxitin Susceptible     Ceftazidime ($) Susceptible     Ceftriaxone ($) Susceptible     Ciprofloxacin ($) Susceptible     Gentamicin ($) Susceptible     Levofloxacin ($) Susceptible     Piperacillin/Tazobac ($) Susceptible     Tobramycin ($) Susceptible                Susceptibility      Enterobacter cloacae complex     WILLIAM     Amikacin ($) Susceptible     Cefazolin ($) Resistant     Cefepime ($$) Susceptible     Cefoxitin Resistant     Ceftazidime ($) Susceptible     Ceftriaxone ($) Susceptible     Ciprofloxacin ($) Susceptible     Gentamicin ($) Susceptible     Levofloxacin ($) Susceptible     Meropenem ($$) Susceptible     Tobramycin ($) Susceptible     Trimeth/Sulfa Susceptible                Susceptibility      Morganella morganii ssp morganii (6)     WILLIAM     Amikacin ($) Susceptible     Ampicillin ($) Resistant     Ampicillin/sulbactam ($) Resistant     Cefazolin ($) Resistant     Cefepime ($$) Susceptible     Cefoxitin Intermediate     Ceftazidime ($) Susceptible     Ceftriaxone ($) Susceptible     Ciprofloxacin ($) Susceptible     Gentamicin ($) Susceptible     Levofloxacin ($) Susceptible     Meropenem ($$) Susceptible     Piperacillin/Tazobac ($) Susceptible     Tobramycin ($) Susceptible     Trimeth/Sulfa Susceptible                Susceptibility      Morganella morganii ssp morganii (7)     WILLIAM     Amikacin ($) Susceptible     Ampicillin ($) Resistant     Ampicillin/sulbactam ($) Resistant     Cefazolin ($) Resistant     Cefepime ($$) Susceptible     Cefoxitin Intermediate     Ceftazidime ($) Susceptible     Ceftriaxone ($) Susceptible     Ciprofloxacin ($) Susceptible     Gentamicin ($) Susceptible     Levofloxacin ($) Susceptible     Meropenem ($$) Susceptible     Piperacillin/Tazobac ($) Susceptible     Tobramycin ($) Susceptible     Trimeth/Sulfa Susceptible                Susceptibility      Myroides species     WILLIAM     Amikacin ($) Resistant Cefepime ($$) Intermediate     Ceftazidime ($) Resistant     Ceftriaxone ($) Intermediate     Ciprofloxacin ($) Susceptible     Gentamicin ($) Resistant     Tobramycin ($) Resistant                    CULTURE, URINE [[id number]] Collected:  11/26/20 0945    Order Status:  Completed Specimen:  Urine Updated:  11/28/20 1050     Special Requests: No Special Requests        Culture result: No Growth (<1000 cfu/mL)       CULTURE, BLOOD #2 [[id number]]  (Abnormal)  (Susceptibility) Collected:  11/26/20 0122    Order Status:  Completed Specimen:  Blood Updated:  11/30/20 0927     Special Requests: No Special Requests        Culture result:       Providencia rettgeri GROWING IN THE AEROBIC AND ANAEROBIC BOTTLE (SITE = ARM)                  Gram Negative Rods CALLED TO AND READ BACK BY Atul Grubbs R.N. [id number] 11/27/20          Susceptibility      Providencia rettgeri     WILLIAM     Amikacin ($) Susceptible     Ampicillin ($) Resistant     Ampicillin/sulbactam ($) Susceptible     Cefazolin ($) Resistant     Cefepime ($$) Susceptible     Cefoxitin Susceptible     Ceftazidime ($) Susceptible     Ceftriaxone ($) Susceptible     Ciprofloxacin ($) Susceptible     Gentamicin ($) Susceptible     Levofloxacin ($) Susceptible     Piperacillin/Tazobac ($) Susceptible     Tobramycin ($) Susceptible                    CULTURE, BLOOD #1 [[id number]]  (Abnormal) Collected:  11/26/20 0112    Order Status:  Completed Specimen:  Blood Updated:  11/30/20 0928     Special Requests: No Special Requests        Culture result:       Providencia rettgeri growing in both bottles drawn (SITE =ARM)                  Gram Negative Rods CALLED TO Atul Grubbs R.N. [id number] 11/27/20                  Please refer to previous blood culture  [id number], COLLECTED 11/26/20 FOR SENSITIVITIES      CULTURE, Sylvie Kan STAIN [[id number]] Collected:  11/25/20 1930    Order Status:  Completed Specimen:  Wound Updated:  12/01/20 1101     Special Requests: No Special Requests        GRAM STAIN Rare WBCs seen         Rare Gram Negative Rods               Rare Gram Positive Cocci in pairs           Culture result:       Heavy MIXED ENTERIC Gram Negative Rods REFER TO [id number] FOR ID AND SENSITIVITIES                  Heavy Staphylococcus aureus REFER TO [facility] [id number] FOR SENSITIVITIES            Heavy  Mixed skin nica isolated       COVID-19 RAPID TEST [[id number]] Collected:  11/25/20 1445    Order Status:  Completed Specimen:  Nasopharyngeal Updated:  11/25/20 5500     Specimen source Nasopharyngeal        COVID-19 rapid test Not Detected        Comment: Rapid Abbott ID Now   Rapid NAAT:  The specimen is NEGATIVE for SARS-CoV-2, the novel coronavirus associated with COVID-19. Negative results should be treated as presumptive and, if inconsistent with clinical signs and symptoms or necessary for patient management, should be tested with an alternative molecular assay. Negative results do not preclude SARS-CoV-2 infection and should not be used as the sole basis for patient management decisions. This test has been authorized by the FDA under   an Emergency Use Authorization (EUA) for use by authorized laboratories.  Fact sheet for Healthcare Providers: ConventionUpdate.co.nz Fact sheet for Patients: ConventionUpdate.co.nz   Methodology: Isothermal Nucleic Acid Amplification                Labs:     Recent Labs     12/04/20 0623 12/03/20  0400   WBC 10.9 11.9*   HGB 11.3* 10.7*   HCT 37.9 35.8   * 551*     Recent Labs     12/04/20 0623 12/03/20  0400 12/02/20  0910    142  142 145  145   K 3.4* 3.5  3.5 3.4*  3.4*   CL 98 98  98 101  101   CO2 37* 43*  42* 42*  42*   BUN 10 12  11 14  14   CREA 0.49* 0.47*  0.46* 0.36*  0.34*   GLU 64* 132*  131* 70  72   CA 8.6 8.5  8.5 8.4*  8.4*   MG 2.0  --  1.7   PHOS 3.0 2.9 2.2*     Recent Labs     12/04/20 0623 12/03/20  0400 12/02/20  0910 12/01/20  1340   ALT  --  14 10* 12   AP  --  100 95 96   TBILI  --  0.4 0.4 0.2   TP  --  6.5 6.2* 5.7*   ALB 2.1* 2.5*  2.4* 2.2*  2.2* 1.5*   GLOB  --  4.0 4.0 4.2*     No results for input(s): INR, PTP, APTT, INREXT, INREXT in the last 72 hours. No results for input(s): FE, TIBC, PSAT, FERR in the last 72 hours. No results found for: FOL, RBCF   No results for input(s): PH, PCO2, PO2 in the last 72 hours. No results for input(s): CPK, CKNDX, TROIQ in the last 72 hours.     No lab exists for component: CPKMB  No results found for: CHOL, CHOLX, CHLST, CHOLV, HDL, HDLP, LDL, LDLC, DLDLP, TGLX, TRIGL, TRIGP, CHHD, CHHDX  No results found for: [facility]  Lab Results   Component Value Date/Time    Color Yellow/Straw 11/25/2020 04:50 PM    Appearance Clear 11/25/2020 04:50 PM    Specific gravity 1.013 11/25/2020 04:50 PM    pH (UA) 5.0 11/25/2020 04:50 PM    Protein Negative 11/25/2020 04:50 PM    Glucose Negative 11/25/2020 04:50 PM    Ketone Negative 11/25/2020 04:50 PM    Bilirubin Negative 11/25/2020 04:50 PM    Urobilinogen 0.1 11/25/2020 04:50 PM    Nitrites Negative 11/25/2020 04:50 PM    Leukocyte Esterase Negative 11/25/2020 04:50 PM    Bacteria Negative 11/25/2020 04:50 PM    WBC 5-10 11/25/2020 04:50 PM    RBC 0-5 11/25/2020 04:50 PM         Assessment:   Acute hypoxemic respiratory failure on 3 L  Acute systolic heart failure with ejection fraction of 15 to 20%  Acute cellulitis of bilateral legs  Gram-negative bacteremia with providentia  Sepsis leukocytosis elevated procalcitonin and lactic acid  Community-acquired pneumonia  Poor hygiene  Elevated troponin  Leukocytosis  Hypothyroidism  Anemia s/p tranfusion   Stress test done showing large infarct/declined cardiac cath  Plan:     Continue aspirin Lipitor levothyroxine    Discontinue vancomycin  Continue IV ZOSYN   Doppler studies negative for DVT  Lasix 40 mg IV twice daily  Follow-up with infectious disease  Cardiology consult for elevated troponin  PT OT consult and wound care nurse consult  Repeat the labs in the morning  Respiratory consult for weaning of oxygen  Nebulizer treatment  Transfuse 2 unit packed RBC    Discussed with cardiology patient is high risk      Replace potassium today  Order stat labs  PT OT consult  Discussed with the  regarding discharge planning  Patient need placement to skilled care        Current Facility-Administered Medications:     potassium chloride SR (KLOR-CON 10) tablet 40 mEq, 40 mEq, Oral, DAILY, Keyona Camarena MD    acetaZOLAMIDE SR (DIAMOX) capsule 500 mg, 500 mg, Oral, ONCE, Keyona Camarena MD    furosemide (LASIX) injection 40 mg, 40 mg, IntraVENous, BID, Jet Walter NP, Stopped at 12/04/20 0900    0.9% sodium chloride infusion 250 mL, 250 mL, IntraVENous, PRN, Mary Solano MD    labetaloL (NORMODYNE;TRANDATE) 20 mg/4 mL (5 mg/mL) injection 10 mg, 10 mg, IntraVENous, Q5MIN PRN, Jillian Bolus., MD    metoprolol (LOPRESSOR) injection 2.5 mg, 2.5 mg, IntraVENous, Q5MIN PRN, Jillian Bolus., MD    nitroglycerin (NITROBID) 2 % ointment 0.5 Inch, 0.5 Inch, Topical, Q6H, Gwen Moon., MD, Stopped at 12/04/20 0000    albuterol-ipratropium (DUO-NEB) 2.5 MG-0.5 MG/3 ML, 3 mL, Nebulization, Q6H RT, Keith Solano MD, 3 mL at 12/04/20 0811    piperacillin-tazobactam (ZOSYN) 3.375 g in 0.9% sodium chloride (MBP/ADV) 100 mL MBP, 3.375 g, IntraVENous, Q8H, Keith Solano MD, Last Rate: 25 mL/hr at 12/04/20 0404, 3.375 g at 12/04/20 0404    levothyroxine (SYNTHROID) tablet 75 mcg, 75 mcg, Oral, 6am, Keith Solano MD, 75 mcg at 12/03/20 6625    acetaminophen (TYLENOL) tablet 650 mg, 650 mg, Oral, Q6H PRN, 650 mg at 12/01/20 0449 **OR** acetaminophen (TYLENOL) suppository 650 mg, 650 mg, Rectal, Q6H PRN, Keith Solano MD    polyethylene glycol (MIRALAX) packet 17 g, 17 g, Oral, DAILY PRN, Keith Solano MD    ondansetron (ZOFRAN ODT) tablet 4 mg, 4 mg, Oral, Q8H PRN **OR** ondansetron (ZOFRAN) injection 4 mg, 4 mg, IntraVENous, Q6H PRN, Keith Solano MD    aspirin tablet 325 mg, 325 mg, Oral, DAILY, Keith Solano MD, Stopped at 12/04/20 0900    atorvastatin (LIPITOR) tablet 40 mg, 40 mg, Oral, QHS, Keith Solano MD, 40 mg at 12/03/20 2026

## 2020-12-04 NOTE — PROGRESS NOTES
Attempted to see pt for PT treatment; however, pt was off the floor in the OR. Will continue to follow.

## 2020-12-05 LAB
ALBUMIN SERPL-MCNC: 2.3 G/DL (ref 3.5–5)
ANION GAP SERPL CALC-SCNC: 1 MMOL/L (ref 5–15)
BASOPHILS # BLD: 0 K/UL (ref 0–0.1)
BASOPHILS NFR BLD: 0 % (ref 0–1)
BUN SERPL-MCNC: 20 MG/DL (ref 6–20)
BUN/CREAT SERPL: 29 (ref 12–20)
CA-I BLD-MCNC: 8.9 MG/DL (ref 8.5–10.1)
CHLORIDE SERPL-SCNC: 102 MMOL/L (ref 97–108)
CO2 SERPL-SCNC: 37 MMOL/L (ref 21–32)
CREAT SERPL-MCNC: 0.7 MG/DL (ref 0.55–1.02)
DIFFERENTIAL METHOD BLD: ABNORMAL
EOSINOPHIL # BLD: 0.1 K/UL (ref 0–0.4)
EOSINOPHIL NFR BLD: 0 % (ref 0–7)
ERYTHROCYTE [DISTWIDTH] IN BLOOD BY AUTOMATED COUNT: 14.2 % (ref 11.5–14.5)
GLUCOSE SERPL-MCNC: 128 MG/DL (ref 65–100)
HCT VFR BLD AUTO: 35.9 % (ref 35–47)
HGB BLD-MCNC: 10.7 G/DL (ref 11.5–16)
IMM GRANULOCYTES # BLD AUTO: 0.1 K/UL (ref 0–0.04)
IMM GRANULOCYTES NFR BLD AUTO: 1 % (ref 0–0.5)
LYMPHOCYTES # BLD: 0.7 K/UL (ref 0.8–3.5)
LYMPHOCYTES NFR BLD: 4 % (ref 12–49)
MCH RBC QN AUTO: 28.8 PG (ref 26–34)
MCHC RBC AUTO-ENTMCNC: 29.8 G/DL (ref 30–36.5)
MCV RBC AUTO: 96.5 FL (ref 80–99)
MONOCYTES # BLD: 0.8 K/UL (ref 0–1)
MONOCYTES NFR BLD: 5 % (ref 5–13)
NEUTS SEG # BLD: 13.8 K/UL (ref 1.8–8)
NEUTS SEG NFR BLD: 90 % (ref 32–75)
PHOSPHATE SERPL-MCNC: 3.4 MG/DL (ref 2.6–4.7)
PLATELET # BLD AUTO: 570 K/UL (ref 150–400)
PMV BLD AUTO: 9.2 FL (ref 8.9–12.9)
POTASSIUM SERPL-SCNC: 3.8 MMOL/L (ref 3.5–5.1)
RBC # BLD AUTO: 3.72 M/UL (ref 3.8–5.2)
SODIUM SERPL-SCNC: 140 MMOL/L (ref 136–145)
WBC # BLD AUTO: 15.3 K/UL (ref 3.6–11)

## 2020-12-05 PROCEDURE — 74011000250 HC RX REV CODE- 250: Performed by: FAMILY MEDICINE

## 2020-12-05 PROCEDURE — 77010033678 HC OXYGEN DAILY

## 2020-12-05 PROCEDURE — 74011000258 HC RX REV CODE- 258: Performed by: FAMILY MEDICINE

## 2020-12-05 PROCEDURE — 85025 COMPLETE CBC W/AUTO DIFF WBC: CPT

## 2020-12-05 PROCEDURE — 74011250637 HC RX REV CODE- 250/637: Performed by: PODIATRIST

## 2020-12-05 PROCEDURE — 65270000032 HC RM SEMIPRIVATE

## 2020-12-05 PROCEDURE — 80069 RENAL FUNCTION PANEL: CPT

## 2020-12-05 PROCEDURE — 99233 SBSQ HOSP IP/OBS HIGH 50: CPT | Performed by: PODIATRIST

## 2020-12-05 PROCEDURE — 74011250637 HC RX REV CODE- 250/637: Performed by: FAMILY MEDICINE

## 2020-12-05 PROCEDURE — 74011250636 HC RX REV CODE- 250/636: Performed by: FAMILY MEDICINE

## 2020-12-05 PROCEDURE — 36415 COLL VENOUS BLD VENIPUNCTURE: CPT

## 2020-12-05 PROCEDURE — 74011250637 HC RX REV CODE- 250/637: Performed by: INTERNAL MEDICINE

## 2020-12-05 PROCEDURE — 99232 SBSQ HOSP IP/OBS MODERATE 35: CPT | Performed by: INTERNAL MEDICINE

## 2020-12-05 PROCEDURE — 94640 AIRWAY INHALATION TREATMENT: CPT

## 2020-12-05 PROCEDURE — 74011250636 HC RX REV CODE- 250/636: Performed by: NURSE PRACTITIONER

## 2020-12-05 PROCEDURE — 74011000250 HC RX REV CODE- 250: Performed by: NURSE ANESTHETIST, CERTIFIED REGISTERED

## 2020-12-05 RX ORDER — OXYCODONE AND ACETAMINOPHEN 5; 325 MG/1; MG/1
1 TABLET ORAL
Status: DISCONTINUED | OUTPATIENT
Start: 2020-12-05 | End: 2020-12-06

## 2020-12-05 RX ADMIN — LEVOTHYROXINE SODIUM 75 MCG: 0.07 TABLET ORAL at 04:36

## 2020-12-05 RX ADMIN — HYOSCYAMINE SULFATE: 16 SOLUTION at 08:49

## 2020-12-05 RX ADMIN — ACETAMINOPHEN 650 MG: 325 TABLET, FILM COATED ORAL at 04:35

## 2020-12-05 RX ADMIN — Medication 10 ML: at 04:36

## 2020-12-05 RX ADMIN — IPRATROPIUM BROMIDE AND ALBUTEROL SULFATE 3 ML: .5; 3 SOLUTION RESPIRATORY (INHALATION) at 20:26

## 2020-12-05 RX ADMIN — Medication 10 ML: at 22:16

## 2020-12-05 RX ADMIN — HYOSCYAMINE SULFATE: 16 SOLUTION at 23:29

## 2020-12-05 RX ADMIN — PIPERACILLIN SODIUM AND TAZOBACTAM SODIUM 3.38 G: 3; .375 INJECTION, POWDER, LYOPHILIZED, FOR SOLUTION INTRAVENOUS at 12:49

## 2020-12-05 RX ADMIN — OXYCODONE HYDROCHLORIDE AND ACETAMINOPHEN 1 TABLET: 5; 325 TABLET ORAL at 12:49

## 2020-12-05 RX ADMIN — IPRATROPIUM BROMIDE AND ALBUTEROL SULFATE 3 ML: .5; 3 SOLUTION RESPIRATORY (INHALATION) at 08:15

## 2020-12-05 RX ADMIN — POTASSIUM CHLORIDE 40 MEQ: 750 TABLET, FILM COATED, EXTENDED RELEASE ORAL at 08:46

## 2020-12-05 RX ADMIN — IPRATROPIUM BROMIDE AND ALBUTEROL SULFATE 3 ML: .5; 3 SOLUTION RESPIRATORY (INHALATION) at 12:08

## 2020-12-05 RX ADMIN — ASPIRIN 325 MG ORAL TABLET 325 MG: 325 PILL ORAL at 08:46

## 2020-12-05 RX ADMIN — Medication 10 ML: at 14:00

## 2020-12-05 RX ADMIN — PIPERACILLIN SODIUM AND TAZOBACTAM SODIUM 3.38 G: 3; .375 INJECTION, POWDER, LYOPHILIZED, FOR SOLUTION INTRAVENOUS at 04:35

## 2020-12-05 RX ADMIN — FUROSEMIDE 40 MG: 10 INJECTION, SOLUTION INTRAMUSCULAR; INTRAVENOUS at 08:46

## 2020-12-05 RX ADMIN — IPRATROPIUM BROMIDE AND ALBUTEROL SULFATE 3 ML: .5; 3 SOLUTION RESPIRATORY (INHALATION) at 02:30

## 2020-12-05 RX ADMIN — PIPERACILLIN SODIUM AND TAZOBACTAM SODIUM 3.38 G: 3; .375 INJECTION, POWDER, LYOPHILIZED, FOR SOLUTION INTRAVENOUS at 20:10

## 2020-12-05 NOTE — PROGRESS NOTES
Progress Note    Patient: Coty Lerma MRN: [id number]  SSN: [id number]    YOB: 1945  Age: 66 y.o. Sex: female      Admit Date: 11/25/2020    LOS: 10 days     Subjective:   Patient followed for sepsis with cellulitis both legs with blood cultures growing Providencia and wound culture with mixed Gram negative nica which have been identified now and MSSA. She underwent bilateral leg debridement yesterday with intra-operative cultures sent. She remains afebrile. WBC increased today. Currently on Zosyn alone. No complaints. Objective:     Vitals:    12/05/20 0212 12/05/20 0752 12/05/20 0754 12/05/20 0816   BP: 100/66  101/67    Pulse: 85  67    Resp: 18  20    Temp: 97.8 °F (36.6 °C)  99.5 °F (37.5 °C)    SpO2: 95%  98% 98%   Weight:  98 lb 8.7 oz (44.7 kg)     Height:            Intake and Output:  Current Shift: No intake/output data recorded. Last three shifts: 12/03 1901 - 12/05 0700  In: 550 [P.O.:250; I.V.:300]  Out: 1700 [Urine:1700]    Physical Exam:   Constitutional:       General: She is not in acute distress. Appearance: She is chronically ill-appearing. HENT:      Head: Normocephalic and atraumatic. Nose:      Comments: Nasal O2 cannula  Genitourinary:     Comments: Purewick catheter  Musculoskeletal:         General: Swelling present. No tenderness. Right lower leg: Edema present. Left lower leg: Edema present. Comments: Both calves are bandaged at this time with bulky gauze dressing, however, erythema has markedly decreased along with swelling. Neurological:      General: No focal deficit present. Mental Status: She is alert. She is disoriented.    Psychiatric:         Behavior: Behavior normal.     Lab/Data Review:     WBC 15,300  Procalcitonin <0.05 <0.11 <0.10 <0.69 <1.19  CRP Pending <1.28 <1.98  <3.04 <9.26 <16.00    Covid-19 negative    Blood cultures (11/26) Providenica rettgeri  Blood cultures (11/26) Providenica rettgeri  Urine culture (11/26) No growth FiNAL  Wound culture (11/27)  MSSA, Klebsiella oxytoca, Providencia rettgeri, Morganella morganii (2 strains), Myroides species, Enterobacter cloacae  Assessment:     Active Problems:    PNA (pneumonia) (11/25/2020)      Pneumonia (11/25/2020)    1. Cellulitis, bilateral lower extremities, severe, secondary to MSSA, Klebsiella oxytoca, Providencia rettgeri, Morganella morganii (2 strains), Myroides species, Enterobacter cloacae, Day #10 IV Zosyn, status post debridement, improving. 2. Gram negative bacteremia with Providencia rettgeri, probably secondary to above, on IV Zosyn. 3. Sepsis with leukocytosis, elevated procalcitonin, CRP and lactic acid, resolving. 4. SOB  5. Pyuria, negative urine culture    Comment:  WBC increased today but may be spurious. Multiple Gram negative rods identified, including Myroides species, all tested appear to be sensitive to Zosyn and all are sensitive to Levaquin. Plan:   1. Continue IV Zosyn; may be able to transition to oral Levaquin and Doxcycycline  2. In am, repeat CBC and CRP   3. Follow-up intra-operative cultures    ADDENDUM:    In speaking with Podiatry, patient's wounds extended to bone, therefore, would like to rule out osteomyelitis with MRI or Ceretec scan.   Signed By: Marc Swanson MD     December 5, 2020

## 2020-12-05 NOTE — PROGRESS NOTES
Progress Note  Date:2020       Room:208/01  Patient Name:Naa Osorio     Date of Birth:     Age:75 y.o. Subjective    Subjective   Pt seen at [facility]. Cont complaints of pain to the wounds her to her legs, worse with dressing changes. Pt nursing, no acute overnight changes. Review of Systems   Unable to accurately obtain due to altered mental status but pt admits to pain    Objective         Vitals Last 24 Hours:  TEMPERATURE:  Temp  Av.2 °F (36.8 °C)  Min: 97.5 °F (36.4 °C)  Max: 99.5 °F (37.5 °C)  RESPIRATIONS RANGE: Resp  Av.7  Min: 15  Max: 23  PULSE OXIMETRY RANGE: SpO2  Av.2 %  Min: 92 %  Max: 98 %  PULSE RANGE: Pulse  Av.7  Min: 67  Max: 85  BLOOD PRESSURE RANGE: Systolic (07FSL), MMQ:240 , Min:97 , BDF:457   ; Diastolic (19ACH), HNX:61, Min:52, Max:78    I/O (24Hr): Intake/Output Summary (Last 24 hours) at 2020 1116  Last data filed at 2020 1640  Gross per 24 hour   Intake 250 ml   Output 500 ml   Net -250 ml     Objective   GEN: Pt is alert and in NAD. Dressings noted to B/L LE are C/D/I. No family noted at [facility]  DERM: Wounds noted to both LE with fibrogranular base. No malodor noted. Periwound erythema noted. No dry skin noted to B/L LE. No proximal lymphatic streaking noted to B/L LE. VASC: Pedal pulses (DP/PT) faintly palpable to B/L LE. CFT<3sec to all digits of B/L LE. No pedal hair growth noted to the level of the digits for B/L LE. Skin temp is warm to warm from proximal to distal for B/L LE. Neg homans/maggi signs to B/L LE.  No varicosities or telangectasias noted to B/L LE.  NEURO: Protective and epicritic sensations grossly intact to B/L LE  MSK: (+) POP, No gross deformities. Decreased muscle tone and bulk noted to B/L LE.  PSYCH: Cooperative with tearful affect    Labs/Imaging/Diagnostics    Labs:  CBC:  Recent Labs     20  0600 20  0623 20  0400   WBC 15.3* 10.9 11.9*   RBC 3.72* 3.94 3.75* HGB 10.7* 11.3* 10.7*   HCT 35.9 37.9 35.8   MCV 96.5 96.2 95.5   RDW 14.2 14.5 14.8*   * 556* 551*     CHEMISTRIES:  Recent Labs     12/05/20  0600 12/04/20  0623 12/03/20  0400    139 142  142   K 3.8 3.4* 3.5  3.5    98 98  98   CO2 37* 37* 43*  42*   BUN 20 10 12  11   CA 8.9 8.6 8.5  8.5   PHOS 3.4 3.0 2.9   MG  --  2.0  --    PT/INR:No results for input(s): INR, INREXT in the last 72 hours. No lab exists for component: PROTIME  APTT:No results for input(s): APTT in the last 72 hours. LIVER PROFILE:  Recent Labs     12/03/20  0400   AST 17   ALT 14     Lab Results   Component Value Date/Time    ALT (SGPT) 14 12/03/2020 04:00 AM    AST (SGOT) 17 12/03/2020 04:00 AM    Alk. phosphatase 100 12/03/2020 04:00 AM    Bilirubin, total 0.4 12/03/2020 04:00 AM       Imaging Last 24 Hours:  No results found. Assessment//Plan   Active Problems:    PNA (pneumonia) (11/25/2020)      Pneumonia (11/25/2020)      Assessment & Plan    - Bilateral LE Wounds  - PAD  - Sepsis (resolving)  - Altered mental status (acute)        Patient seen and evaluated at bedside  - Most recent labs personally reviewed  - Wound care performed  - B/L LE MRI pending to r/o osteo to both tibias  - Abx per ID  - I will follow closely     The wounds responded very well to the debridement yesterday, plan for an additional debridement with graft application to accelerate the healing process of the soft tissue once the MRI results have returned. Will cont aggressive local wound care for now        Scott HERBERT  [address] Magy Harris DPM, [address] [facility]  [address] [place] [address], [address] [facility],[address]  Chana Randolph:  [phone]  F:  [phone]  C:  [phone]    Electronically signed by Stoney Hodgkin, DPM on 12/5/2020 at 11:16 AM

## 2020-12-05 NOTE — PROGRESS NOTES
Problem: Falls - Risk of  Goal: *Absence of Falls  Description: Document Stanford Massey Fall Risk and appropriate interventions in the flowsheet.   12/4/2020 2350 by Shu Sher RN  Note: Fall Risk Interventions:  Mobility Interventions: Bed/chair exit alarm    Mentation Interventions: Adequate sleep, hydration, pain control, Door open when patient unattended, Bed/chair exit alarm    Medication Interventions: Bed/chair exit alarm    Elimination Interventions: Call light in reach, Bed/chair exit alarm           12/4/2020 2350 by Shu Sher RN  Outcome: Progressing Towards Goal  Note: Fall Risk Interventions:  Mobility Interventions: Bed/chair exit alarm    Mentation Interventions: Adequate sleep, hydration, pain control, Door open when patient unattended, Bed/chair exit alarm    Medication Interventions: Bed/chair exit alarm    Elimination Interventions: Call light in reach, Bed/chair exit alarm              Problem: Nutrition Deficit  Goal: *Optimize nutritional status  Outcome: Progressing Towards Goal

## 2020-12-05 NOTE — PROGRESS NOTES
Consult  Pulmonary, Critical Care    Name: Vin Valencia MRN: [id number]   : 1945 Hospital: [address] [place] [address]   Date: 2020  Admission date: 2020 Hospital Day: 11       Subjective/Interval History:   Seen on the medical floor. Consult requested for acute hypoxic respiratory failure. She is an extremely poor historian states she feels fine to leave her alone because she is trying to eat  / respirations nonlabored she remains on 3 L of oxygen. States she is not having any surgery  12/3 awake eating breakfast respirations nonlabored   sleeping but awakens easily denies shortness of breath    Hospital Problems  Never Reviewed          Codes Class Noted POA    PNA (pneumonia) ICD-10-CM: J18.9  ICD-9-CM: 486  2020 Unknown        Pneumonia ICD-10-CM: J18.9  ICD-9-CM: 755  2020 Unknown              IMPRESSION:   1. Acute hypoxic respiratory failure secondary to pulmonary edema remains on 2 L of oxygen   2. Hypokalemia  repleted   3. Hypercapnia likely metabolic alkalosis improved after Diamox  4. Severe cardiomyopathy  5. Severe mitral regurg  6. Bilateral pleural effusions secondary to pulmonary edema no significant change on x-ray 12/3  7. Providencia septicemia  8. Anemia  9. Hypothyroidism treated  10. Questionable early dementia  Body mass index is 19.25 kg/m². 11.       RECOMMENDATIONS/PLAN:   1. Creatinine stable continue twice daily Lasix  2. Continue daily potassium  3. Follow renal function  4. Follow H&H  5.           [x] High complexity decision making was performed  [x] See my orders for details      Subjective/Initial History:     I was asked by Elicia Rodriguez MD to see Vin Valencia  a 66 y.o.    female in consultation for a chief complaint of acute hypoxic respiratory failure and pleural effusions      Allergies   Allergen Reactions    Tetracycline Other (comments)     Causes mouth sores    Morphine Nausea and Vomiting        MAR reviewed and pertinent medications noted or modified as needed     Current Facility-Administered Medications   Medication    0.9% sodium chloride infusion 1,000 mL    sodium chloride (NS) flush 5-40 mL    fentaNYL citrate (PF) injection 50 mcg    HYDROmorphone (PF) (DILAUDID) injection 0.5 mg    ondansetron (ZOFRAN) injection 4 mg    potassium chloride SR (KLOR-CON 10) tablet 40 mEq    labetaloL (NORMODYNE;TRANDATE) 20 mg/4 mL (5 mg/mL) injection 5 mg    metoprolol (LOPRESSOR) injection 2.5 mg    hydrALAZINE (APRESOLINE) 20 mg/mL injection 10 mg    sodium hypochlorite (HALF STRENGTH DAKIN'S) 0.25% irrigation (bottle)    sodium chloride (NS) flush 5-40 mL    sodium chloride (NS) flush 5-40 mL    furosemide (LASIX) injection 40 mg    0.9% sodium chloride infusion 250 mL    labetaloL (NORMODYNE;TRANDATE) 20 mg/4 mL (5 mg/mL) injection 10 mg    metoprolol (LOPRESSOR) injection 2.5 mg    albuterol-ipratropium (DUO-NEB) 2.5 MG-0.5 MG/3 ML    piperacillin-tazobactam (ZOSYN) 3.375 g in 0.9% sodium chloride (MBP/ADV) 100 mL MBP    levothyroxine (SYNTHROID) tablet 75 mcg    acetaminophen (TYLENOL) tablet 650 mg    Or    acetaminophen (TYLENOL) suppository 650 mg    polyethylene glycol (MIRALAX) packet 17 g    ondansetron (ZOFRAN ODT) tablet 4 mg    Or    ondansetron (ZOFRAN) injection 4 mg    aspirin tablet 325 mg    atorvastatin (LIPITOR) tablet 40 mg      Patient PCP: Karl Bardales MD  PMH:  has no past medical history on file. PSH:   has no past surgical history on file. FHX: family history is not on file.    SHX:           Objective:     Vital Signs: Telemetry:    normal sinus rhythm Intake/Output:   Visit Vitals  /67 (BP 1 Location: Left arm, BP Patient Position: At rest)   Pulse 67   Temp 99.5 °F (37.5 °C)   Resp 20   Ht 5' (1.524 m)   Wt 44.7 kg (98 lb 8.7 oz)   SpO2 98%   BMI 19.25 kg/m²       Temp (24hrs), Av.1 °F (36.7 °C), Min:97.3 °F (36.3 °C), Max:99.5 °F (37.5 °C)        O2 Device: Nasal cannula O2 Flow Rate (L/min): 2 l/min       Wt Readings from Last 4 Encounters:   12/05/20 44.7 kg (98 lb 8.7 oz)   09/20/16 57.2 kg (126 lb)   12/07/10 64 kg (141 lb)          Intake/Output Summary (Last 24 hours) at 12/5/2020 0934  Last data filed at 12/4/2020 1640  Gross per 24 hour   Intake 550 ml   Output 500 ml   Net 50 ml       Last shift:      No intake/output data recorded. Last 3 shifts: 12/03 1901 - 12/05 0700  In: 550 [P.O.:250;  I.V.:300]  Out: 1700 [Urine:1700]       Physical Exam:   General:  female; chronically ill appearing frail  HEENT: NCAT, oral mucosa clear  Eyes: anicteric; conjunctiva clear extraocular movements intact  Neck: no nodes, neck veins visible no accessory muscle use  Chest: no deformity,   Cardiac: Regular rate and rhythm systolic murmur present has edema of the extremities  Lungs: distant breath sounds; no wheezing rales are heard posteriorly no breath sounds in the bases  Abd: Soft positive bowel sounds no tenderness she has some posterior abdominal wall edema  Ext: Edema of the legs arms and sacrum  : clear urine  Neuro: Awake more alert mildly confused  will move all 4 extremities  Psych- no agitation, oriented to person;   Skin: Lower extremities have very flaky skin with erythema  Pulses: Brachial radial femoral pulses intact  Capillary: Slow capillary refill    Labs:    Recent Labs     12/05/20  0600 12/04/20  0623 12/03/20  0400   WBC 15.3* 10.9 11.9*   HGB 10.7* 11.3* 10.7*   * 556* 551*     Recent Labs     12/05/20  0600 12/04/20  0623 12/03/20  0400    139 142  142   K 3.8 3.4* 3.5  3.5    98 98  98   CO2 37* 37* 43*  42*   * 64* 132*  131*   BUN 20 10 12  11   CREA 0.70 0.49* 0.47*  0.46*   CA 8.9 8.6 8.5  8.5   MG  --  2.0  --    PHOS 3.4 3.0 2.9   ALB 2.3* 2.1* 2.5*  2.4*   ALT  --   --  14     2 L nasal oxygen with oxygen saturation 98%  BNP 16,597  CRP 3.04  11/26 blood culture with Providencia sensitive to Zosyn  Urine culture no growth  Wound culture with multiple organisms including methicillin sensitive staph aureus Klebsiella Providencia Enterobacter and Morganella  11/27 echo ejection fraction 20% dilated left atrium grade 2 diastolic dysfunction moderate to severe mitral regurg IVC dilatation RVSP 55  12/1 nuclear cardiac scan with ejection fraction of 19%  Lab Results   Component Value Date/Time    Culture result: Heavy Morganella morganii ssp morganii 11/27/2020 11:15 AM    Culture result:  11/27/2020 11:15 AM     Heavy Morganella morganii ssp morganii (2nd colony type/strain)    Culture result: Light Myroides sp 11/27/2020 11:15 AM    Culture result: Heavy  Mixed skin nica isolated   11/27/2020 11:15 AM    Culture result: Heavy Staphylococcus aureus 11/27/2020 11:15 AM    Culture result: Heavy Klebsiella oxytoca 11/27/2020 11:15 AM    Culture result: Heavy Providencia rettgeri 11/27/2020 11:15 AM    Culture result: Heavy Enterobacter cloacae complex 11/27/2020 11:15 AM     Lab Results   Component Value Date/Time    TSH 0.26 (L) 11/25/2020 12:30 PM       Imaging:    CXR Results  (Last 48 hours)               12/03/20 1215  XR CHEST SNGL V Final result    Impression:  Impression:   Little interval change. Narrative:  Study: XR CHEST SNGL V       Clinical indication: Pleural effusions       Comparison: Chest x-ray 12/1/2020. Findings: Only hazy bibasilar airspace disease/pleural effusions. No evidence of   pneumothorax. Cardiac silhouette is enlarged. Stable positioning of right chest wall pacemaker   device. Mild vascular congestion. No acute osseous abnormality identified. Results from Hospital Encounter encounter on 11/25/20   XR CHEST SNGL V    Narrative Study: XR CHEST SNGL V    Clinical indication: Pleural effusions    Comparison: Chest x-ray 12/1/2020. Findings: Only hazy bibasilar airspace disease/pleural effusions.  No evidence of  pneumothorax. Cardiac silhouette is enlarged. Stable positioning of right chest wall pacemaker  device. Mild vascular congestion. No acute osseous abnormality identified. Impression Impression:  Little interval change. XR CHEST PORT    Narrative Chest, frontal view, 12/1/2020    History: CHF. Comparison: Including chest 11/27/2020. Findings: The patient's head obscures the left upper lung zone. Right-sided  pacemaker device with 2 leads is in place. The cardiac silhouette again  demonstrates globular enlargement which can indicate cardiomyopathy or  pericardial effusion. The lungs are underexpanded with bibasilar atelectasis,  increased as compared to chest 11/27/2020. Right pleural effusion is possible. There is pulmonary vascular congestion and hydrostatic edema. No pneumothorax  is identified. The osseous structures are stable. Impression Impression: Underexpanded lungs with bibasilar atelectasis, increased. Possible  right pleural effusion. Hydrostatic edema. XR CHEST PA LAT    Narrative 2 view comparison the 25th      Impression Impression: The cardiomediastinal silhouette is appropriate for age, technique,  and lung expansion. Pulmonary vasculature is not congested. The lungs are  essentially clear. No effusion or pneumothorax is seen. · Providencia septicemia questionably urinary tract or from skin site. Urine culture was no growth I do not know if it was collected after antibiotics were given. She is currently on Zosyn   She has pleural effusions and accentuated interstitial markings consistent with CHF/pulmonary edema. She is receiving Lasix. She has developed hypokalemia with the diuresis will supplement potassium. ·   · Thank you for allowing me to see Ms. Yohana Foley  ·     Abilio Blount MD

## 2020-12-05 NOTE — PROGRESS NOTES
General Daily Progress Note          Patient Name:   Linus Hudson       YOB: 1945       Age:  66 y.o. Admit Date: 11/25/2020      Subjective:         Seen awake not in distress confused  On 4 L oxygen by nasal cannula  Hemoglobin is 10.7    Patient had debridement of right leg ulcer and left leg ulcer             Objective:     Visit Vitals  /67 (BP 1 Location: Left arm, BP Patient Position: At rest)   Pulse 67   Temp 99.5 °F (37.5 °C)   Resp 20   Ht 5' (1.524 m)   Wt 44.7 kg (98 lb 8.7 oz)   SpO2 98%   BMI 19.25 kg/m²          [unfilled]      Review of Systems    Constitutional: Negative for chills and fever. HENT: Negative. Eyes: Negative. Respiratory: Negative. Cardiovascular: Negative. Gastrointestinal: Negative for abdominal pain and nausea. Skin: Negative. Neurological: Negative. Physical Exam:      Constitutional: Alert awake  HENT:   Head: Normocephalic and atraumatic. Eyes: Pupils are equal, round, and reactive to light. EOM are normal.   Cardiovascular: Normal rate, regular rhythm and normal heart sounds. Pulmonary/Chest: Breath sounds normal. No wheezes. No rales. Exhibits no tenderness. Abdominal: Soft. Bowel sounds are normal. There is no abdominal tenderness. There is no rebound and no guarding. Musculoskeletal: Normal range of motion. Neurological: Bilateral lower leg red swollen    XR CHEST SNGL V   Final Result   Impression:   Little interval change. XR CHEST PORT   Final Result   Impression: Underexpanded lungs with bibasilar atelectasis, increased. Possible   right pleural effusion. Hydrostatic edema. XR CHEST PA LAT   Final Result   Impression: The cardiomediastinal silhouette is appropriate for age, technique,   and lung expansion. Pulmonary vasculature is not congested. The lungs are   essentially clear. No effusion or pneumothorax is seen.          XR CHEST SNGL V   Final Result               Results Procedure Component Value Units Date/Time    CULTURE, Alex Eid STAIN [[id number]] Collected:  12/04/20 1030    Order Status:  Completed Specimen:  Wound from Leg Updated:  12/04/20 1212    CULTURE, Alex Eid STAIN [[id number]] Collected:  12/04/20 1030    Order Status:  Completed Specimen:  Wound from Leg Updated:  12/04/20 1210    CULTURE, WOUND Génesis Wickner STAIN [[id number]]  (Susceptibility) Collected:  11/27/20 1115    Order Status:  Completed Specimen:  Wound Updated:  12/02/20 1020     Special Requests: No Special Requests        GRAM STAIN Rare WBCs seen         Few Gram Negative Rods               Occasional Gram Positive Cocci in clusters           Culture result: Heavy  Mixed skin nica isolated               Heavy Staphylococcus aureus            Heavy Klebsiella oxytoca               Heavy Providencia rettgeri                  Heavy Enterobacter cloacae complex                  Heavy Morganella morganii ssp morganii                  Heavy Morganella morganii ssp morganii (2nd colony type/strain)            Light Myroides sp       Susceptibility      Staphylococcus aureus     WILLIAM     Ciprofloxacin ($) Resistant     Clindamycin ($) Susceptible     Daptomycin ($$$$$) Susceptible     Doxycycline ($$) Susceptible     Erythromycin ($$$$) Resistant     Gentamicin ($) Susceptible     Levofloxacin ($) Intermediate     Linezolid ($$$$$) Susceptible     Moxifloxacin ($$$$) Susceptible     Oxacillin Susceptible     Rifampin ($$$$) Susceptible [1]      Tetracycline Susceptible     Trimeth/Sulfa Susceptible     Vancomycin ($) Susceptible            [1]   Rifampin is not to be used for mono-therapy.              Susceptibility      Klebsiella oxytoca     WILLIAM     Amikacin ($) Susceptible     Ampicillin ($) Resistant     Ampicillin/sulbactam ($) Susceptible     Cefazolin ($) Susceptible     Cefepime ($$) Susceptible     Cefoxitin Susceptible     Ceftazidime ($) Susceptible     Ceftriaxone ($) Susceptible     Ciprofloxacin ($) Susceptible     Gentamicin ($) Susceptible     Levofloxacin ($) Susceptible     Meropenem ($$) Susceptible     Tobramycin ($) Susceptible     Trimeth/Sulfa Susceptible                Susceptibility      Providencia rettgeri     WILLIAM     Amikacin ($) Susceptible     Ampicillin ($) Resistant     Ampicillin/sulbactam ($) Susceptible     Cefazolin ($) Resistant     Cefepime ($$) Susceptible     Cefoxitin Susceptible     Ceftazidime ($) Susceptible     Ceftriaxone ($) Susceptible     Ciprofloxacin ($) Susceptible     Gentamicin ($) Susceptible     Levofloxacin ($) Susceptible     Piperacillin/Tazobac ($) Susceptible     Tobramycin ($) Susceptible                Susceptibility      Enterobacter cloacae complex     WILLIAM     Amikacin ($) Susceptible     Cefazolin ($) Resistant     Cefepime ($$) Susceptible     Cefoxitin Resistant     Ceftazidime ($) Susceptible     Ceftriaxone ($) Susceptible     Ciprofloxacin ($) Susceptible     Gentamicin ($) Susceptible     Levofloxacin ($) Susceptible     Meropenem ($$) Susceptible     Tobramycin ($) Susceptible     Trimeth/Sulfa Susceptible                Susceptibility      Morganella morganii ssp morganii (6)     WILLIAM     Amikacin ($) Susceptible     Ampicillin ($) Resistant     Ampicillin/sulbactam ($) Resistant     Cefazolin ($) Resistant     Cefepime ($$) Susceptible     Cefoxitin Intermediate     Ceftazidime ($) Susceptible     Ceftriaxone ($) Susceptible     Ciprofloxacin ($) Susceptible     Gentamicin ($) Susceptible     Levofloxacin ($) Susceptible     Meropenem ($$) Susceptible     Piperacillin/Tazobac ($) Susceptible     Tobramycin ($) Susceptible     Trimeth/Sulfa Susceptible                Susceptibility      Morganella morganii ssp morganii (7)     WILLIAM     Amikacin ($) Susceptible     Ampicillin ($) Resistant     Ampicillin/sulbactam ($) Resistant     Cefazolin ($) Resistant     Cefepime ($$) Susceptible     Cefoxitin Intermediate     Ceftazidime ($) Susceptible Ceftriaxone ($) Susceptible     Ciprofloxacin ($) Susceptible     Gentamicin ($) Susceptible     Levofloxacin ($) Susceptible     Meropenem ($$) Susceptible     Piperacillin/Tazobac ($) Susceptible     Tobramycin ($) Susceptible     Trimeth/Sulfa Susceptible                Susceptibility      Myroides species     WILLIAM     Amikacin ($) Resistant     Cefepime ($$) Intermediate     Ceftazidime ($) Resistant     Ceftriaxone ($) Intermediate     Ciprofloxacin ($) Susceptible     Gentamicin ($) Resistant     Tobramycin ($) Resistant                    CULTURE, URINE [[id number]] Collected:  11/26/20 0945    Order Status:  Completed Specimen:  Urine Updated:  11/28/20 1050     Special Requests: No Special Requests        Culture result: No Growth (<1000 cfu/mL)       CULTURE, BLOOD #2 [[id number]]  (Abnormal)  (Susceptibility) Collected:  11/26/20 0122    Order Status:  Completed Specimen:  Blood Updated:  11/30/20 0927     Special Requests: No Special Requests        Culture result:       Providencia rettgeri GROWING IN THE AEROBIC AND ANAEROBIC BOTTLE (SITE = ARM)                  Gram Negative Rods CALLED TO AND READ BACK BY Lemar Burkitt R.N. 57Cheryl 11/27/20          Susceptibility      Providencia rettgeri     WILLIAM     Amikacin ($) Susceptible     Ampicillin ($) Resistant     Ampicillin/sulbactam ($) Susceptible     Cefazolin ($) Resistant     Cefepime ($$) Susceptible     Cefoxitin Susceptible     Ceftazidime ($) Susceptible     Ceftriaxone ($) Susceptible     Ciprofloxacin ($) Susceptible     Gentamicin ($) Susceptible     Levofloxacin ($) Susceptible     Piperacillin/Tazobac ($) Susceptible     Tobramycin ($) Susceptible                    CULTURE, BLOOD #1 [[id number]]  (Abnormal) Collected:  11/26/20 0112    Order Status:  Completed Specimen:  Blood Updated:  11/30/20 0928     Special Requests: No Special Requests        Culture result:       Providencia rettgeri growing in both bottles drawn (SITE =ARM) Gram Negative Rods CALLED TO Radha Zamudio R.N. [id number] 11/27/20                  Please refer to previous blood culture  [id number], COLLECTED 11/26/20 FOR SENSITIVITIES      CULTURE, Ingrid Merlin STAIN [[id number]] Collected:  11/25/20 1930    Order Status:  Completed Specimen:  Wound Updated:  12/01/20 1101     Special Requests: No Special Requests        GRAM STAIN Rare WBCs seen         Rare Gram Negative Rods               Rare Gram Positive Cocci in pairs           Culture result:       Heavy MIXED ENTERIC Gram Negative Rods REFER TO [id number] FOR ID AND SENSITIVITIES                  Heavy Staphylococcus aureus REFER TO [facility] [id number] FOR SENSITIVITIES            Heavy  Mixed skin nica isolated       COVID-19 RAPID TEST [[id number]] Collected:  11/25/20 1445    Order Status:  Completed Specimen:  Nasopharyngeal Updated:  11/25/20 1553     Specimen source Nasopharyngeal        COVID-19 rapid test Not Detected        Comment: Rapid [employer] ID Now   Rapid NAAT:  The specimen is NEGATIVE for SARS-CoV-2, the novel coronavirus associated with COVID-19. Negative results should be treated as presumptive and, if inconsistent with clinical signs and symptoms or necessary for patient management, should be tested with an alternative molecular assay. Negative results do not preclude SARS-CoV-2 infection and should not be used as the sole basis for patient management decisions. This test has been authorized by the FDA under   an Emergency Use Authorization (EUA) for use by authorized laboratories.  Fact sheet for Healthcare Providers: ConventionUpdate.co.nz Fact sheet for Patients: ConventionUpdate.co.nz   Methodology: Isothermal Nucleic Acid Amplification                Labs:     Recent Labs     12/05/20  0600 12/04/20  0623   WBC 15.3* 10.9   HGB 10.7* 11.3*   HCT 35.9 37.9   * 556*     Recent Labs     12/05/20  0600 12/04/20  0623 12/03/20  0400    139 142  142   K 3.8 3. 4* 3.5  3.5    98 98  98   CO2 37* 37* 43*  42*   BUN 20 10 12  11   CREA 0.70 0.49* 0.47*  0.46*   * 64* 132*  131*   CA 8.9 8.6 8.5  8.5   MG  --  2.0  --    PHOS 3.4 3.0 2.9     Recent Labs     12/05/20  0600 12/04/20  0623 12/03/20  0400   ALT  --   --  14   AP  --   --  100   TBILI  --   --  0.4   TP  --   --  6.5   ALB 2.3* 2.1* 2.5*  2.4*   GLOB  --   --  4.0     No results for input(s): INR, PTP, APTT, INREXT, INREXT in the last 72 hours. No results for input(s): FE, TIBC, PSAT, FERR in the last 72 hours. No results found for: FOL, RBCF   No results for input(s): PH, PCO2, PO2 in the last 72 hours. No results for input(s): CPK, CKNDX, TROIQ in the last 72 hours.     No lab exists for component: CPKMB  No results found for: CHOL, CHOLX, CHLST, CHOLV, HDL, HDLP, LDL, LDLC, DLDLP, TGLX, TRIGL, TRIGP, CHHD, CHHDX  No results found for: [facility]  Lab Results   Component Value Date/Time    Color Yellow/Straw 11/25/2020 04:50 PM    Appearance Clear 11/25/2020 04:50 PM    Specific gravity 1.013 11/25/2020 04:50 PM    pH (UA) 5.0 11/25/2020 04:50 PM    Protein Negative 11/25/2020 04:50 PM    Glucose Negative 11/25/2020 04:50 PM    Ketone Negative 11/25/2020 04:50 PM    Bilirubin Negative 11/25/2020 04:50 PM    Urobilinogen 0.1 11/25/2020 04:50 PM    Nitrites Negative 11/25/2020 04:50 PM    Leukocyte Esterase Negative 11/25/2020 04:50 PM    Bacteria Negative 11/25/2020 04:50 PM    WBC 5-10 11/25/2020 04:50 PM    RBC 0-5 11/25/2020 04:50 PM         Assessment:   Acute hypoxemic respiratory failure on 4 L  Acute systolic heart failure with ejection fraction of 15 to 20%  Acute cellulitis of bilateral legs/wound culture positive for staph aureus/providentia/Enterobacter/Morganella/Klebsiella  Status post debridement of the both leg wounds  Gram-negative bacteremia with providentia  Sepsis leukocytosis elevated procalcitonin and lactic acid  Community-acquired pneumonia  Poor hygiene  Elevated troponin  Leukocytosis  Hypothyroidism  Anemia s/p tranfusion   Stress test done showing large infarct/declined cardiac cath  Plan:     Continue aspirin Lipitor levothyroxine    Discontinue vancomycin  Continue IV ZOSYN   Doppler studies negative for DVT  Lasix 40 mg IV twice daily  Follow-up with infectious disease  Cardiology consult for elevated troponin  PT OT consult and wound care nurse consult  Repeat the labs in the morning  Respiratory consult for weaning of oxygen  Nebulizer treatment  S/p Transfuse 2 unit packed RBC    Discussed with cardiology patient is high risk      Replace potassium today  Order stat labs  PT OT consult  Discussed with the  regarding discharge planning  Patient need placement to skilled care        Current Facility-Administered Medications:     0.9% sodium chloride infusion 1,000 mL, 1,000 mL, IntraVENous, CONTINUOUS, Keila Moon MD, Stopped at 12/04/20 1200    sodium chloride (NS) flush 5-40 mL, 5-40 mL, IntraVENous, PRN, Keila Moon MD    fentaNYL citrate (PF) injection 50 mcg, 50 mcg, IntraVENous, Multiple, Keila Moon MD    HYDROmorphone (PF) (DILAUDID) injection 0.5 mg, 0.5 mg, IntraVENous, Multiple, Keila Moon MD, 0.5 mg at 12/04/20 1136    ondansetron (ZOFRAN) injection 4 mg, 4 mg, IntraVENous, PRN, Keila Moon MD    potassium chloride SR (KLOR-CON 10) tablet 40 mEq, 40 mEq, Oral, DAILY, Luigi Calixto MD, 40 mEq at 12/05/20 0846    labetaloL (NORMODYNE;TRANDATE) 20 mg/4 mL (5 mg/mL) injection 5 mg, 5 mg, IntraVENous, Q5MIN PRN, Vy Castellon MD    metoprolol (LOPRESSOR) injection 2.5 mg, 2.5 mg, IntraVENous, Q5MIN PRN, Vy Castellon MD    hydrALAZINE (APRESOLINE) 20 mg/mL injection 10 mg, 10 mg, IntraVENous, Q10MIN PRN, Vy Castellon MD    sodium hypochlorite (HALF STRENGTH DAKIN'S) 0.25% irrigation (bottle), , Topical, BID, Mekhi Wilcox CRNA    sodium chloride (NS) flush 5-40 mL, 5-40 mL, IntraVENous, Q8H, Phillingane, Scott, DPM, 10 mL at 12/05/20 0436    sodium chloride (NS) flush 5-40 mL, 5-40 mL, IntraVENous, PRN, Phillingane, Scott, DPM    furosemide (LASIX) injection 40 mg, 40 mg, IntraVENous, BID, Madalyn Chapa NP, 40 mg at 12/05/20 0846    0.9% sodium chloride infusion 250 mL, 250 mL, IntraVENous, PRN, Nidia oSlano MD    labetaloL (NORMODYNE;TRANDATE) 20 mg/4 mL (5 mg/mL) injection 10 mg, 10 mg, IntraVENous, Q5MIN PRN, Gray Singh MD    metoprolol [facility]) injection 2.5 mg, 2.5 mg, IntraVENous, Q5MIN PRN, Gray Singh MD    albuterol-ipratropium (DUO-NEB) 2.5 MG-0.5 MG/3 ML, 3 mL, Nebulization, Q6H RT, Keith Solano MD, 3 mL at 12/05/20 0815    piperacillin-tazobactam (ZOSYN) 3.375 g in 0.9% sodium chloride (MBP/ADV) 100 mL MBP, 3.375 g, IntraVENous, Q8H, Keith Solano MD, Last Rate: 25 mL/hr at 12/05/20 0435, 3.375 g at 12/05/20 0435    levothyroxine (SYNTHROID) tablet 75 mcg, 75 mcg, Oral, 6am, Keith Solano MD, 75 mcg at 12/05/20 0436    acetaminophen (TYLENOL) tablet 650 mg, 650 mg, Oral, Q6H PRN, 650 mg at 12/05/20 0435 **OR** acetaminophen (TYLENOL) suppository 650 mg, 650 mg, Rectal, Q6H PRN, Nidia Solano MD    polyethylene glycol (MIRALAX) packet 17 g, 17 g, Oral, DAILY PRN, Nidia Solano MD    ondansetron (ZOFRAN ODT) tablet 4 mg, 4 mg, Oral, Q8H PRN **OR** ondansetron (ZOFRAN) injection 4 mg, 4 mg, IntraVENous, Q6H PRN, Keith Solano MD    aspirin tablet 325 mg, 325 mg, Oral, DAILY, Keith Solano MD, 325 mg at 12/05/20 0846    atorvastatin (LIPITOR) tablet 40 mg, 40 mg, Oral, QHS, Keith Solano MD, 40 mg at 12/04/20 2130

## 2020-12-05 NOTE — PROGRESS NOTES
Cardiology Progress Note    Patient seen and examined. This is a patient who is followed for acute heart failure. In bed, dressing change in progress. She reports leg pain. No chest pain. She denies orthopnea, breathing stable. No other complaints offered. Telemetry reviewed, V paced. Pertinent review of system items noted above, all other systems are negative. Current medications reviewed. Physical Examination  Vital signs are stable, Blood Pressure 101/67, Pulse 67  No apparent distress. Heart is regular, rate and rhythm. Normal S1, S2, no murmurs are appreciated. Lungs are diminished  Abdomen is soft, nontender, normal bowel sounds. Lower extremities are wrapped. Labs reviewed   K 3.5  Crt . 47    Myocardial Perfusion Study:   · Baseline ECG: Normal EKG, rare PVCs. · Gated SPECT: Left ventricular function post-stress was abnormal. Calculated ejection fraction is 19%. · Myocardial perfusion imaging defect 1: There is a defect that is large in size with a severe reduction in uptake present in the inferior and apex location(s) that is non-reversible. There is abnormal wall motion in the defect area. Viability in the area is poor. The defect appears to be infarction. Perfusion defect was visually and quantitatively present. · Myocardial perfusion imaging defect 2: There is a defect that is small in size affecting the anteroseptal location(s) that is partially reversible. The defect appears to be infarction with baldo-infarct ischemia. Perfusion defect was visually and quantitatively present. · Left ventricular perfusion is abnormal.  · Myocardial perfusion imaging supports an intermediate risk stress test.      Case discussed with Dr. Sarah Foss and our impression and recommendations are as follows:  1. Acute systolic CHF: EF 40-62, unclear if this is new diagnosis. Continue diuresis, with close monitoring of I&O and daily weights.  Stress test with large infarction and only a small area of baldo-infarct. She declined cardiac cath and decided upon medical management. Will add low dose ACE/BB once euvolemic and when BP allows. Please keep serum potassium between 4-5 and serum magnesium > 2.      2. PPM: [facility] PPM. Per documents implanted in 2005 by Dr. Mack Seay for complete heart block. Device interrogated showing several NSVT, unsure if they correlate with possible syncopal episode. One on 11/25 at 0942 lasting 3:36s. Will start BB once euvolemic. 3. Elevated troponin: Likely demand ischemia from CHF. No active chest pain reported. 4. Cellulitis: Rx per primary. S/p lower extremity wound debridement. Please do no hesitate to call me or Dr. Eveline Tsang if additional questions arise.

## 2020-12-06 LAB
ALBUMIN SERPL-MCNC: 2.2 G/DL (ref 3.5–5)
ALBUMIN/GLOB SERPL: 0.5 {RATIO} (ref 1.1–2.2)
ALP SERPL-CCNC: 100 U/L (ref 45–117)
ALT SERPL-CCNC: 11 U/L (ref 12–78)
ANION GAP SERPL CALC-SCNC: 2 MMOL/L (ref 5–15)
AST SERPL W P-5'-P-CCNC: 18 U/L (ref 15–37)
BASOPHILS # BLD: 0 K/UL (ref 0–0.1)
BASOPHILS NFR BLD: 0 % (ref 0–1)
BILIRUB SERPL-MCNC: 0.2 MG/DL (ref 0.2–1)
BUN SERPL-MCNC: 22 MG/DL (ref 6–20)
BUN/CREAT SERPL: 44 (ref 12–20)
CA-I BLD-MCNC: 9 MG/DL (ref 8.5–10.1)
CHLORIDE SERPL-SCNC: 106 MMOL/L (ref 97–108)
CO2 SERPL-SCNC: 32 MMOL/L (ref 21–32)
CREAT SERPL-MCNC: 0.5 MG/DL (ref 0.55–1.02)
CRP SERPL-MCNC: 1.24 MG/DL (ref 0–0.6)
DIFFERENTIAL METHOD BLD: ABNORMAL
EOSINOPHIL # BLD: 0.3 K/UL (ref 0–0.4)
EOSINOPHIL NFR BLD: 2 % (ref 0–7)
ERYTHROCYTE [DISTWIDTH] IN BLOOD BY AUTOMATED COUNT: 14.4 % (ref 11.5–14.5)
GLOBULIN SER CALC-MCNC: 4.1 G/DL (ref 2–4)
GLUCOSE SERPL-MCNC: 65 MG/DL (ref 65–100)
HCT VFR BLD AUTO: 38.9 % (ref 35–47)
HGB BLD-MCNC: 11.4 G/DL (ref 11.5–16)
IMM GRANULOCYTES # BLD AUTO: 0.1 K/UL (ref 0–0.04)
IMM GRANULOCYTES NFR BLD AUTO: 1 % (ref 0–0.5)
LYMPHOCYTES # BLD: 0.9 K/UL (ref 0.8–3.5)
LYMPHOCYTES NFR BLD: 6 % (ref 12–49)
MCH RBC QN AUTO: 28.4 PG (ref 26–34)
MCHC RBC AUTO-ENTMCNC: 29.3 G/DL (ref 30–36.5)
MCV RBC AUTO: 97 FL (ref 80–99)
MONOCYTES # BLD: 0.9 K/UL (ref 0–1)
MONOCYTES NFR BLD: 6 % (ref 5–13)
NEUTS SEG # BLD: 13.3 K/UL (ref 1.8–8)
NEUTS SEG NFR BLD: 85 % (ref 32–75)
PLATELET # BLD AUTO: 506 K/UL (ref 150–400)
PMV BLD AUTO: 9.1 FL (ref 8.9–12.9)
POTASSIUM SERPL-SCNC: 4 MMOL/L (ref 3.5–5.1)
PROT SERPL-MCNC: 6.3 G/DL (ref 6.4–8.2)
RBC # BLD AUTO: 4.01 M/UL (ref 3.8–5.2)
SODIUM SERPL-SCNC: 140 MMOL/L (ref 136–145)
WBC # BLD AUTO: 15.4 K/UL (ref 3.6–11)

## 2020-12-06 PROCEDURE — 94640 AIRWAY INHALATION TREATMENT: CPT

## 2020-12-06 PROCEDURE — 97110 THERAPEUTIC EXERCISES: CPT

## 2020-12-06 PROCEDURE — 65270000032 HC RM SEMIPRIVATE

## 2020-12-06 PROCEDURE — 74011000250 HC RX REV CODE- 250: Performed by: FAMILY MEDICINE

## 2020-12-06 PROCEDURE — 74011250636 HC RX REV CODE- 250/636: Performed by: FAMILY MEDICINE

## 2020-12-06 PROCEDURE — 74011000258 HC RX REV CODE- 258: Performed by: FAMILY MEDICINE

## 2020-12-06 PROCEDURE — 86140 C-REACTIVE PROTEIN: CPT

## 2020-12-06 PROCEDURE — 74011250637 HC RX REV CODE- 250/637: Performed by: FAMILY MEDICINE

## 2020-12-06 PROCEDURE — 80053 COMPREHEN METABOLIC PANEL: CPT

## 2020-12-06 PROCEDURE — 74011250637 HC RX REV CODE- 250/637: Performed by: INTERNAL MEDICINE

## 2020-12-06 PROCEDURE — 99232 SBSQ HOSP IP/OBS MODERATE 35: CPT | Performed by: INTERNAL MEDICINE

## 2020-12-06 PROCEDURE — 77010033678 HC OXYGEN DAILY

## 2020-12-06 PROCEDURE — 85025 COMPLETE CBC W/AUTO DIFF WBC: CPT

## 2020-12-06 PROCEDURE — 36415 COLL VENOUS BLD VENIPUNCTURE: CPT

## 2020-12-06 RX ADMIN — LEVOTHYROXINE SODIUM 75 MCG: 0.07 TABLET ORAL at 09:06

## 2020-12-06 RX ADMIN — ASPIRIN 325 MG ORAL TABLET 325 MG: 325 PILL ORAL at 09:06

## 2020-12-06 RX ADMIN — ATORVASTATIN CALCIUM 40 MG: 40 TABLET, FILM COATED ORAL at 22:32

## 2020-12-06 RX ADMIN — HYOSCYAMINE SULFATE: 16 SOLUTION at 20:30

## 2020-12-06 RX ADMIN — IPRATROPIUM BROMIDE AND ALBUTEROL SULFATE 3 ML: .5; 3 SOLUTION RESPIRATORY (INHALATION) at 07:46

## 2020-12-06 RX ADMIN — Medication 10 ML: at 22:32

## 2020-12-06 RX ADMIN — PIPERACILLIN SODIUM AND TAZOBACTAM SODIUM 3.38 G: 3; .375 INJECTION, POWDER, LYOPHILIZED, FOR SOLUTION INTRAVENOUS at 04:34

## 2020-12-06 RX ADMIN — IPRATROPIUM BROMIDE AND ALBUTEROL SULFATE 3 ML: .5; 3 SOLUTION RESPIRATORY (INHALATION) at 19:35

## 2020-12-06 RX ADMIN — Medication 10 ML: at 14:00

## 2020-12-06 RX ADMIN — POTASSIUM CHLORIDE 40 MEQ: 750 TABLET, FILM COATED, EXTENDED RELEASE ORAL at 09:06

## 2020-12-06 RX ADMIN — IPRATROPIUM BROMIDE AND ALBUTEROL SULFATE 3 ML: .5; 3 SOLUTION RESPIRATORY (INHALATION) at 02:00

## 2020-12-06 RX ADMIN — PIPERACILLIN SODIUM AND TAZOBACTAM SODIUM 3.38 G: 3; .375 INJECTION, POWDER, LYOPHILIZED, FOR SOLUTION INTRAVENOUS at 20:29

## 2020-12-06 RX ADMIN — Medication 10 ML: at 06:29

## 2020-12-06 RX ADMIN — PIPERACILLIN SODIUM AND TAZOBACTAM SODIUM 3.38 G: 3; .375 INJECTION, POWDER, LYOPHILIZED, FOR SOLUTION INTRAVENOUS at 11:10

## 2020-12-06 RX ADMIN — HYOSCYAMINE SULFATE: 16 SOLUTION at 09:07

## 2020-12-06 RX ADMIN — IPRATROPIUM BROMIDE AND ALBUTEROL SULFATE 3 ML: .5; 3 SOLUTION RESPIRATORY (INHALATION) at 12:34

## 2020-12-06 NOTE — PROGRESS NOTES
General Daily Progress Note          Patient Name:   Coty Lerma       YOB: 1945       Age:  66 y.o. Admit Date: 11/25/2020      Subjective:         Seen awake not in distress confused  On 4 L oxygen by nasal cannula  Hemoglobin is 11.4    Patient was drowsy and lethargic after receiving Percocet /now discontinued  Hypotensive this morning hold all blood pressure medicine    Patient had debridement of right leg ulcer and left leg ulcer             Objective:     Visit Vitals  BP (!) 102/56 (BP 1 Location: Left arm, BP Patient Position: At rest)   Pulse 80   Temp 98.1 °F (36.7 °C)   Resp 20   Ht 5' (1.524 m)   Wt 46.6 kg (102 lb 11.8 oz)   SpO2 98%   BMI 20.06 kg/m²          [unfilled]      Review of Systems    Constitutional: Negative for chills and fever. HENT: Negative. Eyes: Negative. Respiratory: Negative. Cardiovascular: Negative. Gastrointestinal: Negative for abdominal pain and nausea. Skin: Negative. Neurological: Negative. Physical Exam:      Constitutional: Alert awake  HENT:   Head: Normocephalic and atraumatic. Eyes: Pupils are equal, round, and reactive to light. EOM are normal.   Cardiovascular: Normal rate, regular rhythm and normal heart sounds. Pulmonary/Chest: Breath sounds normal. No wheezes. No rales. Exhibits no tenderness. Abdominal: Soft. Bowel sounds are normal. There is no abdominal tenderness. There is no rebound and no guarding. Musculoskeletal: Normal range of motion. Neurological: Bilateral lower leg red swollen    XR CHEST SNGL V   Final Result   Impression:   Little interval change. XR CHEST PORT   Final Result   Impression: Underexpanded lungs with bibasilar atelectasis, increased. Possible   right pleural effusion. Hydrostatic edema. XR CHEST PA LAT   Final Result   Impression: The cardiomediastinal silhouette is appropriate for age, technique,   and lung expansion. Pulmonary vasculature is not congested. The lungs are   essentially clear. No effusion or pneumothorax is seen. XR CHEST SNGL V   Final Result      XR CHEST PA LAT    (Results Pending)   MRI TIB/FIB LT W WO CONT    (Results Pending)   MRI TIB/FIB RT WO CONT    (Results Pending)            Results     Procedure Component Value Units Date/Time    CULTURE, Parminder Ramirez [[id number]] Collected:  12/04/20 1030    Order Status:  Completed Specimen:  Wound from Leg Updated:  12/06/20 0836     Special Requests: No Special Requests        GRAM STAIN       Occasional Gram Negative Rods           Culture result:        Moderate Gram Negative Rods (CHECKING FOR MULTIPLE COLONY TYPES)                  Rare POSSIBLE Staphylococcus aureus          CULTURE, Alexx Rojas STAIN [[id number]] Collected:  12/04/20 1030    Order Status:  Completed Specimen:  Wound from Leg Updated:  12/06/20 0830     Special Requests: No Special Requests        GRAM STAIN Few Gram Negative Rods        Culture result:       Light Gram Negative Rods (MIXED COLONY TYPES)                  Light POSSIBLE Staphylococcus aureus            Light  Mixed skin nica isolated       CULTURE, Alexx Rojas STAIN [[id number]]  (Susceptibility) Collected:  11/27/20 1115    Order Status:  Completed Specimen:  Wound Updated:  12/02/20 1020     Special Requests: No Special Requests        GRAM STAIN Rare WBCs seen         Few Gram Negative Rods               Occasional Gram Positive Cocci in clusters           Culture result: Heavy  Mixed skin nica isolated               Heavy Staphylococcus aureus            Heavy Klebsiella oxytoca               Heavy Providencia rettgeri                  Heavy Enterobacter cloacae complex                  Heavy Morganella morganii ssp morganii                  Heavy Morganella morganii ssp morganii (2nd colony type/strain)            Light Myroides sp       Susceptibility      Staphylococcus aureus     WILLIAM     Ciprofloxacin ($) Resistant     Clindamycin ($) Susceptible     Daptomycin ($$$$$) Susceptible     Doxycycline ($$) Susceptible     Erythromycin ($$$$) Resistant     Gentamicin ($) Susceptible     Levofloxacin ($) Intermediate     Linezolid ($$$$$) Susceptible     Moxifloxacin ($$$$) Susceptible     Oxacillin Susceptible     Rifampin ($$$$) Susceptible [1]      Tetracycline Susceptible     Trimeth/Sulfa Susceptible     Vancomycin ($) Susceptible            [1]   Rifampin is not to be used for mono-therapy.              Susceptibility      Klebsiella oxytoca     WILLIAM     Amikacin ($) Susceptible     Ampicillin ($) Resistant     Ampicillin/sulbactam ($) Susceptible     Cefazolin ($) Susceptible     Cefepime ($$) Susceptible     Cefoxitin Susceptible     Ceftazidime ($) Susceptible     Ceftriaxone ($) Susceptible     Ciprofloxacin ($) Susceptible     Gentamicin ($) Susceptible     Levofloxacin ($) Susceptible     Meropenem ($$) Susceptible     Tobramycin ($) Susceptible     Trimeth/Sulfa Susceptible                Susceptibility      Providencia rettgeri     WILLIAM     Amikacin ($) Susceptible     Ampicillin ($) Resistant     Ampicillin/sulbactam ($) Susceptible     Cefazolin ($) Resistant     Cefepime ($$) Susceptible     Cefoxitin Susceptible     Ceftazidime ($) Susceptible     Ceftriaxone ($) Susceptible     Ciprofloxacin ($) Susceptible     Gentamicin ($) Susceptible     Levofloxacin ($) Susceptible     Piperacillin/Tazobac ($) Susceptible     Tobramycin ($) Susceptible                Susceptibility      Enterobacter cloacae complex     WILLIAM     Amikacin ($) Susceptible     Cefazolin ($) Resistant     Cefepime ($$) Susceptible     Cefoxitin Resistant     Ceftazidime ($) Susceptible     Ceftriaxone ($) Susceptible     Ciprofloxacin ($) Susceptible     Gentamicin ($) Susceptible     Levofloxacin ($) Susceptible     Meropenem ($$) Susceptible     Tobramycin ($) Susceptible     Trimeth/Sulfa Susceptible                Susceptibility      Morganella morganii ssp morganii (6)     WILLIAM     Amikacin ($) Susceptible     Ampicillin ($) Resistant     Ampicillin/sulbactam ($) Resistant     Cefazolin ($) Resistant     Cefepime ($$) Susceptible     Cefoxitin Intermediate     Ceftazidime ($) Susceptible     Ceftriaxone ($) Susceptible     Ciprofloxacin ($) Susceptible     Gentamicin ($) Susceptible     Levofloxacin ($) Susceptible     Meropenem ($$) Susceptible     Piperacillin/Tazobac ($) Susceptible     Tobramycin ($) Susceptible     Trimeth/Sulfa Susceptible                Susceptibility      Morganella morganii ssp morganii (7)     WILLIAM     Amikacin ($) Susceptible     Ampicillin ($) Resistant     Ampicillin/sulbactam ($) Resistant     Cefazolin ($) Resistant     Cefepime ($$) Susceptible     Cefoxitin Intermediate     Ceftazidime ($) Susceptible     Ceftriaxone ($) Susceptible     Ciprofloxacin ($) Susceptible     Gentamicin ($) Susceptible     Levofloxacin ($) Susceptible     Meropenem ($$) Susceptible     Piperacillin/Tazobac ($) Susceptible     Tobramycin ($) Susceptible     Trimeth/Sulfa Susceptible                Susceptibility      Myroides species     WILLIAM     Amikacin ($) Resistant     Cefepime ($$) Intermediate     Ceftazidime ($) Resistant     Ceftriaxone ($) Intermediate     Ciprofloxacin ($) Susceptible     Gentamicin ($) Resistant     Tobramycin ($) Resistant                    CULTURE, URINE [[id number]] Collected:  11/26/20 0945    Order Status:  Completed Specimen:  Urine Updated:  11/28/20 1050     Special Requests: No Special Requests        Culture result: No Growth (<1000 cfu/mL)       CULTURE, BLOOD #2 [[id number]]  (Abnormal)  (Susceptibility) Collected:  11/26/20 0122    Order Status:  Completed Specimen:  Blood Updated:  11/30/20 0927     Special Requests: No Special Requests        Culture result:       Providencia rettgeri GROWING IN THE AEROBIC AND ANAEROBIC BOTTLE (SITE = ARM)                  Gram Negative Rods CALLED TO AND READ BACK BY Catherine Corea79 11/27/20          Susceptibility      Providencia rettgeri     WILLIAM     Amikacin ($) Susceptible     Ampicillin ($) Resistant     Ampicillin/sulbactam ($) Susceptible     Cefazolin ($) Resistant     Cefepime ($$) Susceptible     Cefoxitin Susceptible     Ceftazidime ($) Susceptible     Ceftriaxone ($) Susceptible     Ciprofloxacin ($) Susceptible     Gentamicin ($) Susceptible     Levofloxacin ($) Susceptible     Piperacillin/Tazobac ($) Susceptible     Tobramycin ($) Susceptible                    CULTURE, BLOOD #1 [[id number]]  (Abnormal) Collected:  11/26/20 0112    Order Status:  Completed Specimen:  Blood Updated:  11/30/20 0928     Special Requests: No Special Requests        Culture result:       Providencia rettgeri growing in both bottles drawn (SITE =ARM)                  Gram Negative Rods CALLED TO Jose Franco R.N. [id number] 11/27/20                  Please refer to previous blood culture  [id number], COLLECTED 11/26/20 FOR SENSITIVITIES      CULTURE, Alexx Rojas STAIN [[id number]] Collected:  11/25/20 1930    Order Status:  Completed Specimen:  Wound Updated:  12/01/20 1101     Special Requests: No Special Requests        GRAM STAIN Rare WBCs seen         Rare Gram Negative Rods               Rare Gram Positive Cocci in pairs           Culture result:       Heavy MIXED ENTERIC Gram Negative Rods REFER TO [id number] FOR ID AND SENSITIVITIES                  Heavy Staphylococcus aureus REFER TO [facility] [id number] FOR SENSITIVITIES            Heavy  Mixed skin nica isolated       COVID-19 RAPID TEST [[id number]] Collected:  11/25/20 1445    Order Status:  Completed Specimen:  Nasopharyngeal Updated:  11/25/20 1553     Specimen source Nasopharyngeal        COVID-19 rapid test Not Detected        Comment: Rapid Abbott ID Now   Rapid NAAT:  The specimen is NEGATIVE for SARS-CoV-2, the novel coronavirus associated with COVID-19.    Negative results should be treated as presumptive and, if inconsistent with clinical signs and symptoms or necessary for patient management, should be tested with an alternative molecular assay. Negative results do not preclude SARS-CoV-2 infection and should not be used as the sole basis for patient management decisions. This test has been authorized by the FDA under   an Emergency Use Authorization (EUA) for use by authorized laboratories. Fact sheet for Healthcare Providers: ConventionUpdate.co.nz Fact sheet for Patients: ConventionUpdate.co.nz   Methodology: Isothermal Nucleic Acid Amplification                Labs:     Recent Labs     12/06/20 0600 12/05/20 0600   WBC 15.4* 15.3*   HGB 11.4* 10.7*   HCT 38.9 35.9   * 570*     Recent Labs     12/06/20 0600 12/05/20 0600 12/04/20 0623    140 139   K 4.0 3.8 3.4*    102 98   CO2 32 37* 37*   BUN 22* 20 10   CREA 0.50* 0.70 0.49*   GLU 65 128* 64*   CA 9.0 8.9 8.6   MG  --   --  2.0   PHOS  --  3.4 3.0     Recent Labs     12/06/20 0600 12/05/20 0600 12/04/20 0623   ALT 11*  --   --      --   --    TBILI 0.2  --   --    TP 6.3*  --   --    ALB 2.2* 2.3* 2.1*   GLOB 4.1*  --   --      No results for input(s): INR, PTP, APTT, INREXT, INREXT in the last 72 hours. No results for input(s): FE, TIBC, PSAT, FERR in the last 72 hours. No results found for: FOL, RBCF   No results for input(s): PH, PCO2, PO2 in the last 72 hours. No results for input(s): CPK, CKNDX, TROIQ in the last 72 hours.     No lab exists for component: CPKMB  No results found for: CHOL, CHOLX, CHLST, CHOLV, HDL, HDLP, LDL, LDLC, DLDLP, TGLX, TRIGL, TRIGP, CHHD, CHHDX  No results found for: [facility]  Lab Results   Component Value Date/Time    Color Yellow/Straw 11/25/2020 04:50 PM    Appearance Clear 11/25/2020 04:50 PM    Specific gravity 1.013 11/25/2020 04:50 PM    pH (UA) 5.0 11/25/2020 04:50 PM    Protein Negative 11/25/2020 04:50 PM    Glucose Negative 11/25/2020 04:50 PM    Ketone Negative 11/25/2020 04:50 PM Bilirubin Negative 11/25/2020 04:50 PM    Urobilinogen 0.1 11/25/2020 04:50 PM    Nitrites Negative 11/25/2020 04:50 PM    Leukocyte Esterase Negative 11/25/2020 04:50 PM    Bacteria Negative 11/25/2020 04:50 PM    WBC 5-10 11/25/2020 04:50 PM    RBC 0-5 11/25/2020 04:50 PM         Assessment:   Acute hypoxemic respiratory failure on 4 L  Acute systolic heart failure with ejection fraction of 15 to 20%  Acute cellulitis of bilateral legs/wound culture positive for staph aureus/providentia/Enterobacter/Morganella/Klebsiella  Status post debridement of the both leg wounds  Gram-negative bacteremia with providentia  Sepsis leukocytosis elevated procalcitonin and lactic acid  Community-acquired pneumonia  Poor hygiene  Elevated troponin  Leukocytosis  Hypothyroidism  Anemia s/p tranfusion   Stress test done showing large infarct/declined cardiac cath  Hypotensive secondary to Percocet  Plan:       Hold blood pressure medications  Discontinue Percocet  Continue aspirin Lipitor levothyroxine    Discontinue vancomycin  Continue IV ZOSYN   Doppler studies negative for DVT  Lasix 40 mg IV twice daily  Follow-up with infectious disease  Cardiology consult for elevated troponin  PT OT consult and wound care nurse consult  Repeat the labs in the morning  Respiratory consult for weaning of oxygen  Nebulizer treatment  S/p Transfuse 2 unit packed RBC    Discussed with the patient nurse to hold blood pressure medicines as patient hypotensive      Replace potassium today  Order stat labs  PT OT consult  Discussed with the  regarding discharge planning  Patient need placement to skilled care        Current Facility-Administered Medications:     oxyCODONE-acetaminophen (PERCOCET) 5-325 mg per tablet 1 Tab, 1 Tab, Oral, Q4H PRN, Scott Long, DPM, 1 Tab at 12/05/20 1249    0.9% sodium chloride infusion 1,000 mL, 1,000 mL, IntraVENous, CONTINUOUS, Sardino, Christy Ormond., MD, Stopped at 12/04/20 1200    sodium chloride (NS) flush 5-40 mL, 5-40 mL, IntraVENous, PRN, Maria Dolores Moon MD    fentaNYL citrate (PF) injection 50 mcg, 50 mcg, IntraVENous, Multiple, Maria Dolores Moon MD    ondansetron [facility] injection 4 mg, 4 mg, IntraVENous, PRN, Bernardine Maria Dolores Walker MD    potassium chloride SR (KLOR-CON 10) tablet 40 mEq, 40 mEq, Oral, DAILY, Umang Rodriguez MD, 40 mEq at 12/06/20 0906    labetaloL (NORMODYNE;TRANDATE) 20 mg/4 mL (5 mg/mL) injection 5 mg, 5 mg, IntraVENous, Q5MIN PRN, Elier Singh MD    metoprolol (LOPRESSOR) injection 2.5 mg, 2.5 mg, IntraVENous, Q5MIN PRN, Elier Singh MD    hydrALAZINE (APRESOLINE) 20 mg/mL injection 10 mg, 10 mg, IntraVENous, Q10MIN PRN, Elier Singh MD    sodium hypochlorite (HALF STRENGTH DAKIN'S) 0.25% irrigation (bottle), , Topical, BID, Aldo Wilcox, CRNA    sodium chloride (NS) flush 5-40 mL, 5-40 mL, IntraVENous, Q8H, Phillingane, Scott, DPM, 10 mL at 12/06/20 0629    sodium chloride (NS) flush 5-40 mL, 5-40 mL, IntraVENous, PRN, Phillingane, Scott, DPM    furosemide (LASIX) injection 40 mg, 40 mg, IntraVENous, BID, Loras Gowers, NP, Stopped at 12/05/20 2100    0.9% sodium chloride infusion 250 mL, 250 mL, IntraVENous, PRN, Lary Solano MD    labetaloL (NORMODYNE;TRANDATE) 20 mg/4 mL (5 mg/mL) injection 10 mg, 10 mg, IntraVENous, Q5MIN PRN, Rebecca Paul MD    metoprolol [facility]) injection 2.5 mg, 2.5 mg, IntraVENous, Q5MIN PRN, Rebecca Paul MD    albuterol-ipratropium (DUO-NEB) 2.5 MG-0.5 MG/3 ML, 3 mL, Nebulization, Q6H RT, Keith Solano MD, 3 mL at 12/06/20 0746    piperacillin-tazobactam (ZOSYN) 3.375 g in 0.9% sodium chloride (MBP/ADV) 100 mL MBP, 3.375 g, IntraVENous, Q8H, Keith Solano MD, Last Rate: 25 mL/hr at 12/06/20 0434, 3.375 g at 12/06/20 0434    levothyroxine (SYNTHROID) tablet 75 mcg, 75 mcg, Oral, 6am, Keith Solano MD, 75 mcg at 12/06/20 0906    acetaminophen (TYLENOL) tablet 650 mg, 650 mg, Oral, Q6H PRN, 650 mg at 12/05/20 0435 **OR** acetaminophen (TYLENOL) suppository 650 mg, 650 mg, Rectal, Q6H PRN, Sean Solano MD    polyethylene glycol (MIRALAX) packet 17 g, 17 g, Oral, DAILY PRN, Sean Solano MD    ondansetron (ZOFRAN ODT) tablet 4 mg, 4 mg, Oral, Q8H PRN **OR** ondansetron (ZOFRAN) injection 4 mg, 4 mg, IntraVENous, Q6H PRN, Keith Solano MD    aspirin tablet 325 mg, 325 mg, Oral, DAILY, Keith Solano MD, 325 mg at 12/06/20 0906    atorvastatin (LIPITOR) tablet 40 mg, 40 mg, Oral, QHS, Keith Solano MD, 40 mg at 12/04/20 2130

## 2020-12-06 NOTE — PROGRESS NOTES
Problem: Mobility Impaired (Adult and Pediatric)  Goal: *Acute Goals and Plan of Care (Insert Text)  Description: Patient will move from supine to sit and sit to supine , scoot up and down, and roll side to side in bed with minimal assistance/contact guard assist within 7 day(s). Patient will transfer from bed to chair and chair to bed with minimal assistance/contact guard assist using the least restrictive device within 7 day(s). Patient will improve static sitting balance to modified independence within 1 week(s). Patient will ambulate 10 feet with moderate assistance with least restrictive device within 1 weeks. Outcome: Progressing Towards Goal   PHYSICAL THERAPY TREATMENT  Patient: Shlomo Leventhal (49 y.o. female)  Date: 12/6/2020  Diagnosis: PNA (pneumonia) [J18.9]  Pneumonia [J18.9]   <principal problem not specified>  Procedure(s) (LRB):  Debridement Of Ulcers Bilateral Legs (Bilateral) 2 Days Post-Op  Precautions:    Chart, physical therapy assessment, plan of care and goals were reviewed. ASSESSMENT  Patient continues with skilled PT services and is progressing towards goals. Encouragement required for minimal participation. Upon arrival pt appeared anxious, c/o SOB and generalized discomfort. O2 sats WFL t/o tx. Pt participated with supine therex, began to c/o fatigue. Declined further tx. Current Level of Function Impacting Discharge (mobility/balance): Limited activity tolerance. Other factors to consider for discharge: Limited participation with PT. PLAN :  Patient continues to benefit from skilled intervention to address the above impairments. Continue treatment per established plan of care. to address goals.     Recommendation for discharge: (in order for the patient to meet his/her long term goals)  Therapy up to 5 days/week in SNF setting    IF patient discharges home will need the following DME: bedside commode, hospital bed, rolling walker, and wheelchair SUBJECTIVE:   Patient stated I can't breath. Nothing is comfortable. I need to get positioned better.     OBJECTIVE DATA SUMMARY:   Critical Behavior:  Neurologic State: Alert  Orientation Level: Oriented X4  Cognition: Impulsive     Functional Mobility Training:  Bed Mobility:  Attempted to have pt participate with bed mobility, however pt declined secondary to fatigue post supine therex. Therapeutic Exercises:   AP x10  Heel slides x10  Hip abd/add x10  SLR x10    AAROM for all. Activity Tolerance:   requires rest breaks  Please refer to the flowsheet for vital signs taken during this treatment. After treatment patient left in no apparent distress:   Supine in bed, Call bell within reach, and Bed / chair alarm activated    COMMUNICATION/COLLABORATION:   The patients plan of care was discussed with: Registered nurse.      Gerson Ortega   Time Calculation: 21 mins

## 2020-12-06 NOTE — PROGRESS NOTES
Pulmonary, Critical Care Note    Name: Johanna Berry MRN: [id number]   : 1945 Hospital: [address] [place] [address]   Date: 2020  Admission date: 2020 Hospital Day: 12       Subjective/Interval History:   Seen on the medical floor. Consult requested for acute hypoxic respiratory failure. She is an extremely poor historian states she feels fine to leave her alone because she is trying to eat   respirations nonlabored she remains on 3 L of oxygen. States she is not having any surgery  12/3 awake eating breakfast respirations nonlabored   sleeping but awakens easily denies shortness of breath    Hospital Problems  Never Reviewed          Codes Class Noted POA    PNA (pneumonia) ICD-10-CM: J18.9  ICD-9-CM: 486  2020 Unknown        Pneumonia ICD-10-CM: J18.9  ICD-9-CM: 257  2020 Unknown              IMPRESSION:   1. Acute hypoxic respiratory failure secondary to pulmonary edema remains on 2 L of oxygen   2. Hypokalemia  repleted   3. Hypercapnia likely metabolic alkalosis improved after Diamox  4. Severe cardiomyopathy  5. Severe mitral regurg  6. Bilateral pleural effusions secondary to pulmonary edema no significant change on x-ray 12/3  7. Providencia septicemia  8. Anemia  9. Hypothyroidism treated  10. Questionable early dementia        RECOMMENDATIONS/PLAN:   1. Creatinine stable continue twice daily Lasix  2. Continue daily potassium  3. Follow renal function  4. Follow H&H  5.           [x] High complexity decision making was performed  [x] See my orders for details      Subjective/Initial History:     I was asked by Boone Zuniga MD to see Johanna Berry  a 66 y.o.    female in consultation for a chief complaint of acute hypoxic respiratory failure and pleural effusions      Allergies   Allergen Reactions    Tetracycline Other (comments)     Causes mouth sores    Morphine Nausea and Vomiting        MAR reviewed and pertinent medications noted or modified as needed     Current Facility-Administered Medications   Medication    0.9% sodium chloride infusion 1,000 mL    sodium chloride (NS) flush 5-40 mL    fentaNYL citrate (PF) injection 50 mcg    ondansetron (ZOFRAN) injection 4 mg    potassium chloride SR (KLOR-CON 10) tablet 40 mEq    labetaloL (NORMODYNE;TRANDATE) 20 mg/4 mL (5 mg/mL) injection 5 mg    metoprolol (LOPRESSOR) injection 2.5 mg    hydrALAZINE (APRESOLINE) 20 mg/mL injection 10 mg    sodium hypochlorite (HALF STRENGTH DAKIN'S) 0.25% irrigation (bottle)    sodium chloride (NS) flush 5-40 mL    sodium chloride (NS) flush 5-40 mL    furosemide (LASIX) injection 40 mg    0.9% sodium chloride infusion 250 mL    labetaloL (NORMODYNE;TRANDATE) 20 mg/4 mL (5 mg/mL) injection 10 mg    metoprolol (LOPRESSOR) injection 2.5 mg    albuterol-ipratropium (DUO-NEB) 2.5 MG-0.5 MG/3 ML    piperacillin-tazobactam (ZOSYN) 3.375 g in 0.9% sodium chloride (MBP/ADV) 100 mL MBP    levothyroxine (SYNTHROID) tablet 75 mcg    acetaminophen (TYLENOL) tablet 650 mg    Or    acetaminophen (TYLENOL) suppository 650 mg    polyethylene glycol (MIRALAX) packet 17 g    ondansetron (ZOFRAN ODT) tablet 4 mg    Or    ondansetron (ZOFRAN) injection 4 mg    aspirin tablet 325 mg    atorvastatin (LIPITOR) tablet 40 mg      Patient PCP: Jeffy, MD Karl  PMH:  has no past medical history on file. PSH:   has no past surgical history on file. FHX: family history is not on file.    SHX:           Objective:     Vital Signs: Telemetry:    normal sinus rhythm Intake/Output:   Visit Vitals  /69 (BP 1 Location: Left arm, BP Patient Position: At rest)   Pulse 83   Temp 98.2 °F (36.8 °C)   Resp 18   Ht 5' (1.524 m)   Wt 46.6 kg (102 lb 11.8 oz)   SpO2 98%   BMI 20.06 kg/m²       Temp (24hrs), Av °F (36.7 °C), Min:97.8 °F (36.6 °C), Max:98.2 °F (36.8 °C)        O2 Device: Nasal cannula O2 Flow Rate (L/min): 3 l/min       Wt Readings from Last 4 Encounters: 12/06/20 46.6 kg (102 lb 11.8 oz)   12/06/20 46.6 kg (102 lb 11.8 oz)   09/20/16 57.2 kg (126 lb)   12/07/10 64 kg (141 lb)          Intake/Output Summary (Last 24 hours) at 12/6/2020 1124  Last data filed at 12/5/2020 1836  Gross per 24 hour   Intake 950 ml   Output 1000 ml   Net -50 ml       Last shift:      No intake/output data recorded. Last 3 shifts: 12/04 1901 - 12/06 0700  In: 950 [P.O.:750;  I.V.:200]  Out: 1000 [Urine:1000]       Physical Exam:   General:  female; chronically ill appearing frail  HEENT: NCAT, oral mucosa clear  Eyes: anicteric; conjunctiva clear extraocular movements intact  Neck: no nodes, neck veins visible no accessory muscle use  Chest: no deformity,   Cardiac: Regular rate and rhythm systolic murmur present has edema of the extremities  Lungs: distant breath sounds; no wheezing rales are heard posteriorly no breath sounds in the bases  Abd: Soft positive bowel sounds no tenderness she has some posterior abdominal wall edema  Ext: Edema of the legs arms and sacrum  : clear urine  Neuro: Awake more alert mildly confused  will move all 4 extremities  Psych- no agitation, oriented to person;   Skin: Lower extremities have very flaky skin with erythema  Pulses: Brachial radial femoral pulses intact  Capillary: Slow capillary refill    Labs:    Recent Labs     12/06/20  0600 12/05/20  0600 12/04/20  0623   WBC 15.4* 15.3* 10.9   HGB 11.4* 10.7* 11.3*   * 570* 556*     Recent Labs     12/06/20  0600 12/05/20  0600 12/04/20  0623    140 139   K 4.0 3.8 3.4*    102 98   CO2 32 37* 37*   GLU 65 128* 64*   BUN 22* 20 10   CREA 0.50* 0.70 0.49*   CA 9.0 8.9 8.6   MG  --   --  2.0   PHOS  --  3.4 3.0   ALB 2.2* 2.3* 2.1*   ALT 11*  --   --      2 L nasal oxygen with oxygen saturation 98%  BNP 16,597  CRP 3.04  11/26 blood culture with Providencia sensitive to Zosyn  Urine culture no growth  Wound culture with multiple organisms including methicillin sensitive staph aureus Klebsiella Providencia Enterobacter and Morganella  11/27 echo ejection fraction 20% dilated left atrium grade 2 diastolic dysfunction moderate to severe mitral regurg IVC dilatation RVSP 55  12/1 nuclear cardiac scan with ejection fraction of 19%  Lab Results   Component Value Date/Time    Culture result:  12/04/2020 10:30 AM     Moderate Gram Negative Rods (CHECKING FOR MULTIPLE COLONY TYPES)    Culture result: Rare POSSIBLE Staphylococcus aureus 12/04/2020 10:30 AM    Culture result: Light Gram Negative Rods (MIXED COLONY TYPES) 12/04/2020 10:30 AM    Culture result: Light POSSIBLE Staphylococcus aureus 12/04/2020 10:30 AM    Culture result: Light  Mixed skin nica isolated   12/04/2020 10:30 AM     Lab Results   Component Value Date/Time    TSH 0.26 (L) 11/25/2020 12:30 PM       Imaging:    CXR Results  (Last 48 hours)    None        Results from Hospital Encounter encounter on 11/25/20   XR CHEST SNGL V    Narrative Study: XR CHEST SNGL V    Clinical indication: Pleural effusions    Comparison: Chest x-ray 12/1/2020. Findings: Only hazy bibasilar airspace disease/pleural effusions. No evidence of  pneumothorax. Cardiac silhouette is enlarged. Stable positioning of right chest wall pacemaker  device. Mild vascular congestion. No acute osseous abnormality identified. Impression Impression:  Little interval change. XR CHEST PORT    Narrative Chest, frontal view, 12/1/2020    History: CHF. Comparison: Including chest 11/27/2020. Findings: The patient's head obscures the left upper lung zone. Right-sided  pacemaker device with 2 leads is in place. The cardiac silhouette again  demonstrates globular enlargement which can indicate cardiomyopathy or  pericardial effusion. The lungs are underexpanded with bibasilar atelectasis,  increased as compared to chest 11/27/2020. Right pleural effusion is possible. There is pulmonary vascular congestion and hydrostatic edema.   No pneumothorax  is identified. The osseous structures are stable. Impression Impression: Underexpanded lungs with bibasilar atelectasis, increased. Possible  right pleural effusion. Hydrostatic edema. XR CHEST PA LAT    Narrative 2 view comparison the 25th      Impression Impression: The cardiomediastinal silhouette is appropriate for age, technique,  and lung expansion. Pulmonary vasculature is not congested. The lungs are  essentially clear. No effusion or pneumothorax is seen. · Providencia septicemia questionably urinary tract or from skin site. Urine culture was no growth I do not know if it was collected after antibiotics were given. She is currently on Zosyn   She has pleural effusions and accentuated interstitial markings consistent with CHF/pulmonary edema. She is receiving Lasix. She has developed hypokalemia with the diuresis will supplement potassium. ·   · Thank you for allowing me to see MsAdele  Hal Jan  ·     Martina Rascon MD

## 2020-12-06 NOTE — PROGRESS NOTES
Cardiology Progress Note    Patient seen and examined. This is a patient who is followed for heart failure with reduced EF. She is resting in bed with her eyes closed, but answers questions. She denies chest pain or shortness of breath. She reports leg pain is fairly well controlled. No other complaints offered. Telemetry reviewed, V paced. Pertinent review of system items noted above, all other systems are negative. Current medications reviewed. Physical Examination  Vital signs are stable, Blood Pressure 102/56, Pulse 80  No apparent distress. Heart is regular, rate and rhythm. Normal S1, S2, no murmurs are appreciated. Lungs are diminished  Abdomen is soft, nontender, normal bowel sounds. Lower extremities are wrapped. Labs reviewed     Myocardial Perfusion Study:   · Baseline ECG: Normal EKG, rare PVCs. · Gated SPECT: Left ventricular function post-stress was abnormal. Calculated ejection fraction is 19%. · Myocardial perfusion imaging defect 1: There is a defect that is large in size with a severe reduction in uptake present in the inferior and apex location(s) that is non-reversible. There is abnormal wall motion in the defect area. Viability in the area is poor. The defect appears to be infarction. Perfusion defect was visually and quantitatively present. · Myocardial perfusion imaging defect 2: There is a defect that is small in size affecting the anteroseptal location(s) that is partially reversible. The defect appears to be infarction with baldo-infarct ischemia. Perfusion defect was visually and quantitatively present. · Left ventricular perfusion is abnormal.  · Myocardial perfusion imaging supports an intermediate risk stress test.      Case discussed with Dr. Sarah Foss and our impression and recommendations are as follows:  1. Heart failure with reduced EF: EF 15-20, unclear if this is new diagnosis. Continue diuresis, with close monitoring of I&O and daily weights.  Stress test with large infarction and only a small area of baldo-infarct. She declined cardiac cath and decided upon medical management. Cannot add low dose ACE/BB due to borderline BP. Please keep serum potassium between 4-5 and serum magnesium > 2.      2. PPM: [facility] PPM. Per documents implanted in 2005 by Dr. Katie Leonard for complete heart block. Device interrogated showing several episodes NSVT, unsure if they correlate with possible syncopal episode. One on 11/25 at 0942 lasting 3:36s. Consider BB once BP allows. 3. Elevated troponin: Likely demand ischemia from CHF. No active chest pain reported. 4. Cellulitis: Rx per primary. S/p lower extremity wound debridement. Please do no hesitate to call me or Dr. Yue Graves if additional questions arise.

## 2020-12-06 NOTE — PROGRESS NOTES
Spiritual Care Assessment/Progress Note  [facility]      NAME: Jonnie Crisostomo      MRN: [id number]  AGE: 66 y.o.  SEX: female  Zoroastrianism Affiliation: Jain   Language: English     12/6/2020     Total Time (in minutes): 6     Spiritual Assessment begun in [facility] through conversation with:         [x]Patient        [] Family    [] Friend(s)        Reason for Consult: Initial/Spiritual assessment, patient floor     Spiritual beliefs: (Please include comment if needed)     [x] Identifies with a angeline tradition:  Jain       [] Supported by a angeline community:            [] Claims no spiritual orientation:           [] Seeking spiritual identity:                [] Adheres to an individual form of spirituality:           [] Not able to assess:                           Identified resources for coping:      [] Prayer                               [] Music                  [] Guided Imagery     [] Family/friends                 [] Pet visits     [] Devotional reading                         [x] Unknown     [] Other:                                               Interventions offered during this visit: (See comments for more details)    Patient Interventions: Coping skills reviewed/reinforced, Affirmation of angeline, Initial/Spiritual assessment, patient floor, Prayer (actual)           Plan of Care:     [] Support spiritual and/or cultural needs    [] Support AMD and/or advance care planning process      [] Support grieving process   [] Coordinate Rites and/or Rituals    [] Coordination with community clergy   [] No spiritual needs identified at this time   [] Detailed Plan of Care below (See Comments)  [] Make referral to Music Therapy  [] Make referral to Pet Therapy     [] Make referral to Addiction services  [] Make referral to [facility]  [] Make referral to Spiritual Care Partner  [] No future visits requested        [x] Follow up upon further referrals     Comments:  Visited patient in [facility] surgical unit during rounds. Patient was alone during the visit. Patient shared about her difficulty of her situation. She seemed to have difficulty hearing my words and at times seemed confused. I facilitated storytelling and provided empathic listening and presence. I offered to pray which she accepted and provided prayer. Per her request I attempted to find her nurse and communicate her needs. I advised of  availability. Chaplains will follow up if further referrals are requested. Chaplain Malathi Agrawal M.Div.    can be reached by calling the  at [facility]  [phone]

## 2020-12-06 NOTE — PROGRESS NOTES
Progress Note    Patient: Brigette Luz MRN: [id number]  SSN: [id number]    YOB: 1945  Age: 66 y.o. Sex: female      Admit Date: 11/25/2020    LOS: 11 days     Subjective:   Patient followed for sepsis with cellulitis both legs with blood cultures growing Providencia and wound culture with mixed Gram negative nica which have been identified now and MSSA. She underwent bilateral leg debridement yesterday with intra-operative cultures prelimarily showing similar results. She remains afebrile. WBC remains elevated but CRP is decreasing. Currently on Zosyn alone. Patient is asleep at this time but appears to be resting comfortably. Objective:     Vitals:    12/06/20 0435 12/06/20 0734 12/06/20 0746 12/06/20 0830   BP: 101/69 (!) 102/56     Pulse: 77 80     Resp: 18 20     Temp: 98 °F (36.7 °C) 98.1 °F (36.7 °C)     SpO2: 94% 98% 98%    Weight:    102 lb 11.8 oz (46.6 kg)   Height:            Intake and Output:  Current Shift: No intake/output data recorded. Last three shifts: 12/04 1901 - 12/06 0700  In: 950 [P.O.:750; I.V.:200]  Out: 1000 [Urine:1000]    Physical Exam:   Constitutional:       General: She is not in acute distress. Appearance: She is chronically ill-appearing. HENT:      Head: Normocephalic and atraumatic. Nose:      Comments: Nasal O2 cannula  Lungs: clear bilaterally but diminished  Genitourinary:     Comments: Purewick catheter  Musculoskeletal:         General: Swelling present. No tenderness. Right lower leg: Edema present. Left lower leg: Edema present. Comments: Both calves are bandaged at this time with bulky gauze dressing, however, erythema has markedly decreased along with swelling. Neurological:      General: No focal deficit present. Mental Status: She is alert. She is disoriented.    Psychiatric:         Behavior: Behavior normal.     Lab/Data Review:     WBC 15,400  Procalcitonin <0.05 <0.11 <0.10 <0.69 <1.19  CRP 1.24 <1.28 <1.98  <3.04 <9.26 <16.00    Covid-19 negative    Blood cultures (11/26) Providenica rettgeri  Blood cultures (11/26) Providenica rettgeri  Urine culture (11/26) No growth FiNAL  Wound culture (11/27)  MSSA, Klebsiella oxytoca, Providencia rettgeri, Morganella morganii (2 strains), Myroides species, Enterobacter cloacae  Wound culture (12/4) Light Staphylococcus aureus and Gram negative rods  Wound culture (12/4) Light Staphylococcus aureus and Gram negative rods  Assessment:     Active Problems:    PNA (pneumonia) (11/25/2020)      Pneumonia (11/25/2020)    1. Cellulitis, bilateral lower extremities, severe, secondary to MSSA, Klebsiella oxytoca, Providencia rettgeri, Morganella morganii (2 strains), Myroides species, Enterobacter cloacae, Day #11 IV Zosyn, status post debridement, improving. 2. Gram negative bacteremia with Providencia rettgeri, probably secondary to above, on IV Zosyn. 3. Sepsis with leukocytosis, elevated procalcitonin, CRP and lactic acid, resolving. 4. SOB  5. Pyuria, negative urine culture    Comment:  WBC unchanged. CRP decreasing with normal procalcitonin. Intra-operative cultures appear to be growing same organisms. Plan:   1. Continue IV Zosyn; may be able to transition to oral Levaquin and Doxcycycline  2. In am, repeat CBC and CRP   3. Follow-up intra-operative cultures  4.  Awaiting MRI to rule osteomyelitis    Signed By: Ramirez Smiley MD     December 6, 2020

## 2020-12-07 ENCOUNTER — APPOINTMENT (OUTPATIENT)
Dept: GENERAL RADIOLOGY | Age: 75
DRG: 853 | End: 2020-12-07
Attending: INTERNAL MEDICINE
Payer: MEDICARE

## 2020-12-07 LAB
ALBUMIN SERPL-MCNC: 2.2 G/DL (ref 3.5–5)
ALBUMIN SERPL-MCNC: 2.2 G/DL (ref 3.5–5)
ALBUMIN/GLOB SERPL: 0.5 {RATIO} (ref 1.1–2.2)
ALP SERPL-CCNC: 94 U/L (ref 45–117)
ALT SERPL-CCNC: 13 U/L (ref 12–78)
ANION GAP SERPL CALC-SCNC: 4 MMOL/L (ref 5–15)
ANION GAP SERPL CALC-SCNC: 4 MMOL/L (ref 5–15)
AST SERPL W P-5'-P-CCNC: 16 U/L (ref 15–37)
BASOPHILS # BLD: 0.1 K/UL (ref 0–0.1)
BASOPHILS NFR BLD: 0 % (ref 0–1)
BILIRUB SERPL-MCNC: 0.4 MG/DL (ref 0.2–1)
BUN SERPL-MCNC: 23 MG/DL (ref 6–20)
BUN SERPL-MCNC: 24 MG/DL (ref 6–20)
BUN/CREAT SERPL: 61 (ref 12–20)
BUN/CREAT SERPL: 62 (ref 12–20)
CA-I BLD-MCNC: 8.6 MG/DL (ref 8.5–10.1)
CA-I BLD-MCNC: 8.7 MG/DL (ref 8.5–10.1)
CHLORIDE SERPL-SCNC: 106 MMOL/L (ref 97–108)
CHLORIDE SERPL-SCNC: 107 MMOL/L (ref 97–108)
CO2 SERPL-SCNC: 31 MMOL/L (ref 21–32)
CO2 SERPL-SCNC: 31 MMOL/L (ref 21–32)
CREAT SERPL-MCNC: 0.38 MG/DL (ref 0.55–1.02)
CREAT SERPL-MCNC: 0.39 MG/DL (ref 0.55–1.02)
CRP SERPL-MCNC: 0.9 MG/DL (ref 0–0.6)
DIFFERENTIAL METHOD BLD: ABNORMAL
EOSINOPHIL # BLD: 0.1 K/UL (ref 0–0.4)
EOSINOPHIL NFR BLD: 1 % (ref 0–7)
ERYTHROCYTE [DISTWIDTH] IN BLOOD BY AUTOMATED COUNT: 14.4 % (ref 11.5–14.5)
GLOBULIN SER CALC-MCNC: 4.1 G/DL (ref 2–4)
GLUCOSE SERPL-MCNC: 81 MG/DL (ref 65–100)
GLUCOSE SERPL-MCNC: 82 MG/DL (ref 65–100)
HCT VFR BLD AUTO: 38.3 % (ref 35–47)
HGB BLD-MCNC: 11.5 G/DL (ref 11.5–16)
IMM GRANULOCYTES # BLD AUTO: 0.1 K/UL (ref 0–0.04)
IMM GRANULOCYTES NFR BLD AUTO: 1 % (ref 0–0.5)
LYMPHOCYTES # BLD: 0.9 K/UL (ref 0.8–3.5)
LYMPHOCYTES NFR BLD: 7 % (ref 12–49)
MCH RBC QN AUTO: 28.5 PG (ref 26–34)
MCHC RBC AUTO-ENTMCNC: 30 G/DL (ref 30–36.5)
MCV RBC AUTO: 94.8 FL (ref 80–99)
MONOCYTES # BLD: 0.7 K/UL (ref 0–1)
MONOCYTES NFR BLD: 6 % (ref 5–13)
NEUTS SEG # BLD: 11.2 K/UL (ref 1.8–8)
NEUTS SEG NFR BLD: 85 % (ref 32–75)
PHOSPHATE SERPL-MCNC: 3 MG/DL (ref 2.6–4.7)
PLATELET # BLD AUTO: 526 K/UL (ref 150–400)
PMV BLD AUTO: 9.3 FL (ref 8.9–12.9)
POTASSIUM SERPL-SCNC: 3.7 MMOL/L (ref 3.5–5.1)
POTASSIUM SERPL-SCNC: 3.8 MMOL/L (ref 3.5–5.1)
PROT SERPL-MCNC: 6.3 G/DL (ref 6.4–8.2)
RBC # BLD AUTO: 4.04 M/UL (ref 3.8–5.2)
SODIUM SERPL-SCNC: 141 MMOL/L (ref 136–145)
SODIUM SERPL-SCNC: 142 MMOL/L (ref 136–145)
WBC # BLD AUTO: 12.9 K/UL (ref 3.6–11)

## 2020-12-07 PROCEDURE — 99232 SBSQ HOSP IP/OBS MODERATE 35: CPT | Performed by: INTERNAL MEDICINE

## 2020-12-07 PROCEDURE — 94760 N-INVAS EAR/PLS OXIMETRY 1: CPT

## 2020-12-07 PROCEDURE — 74011250637 HC RX REV CODE- 250/637: Performed by: INTERNAL MEDICINE

## 2020-12-07 PROCEDURE — 77010033678 HC OXYGEN DAILY

## 2020-12-07 PROCEDURE — 80053 COMPREHEN METABOLIC PANEL: CPT

## 2020-12-07 PROCEDURE — 36415 COLL VENOUS BLD VENIPUNCTURE: CPT

## 2020-12-07 PROCEDURE — 71046 X-RAY EXAM CHEST 2 VIEWS: CPT

## 2020-12-07 PROCEDURE — 86140 C-REACTIVE PROTEIN: CPT

## 2020-12-07 PROCEDURE — 97530 THERAPEUTIC ACTIVITIES: CPT

## 2020-12-07 PROCEDURE — 80069 RENAL FUNCTION PANEL: CPT

## 2020-12-07 PROCEDURE — 74011000250 HC RX REV CODE- 250: Performed by: FAMILY MEDICINE

## 2020-12-07 PROCEDURE — 94640 AIRWAY INHALATION TREATMENT: CPT

## 2020-12-07 PROCEDURE — 85025 COMPLETE CBC W/AUTO DIFF WBC: CPT

## 2020-12-07 PROCEDURE — 65270000032 HC RM SEMIPRIVATE

## 2020-12-07 PROCEDURE — 74011250636 HC RX REV CODE- 250/636: Performed by: NURSE PRACTITIONER

## 2020-12-07 PROCEDURE — 74011000258 HC RX REV CODE- 258: Performed by: FAMILY MEDICINE

## 2020-12-07 PROCEDURE — 74011250637 HC RX REV CODE- 250/637: Performed by: FAMILY MEDICINE

## 2020-12-07 PROCEDURE — 74011250636 HC RX REV CODE- 250/636: Performed by: FAMILY MEDICINE

## 2020-12-07 RX ORDER — AMIODARONE HYDROCHLORIDE 200 MG/1
200 TABLET ORAL DAILY
Status: DISCONTINUED | OUTPATIENT
Start: 2020-12-07 | End: 2020-12-17 | Stop reason: HOSPADM

## 2020-12-07 RX ADMIN — IPRATROPIUM BROMIDE AND ALBUTEROL SULFATE 3 ML: .5; 3 SOLUTION RESPIRATORY (INHALATION) at 01:08

## 2020-12-07 RX ADMIN — HYOSCYAMINE SULFATE: 16 SOLUTION at 21:00

## 2020-12-07 RX ADMIN — IPRATROPIUM BROMIDE AND ALBUTEROL SULFATE 3 ML: .5; 3 SOLUTION RESPIRATORY (INHALATION) at 08:01

## 2020-12-07 RX ADMIN — HYOSCYAMINE SULFATE: 16 SOLUTION at 09:41

## 2020-12-07 RX ADMIN — AMIODARONE HYDROCHLORIDE 200 MG: 200 TABLET ORAL at 11:02

## 2020-12-07 RX ADMIN — LEVOTHYROXINE SODIUM 75 MCG: 0.07 TABLET ORAL at 09:41

## 2020-12-07 RX ADMIN — PIPERACILLIN SODIUM AND TAZOBACTAM SODIUM 3.38 G: 3; .375 INJECTION, POWDER, LYOPHILIZED, FOR SOLUTION INTRAVENOUS at 20:57

## 2020-12-07 RX ADMIN — ATORVASTATIN CALCIUM 40 MG: 40 TABLET, FILM COATED ORAL at 21:00

## 2020-12-07 RX ADMIN — FUROSEMIDE 40 MG: 10 INJECTION, SOLUTION INTRAMUSCULAR; INTRAVENOUS at 09:41

## 2020-12-07 RX ADMIN — PIPERACILLIN SODIUM AND TAZOBACTAM SODIUM 3.38 G: 3; .375 INJECTION, POWDER, LYOPHILIZED, FOR SOLUTION INTRAVENOUS at 04:22

## 2020-12-07 RX ADMIN — ASPIRIN 325 MG ORAL TABLET 325 MG: 325 PILL ORAL at 09:41

## 2020-12-07 RX ADMIN — Medication 10 ML: at 23:00

## 2020-12-07 RX ADMIN — POTASSIUM CHLORIDE 40 MEQ: 750 TABLET, FILM COATED, EXTENDED RELEASE ORAL at 09:41

## 2020-12-07 RX ADMIN — Medication 10 ML: at 16:36

## 2020-12-07 RX ADMIN — IPRATROPIUM BROMIDE AND ALBUTEROL SULFATE 3 ML: .5; 3 SOLUTION RESPIRATORY (INHALATION) at 13:25

## 2020-12-07 RX ADMIN — IPRATROPIUM BROMIDE AND ALBUTEROL SULFATE 3 ML: .5; 3 SOLUTION RESPIRATORY (INHALATION) at 19:47

## 2020-12-07 RX ADMIN — FUROSEMIDE 40 MG: 10 INJECTION, SOLUTION INTRAMUSCULAR; INTRAVENOUS at 21:00

## 2020-12-07 RX ADMIN — Medication 10 ML: at 05:33

## 2020-12-07 RX ADMIN — PIPERACILLIN SODIUM AND TAZOBACTAM SODIUM 3.38 G: 3; .375 INJECTION, POWDER, LYOPHILIZED, FOR SOLUTION INTRAVENOUS at 12:33

## 2020-12-07 RX ADMIN — ACETAMINOPHEN 650 MG: 325 TABLET, FILM COATED ORAL at 05:59

## 2020-12-07 NOTE — PROGRESS NOTES
General Daily Progress Note          Patient Name:   Georgette Dodson       YOB: 1945       Age:  66 y.o. Admit Date: 11/25/2020      Subjective:         Seen awake not in distress confused  On 3 L oxygen by nasal cannula  Hemoglobin is 11.4    Patient was drowsy and lethargic after receiving Percocet /now discontinued  Hypotensive this morning hold all blood pressure medicine    Patient had debridement of right leg ulcer and left leg ulcer             Objective:     Visit Vitals  /66 (BP 1 Location: Left arm, BP Patient Position: At rest)   Pulse 82   Temp 97.7 °F (36.5 °C)   Resp 18   Ht 5' (1.524 m)   Wt 46.6 kg (102 lb 11.8 oz)   SpO2 96%   BMI 20.06 kg/m²          [unfilled]      Review of Systems    Constitutional: Negative for chills and fever. HENT: Negative. Eyes: Negative. Respiratory: Negative. Cardiovascular: Negative. Gastrointestinal: Negative for abdominal pain and nausea. Skin: Negative. Neurological: Negative. Physical Exam:      Constitutional: Alert awake  HENT:   Head: Normocephalic and atraumatic. Eyes: Pupils are equal, round, and reactive to light. EOM are normal.   Cardiovascular: Normal rate, regular rhythm and normal heart sounds. Pulmonary/Chest: Breath sounds normal. No wheezes. No rales. Exhibits no tenderness. Abdominal: Soft. Bowel sounds are normal. There is no abdominal tenderness. There is no rebound and no guarding. Musculoskeletal: Normal range of motion. Neurological: Bilateral lower leg red swollen    XR CHEST SNGL V   Final Result   Impression:   Little interval change. XR CHEST PORT   Final Result   Impression: Underexpanded lungs with bibasilar atelectasis, increased. Possible   right pleural effusion. Hydrostatic edema. XR CHEST PA LAT   Final Result   Impression: The cardiomediastinal silhouette is appropriate for age, technique,   and lung expansion. Pulmonary vasculature is not congested.  The lungs are   essentially clear. No effusion or pneumothorax is seen. XR CHEST SNGL V   Final Result      XR CHEST PA LAT    (Results Pending)            Results     Procedure Component Value Units Date/Time    CULTURE, Lisy Lenz [[id number]] Collected:  12/04/20 1030    Order Status:  Completed Specimen:  Wound from Leg Updated:  12/06/20 0836     Special Requests: No Special Requests        GRAM STAIN       Occasional Gram Negative Rods           Culture result:        Moderate Gram Negative Rods (CHECKING FOR MULTIPLE COLONY TYPES)                  Rare POSSIBLE Staphylococcus aureus          CULTURE, Mahesh Bark STAIN [[id number]] Collected:  12/04/20 1030    Order Status:  Completed Specimen:  Wound from Leg Updated:  12/06/20 0830     Special Requests: No Special Requests        GRAM STAIN Few Gram Negative Rods        Culture result:       Light Gram Negative Rods (MIXED COLONY TYPES)                  Light POSSIBLE Staphylococcus aureus            Light  Mixed skin nica isolated       CULTURE, Mahesh Bark STAIN [[id number]]  (Susceptibility) Collected:  11/27/20 1115    Order Status:  Completed Specimen:  Wound Updated:  12/02/20 1020     Special Requests: No Special Requests        GRAM STAIN Rare WBCs seen         Few Gram Negative Rods               Occasional Gram Positive Cocci in clusters           Culture result: Heavy  Mixed skin nica isolated               Heavy Staphylococcus aureus            Heavy Klebsiella oxytoca               Heavy Providencia rettgeri                  Heavy Enterobacter cloacae complex                  Heavy Morganella morganii ssp morganii                  Heavy Morganella morganii ssp morganii (2nd colony type/strain)            Light Myroides sp       Susceptibility      Staphylococcus aureus     WILLIAM     Ciprofloxacin ($) Resistant     Clindamycin ($) Susceptible     Daptomycin ($$$$$) Susceptible     Doxycycline ($$) Susceptible     Erythromycin ($$$$) Resistant     Gentamicin ($) Susceptible     Levofloxacin ($) Intermediate     Linezolid ($$$$$) Susceptible     Moxifloxacin ($$$$) Susceptible     Oxacillin Susceptible     Rifampin ($$$$) Susceptible [1]      Tetracycline Susceptible     Trimeth/Sulfa Susceptible     Vancomycin ($) Susceptible            [1]   Rifampin is not to be used for mono-therapy.              Susceptibility      Klebsiella oxytoca     WILLIAM     Amikacin ($) Susceptible     Ampicillin ($) Resistant     Ampicillin/sulbactam ($) Susceptible     Cefazolin ($) Susceptible     Cefepime ($$) Susceptible     Cefoxitin Susceptible     Ceftazidime ($) Susceptible     Ceftriaxone ($) Susceptible     Ciprofloxacin ($) Susceptible     Gentamicin ($) Susceptible     Levofloxacin ($) Susceptible     Meropenem ($$) Susceptible     Tobramycin ($) Susceptible     Trimeth/Sulfa Susceptible                Susceptibility      Providencia rettgeri     WILLIAM     Amikacin ($) Susceptible     Ampicillin ($) Resistant     Ampicillin/sulbactam ($) Susceptible     Cefazolin ($) Resistant     Cefepime ($$) Susceptible     Cefoxitin Susceptible     Ceftazidime ($) Susceptible     Ceftriaxone ($) Susceptible     Ciprofloxacin ($) Susceptible     Gentamicin ($) Susceptible     Levofloxacin ($) Susceptible     Piperacillin/Tazobac ($) Susceptible     Tobramycin ($) Susceptible                Susceptibility      Enterobacter cloacae complex     WILLIAM     Amikacin ($) Susceptible     Cefazolin ($) Resistant     Cefepime ($$) Susceptible     Cefoxitin Resistant     Ceftazidime ($) Susceptible     Ceftriaxone ($) Susceptible     Ciprofloxacin ($) Susceptible     Gentamicin ($) Susceptible     Levofloxacin ($) Susceptible     Meropenem ($$) Susceptible     Tobramycin ($) Susceptible     Trimeth/Sulfa Susceptible                Susceptibility      Morganella morganii ssp morganii (6)     WILLIAM     Amikacin ($) Susceptible     Ampicillin ($) Resistant     Ampicillin/sulbactam ($) Resistant     Cefazolin ($) Resistant     Cefepime ($$) Susceptible     Cefoxitin Intermediate     Ceftazidime ($) Susceptible     Ceftriaxone ($) Susceptible     Ciprofloxacin ($) Susceptible     Gentamicin ($) Susceptible     Levofloxacin ($) Susceptible     Meropenem ($$) Susceptible     Piperacillin/Tazobac ($) Susceptible     Tobramycin ($) Susceptible     Trimeth/Sulfa Susceptible                Susceptibility      Morganella morganii ssp morganii (7)     WILLIAM     Amikacin ($) Susceptible     Ampicillin ($) Resistant     Ampicillin/sulbactam ($) Resistant     Cefazolin ($) Resistant     Cefepime ($$) Susceptible     Cefoxitin Intermediate     Ceftazidime ($) Susceptible     Ceftriaxone ($) Susceptible     Ciprofloxacin ($) Susceptible     Gentamicin ($) Susceptible     Levofloxacin ($) Susceptible     Meropenem ($$) Susceptible     Piperacillin/Tazobac ($) Susceptible     Tobramycin ($) Susceptible     Trimeth/Sulfa Susceptible                Susceptibility      Myroides species     WILLIAM     Amikacin ($) Resistant     Cefepime ($$) Intermediate     Ceftazidime ($) Resistant     Ceftriaxone ($) Intermediate     Ciprofloxacin ($) Susceptible     Gentamicin ($) Resistant     Tobramycin ($) Resistant                    CULTURE, URINE [[id number]] Collected:  11/26/20 0945    Order Status:  Completed Specimen:  Urine Updated:  11/28/20 1050     Special Requests: No Special Requests        Culture result: No Growth (<1000 cfu/mL)       CULTURE, BLOOD #2 [[id number]]  (Abnormal)  (Susceptibility) Collected:  11/26/20 0122    Order Status:  Completed Specimen:  Blood Updated:  11/30/20 0927     Special Requests: No Special Requests        Culture result:       Providencia rettgeri GROWING IN THE AEROBIC AND ANAEROBIC BOTTLE (SITE = ARM)                  Gram Negative Rods CALLED TO AND READ BACK BY Adam Yo R.N. 1730 11/27/20          Susceptibility      Providencia rettgeri     WILLIAM     Amikacin ($) Susceptible Ampicillin ($) Resistant     Ampicillin/sulbactam ($) Susceptible     Cefazolin ($) Resistant     Cefepime ($$) Susceptible     Cefoxitin Susceptible     Ceftazidime ($) Susceptible     Ceftriaxone ($) Susceptible     Ciprofloxacin ($) Susceptible     Gentamicin ($) Susceptible     Levofloxacin ($) Susceptible     Piperacillin/Tazobac ($) Susceptible     Tobramycin ($) Susceptible                    CULTURE, BLOOD #1 [[id number]]  (Abnormal) Collected:  11/26/20 0112    Order Status:  Completed Specimen:  Blood Updated:  11/30/20 0928     Special Requests: No Special Requests        Culture result:       Providencia rettgeri growing in both bottles drawn (SITE =ARM)                  Gram Negative Rods CALLED TO Garrett Yen R.N. 1420 11/27/20                  Please refer to previous blood culture  [id number], COLLECTED 11/26/20 FOR SENSITIVITIES      CULTURE, Lidia  STAIN [[id number]] Collected:  11/25/20 1930    Order Status:  Completed Specimen:  Wound Updated:  12/01/20 1101     Special Requests: No Special Requests        GRAM STAIN Rare WBCs seen         Rare Gram Negative Rods               Rare Gram Positive Cocci in pairs           Culture result:       Heavy MIXED ENTERIC Gram Negative Rods REFER TO [id number] FOR ID AND SENSITIVITIES                  Heavy Staphylococcus aureus REFER TO [facility] [id number] FOR SENSITIVITIES            Heavy  Mixed skin nica isolated       COVID-19 RAPID TEST [[id number]] Collected:  11/25/20 1445    Order Status:  Completed Specimen:  Nasopharyngeal Updated:  11/25/20 1553     Specimen source Nasopharyngeal        COVID-19 rapid test Not Detected        Comment: Rapid [employer] ID Now   Rapid NAAT:  The specimen is NEGATIVE for SARS-CoV-2, the novel coronavirus associated with COVID-19. Negative results should be treated as presumptive and, if inconsistent with clinical signs and symptoms or necessary for patient management, should be tested with an alternative molecular assay. Negative results do not preclude SARS-CoV-2 infection and should not be used as the sole basis for patient management decisions. This test has been authorized by the FDA under   an Emergency Use Authorization (EUA) for use by authorized laboratories. Fact sheet for Healthcare Providers: ConventionUpdate.co.nz Fact sheet for Patients: ConventionUpdate.co.nz   Methodology: Isothermal Nucleic Acid Amplification                Labs:     Recent Labs     12/07/20  0400 85/55/51  0321   WBC Duplicate request 19.2*   HGB Duplicate request 10.7*   HCT Duplicate request 08.8   PLT Duplicate request 101*     Recent Labs     12/06/20  0600 12/05/20  0600    140   K 4.0 3.8    102   CO2 32 37*   BUN 22* 20   CREA 0.50* 0.70   GLU 65 128*   CA 9.0 8.9   PHOS  --  3.4     Recent Labs     12/06/20  0600 12/05/20  0600   ALT 11*  --      --    TBILI 0.2  --    TP 6.3*  --    ALB 2.2* 2.3*   GLOB 4.1*  --      No results for input(s): INR, PTP, APTT, INREXT, INREXT in the last 72 hours. No results for input(s): FE, TIBC, PSAT, FERR in the last 72 hours. No results found for: FOL, RBCF   No results for input(s): PH, PCO2, PO2 in the last 72 hours. No results for input(s): CPK, CKNDX, TROIQ in the last 72 hours.     No lab exists for component: CPKMB  No results found for: CHOL, CHOLX, CHLST, CHOLV, HDL, HDLP, LDL, LDLC, DLDLP, TGLX, TRIGL, TRIGP, CHHD, CHHDX  No results found for: Abby Yousif  Lab Results   Component Value Date/Time    Color Yellow/Straw 11/25/2020 04:50 PM    Appearance Clear 11/25/2020 04:50 PM    Specific gravity 1.013 11/25/2020 04:50 PM    pH (UA) 5.0 11/25/2020 04:50 PM    Protein Negative 11/25/2020 04:50 PM    Glucose Negative 11/25/2020 04:50 PM    Ketone Negative 11/25/2020 04:50 PM    Bilirubin Negative 11/25/2020 04:50 PM    Urobilinogen 0.1 11/25/2020 04:50 PM    Nitrites Negative 11/25/2020 04:50 PM    Leukocyte Esterase Negative 11/25/2020 04:50 PM    Bacteria Negative 11/25/2020 04:50 PM    WBC 5-10 11/25/2020 04:50 PM    RBC 0-5 11/25/2020 04:50 PM         Assessment:   Acute hypoxemic respiratory failure on 3 L  Acute systolic heart failure with ejection fraction of 15 to 20%  Acute cellulitis of bilateral legs/wound culture positive for staph aureus/providentia/Enterobacter/Morganella/Klebsiella  Status post debridement of the both leg wounds  Gram-negative bacteremia with providentia  Sepsis leukocytosis elevated procalcitonin and lactic acid  Community-acquired pneumonia  Poor hygiene  Elevated troponin  Leukocytosis  Hypothyroidism  Anemia s/p tranfusion   Stress test done showing large infarct/declined cardiac cath  Hypotensive secondary to Percocet  Plan:       Hold blood pressure medications  Discontinue Percocet  Continue aspirin Lipitor levothyroxine    Discontinue vancomycin  Continue IV ZOSYN /plan for transition to Levaquin and doxycycline on discharge  Doppler studies negative for DVT  Lasix 40 mg IV twice daily  Follow-up with infectious disease  Cardiology consult for elevated troponin  PT OT consult and wound care nurse consult  Repeat the labs in the morning  Respiratory consult for weaning of oxygen  Nebulizer treatment  S/p Transfuse 2 unit packed RBC    Discussed with the patient nurse to hold blood pressure medicines as patient hypotensive      Labs pending today  PT OT consult  Discussed with the  regarding discharge planning  Patient need placement to skilled care  Awaiting MRI for rule out osteomyelitis        Current Facility-Administered Medications:     0.9% sodium chloride infusion 1,000 mL, 1,000 mL, IntraVENous, CONTINUOUS, Gwen Moon MD, Stopped at 12/04/20 1200    sodium chloride (NS) flush 5-40 mL, 5-40 mL, IntraVENous, PRN, Gwen Moon MD    fentaNYL citrate (PF) injection 50 mcg, 50 mcg, IntraVENous, Multiple, Gwen Moon MD    ondansetron [facility]) injection 4 mg, 4 mg, IntraVENous, PRN, Jordy Moon MD    potassium chloride SR (KLOR-CON 10) tablet 40 mEq, 40 mEq, Oral, DAILY, Pranav Cuadra MD, 40 mEq at 12/06/20 0906    labetaloL (NORMODYNE;TRANDATE) 20 mg/4 mL (5 mg/mL) injection 5 mg, 5 mg, IntraVENous, Q5MIN PRN, Demond Lorenzo MD    metoprolol (LOPRESSOR) injection 2.5 mg, 2.5 mg, IntraVENous, Q5MIN PRN, Demond Lorenzo MD    hydrALAZINE (APRESOLINE) 20 mg/mL injection 10 mg, 10 mg, IntraVENous, Q10MIN PRN, Demond Lorenzo MD    sodium hypochlorite (HALF STRENGTH DAKIN'S) 0.25% irrigation (bottle), , Topical, BID, Dustin Wilcox, CRNA    sodium chloride (NS) flush 5-40 mL, 5-40 mL, IntraVENous, Q8H, Phillingane, Scott, DPM, 10 mL at 12/07/20 0533    sodium chloride (NS) flush 5-40 mL, 5-40 mL, IntraVENous, PRN, Phillingane, Scott, DPM    furosemide (LASIX) injection 40 mg, 40 mg, IntraVENous, BID, Saqib Degroot, NP, Stopped at 12/05/20 2100    0.9% sodium chloride infusion 250 mL, 250 mL, IntraVENous, PRN, Emily Solano MD    labetaloL (NORMODYNE;TRANDATE) 20 mg/4 mL (5 mg/mL) injection 10 mg, 10 mg, IntraVENous, Q5MIN PRN, Kathy Forte MD    metoprolol [facility]) injection 2.5 mg, 2.5 mg, IntraVENous, Q5MIN PRN, Kathy Forte MD    albuterol-ipratropium (DUO-NEB) 2.5 MG-0.5 MG/3 ML, 3 mL, Nebulization, Q6H RT, Keith Solano MD, 3 mL at 12/07/20 0801    piperacillin-tazobactam (ZOSYN) 3.375 g in 0.9% sodium chloride (MBP/ADV) 100 mL MBP, 3.375 g, IntraVENous, Q8H, Keith Solano MD, Last Rate: 25 mL/hr at 12/07/20 0422, 3.375 g at 12/07/20 0422    levothyroxine (SYNTHROID) tablet 75 mcg, 75 mcg, Oral, 6am, Keith Solano MD, 75 mcg at 12/06/20 0906    acetaminophen (TYLENOL) tablet 650 mg, 650 mg, Oral, Q6H PRN, 650 mg at 12/07/20 0559 **OR** acetaminophen (TYLENOL) suppository 650 mg, 650 mg, Rectal, Q6H PRN, Keith Solano MD    polyethylene glycol (MIRALAX) packet 17 g, 17 g, Oral, DAILY PRN, Edith Solano MD    ondansetron (ZOFRAN ODT) tablet 4 mg, 4 mg, Oral, Q8H PRN **OR** ondansetron (ZOFRAN) injection 4 mg, 4 mg, IntraVENous, Q6H PRN, Keith Solano MD    aspirin tablet 325 mg, 325 mg, Oral, DAILY, Keith Solano MD, 325 mg at 12/06/20 0906    atorvastatin (LIPITOR) tablet 40 mg, 40 mg, Oral, QHS, Keith Solano MD, 40 mg at 12/06/20 2232

## 2020-12-07 NOTE — PROGRESS NOTES
Pulmonary, Critical Care Note    Name: Jori Burt MRN: [id number]   : 1945 Hospital: [facility]   Date: 2020  Admission date: 2020 Hospital Day: 13       Subjective/Interval History:   Seen on the medical floor. Consult requested for acute hypoxic respiratory failure. She is an extremely poor historian states she feels fine to leave her alone because she is trying to eat  12/ respirations nonlabored she remains on 3 L of oxygen. States she is not having any surgery  12/3 awake eating breakfast respirations nonlabored   sleeping but awakens easily denies shortness of breath   sleeping awakens easily denies shortness of breath respirations nonlabored    Hospital Problems  Never Reviewed          Codes Class Noted POA    PNA (pneumonia) ICD-10-CM: J18.9  ICD-9-CM: 486  2020 Unknown        Pneumonia ICD-10-CM: J18.9  ICD-9-CM: 634  2020 Unknown              IMPRESSION:   1. Acute hypoxic respiratory failure secondary to pulmonary edema remains on 2 L of oxygen   2. Hypokalemia  repleted   3. Hypercapnia likely metabolic alkalosis improved after Diamox  4. Severe cardiomyopathy  5. Severe mitral regurg  6. Bilateral pleural effusions secondary to pulmonary edema no significant change on x-ray 12/3  7. Providencia septicemia  8. Anemia  9. Hypothyroidism treated  10. Questionable early dementia        RECOMMENDATIONS/PLAN:   1. Creatinine stable continue twice daily Lasix await labs today  2. Continue daily potassium  3. Follow renal function  4. Follow H&H  5.           [x] High complexity decision making was performed  [x] See my orders for details      Subjective/Initial History:     I was asked by Joya Mejia MD to see Jori Burt  a 66 y.o.    female in consultation for a chief complaint of acute hypoxic respiratory failure and pleural effusions      Allergies   Allergen Reactions    Tetracycline Other (comments)     Causes mouth sores    Morphine Nausea and Vomiting        MAR reviewed and pertinent medications noted or modified as needed     Current Facility-Administered Medications   Medication    amiodarone (CORDARONE) tablet 200 mg    0.9% sodium chloride infusion 1,000 mL    sodium chloride (NS) flush 5-40 mL    fentaNYL citrate (PF) injection 50 mcg    ondansetron (ZOFRAN) injection 4 mg    potassium chloride SR (KLOR-CON 10) tablet 40 mEq    labetaloL (NORMODYNE;TRANDATE) 20 mg/4 mL (5 mg/mL) injection 5 mg    metoprolol (LOPRESSOR) injection 2.5 mg    hydrALAZINE (APRESOLINE) 20 mg/mL injection 10 mg    sodium hypochlorite (HALF STRENGTH DAKIN'S) 0.25% irrigation (bottle)    sodium chloride (NS) flush 5-40 mL    sodium chloride (NS) flush 5-40 mL    furosemide (LASIX) injection 40 mg    0.9% sodium chloride infusion 250 mL    labetaloL (NORMODYNE;TRANDATE) 20 mg/4 mL (5 mg/mL) injection 10 mg    metoprolol (LOPRESSOR) injection 2.5 mg    albuterol-ipratropium (DUO-NEB) 2.5 MG-0.5 MG/3 ML    piperacillin-tazobactam (ZOSYN) 3.375 g in 0.9% sodium chloride (MBP/ADV) 100 mL MBP    levothyroxine (SYNTHROID) tablet 75 mcg    acetaminophen (TYLENOL) tablet 650 mg    Or    acetaminophen (TYLENOL) suppository 650 mg    polyethylene glycol (MIRALAX) packet 17 g    ondansetron (ZOFRAN ODT) tablet 4 mg    Or    ondansetron (ZOFRAN) injection 4 mg    aspirin tablet 325 mg    atorvastatin (LIPITOR) tablet 40 mg      Patient PCP: Jeffy, MD Karl  PMH:  has no past medical history on file. PSH:   has no past surgical history on file. FHX: family history is not on file.    SHX:           Objective:     Vital Signs: Telemetry:    normal sinus rhythm Intake/Output:   Visit Vitals  /66 (BP 1 Location: Left arm, BP Patient Position: At rest)   Pulse 82   Temp 97.7 °F (36.5 °C)   Resp 18   Ht 5' (1.524 m)   Wt 46.6 kg (102 lb 11.8 oz)   SpO2 96%   BMI 20.06 kg/m²       Temp (24hrs), Av.9 °F (36.6 °C), Min:97.7 °F (36.5 °C), Max:98.4 °F (36.9 °C)        O2 Device: Nasal cannula O2 Flow Rate (L/min): 3 l/min       Wt Readings from Last 4 Encounters:   12/06/20 46.6 kg (102 lb 11.8 oz)   12/06/20 47.7 kg (105 lb 2.6 oz)   09/20/16 57.2 kg (126 lb)   12/07/10 64 kg (141 lb)          Intake/Output Summary (Last 24 hours) at 12/7/2020 0956  Last data filed at 12/6/2020 1742  Gross per 24 hour   Intake 1110 ml   Output 300 ml   Net 810 ml       Last shift:      No intake/output data recorded. Last 3 shifts: 12/05 1901 - 12/07 0700  In: 1110 [P.O.:950;  I.V.:160]  Out: 300 [Urine:300]       Physical Exam:   General:  female; chronically ill appearing frail  HEENT: NCAT, oral mucosa clear  Eyes: anicteric; conjunctiva clear extraocular movements intact  Neck: no nodes, neck veins visible no accessory muscle use  Chest: no deformity,   Cardiac: Regular rate and rhythm systolic murmur present has edema of the extremities  Lungs: distant breath sounds; no wheezing rales are heard posteriorly no breath sounds in the bases  Abd: Soft positive bowel sounds no tenderness she has some posterior abdominal wall edema  Ext: Edema of the legs arms and sacrum  : clear urine  Neuro: Awake more alert mildly confused  will move all 4 extremities  Psych- no agitation, oriented to person;   Skin: Lower extremities have very flaky skin with erythema  Pulses: Brachial radial femoral pulses intact  Capillary: Slow capillary refill    Labs:    Recent Labs     12/07/20  0400 12/06/20  0600 99/14/95  5564   WBC Duplicate request 09.8* 03.2*   HGB Duplicate request 98.6* 42.8*   PLT Duplicate request 495* 410*     Recent Labs     12/06/20  0600 12/05/20  0600    140   K 4.0 3.8    102   CO2 32 37*   GLU 65 128*   BUN 22* 20   CREA 0.50* 0.70   CA 9.0 8.9   PHOS  --  3.4   ALB 2.2* 2.3*   ALT 11*  --      2 L nasal oxygen with oxygen saturation 98%  BNP 16,597  CRP 3.04  11/26 blood culture with Providencia sensitive to Zosyn  Urine culture no growth  Wound culture with multiple organisms including methicillin sensitive staph aureus Klebsiella Providencia Enterobacter and Morganella  11/27 echo ejection fraction 20% dilated left atrium grade 2 diastolic dysfunction moderate to severe mitral regurg IVC dilatation RVSP 55  12/1 nuclear cardiac scan with ejection fraction of 19%  Lab Results   Component Value Date/Time    Culture result:  12/04/2020 10:30 AM     Moderate Gram Negative Rods (CHECKING FOR MULTIPLE COLONY TYPES)    Culture result: Rare POSSIBLE Staphylococcus aureus 12/04/2020 10:30 AM    Culture result: Light Gram Negative Rods (MIXED COLONY TYPES) 12/04/2020 10:30 AM    Culture result: Light POSSIBLE Staphylococcus aureus 12/04/2020 10:30 AM    Culture result: Light  Mixed skin nica isolated   12/04/2020 10:30 AM     Lab Results   Component Value Date/Time    TSH 0.26 (L) 11/25/2020 12:30 PM       Imaging:    CXR Results  (Last 48 hours)               12/07/20 0835  XR CHEST PA LAT Final result    Impression:  Impression: Underexpanded lungs with bibasilar atelectasis. Small pleural   effusions. Hydrostatic edema. No significant interval change. Narrative:  Chest, 2 views, 12/7/2020       History: Pulmonary edema. Bilateral effusions. Comparison: Including chest 12/3/2020. Findings: A right-sided pacemaker device with leads in the right atrium and   right ventricle is in place. The cardiac silhouette is stable globular   enlargement which could indicate cardiomyopathy or pericardial effusion. The   thoracic aorta is tortuous. The lungs are underexpanded with bibasilar   atelectasis. Small pleural effusions are noted. There is pulmonary vascular   congestion and hydrostatic edema. No pneumothorax is identified. The thoracic   spine again demonstrates kyphosis, degenerative changes and compression   deformities.                Results from [place] encounter on 11/25/20   XR CHEST PA LAT Narrative Chest, 2 views, 12/7/2020    History: Pulmonary edema. Bilateral effusions. Comparison: Including chest 12/3/2020. Findings: A right-sided pacemaker device with leads in the right atrium and  right ventricle is in place. The cardiac silhouette is stable globular  enlargement which could indicate cardiomyopathy or pericardial effusion. The  thoracic aorta is tortuous. The lungs are underexpanded with bibasilar  atelectasis. Small pleural effusions are noted. There is pulmonary vascular  congestion and hydrostatic edema. No pneumothorax is identified. The thoracic  spine again demonstrates kyphosis, degenerative changes and compression  deformities. Impression Impression: Underexpanded lungs with bibasilar atelectasis. Small pleural  effusions. Hydrostatic edema. No significant interval change. XR CHEST SNGL V    Narrative Study: XR CHEST SNGL V    Clinical indication: Pleural effusions    Comparison: Chest x-ray 12/1/2020. Findings: Only hazy bibasilar airspace disease/pleural effusions. No evidence of  pneumothorax. Cardiac silhouette is enlarged. Stable positioning of right chest wall pacemaker  device. Mild vascular congestion. No acute osseous abnormality identified. Impression Impression:  Little interval change. XR CHEST PORT    Narrative Chest, frontal view, 12/1/2020    History: CHF. Comparison: Including chest 11/27/2020. Findings: The patient's head obscures the left upper lung zone. Right-sided  pacemaker device with 2 leads is in place. The cardiac silhouette again  demonstrates globular enlargement which can indicate cardiomyopathy or  pericardial effusion. The lungs are underexpanded with bibasilar atelectasis,  increased as compared to chest 11/27/2020. Right pleural effusion is possible. There is pulmonary vascular congestion and hydrostatic edema. No pneumothorax  is identified. The osseous structures are stable.       Impression Impression: Underexpanded lungs with bibasilar atelectasis, increased. Possible  right pleural effusion. Hydrostatic edema. · Providencia septicemia questionably urinary tract or from skin site. Urine culture was no growth I do not know if it was collected after antibiotics were given. She is currently on Zosyn   She has pleural effusions and accentuated interstitial markings consistent with CHF/pulmonary edema. She is receiving Lasix. She has developed hypokalemia with the diuresis will supplement potassium. ·   · Thank you for allowing me to see Ms. Radha Zamudio  ·     Vickie Humphries MD

## 2020-12-07 NOTE — PROGRESS NOTES
Problem: Self Care Deficits Care Plan (Adult)  Goal: *Acute Goals and Plan of Care (Insert Text)  Description: Pt will be min A sup <> sit in prep for EOB ADLs  Pt will be SBA grooming sitting EOB  Pt will be min A LE dressing sitting EOB/long sit  Pt will be min A sit <>  prep for toileting LRAD  Pt will be min A toileting/toilet transfer/cloth mgmt LRAD  Pt will be SPV following UE HEP in prep for self care tasks  Outcome: Progressing Towards Goal     Problem: Patient Education: Go to Patient Education Activity  Goal: Patient/Family Education  Outcome: Progressing Towards Goal   OCCUPATIONAL THERAPY TREATMENT  Patient: Charline Valdez (24 y.o. female)  Date: 12/7/2020  Diagnosis: PNA (pneumonia) [J18.9]  Pneumonia [J18.9]   <principal problem not specified>  Procedure(s) (LRB):  Debridement Of Ulcers Bilateral Legs (Bilateral) 3 Days Post-Op  Precautions:  fall, skin  Chart, occupational therapy assessment, plan of care, and goals were reviewed. ASSESSMENT  Patient continues with skilled OT services and is slowly progressing towards goals. Pt seen this am cotreat w/P. T. for increased pt safety. Pt reluctant to get up, wanting only to work in bed, needing max encouragement/education to participate. Pt mod Ax2 supine>sit and max Ax2 sit>supine. Once positioned EOB, able to maintain static sit balance w/ SBA/CGA, but needed max A using pad to scoot her towards EOB  to get feet on floor. Pt performed face, hand washing w/ set up/SBA and hair combing w/min A. Pt max A don socks. Pt completed x5 reps BUE AROM. With max encouragement pt agreed to try to stand. Pt sit<>stand using rw from EOB  w/ max Ax2. Pt did x2 trials. Pt needed mod Ax2 for static standiing balance. Pt was unable to perform any steps or side steps. Pt continues to be deconditioned, low activity tolerance and will cont to benefit from skilled OT to address deficits/goals.       Current Level of Function Impacting Discharge (ADLs): deconditioning requiring mod/max Ax2 supine<>sit, max Ax2 sit<>stand. Don/doff socks max A and set up/SBA to min A UB grooming           PLAN :  Patient continues to benefit from skilled intervention to address the above impairments. Continue treatment per established plan of care. to address goals. Recommend with staff: BUE AROM, encourage IND w/ bed level UB ADL, repositioning. Recommend next OT session: EOB ADL tolerance, UE TE/TA, sit<>stand xfers in prep for EOB<>BSC xfer. Recommendation for discharge: (in order for the patient to meet his/her long term goals)  Therapy up to 5 days/week in SNF setting    This discharge recommendation:  Has been made in collaboration with the attending provider and/or case management    IF patient discharges home will need the following DME: TBD       SUBJECTIVE:   Patient stated why do I have to  sit up.     OBJECTIVE DATA SUMMARY:   Cognitive/Behavioral Status:  Neurologic State: Alert  Orientation Level: Oriented to person;Oriented to place  Cognition: Decreased attention/concentration        Safety/Judgement: Decreased insight into deficits    Functional Mobility and Transfers for ADLs:  Bed Mobility:  Rolling: Moderate assistance  Supine to Sit: Moderate assistance; Additional time;Assist x2  Sit to Supine: Maximum assistance;Assist x2    Transfers:  Sit to Stand: Maximum assistance;Assist x2          Balance:  Sitting: Intact(SBA)  Sitting - Static: Good (unsupported); Occassional  Sitting - Dynamic: Poor (constant support)  Standing: Impaired; With support  Standing - Static: Poor    ADL Intervention:       Grooming  Grooming Assistance: Set-up; Stand-by assistance;Minimum assistance  Position Performed: Seated edge of bed  Washing Face: Set-up; Stand-by assistance  Washing Hands: Set-up; Stand-by assistance  Brushing/Combing Hair: Minimum assistance                   Lower Body Dressing Assistance  Socks: Maximum assistance  Position Performed: Seated edge of bed         Cognitive Retraining  Safety/Judgement: Decreased insight into deficits    Therapeutic Exercises:   BUE AROM x5 reps EOB    Pain:  BLE's 6/10    Activity Tolerance:   Fair, desaturates with exertion and requires oxygen, and requires rest breaks  Please refer to the flowsheet for vital signs taken during this treatment. After treatment patient left in no apparent distress:   Supine in bed, Call bell within reach, Bed / chair alarm activated, and Side rails x 3    COMMUNICATION/COLLABORATION:   The patients plan of care was discussed with: Physical therapy assistant and Registered nurse. Pamela w/P. T. for pt safety w/functional transfers    [place]  Time Calculation: 42 mins

## 2020-12-07 NOTE — PROGRESS NOTES
CM spoke to patient at bedside for a SNF choice. Patient stated that she wanted to return home. CM informed patient that [address] had discharged patient from [facility] and had concerns about the patient's living environment and the police and  were involved. CM left patient a freedom of choice for SNF. CM spoke to [place] about putting a psych consult in. CM will continue to follow patient for discharge planning needs.

## 2020-12-07 NOTE — PROGRESS NOTES
Problem: Falls - Risk of  Goal: *Absence of Falls  Description: Document Karly Alegre Fall Risk and appropriate interventions in the flowsheet.   Outcome: Progressing Towards Goal  Note: Fall Risk Interventions:  Mobility Interventions: Bed/chair exit alarm, OT consult for ADLs, PT Consult for assist device competence, PT Consult for mobility concerns, Strengthening exercises (ROM-active/passive)    Mentation Interventions: Bed/chair exit alarm, Increase mobility, Update white board    Medication Interventions: Bed/chair exit alarm, Patient to call before getting OOB, Teach patient to arise slowly, Utilize gait belt for transfers/ambulation    Elimination Interventions: Bed/chair exit alarm, Call light in reach, Patient to call for help with toileting needs    History of Falls Interventions: Bed/chair exit alarm

## 2020-12-07 NOTE — PROGRESS NOTES
Problem: Mobility Impaired (Adult and Pediatric)  Goal: *Acute Goals and Plan of Care (Insert Text)  Description: Patient will move from supine to sit and sit to supine , scoot up and down, and roll side to side in bed with minimal assistance/contact guard assist within 7 day(s). Patient will transfer from bed to chair and chair to bed with minimal assistance/contact guard assist using the least restrictive device within 7 day(s). Patient will improve static sitting balance to modified independence within 1 week(s). Patient will ambulate 10 feet with moderate assistance with least restrictive device within 1 weeks. Outcome: Progressing Towards Goal   PHYSICAL THERAPY TREATMENT  Patient: Charly Edwards (66 y.o. female)  Date: 12/7/2020  Diagnosis: PNA (pneumonia) [J18.9]  Pneumonia [J18.9]   <principal problem not specified>  Procedure(s) (LRB):  Debridement Of Ulcers Bilateral Legs (Bilateral) 3 Days Post-Op  Precautions:    Chart, physical therapy assessment, plan of care and goals were reviewed. ASSESSMENT  Patient continues with skilled PT services and is progressing towards goals. Pt. Required extensive assistance for bed mobility and Tfs this session. Pt. With forward flexed position noted with sitting balance. Pt. Sat 10 min EOB and performed some LE exercises as well as ADLs with OT. O2 sats remained  while sitting. Pt. Only tolerated standing for approx 1 minute each and max assist X 2. Pt. Was unable to take any side steps due to leaning posteriorly and hyperextending at knees. O2 sats decreased with standing activity to  89% then about 1 minute later back up to 92-98%. Pt. Needed increased time to complete all tasks and rest breaks due to fatigue. Pt. Also needs max encouagement during RX.     Current Level of Function Impacting Discharge (mobility/balance): Assistance    Other factors to consider for discharge: Safety         PLAN :  Patient continues to benefit from skilled intervention to address the above impairments. Continue treatment per established plan of care. to address goals. Recommendation for discharge: (in order for the patient to meet his/her long term goals)  Therapy up to 5 days/week in SNF setting    This discharge recommendation:  Has been made in collaboration with the attending provider and/or case management    IF patient discharges home will need the following DME: SNF       SUBJECTIVE:   Patient stated \"I don;t think I can do this. \" Pt. Finally agreed to work with therapies. CO RX with OT for increased mobility and safety. OBJECTIVE DATA SUMMARY:   Critical Behavior:  Neurologic State: Alert  Orientation Level: Oriented to person, Oriented to place  Cognition: Decreased attention/concentration  Safety/Judgement: Decreased insight into deficits  Functional Mobility Training:  Bed Mobility:  Rolling: Moderate assistance  Supine to Sit: Moderate assistance; Additional time;Assist x2  Sit to Supine: Maximum assistance;Assist x2           Transfers:  Sit to Stand: Maximum assistance;Assist x2  Stand to Sit: Maximum assistance;Assist x2        Practiced 2 times with RW and mod Assist X 2 second time. Balance:  Sitting: Intact(SBA)  Sitting - Static: Good (unsupported); Occassional  Sitting - Dynamic: Poor (constant support)  Standing: Impaired; With support  Standing - Static: Poor  Ambulation/Gait Training:                  Therapeutic Exercises: Therapeutic Exercises:       EXERCISE   Sets   Reps   Active Active Assist   Passive Self ROM   Comments   Ankle Pumps  12 [x] [] [] []    Quad Sets/Glut Sets  12 [x] [] [] []    Hamstring Sets   [] [] [] []    Short Arc Quads   [] [] [] []    Heel Slides  12 [] [x] [] []    Straight Leg Raises   [] [] [] []    Hip abd/add  12 [] [x] [] []    Long Arc Quads  12 [x] [] [] []    Marching   [] [] [] []       [] [] [] []       Pain Ratin/10 bilat LEs      Activity Tolerance:   Fair, requires rest breaks, and observed SOB with activity  Please refer to the flowsheet for vital signs taken during this treatment. After treatment patient left in no apparent distress:   Supine in bed, Call bell within reach, Bed / chair alarm activated, Side rails x 3, and RN notified of RX.    COMMUNICATION/COLLABORATION:   The patients plan of care was discussed with: Registered nurse.      Endy Shirley   Time Calculation: 42 mins

## 2020-12-07 NOTE — PROGRESS NOTES
Progress Note    Patient: Gerald Curiel MRN: [id number]  SSN: [id number]    YOB: 1945  Age: 66 y.o. Sex: female      Admit Date: 11/25/2020    LOS: 12 days     Subjective:   Patient followed for sepsis with cellulitis both legs with blood cultures growing Providencia and wound culture with mixed Gram negative nica identified below and MSSA. Following debridement, intra-operative cultures prelimarily showing similar results. She remains afebrile. WBC/CRP decreasing. Still on Zosyn alone. Patient again asleep. Objective:     Vitals:    12/07/20 0107 12/07/20 0500 12/07/20 0802 12/07/20 0809   BP:  111/71  110/66   Pulse:  84  82   Resp:  18  18   Temp:  97.7 °F (36.5 °C)  97.7 °F (36.5 °C)   SpO2: 95% 98% 97% 96%   Weight:       Height:            Intake and Output:  Current Shift: No intake/output data recorded. Last three shifts: 12/05 1901 - 12/07 0700  In: 1110 [P.O.:950; I.V.:160]  Out: 300 [Urine:300]    Physical Exam:   Constitutional:       General: She is not in acute distress. Appearance: She is chronically ill-appearing. HENT:      Head: Normocephalic and atraumatic. Nose:      Comments: Nasal O2 cannula  Lungs: clear bilaterally but diminished  Genitourinary:     Comments: Purewick catheter  Musculoskeletal:         General: Swelling present. No tenderness. Right lower leg: Edema present. Left lower leg: Edema present. Comments: Both calves are bandaged at this time with bulky gauze dressing, however, erythema has markedly decreased along with swelling. Neurological: asleep   Psychiatric: asleep.      Lab/Data Review:     WBC 12,900   Procalcitonin <0.05 <0.11 <0.10 <0.69 <1.19  CRP 0.90 < 1.24 <1.28 <1.98  <3.04 <9.26 <16.00    Covid-19 negative    Blood cultures (11/26) Providenica rettgeri  Blood cultures (11/26) Providenica rettgeri  Urine culture (11/26) No growth FiNAL  Wound culture (11/27)  MSSA, Klebsiella oxytoca, Providencia rettgeri, Morganella morganii (2 strains), Myroides species, Enterobacter cloacae  Wound culture (12/4) Light Staphylococcus aureus and Gram negative rods  Wound culture (12/4) Light Staphylococcus aureus and Gram negative rods  Assessment:     Active Problems:    PNA (pneumonia) (11/25/2020)      Pneumonia (11/25/2020)    1. Cellulitis, bilateral lower extremities, severe, secondary to MSSA, Klebsiella oxytoca, Providencia rettgeri, Morganella morganii (2 strains), Myroides species, Enterobacter cloacae, Day #12 IV Zosyn, status post debridement, improving. 2. Gram negative bacteremia with Providencia rettgeri, probably secondary to above, on IV Zosyn. 3. Sepsis with leukocytosis, elevated procalcitonin, CRP and lactic acid, resolving. 4. SOB  5. Pyuria, negative urine culture    Comment:  WBC and CRP decreasing. Intra-operative cultures appear to be growing same organisms. Plan:   1. Continue IV Zosyn; may be able to transition to oral Levaquin and Doxcycycline  2. In am, repeat CBC and CRP   3. Follow-up intra-operative cultures  4.  Ceretec scan both tibia to rule osteomyelitis (MRI order ?cancelled)    Signed By: Arnoldo Neil MD     December 7, 2020

## 2020-12-07 NOTE — PROGRESS NOTES
Cardiology Progress Note    Patient seen and examined. This is a patient who is followed for heart failure with reduced EF. She is resting in bed, no acute distress. Opens eyes briefly and she answers questions appropriately. She denies chest pain or shortness of breath. No other complaints offered. Telemetry reviewed, V paced. Pertinent review of system items noted above, all other systems are negative. Current medications reviewed. Physical Examination  Vital signs are stable, Blood Pressure 110/66, Pulse 82  No apparent distress. Heart is regular, rate and rhythm. Normal S1, S2, murmur noted  Lungs are diminished  Abdomen is soft, nontender, normal bowel sounds. Lower extremities are dressed    Labs reviewed     CXR Results  (Last 48 hours)               12/07/20 0835  XR CHEST PA LAT Final result    Impression:  Impression: Underexpanded lungs with bibasilar atelectasis. Small pleural   effusions. Hydrostatic edema. No significant interval change. Narrative:  Chest, 2 views, 12/7/2020       History: Pulmonary edema. Bilateral effusions. Comparison: Including chest 12/3/2020. Findings: A right-sided pacemaker device with leads in the right atrium and   right ventricle is in place. The cardiac silhouette is stable globular   enlargement which could indicate cardiomyopathy or pericardial effusion. The   thoracic aorta is tortuous. The lungs are underexpanded with bibasilar   atelectasis. Small pleural effusions are noted. There is pulmonary vascular   congestion and hydrostatic edema. No pneumothorax is identified. The thoracic   spine again demonstrates kyphosis, degenerative changes and compression   deformities. Case discussed with Dr. Sangeeta Hatfield and our impression and recommendations are as follows:  1. Heart failure with reduced EF: EF 15-20, unsure if this is new diagnosis. Continue diuresis, with close monitoring of I&O and daily weights.  Stress test with large infarction and only a small area of baldo-infarct. She continues to decline cardiac catheterization and continue medical management. No ACE/ARB therapy due to low blood pressure. Keep serum potassium between 4-5 and serum magnesium > 2.      2. PPM: [facility] PPM. Per documents implanted in 2005 by Dr. Katie Leonard for complete heart block. Device interrogated showing several episodes NSVT, unsure if they correlate with possible syncopal episode. One on 11/25 at 0942 lasting 3:36s. Consider BB once BP allows. 3. Elevated troponin: Likely demand ischemia from CHF. No active chest pain reported. 4. Cellulitis: Treatment per primary. S/p lower extremity wound debridement. 5. Hyperlipidemia: Continue high intensity statin therapy. Please do no hesitate to call me or Dr. Álvaro Moya if additional questions arise.

## 2020-12-08 ENCOUNTER — ANESTHESIA EVENT (OUTPATIENT)
Dept: SURGERY | Age: 75
DRG: 853 | End: 2020-12-08
Payer: MEDICARE

## 2020-12-08 ENCOUNTER — APPOINTMENT (OUTPATIENT)
Dept: NUCLEAR MEDICINE | Age: 75
DRG: 853 | End: 2020-12-08
Attending: INTERNAL MEDICINE
Payer: MEDICARE

## 2020-12-08 ENCOUNTER — ANESTHESIA (OUTPATIENT)
Dept: SURGERY | Age: 75
DRG: 853 | End: 2020-12-08
Payer: MEDICARE

## 2020-12-08 LAB
ALBUMIN SERPL-MCNC: 2.1 G/DL (ref 3.5–5)
ALBUMIN/GLOB SERPL: 0.5 {RATIO} (ref 1.1–2.2)
ALP SERPL-CCNC: 98 U/L (ref 45–117)
ALT SERPL-CCNC: 13 U/L (ref 12–78)
ANION GAP SERPL CALC-SCNC: 4 MMOL/L (ref 5–15)
AST SERPL W P-5'-P-CCNC: 14 U/L (ref 15–37)
BACTERIA SPEC CULT: NORMAL
BASOPHILS # BLD: 0 K/UL (ref 0–0.1)
BASOPHILS NFR BLD: 0 % (ref 0–1)
BILIRUB SERPL-MCNC: 0.4 MG/DL (ref 0.2–1)
BUN SERPL-MCNC: 25 MG/DL (ref 6–20)
BUN/CREAT SERPL: 51 (ref 12–20)
CA-I BLD-MCNC: 8.4 MG/DL (ref 8.5–10.1)
CHLORIDE SERPL-SCNC: 102 MMOL/L (ref 97–108)
CO2 SERPL-SCNC: 37 MMOL/L (ref 21–32)
CREAT SERPL-MCNC: 0.49 MG/DL (ref 0.55–1.02)
CRP SERPL-MCNC: 1.14 MG/DL (ref 0–0.6)
DIFFERENTIAL METHOD BLD: ABNORMAL
EOSINOPHIL # BLD: 0.1 K/UL (ref 0–0.4)
EOSINOPHIL NFR BLD: 1 % (ref 0–7)
ERYTHROCYTE [DISTWIDTH] IN BLOOD BY AUTOMATED COUNT: 14.3 % (ref 11.5–14.5)
GLOBULIN SER CALC-MCNC: 3.9 G/DL (ref 2–4)
GLUCOSE SERPL-MCNC: 94 MG/DL (ref 65–100)
GRAM STN SPEC: NORMAL
GRAM STN SPEC: NORMAL
HCT VFR BLD AUTO: 33.5 % (ref 35–47)
HGB BLD-MCNC: 10.1 G/DL (ref 11.5–16)
IMM GRANULOCYTES # BLD AUTO: 0.1 K/UL (ref 0–0.04)
IMM GRANULOCYTES NFR BLD AUTO: 1 % (ref 0–0.5)
LYMPHOCYTES # BLD: 0.7 K/UL (ref 0.8–3.5)
LYMPHOCYTES NFR BLD: 6 % (ref 12–49)
MCH RBC QN AUTO: 28.1 PG (ref 26–34)
MCHC RBC AUTO-ENTMCNC: 30.1 G/DL (ref 30–36.5)
MCV RBC AUTO: 93.3 FL (ref 80–99)
MONOCYTES # BLD: 0.6 K/UL (ref 0–1)
MONOCYTES NFR BLD: 5 % (ref 5–13)
NEUTS SEG # BLD: 10.6 K/UL (ref 1.8–8)
NEUTS SEG NFR BLD: 87 % (ref 32–75)
PLATELET # BLD AUTO: 519 K/UL (ref 150–400)
PMV BLD AUTO: 9.3 FL (ref 8.9–12.9)
POTASSIUM SERPL-SCNC: 3 MMOL/L (ref 3.5–5.1)
PROT SERPL-MCNC: 6 G/DL (ref 6.4–8.2)
RBC # BLD AUTO: 3.59 M/UL (ref 3.8–5.2)
SODIUM SERPL-SCNC: 143 MMOL/L (ref 136–145)
SPECIAL REQUESTS,SREQ: NORMAL
SPECIAL REQUESTS,SREQ: NORMAL
WBC # BLD AUTO: 12.1 K/UL (ref 3.6–11)

## 2020-12-08 PROCEDURE — 94760 N-INVAS EAR/PLS OXIMETRY 1: CPT

## 2020-12-08 PROCEDURE — 74011000250 HC RX REV CODE- 250: Performed by: NURSE ANESTHETIST, CERTIFIED REGISTERED

## 2020-12-08 PROCEDURE — 77010033678 HC OXYGEN DAILY

## 2020-12-08 PROCEDURE — 74011000258 HC RX REV CODE- 258: Performed by: FAMILY MEDICINE

## 2020-12-08 PROCEDURE — 74011250636 HC RX REV CODE- 250/636: Performed by: NURSE PRACTITIONER

## 2020-12-08 PROCEDURE — 77030041703 HC SLV COMPR DVT ARJO -B: Performed by: PODIATRIST

## 2020-12-08 PROCEDURE — 86140 C-REACTIVE PROTEIN: CPT

## 2020-12-08 PROCEDURE — A9521 TC99M EXAMETAZIME: HCPCS

## 2020-12-08 PROCEDURE — 76060000031 HC ANESTHESIA FIRST 0.5 HR: Performed by: PODIATRIST

## 2020-12-08 PROCEDURE — 2709999900 HC NON-CHARGEABLE SUPPLY: Performed by: PODIATRIST

## 2020-12-08 PROCEDURE — 11046 DBRDMT MUSC&/FSCA EA ADDL: CPT | Performed by: PODIATRIST

## 2020-12-08 PROCEDURE — 77030039464 HC TU IRR ENDO DISP MSNX -B: Performed by: PODIATRIST

## 2020-12-08 PROCEDURE — 74011250637 HC RX REV CODE- 250/637: Performed by: FAMILY MEDICINE

## 2020-12-08 PROCEDURE — 74011250637 HC RX REV CODE- 250/637: Performed by: INTERNAL MEDICINE

## 2020-12-08 PROCEDURE — 94640 AIRWAY INHALATION TREATMENT: CPT

## 2020-12-08 PROCEDURE — 80053 COMPREHEN METABOLIC PANEL: CPT

## 2020-12-08 PROCEDURE — 85025 COMPLETE CBC W/AUTO DIFF WBC: CPT

## 2020-12-08 PROCEDURE — 77030039465 HC PRB ASPIR SONICVAC MSNX -F: Performed by: PODIATRIST

## 2020-12-08 PROCEDURE — 76210000063 HC OR PH I REC FIRST 0.5 HR: Performed by: PODIATRIST

## 2020-12-08 PROCEDURE — 65270000029 HC RM PRIVATE

## 2020-12-08 PROCEDURE — 11043 DBRDMT MUSC&/FSCA 1ST 20/<: CPT | Performed by: PODIATRIST

## 2020-12-08 PROCEDURE — 74011250636 HC RX REV CODE- 250/636: Performed by: NURSE ANESTHETIST, CERTIFIED REGISTERED

## 2020-12-08 PROCEDURE — 99232 SBSQ HOSP IP/OBS MODERATE 35: CPT | Performed by: INTERNAL MEDICINE

## 2020-12-08 PROCEDURE — 74011000250 HC RX REV CODE- 250: Performed by: FAMILY MEDICINE

## 2020-12-08 PROCEDURE — 0KBT0ZZ EXCISION OF LEFT LOWER LEG MUSCLE, OPEN APPROACH: ICD-10-PCS | Performed by: PODIATRIST

## 2020-12-08 PROCEDURE — 76010000154 HC OR TIME FIRST 0.5 HR: Performed by: PODIATRIST

## 2020-12-08 PROCEDURE — 99233 SBSQ HOSP IP/OBS HIGH 50: CPT | Performed by: PODIATRIST

## 2020-12-08 PROCEDURE — 36415 COLL VENOUS BLD VENIPUNCTURE: CPT

## 2020-12-08 PROCEDURE — 0KBS0ZZ EXCISION OF RIGHT LOWER LEG MUSCLE, OPEN APPROACH: ICD-10-PCS | Performed by: PODIATRIST

## 2020-12-08 PROCEDURE — 74011250636 HC RX REV CODE- 250/636: Performed by: FAMILY MEDICINE

## 2020-12-08 PROCEDURE — 77030010509 HC AIRWY LMA MSK TELE -A: Performed by: NURSE ANESTHETIST, CERTIFIED REGISTERED

## 2020-12-08 PROCEDURE — 74011000272 HC RX REV CODE- 272: Performed by: PODIATRIST

## 2020-12-08 RX ORDER — SODIUM CHLORIDE 0.9 % (FLUSH) 0.9 %
5-40 SYRINGE (ML) INJECTION AS NEEDED
Status: DISCONTINUED | OUTPATIENT
Start: 2020-12-08 | End: 2020-12-13

## 2020-12-08 RX ORDER — DEXMEDETOMIDINE HYDROCHLORIDE 100 UG/ML
INJECTION, SOLUTION INTRAVENOUS
Status: COMPLETED
Start: 2020-12-08 | End: 2020-12-08

## 2020-12-08 RX ORDER — KETAMINE HYDROCHLORIDE 10 MG/ML
INJECTION, SOLUTION INTRAMUSCULAR; INTRAVENOUS AS NEEDED
Status: DISCONTINUED | OUTPATIENT
Start: 2020-12-08 | End: 2020-12-08 | Stop reason: HOSPADM

## 2020-12-08 RX ORDER — SODIUM CHLORIDE 0.9 % (FLUSH) 0.9 %
5-40 SYRINGE (ML) INJECTION EVERY 8 HOURS
Status: DISCONTINUED | OUTPATIENT
Start: 2020-12-08 | End: 2020-12-08 | Stop reason: HOSPADM

## 2020-12-08 RX ORDER — DEXMEDETOMIDINE HYDROCHLORIDE 100 UG/ML
INJECTION, SOLUTION INTRAVENOUS AS NEEDED
Status: DISCONTINUED | OUTPATIENT
Start: 2020-12-08 | End: 2020-12-08 | Stop reason: HOSPADM

## 2020-12-08 RX ORDER — SODIUM CHLORIDE 9 MG/ML
1000 INJECTION, SOLUTION INTRAVENOUS CONTINUOUS
Status: DISCONTINUED | OUTPATIENT
Start: 2020-12-08 | End: 2020-12-08 | Stop reason: HOSPADM

## 2020-12-08 RX ORDER — SODIUM CHLORIDE 0.9 % (FLUSH) 0.9 %
5-40 SYRINGE (ML) INJECTION EVERY 8 HOURS
Status: DISCONTINUED | OUTPATIENT
Start: 2020-12-08 | End: 2020-12-13

## 2020-12-08 RX ORDER — ONDANSETRON 2 MG/ML
INJECTION INTRAMUSCULAR; INTRAVENOUS AS NEEDED
Status: DISCONTINUED | OUTPATIENT
Start: 2020-12-08 | End: 2020-12-08 | Stop reason: HOSPADM

## 2020-12-08 RX ORDER — SODIUM CHLORIDE 0.9 % (FLUSH) 0.9 %
5-40 SYRINGE (ML) INJECTION AS NEEDED
Status: DISCONTINUED | OUTPATIENT
Start: 2020-12-08 | End: 2020-12-08 | Stop reason: HOSPADM

## 2020-12-08 RX ORDER — FENTANYL CITRATE 50 UG/ML
50 INJECTION, SOLUTION INTRAMUSCULAR; INTRAVENOUS
Status: DISCONTINUED | OUTPATIENT
Start: 2020-12-08 | End: 2020-12-08 | Stop reason: HOSPADM

## 2020-12-08 RX ORDER — SODIUM CHLORIDE 0.9 G/100ML
IRRIGANT IRRIGATION AS NEEDED
Status: DISCONTINUED | OUTPATIENT
Start: 2020-12-08 | End: 2020-12-08 | Stop reason: HOSPADM

## 2020-12-08 RX ORDER — LIDOCAINE HYDROCHLORIDE 20 MG/ML
INJECTION, SOLUTION EPIDURAL; INFILTRATION; INTRACAUDAL; PERINEURAL AS NEEDED
Status: DISCONTINUED | OUTPATIENT
Start: 2020-12-08 | End: 2020-12-08 | Stop reason: HOSPADM

## 2020-12-08 RX ORDER — ACETAZOLAMIDE 500 MG/1
500 CAPSULE, EXTENDED RELEASE ORAL EVERY 6 HOURS
Status: DISPENSED | OUTPATIENT
Start: 2020-12-08 | End: 2020-12-08

## 2020-12-08 RX ORDER — ONDANSETRON 2 MG/ML
4 INJECTION INTRAMUSCULAR; INTRAVENOUS AS NEEDED
Status: DISCONTINUED | OUTPATIENT
Start: 2020-12-08 | End: 2020-12-08 | Stop reason: HOSPADM

## 2020-12-08 RX ORDER — SODIUM CHLORIDE, SODIUM LACTATE, POTASSIUM CHLORIDE, CALCIUM CHLORIDE 600; 310; 30; 20 MG/100ML; MG/100ML; MG/100ML; MG/100ML
INJECTION, SOLUTION INTRAVENOUS
Status: DISCONTINUED | OUTPATIENT
Start: 2020-12-08 | End: 2020-12-08 | Stop reason: HOSPADM

## 2020-12-08 RX ORDER — PROPOFOL 10 MG/ML
INJECTION, EMULSION INTRAVENOUS AS NEEDED
Status: DISCONTINUED | OUTPATIENT
Start: 2020-12-08 | End: 2020-12-08 | Stop reason: HOSPADM

## 2020-12-08 RX ORDER — HYDROMORPHONE HYDROCHLORIDE 1 MG/ML
0.5 INJECTION, SOLUTION INTRAMUSCULAR; INTRAVENOUS; SUBCUTANEOUS
Status: DISCONTINUED | OUTPATIENT
Start: 2020-12-08 | End: 2020-12-08 | Stop reason: HOSPADM

## 2020-12-08 RX ORDER — POTASSIUM CHLORIDE 20 MEQ/1
40 TABLET, EXTENDED RELEASE ORAL ONCE
Status: COMPLETED | OUTPATIENT
Start: 2020-12-08 | End: 2020-12-08

## 2020-12-08 RX ADMIN — Medication 10 ML: at 21:10

## 2020-12-08 RX ADMIN — Medication 10 ML: at 14:20

## 2020-12-08 RX ADMIN — IPRATROPIUM BROMIDE AND ALBUTEROL SULFATE 3 ML: .5; 3 SOLUTION RESPIRATORY (INHALATION) at 13:43

## 2020-12-08 RX ADMIN — IPRATROPIUM BROMIDE AND ALBUTEROL SULFATE 3 ML: .5; 3 SOLUTION RESPIRATORY (INHALATION) at 20:23

## 2020-12-08 RX ADMIN — PROPOFOL 30 MG: 10 INJECTION, EMULSION INTRAVENOUS at 17:42

## 2020-12-08 RX ADMIN — FUROSEMIDE 40 MG: 10 INJECTION, SOLUTION INTRAMUSCULAR; INTRAVENOUS at 09:01

## 2020-12-08 RX ADMIN — LIDOCAINE HYDROCHLORIDE 60 MG: 20 INJECTION, SOLUTION EPIDURAL; INFILTRATION; INTRACAUDAL; PERINEURAL at 17:42

## 2020-12-08 RX ADMIN — DEXMEDETOMIDINE HYDROCHLORIDE 20 MCG: 100 INJECTION, SOLUTION INTRAVENOUS at 17:36

## 2020-12-08 RX ADMIN — KETAMINE HYDROCHLORIDE 30 MG: 10 INJECTION INTRAMUSCULAR; INTRAVENOUS at 17:42

## 2020-12-08 RX ADMIN — POTASSIUM CHLORIDE 40 MEQ: 1500 TABLET, EXTENDED RELEASE ORAL at 16:16

## 2020-12-08 RX ADMIN — Medication 10 ML: at 05:05

## 2020-12-08 RX ADMIN — POTASSIUM CHLORIDE 40 MEQ: 750 TABLET, FILM COATED, EXTENDED RELEASE ORAL at 09:00

## 2020-12-08 RX ADMIN — IPRATROPIUM BROMIDE AND ALBUTEROL SULFATE 3 ML: .5; 3 SOLUTION RESPIRATORY (INHALATION) at 09:03

## 2020-12-08 RX ADMIN — PIPERACILLIN SODIUM AND TAZOBACTAM SODIUM 3.38 G: 3; .375 INJECTION, POWDER, LYOPHILIZED, FOR SOLUTION INTRAVENOUS at 05:04

## 2020-12-08 RX ADMIN — PIPERACILLIN SODIUM AND TAZOBACTAM SODIUM 3.38 G: 3; .375 INJECTION, POWDER, LYOPHILIZED, FOR SOLUTION INTRAVENOUS at 21:10

## 2020-12-08 RX ADMIN — LEVOTHYROXINE SODIUM 75 MCG: 0.07 TABLET ORAL at 05:04

## 2020-12-08 RX ADMIN — AMIODARONE HYDROCHLORIDE 200 MG: 200 TABLET ORAL at 09:00

## 2020-12-08 RX ADMIN — IPRATROPIUM BROMIDE AND ALBUTEROL SULFATE 3 ML: .5; 3 SOLUTION RESPIRATORY (INHALATION) at 01:58

## 2020-12-08 RX ADMIN — SODIUM CHLORIDE, POTASSIUM CHLORIDE, SODIUM LACTATE AND CALCIUM CHLORIDE: 600; 310; 30; 20 INJECTION, SOLUTION INTRAVENOUS at 17:36

## 2020-12-08 RX ADMIN — PIPERACILLIN SODIUM AND TAZOBACTAM SODIUM 3.38 G: 3; .375 INJECTION, POWDER, LYOPHILIZED, FOR SOLUTION INTRAVENOUS at 11:47

## 2020-12-08 RX ADMIN — Medication 10 ML: at 22:00

## 2020-12-08 RX ADMIN — ATORVASTATIN CALCIUM 40 MG: 40 TABLET, FILM COATED ORAL at 21:10

## 2020-12-08 RX ADMIN — HYOSCYAMINE SULFATE: 16 SOLUTION at 09:01

## 2020-12-08 RX ADMIN — ASPIRIN 325 MG ORAL TABLET 325 MG: 325 PILL ORAL at 09:00

## 2020-12-08 RX ADMIN — ONDANSETRON 4 MG: 2 INJECTION INTRAMUSCULAR; INTRAVENOUS at 17:42

## 2020-12-08 RX ADMIN — FUROSEMIDE 40 MG: 10 INJECTION, SOLUTION INTRAMUSCULAR; INTRAVENOUS at 21:10

## 2020-12-08 RX ADMIN — ACETAZOLAMIDE 500 MG: 500 CAPSULE, EXTENDED RELEASE ORAL at 12:48

## 2020-12-08 NOTE — PROGRESS NOTES
General Daily Progress Note          Patient Name:   Jonnie Crisostomo       YOB: 1945       Age:  66 y.o. Admit Date: 11/25/2020      Subjective:         Seen awake not in distress confused  On 3 L oxygen by nasal cannula  Hemoglobin is 11.4    Patient was drowsy and lethargic after receiving Percocet /now discontinued  Hypotensive this morning hold all blood pressure medicine    Patient had debridement of right leg ulcer and left leg ulcer             Objective:     Visit Vitals  BP (!) 99/58 (BP 1 Location: Left arm, BP Patient Position: At rest)   Pulse 82   Temp 98.2 °F (36.8 °C)   Resp 19   Ht 5' (1.524 m)   Wt 45.3 kg (99 lb 13.9 oz)   SpO2 91%   BMI 19.50 kg/m²          [unfilled]      Review of Systems    Constitutional: Negative for chills and fever. HENT: Negative. Eyes: Negative. Respiratory: Negative. Cardiovascular: Negative. Gastrointestinal: Negative for abdominal pain and nausea. Skin: Negative. Neurological: Negative. Physical Exam:      Constitutional: Alert awake  HENT:   Head: Normocephalic and atraumatic. Eyes: Pupils are equal, round, and reactive to light. EOM are normal.   Cardiovascular: Normal rate, regular rhythm and normal heart sounds. Pulmonary/Chest: Breath sounds normal. No wheezes. No rales. Exhibits no tenderness. Abdominal: Soft. Bowel sounds are normal. There is no abdominal tenderness. There is no rebound and no guarding. Musculoskeletal: Normal range of motion. Neurological: Bilateral lower leg red swollen    XR CHEST PA LAT   Final Result   Impression: Underexpanded lungs with bibasilar atelectasis. Small pleural   effusions. Hydrostatic edema. No significant interval change. XR CHEST SNGL V   Final Result   Impression:   Little interval change. XR CHEST PORT   Final Result   Impression: Underexpanded lungs with bibasilar atelectasis, increased. Possible   right pleural effusion. Hydrostatic edema. XR CHEST PA LAT   Final Result   Impression: The cardiomediastinal silhouette is appropriate for age, technique,   and lung expansion. Pulmonary vasculature is not congested. The lungs are   essentially clear. No effusion or pneumothorax is seen. XR CHEST SNGL V   Final Result      NM INFLAM PROC LTD    (Results Pending)            Results     Procedure Component Value Units Date/Time    CULTURE, Jacquie Gold STAIN [[id number]]  (Susceptibility) Collected:  12/04/20 1030    Order Status:  Completed Specimen:  Wound from Leg Updated:  12/07/20 1500     Special Requests: No Special Requests        GRAM STAIN       Occasional Gram Negative Rods           Culture result:        Moderate Morganella morganii ssp morganii                  Moderate 2nd gram negative remigio            Moderate  3rd gram negative remigio               Light POSSIBLE Staphylococcus aureus          Susceptibility      Morganella morganii ssp morganii     WILLIAM (Preliminary)     Amikacin ($) Susceptible     Ampicillin ($) Resistant     Ampicillin/sulbactam ($) Resistant     Cefazolin ($) Resistant     Cefepime ($$) Susceptible     Cefoxitin Intermediate     Ceftazidime ($) Susceptible     Ceftriaxone ($) Susceptible     Ciprofloxacin ($) Susceptible     Gentamicin ($) Susceptible     Levofloxacin ($) Susceptible     Meropenem ($$) Susceptible     Piperacillin/Tazobac ($) Susceptible     Tobramycin ($) Susceptible     Trimeth/Sulfa Susceptible                    CULTURE, Oliver Arreola [[id number]] Collected:  12/04/20 1030    Order Status:  Completed Specimen:  Wound from Leg Updated:  12/06/20 0830     Special Requests: No Special Requests        GRAM STAIN Few Gram Negative Rods        Culture result:       Light Gram Negative Rods (MIXED COLONY TYPES)                  Light POSSIBLE Staphylococcus aureus            Light  Mixed skin nica isolated       CULTURE, Jacquie Gold STAIN [[id number]]  (Susceptibility) Collected:  11/27/20 1115 Order Status:  Completed Specimen:  Wound Updated:  12/02/20 1020     Special Requests: No Special Requests        GRAM STAIN Rare WBCs seen         Few Gram Negative Rods               Occasional Gram Positive Cocci in clusters           Culture result: Heavy  Mixed skin nica isolated               Heavy Staphylococcus aureus            Heavy Klebsiella oxytoca               Heavy Providencia rettgeri                  Heavy Enterobacter cloacae complex                  Heavy Morganella morganii ssp morganii                  Heavy Morganella morganii ssp morganii (2nd colony type/strain)            Light Myroides sp       Susceptibility      Staphylococcus aureus     WILLIAM     Ciprofloxacin ($) Resistant     Clindamycin ($) Susceptible     Daptomycin ($$$$$) Susceptible     Doxycycline ($$) Susceptible     Erythromycin ($$$$) Resistant     Gentamicin ($) Susceptible     Levofloxacin ($) Intermediate     Linezolid ($$$$$) Susceptible     Moxifloxacin ($$$$) Susceptible     Oxacillin Susceptible     Rifampin ($$$$) Susceptible [1]      Tetracycline Susceptible     Trimeth/Sulfa Susceptible     Vancomycin ($) Susceptible            [1]   Rifampin is not to be used for mono-therapy.              Susceptibility      Klebsiella oxytoca     WILLIAM     Amikacin ($) Susceptible     Ampicillin ($) Resistant     Ampicillin/sulbactam ($) Susceptible     Cefazolin ($) Susceptible     Cefepime ($$) Susceptible     Cefoxitin Susceptible     Ceftazidime ($) Susceptible     Ceftriaxone ($) Susceptible     Ciprofloxacin ($) Susceptible     Gentamicin ($) Susceptible     Levofloxacin ($) Susceptible     Meropenem ($$) Susceptible     Tobramycin ($) Susceptible     Trimeth/Sulfa Susceptible                Susceptibility      Providencia rettgeri     WILLIAM     Amikacin ($) Susceptible     Ampicillin ($) Resistant     Ampicillin/sulbactam ($) Susceptible     Cefazolin ($) Resistant     Cefepime ($$) Susceptible     Cefoxitin Susceptible Ceftazidime ($) Susceptible     Ceftriaxone ($) Susceptible     Ciprofloxacin ($) Susceptible     Gentamicin ($) Susceptible     Levofloxacin ($) Susceptible     Piperacillin/Tazobac ($) Susceptible     Tobramycin ($) Susceptible                Susceptibility      Enterobacter cloacae complex     WILLIAM     Amikacin ($) Susceptible     Cefazolin ($) Resistant     Cefepime ($$) Susceptible     Cefoxitin Resistant     Ceftazidime ($) Susceptible     Ceftriaxone ($) Susceptible     Ciprofloxacin ($) Susceptible     Gentamicin ($) Susceptible     Levofloxacin ($) Susceptible     Meropenem ($$) Susceptible     Tobramycin ($) Susceptible     Trimeth/Sulfa Susceptible                Susceptibility      Morganella morganii ssp morganii (6)     WILLIAM     Amikacin ($) Susceptible     Ampicillin ($) Resistant     Ampicillin/sulbactam ($) Resistant     Cefazolin ($) Resistant     Cefepime ($$) Susceptible     Cefoxitin Intermediate     Ceftazidime ($) Susceptible     Ceftriaxone ($) Susceptible     Ciprofloxacin ($) Susceptible     Gentamicin ($) Susceptible     Levofloxacin ($) Susceptible     Meropenem ($$) Susceptible     Piperacillin/Tazobac ($) Susceptible     Tobramycin ($) Susceptible     Trimeth/Sulfa Susceptible                Susceptibility      Morganella morganii ssp morganii (7)     WILLIAM     Amikacin ($) Susceptible     Ampicillin ($) Resistant     Ampicillin/sulbactam ($) Resistant     Cefazolin ($) Resistant     Cefepime ($$) Susceptible     Cefoxitin Intermediate     Ceftazidime ($) Susceptible     Ceftriaxone ($) Susceptible     Ciprofloxacin ($) Susceptible     Gentamicin ($) Susceptible     Levofloxacin ($) Susceptible     Meropenem ($$) Susceptible     Piperacillin/Tazobac ($) Susceptible     Tobramycin ($) Susceptible     Trimeth/Sulfa Susceptible                Susceptibility      Myroides species     WILLIAM     Amikacin ($) Resistant     Cefepime ($$) Intermediate     Ceftazidime ($) Resistant     Ceftriaxone ($) Intermediate     Ciprofloxacin ($) Susceptible     Gentamicin ($) Resistant     Tobramycin ($) Resistant                    CULTURE, URINE [[id number]] Collected:  11/26/20 0945    Order Status:  Completed Specimen:  Urine Updated:  11/28/20 1050     Special Requests: No Special Requests        Culture result: No Growth (<1000 cfu/mL)       CULTURE, BLOOD #2 [[id number]]  (Abnormal)  (Susceptibility) Collected:  11/26/20 0122    Order Status:  Completed Specimen:  Blood Updated:  11/30/20 0927     Special Requests: No Special Requests        Culture result:       Providencia rettgeri GROWING IN THE AEROBIC AND ANAEROBIC BOTTLE (SITE = ARM)                  Gram Negative Rods CALLED TO AND READ BACK BY Elio Cummings R.N. [id number] 11/27/20          Susceptibility      Providencia rettgeri     WILLIAM     Amikacin ($) Susceptible     Ampicillin ($) Resistant     Ampicillin/sulbactam ($) Susceptible     Cefazolin ($) Resistant     Cefepime ($$) Susceptible     Cefoxitin Susceptible     Ceftazidime ($) Susceptible     Ceftriaxone ($) Susceptible     Ciprofloxacin ($) Susceptible     Gentamicin ($) Susceptible     Levofloxacin ($) Susceptible     Piperacillin/Tazobac ($) Susceptible     Tobramycin ($) Susceptible                    CULTURE, BLOOD #1 [[id number]]  (Abnormal) Collected:  11/26/20 0112    Order Status:  Completed Specimen:  Blood Updated:  11/30/20 0928     Special Requests: No Special Requests        Culture result:       Providencia rettgeri growing in both bottles drawn (SITE =ARM)                  Gram Negative Rods CALLED TO Elio Cummings R.N. [id number] 11/27/20                  Please refer to previous blood culture  [id number], COLLECTED 11/26/20 FOR SENSITIVITIES      CULTURE, Grace People STAIN [[id number]] Collected:  11/25/20 1930    Order Status:  Completed Specimen:  Wound Updated:  12/01/20 1101     Special Requests: No Special Requests        GRAM STAIN Rare WBCs seen         Rare Gram Negative Rods Rare Gram Positive Cocci in pairs           Culture result:       Heavy MIXED ENTERIC Gram Negative Rods REFER TO [id number] FOR ID AND SENSITIVITIES                  Heavy Staphylococcus aureus REFER TO [facility] [id number] FOR SENSITIVITIES            Heavy  Mixed skin nica isolated       COVID-19 RAPID TEST [[id number]] Collected:  11/25/20 1445    Order Status:  Completed Specimen:  Nasopharyngeal Updated:  11/25/20 2589     Specimen source Nasopharyngeal        COVID-19 rapid test Not Detected        Comment: Rapid [employer] ID Now   Rapid NAAT:  The specimen is NEGATIVE for SARS-CoV-2, the novel coronavirus associated with COVID-19. Negative results should be treated as presumptive and, if inconsistent with clinical signs and symptoms or necessary for patient management, should be tested with an alternative molecular assay. Negative results do not preclude SARS-CoV-2 infection and should not be used as the sole basis for patient management decisions. This test has been authorized by the FDA under   an Emergency Use Authorization (EUA) for use by authorized laboratories. Fact sheet for Healthcare Providers: [employer] Fact sheet for Patients: kstattoo[employer]com   Methodology: Isothermal Nucleic Acid Amplification                Labs:     Recent Labs     12/07/20  1120 12/06/20  0600   WBC 12.9* 15.4*   HGB 11.5 11.4*   HCT 38.3 38.9   * 506*     Recent Labs     12/07/20  1120 12/06/20  0600     141 140   K 3.8  3.7 4.0     106 106   CO2 31  31 32   BUN 23*  24* 22*   CREA 0.38*  0.39* 0.50*   GLU 82  81 65   CA 8.7  8.6 9.0   PHOS 3.0  --      Recent Labs     12/07/20  1120 12/06/20  0600   ALT 13 11*   AP 94 100   TBILI 0.4 0.2   TP 6.3* 6.3*   ALB 2.2*  2.2* 2.2*   GLOB 4.1* 4.1*     No results for input(s): INR, PTP, APTT, INREXT, INREXT in the last 72 hours. No results for input(s): FE, TIBC, PSAT, FERR in the last 72 hours.    No results found for: FOL, RBCF   No results for input(s): PH, PCO2, PO2 in the last 72 hours. No results for input(s): CPK, CKNDX, TROIQ in the last 72 hours.     No lab exists for component: CPKMB  No results found for: CHOL, CHOLX, CHLST, CHOLV, HDL, HDLP, LDL, LDLC, DLDLP, TGLX, TRIGL, TRIGP, CHHD, CHHDX  No results found for: [facility]  Lab Results   Component Value Date/Time    Color Yellow/Straw 11/25/2020 04:50 PM    Appearance Clear 11/25/2020 04:50 PM    Specific gravity 1.013 11/25/2020 04:50 PM    pH (UA) 5.0 11/25/2020 04:50 PM    Protein Negative 11/25/2020 04:50 PM    Glucose Negative 11/25/2020 04:50 PM    Ketone Negative 11/25/2020 04:50 PM    Bilirubin Negative 11/25/2020 04:50 PM    Urobilinogen 0.1 11/25/2020 04:50 PM    Nitrites Negative 11/25/2020 04:50 PM    Leukocyte Esterase Negative 11/25/2020 04:50 PM    Bacteria Negative 11/25/2020 04:50 PM    WBC 5-10 11/25/2020 04:50 PM    RBC 0-5 11/25/2020 04:50 PM         Assessment:   Acute hypoxemic respiratory failure on 3 L  Acute systolic heart failure with ejection fraction of 15 to 20%  Acute cellulitis of bilateral legs/wound culture positive for staph aureus/providentia/Enterobacter/Morganella/Klebsiella  Status post debridement of the both leg wounds  Gram-negative bacteremia with providentia  Sepsis leukocytosis elevated procalcitonin and lactic acid  Community-acquired pneumonia  Poor hygiene  Elevated troponin  Leukocytosis  Hypothyroidism  Anemia s/p tranfusion   Stress test done showing large infarct/declined cardiac cath  Hypotensive secondary to Percocet  Plan:     WBC tagged Ceretec   Hold blood pressure medications  Discontinue Percocet  Continue aspirin Lipitor levothyroxine  Patient going for surgery today  Discontinue vancomycin  Continue IV ZOSYN /plan for transition to Levaquin and doxycycline on discharge  Doppler studies negative for DVT  Lasix 40 mg IV twice daily  Follow-up with infectious disease  Cardiology consult for elevated troponin  PT OT consult and wound care nurse consult  Repeat the labs in the morning  Respiratory consult for weaning of oxygen  Nebulizer treatment  S/p Transfuse 2 unit packed RBC    Discussed with the patient nurse to hold blood pressure medicines as patient hypotensive      Labs pending today  PT OT consult  Discussed with the  regarding discharge planning  Patient need placement to skilled care  Awaiting MRI for rule out osteomyelitis        Current Facility-Administered Medications:     technetium exametazime (Tc-CERETEC) injection 25 millicurie, 25 millicurie, IntraVENous, RAD ONCE, Aditi Vuong MD    amiodarone (CORDARONE) tablet 200 mg, 200 mg, Oral, DAILY, Fuentes Shankar MD, 200 mg at 12/07/20 1102    0.9% sodium chloride infusion 1,000 mL, 1,000 mL, IntraVENous, CONTINUOUS, Miguel Moon MD, Stopped at 12/04/20 1200    sodium chloride (NS) flush 5-40 mL, 5-40 mL, IntraVENous, PRN, Miguel Moon MD    fentaNYL citrate (PF) injection 50 mcg, 50 mcg, IntraVENous, Multiple, Miguel Moon MD    ondansetron [facility]) injection 4 mg, 4 mg, IntraVENous, PRN, Miguel Burnett MD    potassium chloride SR (KLOR-CON 10) tablet 40 mEq, 40 mEq, Oral, DAILY, Alen Barrera MD, 40 mEq at 12/07/20 0941    labetaloL (NORMODYNE;TRANDATE) 20 mg/4 mL (5 mg/mL) injection 5 mg, 5 mg, IntraVENous, Q5MIN PRN, Farhana Amaya MD    metoprolol (LOPRESSOR) injection 2.5 mg, 2.5 mg, IntraVENous, Q5MIN PRN, Farhana Amaya MD    hydrALAZINE (APRESOLINE) 20 mg/mL injection 10 mg, 10 mg, IntraVENous, Q10MIN PRN, Farhana Amaya MD    sodium hypochlorite (HALF STRENGTH DAKIN'S) 0.25% irrigation (bottle), , Topical, BID, Ran Wilcox CRNA    sodium chloride (NS) flush 5-40 mL, 5-40 mL, IntraVENous, Q8H, Scott Long DPM, 10 mL at 12/08/20 0505    sodium chloride (NS) flush 5-40 mL, 5-40 mL, IntraVENous, PRN, Sherwin Harris DPM   furosemide (LASIX) injection 40 mg, 40 mg, IntraVENous, BID, Madalyn Whitmore NP, 40 mg at 12/07/20 2100    0.9% sodium chloride infusion 250 mL, 250 mL, IntraVENous, PRN, Kurt Solano MD    labetaloL (NORMODYNE;TRANDATE) 20 mg/4 mL (5 mg/mL) injection 10 mg, 10 mg, IntraVENous, Q5MIN PRN, Miguel Burnett MD    metoprolol [facility]) injection 2.5 mg, 2.5 mg, IntraVENous, Q5MIN PRN, Faisal Butt MD    albuterol-ipratropium (DUO-NEB) 2.5 MG-0.5 MG/3 ML, 3 mL, Nebulization, Q6H RT, Keith Solano MD, 3 mL at 12/08/20 0158    piperacillin-tazobactam (ZOSYN) 3.375 g in 0.9% sodium chloride (MBP/ADV) 100 mL MBP, 3.375 g, IntraVENous, Q8H, Keith Solano MD, Last Rate: 25 mL/hr at 12/08/20 0504, 3.375 g at 12/08/20 0504    levothyroxine (SYNTHROID) tablet 75 mcg, 75 mcg, Oral, 6am, Keith Solano MD, 75 mcg at 12/08/20 0504    acetaminophen (TYLENOL) tablet 650 mg, 650 mg, Oral, Q6H PRN, 650 mg at 12/07/20 0559 **OR** acetaminophen (TYLENOL) suppository 650 mg, 650 mg, Rectal, Q6H PRN, Kurt Solano MD    polyethylene glycol (MIRALAX) packet 17 g, 17 g, Oral, DAILY PRN, Kurt Solano MD    ondansetron (ZOFRAN ODT) tablet 4 mg, 4 mg, Oral, Q8H PRN **OR** ondansetron (ZOFRAN) injection 4 mg, 4 mg, IntraVENous, Q6H PRN, Keith Solano MD    aspirin tablet 325 mg, 325 mg, Oral, DAILY, Keith Solano MD, 325 mg at 12/07/20 0941    atorvastatin (LIPITOR) tablet 40 mg, 40 mg, Oral, QHS, Keith Solano MD, 40 mg at 12/07/20 2100

## 2020-12-08 NOTE — PROGRESS NOTES
Comprehensive Nutrition Assessment    Type and Reason for Visit: Reassess(Interim)    Nutrition Recommendations/Plan:     Continue Cardiac diet, Soft solids  Continue Ensure TID  Continue Bert daily     Obtain updated measured wts at least weekly as pt diuresed  Nursing to document %meal and supplement intakes in I/Os    Nutrition Assessment:  Brought to ED after found hypoxic and cyanotic at home, noted poor living conditions. Pt confused per MD notes. Dx sepsis, cellulitis, PNA, pyruria. F/u CXR (12/5) showed under-expanded lungs, bibasilar atelectasis, small effusions. Bilat LE stasis wounds present on admit- WCN and Podiatry following, noted wounds concerning for gangrene. ID noted wound cx + Gram negative bacteremia. S/p I&D LE (12/4), plans for repeat debridement today. S/p x2 U PRBCs transfused for anemia. Cardio following for acute CHF. On 3L NC. MD indicated stress test showed large infarct, pt refused cardiac cath. Psych now consulted d/t concern for pt ability to care for self and ongoing AMS. Ordered Cardiac diet w/ Soft solids then adjusted to Regular diet. Recent intakes 50-85% recorded with 100% intake of Ensure Enlive per RN. Bert intake remains unknown. Appetite is very good per pt and RN. Intermittently NPO for procedures (including today at [facility]) pt reporting hunger cues and frequently requesting to eat. Previous intakes per EMR ranging 40-90% consistently. Pt remains altered, confused, does not understand being NPO for procedures. On initial visit, pt appeared to have good appetite, eating during interview. Agreed to [employer]. Labs: H/H: 10.1/33.5, K 3.0, BUN 25, Cr 0.49, Ca 8.4, BG WNL, AST 14. Meds: Zosyn, statin, lasix, KCl. Malnutrition Assessment:  Malnutrition Status:   Moderate malnutrition    Context:  Chronic illness     Findings of the 6 clinical characteristics of malnutrition:   Energy Intake:  No significant decrease in energy intake  Weight Loss:  7.0 - Greater than 7.5% over 3 months(>9% x 3-4 months)     Body Fat Loss:  No significant body fat loss,     Muscle Mass Loss:  1 - Mild muscle mass loss, Clavicles (pectoralis &deltoids)  Fluid Accumulation:  No significant fluid accumulation,      Estimated Daily Nutrient Needs:  Energy (kcal): 1235kcal (32kcal/kg EDW); Weight Used for Energy Requirements: (EDW)  Protein (g): 50g pro (1.3g/kg); Weight Used for Protein Requirements: (EDW)  Fluid (ml/day): 1235mL; Method Used for Fluid Requirements: 1 ml/kcal      Nutrition Related Findings: Will f/u for NFPE as able. On visual assessment, noted muscle wasting to deltoid and clavicle. Pt denied c/s difficulty n/v or c/d. Last BM 12/4 per EMR. Edema improving, 1+pitting bilat LE edema. Wounds:    Venous stasis([place] ulcers concerning [place] [facility] [place]       Current Nutrition Therapies:  DIET NUTRITIONAL SUPPLEMENTS Breakfast, Lunch, Dinner; Ensure [employer] (strawberry)  DIET NUTRITIONAL SUPPLEMENTS Lunch; Bert (alt flavor)  DIET REGULAR  DIET NPO Except Meds    Anthropometric Measures:  · Height:  5' (152.4 cm)  · Current Body Wt:  38.6 kg (85 lb)(EDW)   · Admission Body Wt:  91 lb 7.9 oz(bed scale 11/26; noted 3+pitting edema present)    · Usual Body Wt:  50.3 kg (111 lb)(Pt reported weight from 'several months ago' at PCP office)     · Ideal Body Wt:  105 lbs:  81 %   · BMI Category:  Underweight (BMI less than 18.5)     Wt hx: 41.7kg (12/8), 45.3kg (12/7), 46.6kg (12/6), 50kg (12/2), 41.2kg (12/1), 41.5kg (11/26). Several wt fluctuations noted since admit likely 2/2 fluid shifts vs error in bed measure. Pt appears closer to admit wt, adjusted for edema to LE. Pt reported UBW of 111lbs several months ago - indicating 20lb wt loss (>9%BW) in estimated 3-4 months - significant.     Nutrition Diagnosis:   · Underweight related to (suboptimal PO intakes to maintain appropriate BW) as evidenced by BMI, weight loss    Nutrition Interventions:   Food and/or Nutrient Delivery: Continue current diet, Continue oral nutrition supplement  Nutrition Education and Counseling: No recommendations at this time  Coordination of Nutrition Care: Continue to monitor while inpatient    Goals:  Meet >75% EENs x 5-7 days (met)  Wt gain 1kg/week from EDW (NEDA 2/2 fluid status)  Lytes and BG WNL (not met)     Nutrition Monitoring and Evaluation:   Behavioral-Environmental Outcomes: None identified  Food/Nutrient Intake Outcomes: Food and nutrient intake, Supplement intake  Physical Signs/Symptoms Outcomes: GI status, Skin, Weight    Discharge Planning:    Continue oral nutrition supplement(to promote wound healing)     Electronically signed by Danielito Smith on 12/8/2020 at 12:54 PM    Contact: [phone]

## 2020-12-08 NOTE — PROGRESS NOTES
Problem: Falls - Risk of  Goal: *Absence of Falls  Description: Document Rod Stanford Fall Risk and appropriate interventions in the flowsheet.   Outcome: Progressing Towards Goal  Note: Fall Risk Interventions:  Mobility Interventions: Bed/chair exit alarm, PT Consult for assist device competence, Strengthening exercises (ROM-active/passive)    Mentation Interventions: Bed/chair exit alarm, Update white board    Medication Interventions: Bed/chair exit alarm, Patient to call before getting OOB    Elimination Interventions: Bed/chair exit alarm, Call light in reach, Patient to call for help with toileting needs    History of Falls Interventions: Bed/chair exit alarm

## 2020-12-08 NOTE — PROGRESS NOTES
Pulmonary, Critical Care Note    Name: Brigette Luz MRN: [id number]   : 1945 Hospital: [address] [place] [address]   Date: 2020  Admission date: 2020 Hospital Day: 14       Subjective/Interval History:   Seen on the medical floor. Consult requested for acute hypoxic respiratory failure. She is an extremely poor historian states she feels fine to leave her alone because she is trying to eat  12/ respirations nonlabored she remains on 3 L of oxygen. States she is not having any surgery  12/3 awake eating breakfast respirations nonlabored   sleeping but awakens easily denies shortness of breath   sleeping awakens easily denies shortness of breath respirations nonlabored    Hospital Problems  Never Reviewed          Codes Class Noted POA    PNA (pneumonia) ICD-10-CM: J18.9  ICD-9-CM: 486  2020 Unknown        Pneumonia ICD-10-CM: J18.9  ICD-9-CM: 702  2020 Unknown              IMPRESSION:   1. Acute hypoxic respiratory failure secondary to pulmonary edema remains on 2 L of oxygen will check overnight oximetry split study  2. Hypokalemia to be repleted   3. Hypercapnia likely metabolic alkalosis improved after Diamox has worsened again  4. Severe cardiomyopathy  5. Severe mitral regurg  6. Bilateral pleural effusions secondary to pulmonary edema no significant change on x-ray 12/3  7. Providencia septicemia  8. Anemia  9. Hypothyroidism treated  10. Questionable early dementia        RECOMMENDATIONS/PLAN:   1. Creatinine stable continue twice daily Lasix   2. Continue daily potassium give extra dose  3. Follow renal function  4. Follow H&H  5.           [x] High complexity decision making was performed  [x] See my orders for details      Subjective/Initial History:     I was asked by Luis Johnson MD to see Brigette Luz  a 66 y.o.    female in consultation for a chief complaint of acute hypoxic respiratory failure and pleural effusions      Allergies Allergen Reactions    Tetracycline Other (comments)     Causes mouth sores    Morphine Nausea and Vomiting        MAR reviewed and pertinent medications noted or modified as needed     Current Facility-Administered Medications   Medication    technetium exametazime (Tc-CERETEC) injection 25 millicurie    amiodarone (CORDARONE) tablet 200 mg    0.9% sodium chloride infusion 1,000 mL    sodium chloride (NS) flush 5-40 mL    fentaNYL citrate (PF) injection 50 mcg    ondansetron (ZOFRAN) injection 4 mg    potassium chloride SR (KLOR-CON 10) tablet 40 mEq    labetaloL (NORMODYNE;TRANDATE) 20 mg/4 mL (5 mg/mL) injection 5 mg    metoprolol (LOPRESSOR) injection 2.5 mg    hydrALAZINE (APRESOLINE) 20 mg/mL injection 10 mg    sodium hypochlorite (HALF STRENGTH DAKIN'S) 0.25% irrigation (bottle)    sodium chloride (NS) flush 5-40 mL    sodium chloride (NS) flush 5-40 mL    furosemide (LASIX) injection 40 mg    0.9% sodium chloride infusion 250 mL    labetaloL (NORMODYNE;TRANDATE) 20 mg/4 mL (5 mg/mL) injection 10 mg    metoprolol (LOPRESSOR) injection 2.5 mg    albuterol-ipratropium (DUO-NEB) 2.5 MG-0.5 MG/3 ML    piperacillin-tazobactam (ZOSYN) 3.375 g in 0.9% sodium chloride (MBP/ADV) 100 mL MBP    levothyroxine (SYNTHROID) tablet 75 mcg    acetaminophen (TYLENOL) tablet 650 mg    Or    acetaminophen (TYLENOL) suppository 650 mg    polyethylene glycol (MIRALAX) packet 17 g    ondansetron (ZOFRAN ODT) tablet 4 mg    Or    ondansetron (ZOFRAN) injection 4 mg    aspirin tablet 325 mg    atorvastatin (LIPITOR) tablet 40 mg      Patient PCP: Jeffy, MD Karl  PMH:  has no past medical history on file. PSH:   has no past surgical history on file. FHX: family history is not on file.    SHX:           Objective:     Vital Signs: Telemetry:    normal sinus rhythm Intake/Output:   Visit Vitals  /66   Pulse 78   Temp 97.8 °F (36.6 °C)   Resp 20   Ht 5' (1.524 m)   Wt 45.3 kg (99 lb 13.9 oz)   SpO2 98%   BMI 19.50 kg/m²       Temp (24hrs), Av.9 °F (36.6 °C), Min:97.6 °F (36.4 °C), Max:98.3 °F (36.8 °C)        O2 Device: Nasal cannula O2 Flow Rate (L/min): 2 l/min       Wt Readings from Last 4 Encounters:   20 45.3 kg (99 lb 13.9 oz)   20 47.7 kg (105 lb 2.6 oz)   16 57.2 kg (126 lb)   12/07/10 64 kg (141 lb)          Intake/Output Summary (Last 24 hours) at 2020 1226  Last data filed at 2020 0641  Gross per 24 hour   Intake    Output 2100 ml   Net -2100 ml       Last shift:      No intake/output data recorded.   Last 3 shifts:  1901 -  0700  In: 100 [P.O.:100]  Out: 3000 [Urine:3000]       Physical Exam:   General:  female; chronically ill appearing frail  HEENT: NCAT, oral mucosa clear  Eyes: anicteric; conjunctiva clear extraocular movements intact  Neck: no nodes, neck veins visible no accessory muscle use  Chest: no deformity,   Cardiac: Regular rate and rhythm systolic murmur present has edema of the extremities  Lungs: distant breath sounds; no wheezing rales are heard posteriorly no breath sounds in the bases  Abd: Soft positive bowel sounds no tenderness she has some posterior abdominal wall edema  Ext: Edema of the legs arms and sacrum  : clear urine  Neuro: Awake more alert mildly confused  will move all 4 extremities  Psych- no agitation, oriented to person;   Skin: Lower extremities have very flaky skin with erythema  Pulses: Brachial radial femoral pulses intact  Capillary: Slow capillary refill    Labs:    Recent Labs     20  1052 20  1120 20  0600   WBC 12.1* 12.9* 15.4*   HGB 10.1* 11.5 11.4*   * 526* 506*     Recent Labs     20  1052 20  1120 20  0600    142  141 140   K 3.0* 3.8  3.7 4.0    107  106 106   CO2 37* 31  31 32   GLU 94 82  81 65   BUN 25* 23*  24* 22*   CREA 0.49* 0.38*  0.39* 0.50*   CA 8.4* 8.7  8.6 9.0   PHOS  --  3.0  --    ALB 2.1* 2.2*  2.2* 2.2*   ALT 13 13 11*     2 L nasal oxygen with oxygen saturation 98%  BNP 16,597  CRP 3.04  11/26 blood culture with Providencia sensitive to Zosyn  Urine culture no growth  Wound culture with multiple organisms including methicillin sensitive staph aureus Klebsiella Providencia Enterobacter and Morganella  11/27 echo ejection fraction 20% dilated left atrium grade 2 diastolic dysfunction moderate to severe mitral regurg IVC dilatation RVSP 55  12/1 nuclear cardiac scan with ejection fraction of 19%  Lab Results   Component Value Date/Time    Culture result: Moderate Morganella morganii ssp morganii 12/04/2020 10:30 AM    Culture result:  12/04/2020 10:30 AM     Moderate Stenotrophomonas (xantho.) maltophilia CLSI guidelines do not recommend reporting susceptibilities for s. Maltophilia. Trimethoprim/sulfamethoxazole is the drug of choice. Culture result: Moderate Enterobacter cloacae complex 12/04/2020 10:30 AM    Culture result: Light Staphylococcus aureus 12/04/2020 10:30 AM    Culture result: Light Enterobacter cloacae complex 12/04/2020 10:30 AM    Culture result: Light Morganella morganii 12/04/2020 10:30 AM    Culture result:  12/04/2020 10:30 AM     Light Enterobacter cloacae complex . ..(2ND COLONY TYPE)    Culture result: Light Staphylococcus aureus 12/04/2020 10:30 AM     Lab Results   Component Value Date/Time    TSH 0.26 (L) 11/25/2020 12:30 PM       Imaging:    CXR Results  (Last 48 hours)               12/07/20 0835  XR CHEST PA LAT Final result    Impression:  Impression: Underexpanded lungs with bibasilar atelectasis. Small pleural   effusions. Hydrostatic edema. No significant interval change. Narrative:  Chest, 2 views, 12/7/2020       History: Pulmonary edema. Bilateral effusions. Comparison: Including chest 12/3/2020. Findings: A right-sided pacemaker device with leads in the right atrium and   right ventricle is in place.   The cardiac silhouette is stable globular   enlargement which could indicate cardiomyopathy or pericardial effusion. The   thoracic aorta is tortuous. The lungs are underexpanded with bibasilar   atelectasis. Small pleural effusions are noted. There is pulmonary vascular   congestion and hydrostatic edema. No pneumothorax is identified. The thoracic   spine again demonstrates kyphosis, degenerative changes and compression   deformities. Results from [place] encounter on 11/25/20   XR CHEST PA LAT    Narrative Chest, 2 views, 12/7/2020    History: Pulmonary edema. Bilateral effusions. Comparison: Including chest 12/3/2020. Findings: A right-sided pacemaker device with leads in the right atrium and  right ventricle is in place. The cardiac silhouette is stable globular  enlargement which could indicate cardiomyopathy or pericardial effusion. The  thoracic aorta is tortuous. The lungs are underexpanded with bibasilar  atelectasis. Small pleural effusions are noted. There is pulmonary vascular  congestion and hydrostatic edema. No pneumothorax is identified. The thoracic  spine again demonstrates kyphosis, degenerative changes and compression  deformities. Impression Impression: Underexpanded lungs with bibasilar atelectasis. Small pleural  effusions. Hydrostatic edema. No significant interval change. XR CHEST SNGL V    Narrative Study: XR CHEST SNGL V    Clinical indication: Pleural effusions    Comparison: Chest x-ray 12/1/2020. Findings: Only hazy bibasilar airspace disease/pleural effusions. No evidence of  pneumothorax. Cardiac silhouette is enlarged. Stable positioning of right chest wall pacemaker  device. Mild vascular congestion. No acute osseous abnormality identified. Impression Impression:  Little interval change. XR CHEST PORT    Narrative Chest, frontal view, 12/1/2020    History: CHF. Comparison: Including chest 11/27/2020.     Findings: The patient's head obscures the left upper lung zone.  Right-sided  pacemaker device with 2 leads is in place. The cardiac silhouette again  demonstrates globular enlargement which can indicate cardiomyopathy or  pericardial effusion. The lungs are underexpanded with bibasilar atelectasis,  increased as compared to chest 11/27/2020. Right pleural effusion is possible. There is pulmonary vascular congestion and hydrostatic edema. No pneumothorax  is identified. The osseous structures are stable. Impression Impression: Underexpanded lungs with bibasilar atelectasis, increased. Possible  right pleural effusion. Hydrostatic edema. · Providencia septicemia questionably urinary tract or from skin site. Urine culture was no growth I do not know if it was collected after antibiotics were given. She is currently on Zosyn   She has pleural effusions and accentuated interstitial markings consistent with CHF/pulmonary edema. She is receiving Lasix. She has developed hypokalemia with the diuresis will supplement potassium. ·   · Thank you for allowing me to see Ms. Estefany Hall  ·     Yen Block MD

## 2020-12-08 NOTE — PROGRESS NOTES
Progress Note    Patient: Elton Leach MRN: [id number]  SSN: [id number]    YOB: 1945  Age: 66 y.o. Sex: female      Admit Date: 11/25/2020    LOS: 13 days     Subjective:   Patient followed for sepsis with cellulitis both legs with blood cultures growing Providencia and wound culture with mixed Gram negative nica identified below and MSSA. Following debridement, intra-operative cultures preliminarily showing similar results. She remains afebrile. WBC/CRP decreasing. Still on Zosyn alone. Patient again asleep but appears to be resting comfortably. Objective:     Vitals:    12/07/20 1952 12/08/20 0302 12/08/20 0742 12/08/20 0902   BP: (!) 101/56 (!) 92/59 (!) 99/58    Pulse: 84 86 82    Resp: 18 18 19    Temp: 98.3 °F (36.8 °C) 97.6 °F (36.4 °C) 98.2 °F (36.8 °C)    SpO2: 94% 93% 91% 96%   Weight:       Height:            Intake and Output:  Current Shift: No intake/output data recorded. Last three shifts: 12/06 1901 - 12/08 0700  In: 100 [P.O.:100]  Out: 3000 [Urine:3000]    Physical Exam:   Constitutional:       General: She is not in acute distress. Appearance: She is chronically ill-appearing. HENT:      Head: Normocephalic and atraumatic. Nose:      Comments: Nasal O2 cannula  Lungs: clear bilaterally but diminished  Genitourinary:     Comments: Purewick catheter  Musculoskeletal:         General: Swelling present. No tenderness. Right lower leg: Edema present. Left lower leg: Edema present. Comments: Both calves are bandaged at this time with bulky gauze dressing, however, erythema has markedly decreased along with swelling. Neurological: asleep   Psychiatric: asleep.      Lab/Data Review:     WBC Pending   Procalcitonin <0.05 <0.11 <0.10 <0.69 <1.19  CRP Pending <0.90 < 1.24 <1.28 <1.98  <3.04 <9.26 <16.00    Covid-19 negative    Blood cultures (11/26) Providenica rettgeri  Blood cultures (11/26) Providenica rettgeri  Urine culture (11/26) No growth FiNAL  Wound culture (11/27)  MSSA, Klebsiella oxytoca, Providencia rettgeri, Morganella morganii (2 strains), Myroides species, Enterobacter cloacae  Wound culture left leg (12/4) Light Staphylococcus aureus and  Gram negative rods  Wound culture (12/4) Light Staphylococcus aureus and Morganella morganii  Assessment:     Active Problems:    PNA (pneumonia) (11/25/2020)      Pneumonia (11/25/2020)    1. Cellulitis, bilateral lower extremities, severe, secondary to MSSA, Klebsiella oxytoca, Providencia rettgeri, Morganella morganii (2 strains), Myroides species, Enterobacter cloacae, Day #13 IV Zosyn, status post debridement, improving. 2. Gram negative bacteremia with Providencia rettgeri, probably secondary to above, on IV Zosyn. 3. Sepsis with leukocytosis, elevated procalcitonin, CRP and lactic acid, resolving. 4. SOB  5. Pyuria, negative urine culture    Comment:  WBC and CRP decreasing. Intra-operative cultures appear to be growing same organisms. Plan:   1. Continue IV Zosyn; may be able to transition to oral Levaquin and Doxcycycline  2. In am, repeat CBC and CRP   3. Follow-up intra-operative cultures  4.  Follow-up Ceretec scan both tibia to rule osteomyelitis     Signed By: Forrest Mart MD     December 8, 2020

## 2020-12-08 NOTE — PROGRESS NOTES
Progress Note  Date:2020       Room:208/01  Patient Name:Naa Osorio     Date of Birth:     Age:75 y.o. Subjective    Subjective   Pt seen at [facility]. Resting comfortably. Denies any new complaints. Per nursing, no acute overnight changes    Review of Systems   Unable to accurately obtain due to altered mental status but pt admits mild pain to her legs exacerbated with dressing changes    Objective         Vitals Last 24 Hours:  TEMPERATURE:  Temp  Av.9 °F (36.6 °C)  Min: 97.6 °F (36.4 °C)  Max: 98.3 °F (36.8 °C)  RESPIRATIONS RANGE: Resp  Av  Min: 18  Max: 18  PULSE OXIMETRY RANGE: SpO2  Av.2 %  Min: 93 %  Max: 99 %  PULSE RANGE: Pulse  Av.4  Min: 77  Max: 86  BLOOD PRESSURE RANGE: Systolic (68EQX), CGX:457 , Min:92 , RSX:533   ; Diastolic (84UXC), SXE:85, Min:56, Max:68    I/O (24Hr): Intake/Output Summary (Last 24 hours) at 2020 4651  Last data filed at 2020 1544  Gross per 24 hour   Intake 100 ml   Output 2000 ml   Net -1900 ml     Objective   GEN: Pt is alert and in NAD. Dressings noted to B/L LE are C/D/I. No family noted at [facility]  DERM: Wounds noted to both LE with fibrogranular base. No malodor noted. Periwound erythema noted. No dry skin noted to B/L LE. No proximal lymphatic streaking noted to B/L LE. VASC: Pedal pulses (DP/PT) faintly palpable to B/L LE. CFT<3sec to all digits of B/L LE. No pedal hair growth noted to the level of the digits for B/L LE. Skin temp is warm to warm from proximal to distal for B/L LE. Neg homans/maggi signs to B/L LE.  No varicosities or telangectasias noted to B/L LE.  NEURO: Protective and epicritic sensations grossly intact to B/L LE  MSK: (+) POP, No gross deformities. Decreased muscle tone and bulk noted to B/L LE.  PSYCH: Cooperative with tearful affect    Labs/Imaging/Diagnostics    Labs:  CBC:  Recent Labs     20  1120 20  0600   WBC 12.9* 15.4*   RBC 4.04 4.01   HGB 11.5 11.4* HCT 38.3 38.9   MCV 94.8 97.0   RDW 14.4 14.4   * 506*     CHEMISTRIES:  Recent Labs     12/07/20  1120 12/06/20  0600     141 140   K 3.8  3.7 4.0     106 106   CO2 31  31 32   BUN 23*  24* 22*   CA 8.7  8.6 9.0   PHOS 3.0  --    PT/INR:No results for input(s): INR, INREXT in the last 72 hours. No lab exists for component: PROTIME  APTT:No results for input(s): APTT in the last 72 hours. LIVER PROFILE:  Recent Labs     12/07/20  1120 12/06/20  0600   AST 16 18   ALT 13 11*     Lab Results   Component Value Date/Time    ALT (SGPT) 13 12/07/2020 11:20 AM    AST (SGOT) 16 12/07/2020 11:20 AM    Alk. phosphatase 94 12/07/2020 11:20 AM    Bilirubin, total 0.4 12/07/2020 11:20 AM       Imaging Last 24 Hours:  Xr Chest Pa Lat    Result Date: 12/7/2020  Chest, 2 views, 12/7/2020 History: Pulmonary edema. Bilateral effusions. Comparison: Including chest 12/3/2020. Findings: A right-sided pacemaker device with leads in the right atrium and right ventricle is in place. The cardiac silhouette is stable globular enlargement which could indicate cardiomyopathy or pericardial effusion. The thoracic aorta is tortuous. The lungs are underexpanded with bibasilar atelectasis. Small pleural effusions are noted. There is pulmonary vascular congestion and hydrostatic edema. No pneumothorax is identified. The thoracic spine again demonstrates kyphosis, degenerative changes and compression deformities. Impression: Underexpanded lungs with bibasilar atelectasis. Small pleural effusions. Hydrostatic edema. No significant interval change. Assessment//Plan   Active Problems:    PNA (pneumonia) (11/25/2020)      Pneumonia (11/25/2020)      Assessment & Plan    - Bilateral LE Wounds  - PAD  - Sepsis (resolving)  - Altered mental status (acute)        Patient seen and evaluated at bedside  - Most recent labs personally reviewed  - Cont current wound care.  Wounds responding well  - B/L LE MRI cancelled (?). Pending ceretec scan to r/o osteo to both tibias  - Abx per ID  - I will follow closely     The wounds responded very well to the initial debridement and cont aggressive wound care, plan for an additional debridement today. NPO and consent to be signed by BETO (sister)         Scott HERBERT  [address] Magy Harris DPM, [address] [facility] and [place] [address] Surgery  [address] [place] [address], [address] [place] [address], [address] [place] [address],[address]  [facility] Record:  [phone]  F:  [phone]  C:  [phone]  Electronically signed by Edi Whyte DPM on 12/8/2020 at 6:32 AM

## 2020-12-08 NOTE — ANESTHESIA PREPROCEDURE EVALUATION
Relevant Problems   No relevant active problems       Anesthetic History   No history of anesthetic complications            Review of Systems / Medical History  Patient summary reviewed, nursing notes reviewed and pertinent labs reviewed    Pulmonary    COPD      Pneumonia and shortness of breath      Comments: Home Oxygen  Acute PNA   Neuro/Psych   Within defined limits           Cardiovascular          CHF: NYHA Classification IV, dyspnea on exertion  Dysrhythmias   Pacemaker and CAD    Exercise tolerance: <4 METS: Cardiology Consult Assessment and Plan:  Case discussed with Dr. Jonel Isabel and our impression and recommendations are as follows:  1. Acute systolic CHF: EF 19-68, UNCLEAR IF NEW OR OLD. Continue diuresis, with lasix IV 40 bid. BNP 07759, still elevated. Renal function low normal. Will monitor. Stress test done today and pending reading. Will add low dose ACE/BB once euvolemic. Please keep serum potassium between 4-5 and serum magnesium > 2. K 2.9, repletion have been ordered already. Will monitor with diuresis. 2. PPM: [facility] [facility] PPM. Contacted rep. Per documents implanted in 2005 by Dr. Mario Alberto Lowry for complete heart block. Interrogated today showing several NSVT, unsure if they correlate with possible syncopal episode. 3. Elevated troponin: Likely demand ischemia from CHF. No active chest pain. 4. Pneumonia and cellulitis: Rx per primary. Pending lower extremity wound debridement.          Comments: V paced - hx of complete heart block  Nuclear Stress Test 12/1/2020  · Baseline ECG: Normal EKG, rare PVCs. · Gated SPECT: Left ventricular function post-stress was abnormal. Calculated ejection fraction is 19%. · Myocardial perfusion imaging defect 1: There is a defect that is large in size with a severe reduction in uptake present in the inferior and apex location(s) that is non-reversible. There is abnormal wall motion in the defect area. Viability in the area is poor.  The defect appears to be infarction. Perfusion defect was visually and quantitatively present. · Myocardial perfusion imaging defect 2: There is a defect that is small in size affecting the anteroseptal location(s) that is partially reversible. The defect appears to be infarction with baldo-infarct ischemia. Perfusion defect was visually and quantitatively present. · Left ventricular perfusion is abnormal.  · Myocardial perfusion imaging supports an intermediate risk stress test.       ECHO 11/27/2020  · LV: Estimated LVEF is 15 - 20%. Moderately dilated left ventricle. Severe concentric hypertrophy. Moderate-to-severely reduced systolic function. Akinesis of the basal anteroseptal and basal anterior wall(s). Refused definity. · LA: Severely dilated left atrium. · RV: Moderately dilated right ventricle. Pacing wire present  · RA: Mildly dilated right atrium. · AV: Cannot rule out bicuspid aortic valve. · MV: Mitral valve non-specific thickening. Moderate to severe mitral valve regurgitation is present. · TV: Moderate tricuspid valve regurgitation is present. · IVC: Moderately elevated central venous pressure (10-15 mmHg); IVC diameter is larger than 21 mm and collapses more than 50% with respiration. · RVSP 55.  · Pericardium: There is a small left pleural effusion. GI/Hepatic/Renal         Renal disease: CRI       Endo/Other      Hypothyroidism       Other Findings            No past medical history on file. No past surgical history on file.     Current Outpatient Medications   Medication Instructions    apixaban (ELIQUIS) 2.5 mg, Oral, 2 TIMES DAILY    carvediloL (COREG) 6.25 mg, Oral, 2 TIMES DAILY WITH MEALS    furosemide (LASIX) 40 mg, Oral, DAILY    levothyroxine (SYNTHROID) 75 mcg, DAILY BEFORE BREAKFAST    lisinopriL (PRINIVIL, ZESTRIL) 5 mg, Oral, DAILY    methocarbamoL (ROBAXIN) 500 mg, Oral, 3 TIMES DAILY    omeprazole (PRILOSEC) 20 mg, Oral, DAILY    potassium chloride (K-DUR, KLOR-CON) 20 mEq tablet 20 mEq, Oral, DAILY       No current facility-administered medications for this visit. No current outpatient medications on file.      Facility-Administered Medications Ordered in Other Visits   Medication Dose Route Frequency    technetium exametazime (Tc-CERETEC) injection 25 millicurie  25 millicurie IntraVENous RAD ONCE    amiodarone (CORDARONE) tablet 200 mg  200 mg Oral DAILY    0.9% sodium chloride infusion 1,000 mL  1,000 mL IntraVENous CONTINUOUS    sodium chloride (NS) flush 5-40 mL  5-40 mL IntraVENous PRN    fentaNYL citrate (PF) injection 50 mcg  50 mcg IntraVENous Multiple    ondansetron (ZOFRAN) injection 4 mg  4 mg IntraVENous PRN    potassium chloride SR (KLOR-CON 10) tablet 40 mEq  40 mEq Oral DAILY    labetaloL (NORMODYNE;TRANDATE) 20 mg/4 mL (5 mg/mL) injection 5 mg  5 mg IntraVENous Q5MIN PRN    metoprolol (LOPRESSOR) injection 2.5 mg  2.5 mg IntraVENous Q5MIN PRN    hydrALAZINE (APRESOLINE) 20 mg/mL injection 10 mg  10 mg IntraVENous Q10MIN PRN    sodium hypochlorite (HALF STRENGTH DAKIN'S) 0.25% irrigation (bottle)   Topical BID    sodium chloride (NS) flush 5-40 mL  5-40 mL IntraVENous Q8H    sodium chloride (NS) flush 5-40 mL  5-40 mL IntraVENous PRN    furosemide (LASIX) injection 40 mg  40 mg IntraVENous BID    0.9% sodium chloride infusion 250 mL  250 mL IntraVENous PRN    labetaloL (NORMODYNE;TRANDATE) 20 mg/4 mL (5 mg/mL) injection 10 mg  10 mg IntraVENous Q5MIN PRN    metoprolol (LOPRESSOR) injection 2.5 mg  2.5 mg IntraVENous Q5MIN PRN    albuterol-ipratropium (DUO-NEB) 2.5 MG-0.5 MG/3 ML  3 mL Nebulization Q6H RT    piperacillin-tazobactam (ZOSYN) 3.375 g in 0.9% sodium chloride (MBP/ADV) 100 mL MBP  3.375 g IntraVENous Q8H    levothyroxine (SYNTHROID) tablet 75 mcg  75 mcg Oral 6am    acetaminophen (TYLENOL) tablet 650 mg  650 mg Oral Q6H PRN    Or    acetaminophen (TYLENOL) suppository 650 mg  650 mg Rectal Q6H PRN    polyethylene glycol (MIRALAX) packet 17 g  17 g Oral DAILY PRN    ondansetron (ZOFRAN ODT) tablet 4 mg  4 mg Oral Q8H PRN    Or    ondansetron (ZOFRAN) injection 4 mg  4 mg IntraVENous Q6H PRN    aspirin tablet 325 mg  325 mg Oral DAILY    atorvastatin (LIPITOR) tablet 40 mg  40 mg Oral QHS       No data found.     Lab Results   Component Value Date/Time    WBC 12.9 (H) 12/07/2020 11:20 AM    HGB 11.5 12/07/2020 11:20 AM    HCT 38.3 12/07/2020 11:20 AM    PLATELET 474 (H) 83/00/8982 11:20 AM    MCV 94.8 12/07/2020 11:20 AM     Lab Results   Component Value Date/Time    Sodium 141 12/07/2020 11:20 AM    Sodium 142 12/07/2020 11:20 AM    Potassium 3.7 12/07/2020 11:20 AM    Potassium 3.8 12/07/2020 11:20 AM    Chloride 106 12/07/2020 11:20 AM    Chloride 107 12/07/2020 11:20 AM    CO2 31 12/07/2020 11:20 AM    CO2 31 12/07/2020 11:20 AM    Anion gap 4 (L) 12/07/2020 11:20 AM    Anion gap 4 (L) 12/07/2020 11:20 AM    Glucose 81 12/07/2020 11:20 AM    Glucose 82 12/07/2020 11:20 AM    BUN 24 (H) 12/07/2020 11:20 AM    BUN 23 (H) 12/07/2020 11:20 AM    Creatinine 0.39 (L) 12/07/2020 11:20 AM    Creatinine 0.38 (L) 12/07/2020 11:20 AM    BUN/Creatinine ratio 62 (H) 12/07/2020 11:20 AM    BUN/Creatinine ratio 61 (H) 12/07/2020 11:20 AM    GFR est AA >60 12/07/2020 11:20 AM    GFR est AA >60 12/07/2020 11:20 AM    GFR est non-AA >60 12/07/2020 11:20 AM    GFR est non-AA >60 12/07/2020 11:20 AM    Calcium 8.6 12/07/2020 11:20 AM    Calcium 8.7 12/07/2020 11:20 AM     No results found for: APTT, PTP, INR, INREXT, INREXT  Lab Results   Component Value Date/Time    Glucose 81 12/07/2020 11:20 AM    Glucose 82 12/07/2020 11:20 AM     Physical Exam    Airway  Mallampati: II  TM Distance: 4 - 6 cm  Neck ROM: normal range of motion   Mouth opening: Normal     Cardiovascular    Rhythm: regular  Rate: normal        Comments: V paced Dental    Dentition: Poor dentition     Pulmonary          Rales       Abdominal  GI exam deferred       Other Findings            Anesthetic Plan    ASA: 4  Anesthesia type: general          Induction: Intravenous  Anesthetic plan and risks discussed with: Patient and Family

## 2020-12-08 NOTE — PROGRESS NOTES
CM spoke to psychiatrist Dr. Karan Daniel regarding consult. Dr. Karan Daniel stated the patient was alert and oriented times four. Dr. Karan Daniel stated that the patient is competent. CM spoke to patient to get a SNF choice. Patient declined SNF. Patient stated that her sister Cary Denis [phone]) stays with her. CM spoke to [facility] worker [facility] [phone] regarding patient refusing SNF. APS Worker stated that APS case is still open with patient. APS Worker reported as long as patient is competent, she can refuse SNF. APS Worker recommended that patient get a personal care aide. CM contacted sister Cary Denis [phone]); received no answer and left a voice message.

## 2020-12-08 NOTE — PROGRESS NOTES
Cardiology Progress Note    Patient seen and examined. This is a patient who is followed for heart failure with reduced EF. She is sleeping in bed, no acute distress. She answers questions appropriately. She denies chest pain or shortness of breath. No other complaints reported. Telemetry reviewed, V paced 70s    Pertinent review of system items noted above, all other systems are negative. Current medications reviewed. Physical Examination  Vital signs are stable, Blood Pressure 101/66, Pulse 74  No apparent distress. Heart is regular, rate and rhythm. Normal S1, S2, murmur present  Lungs are diminished  Abdomen is soft, nontender, normal bowel sounds. Lower extremities are dressed    Labs reviewed       Case discussed with Dr. Kathy Deleon and our impression and recommendations are as follows:  1. Heart failure with reduced EF: EF 15-20, unsure if this is new diagnosis. Continue diuresis, with close monitoring of I&O and daily weights. Stress test with large infarction and only a small area of baldo-infarct. Continue medical management. No ACE/ARB therapy due to low blood pressure. Keep serum potassium between 4-5 and serum magnesium > 2.      2. PPM: [employer] [facility] PPM. Per documents implanted in 2005 by Dr. Kaye Tabor for complete heart block. Device interrogated showing several episodes NSVT, unsure if they correlate with possible syncopal episode. One on 11/25 at 0942 lasting 3:36s. Consider BB once BP allows. Continue Amiodarone. Monitor telemetry and electrolytes    3. Elevated troponin: Likely demand ischemia from CHF. No active chest pain reported. 4. Cellulitis: Treatment per primary. S/p lower extremity wound debridement. 5. Hyperlipidemia: Continue high intensity statin therapy. Please do no hesitate to call me or Dr. Kathy Deleon if additional questions arise.

## 2020-12-08 NOTE — CONSULTS
[facility]    Name:  John Cervantes  MR#:  [id number]  :  1945  ACCOUNT #:  [de-identified]  DATE OF SERVICE:  2020    PSYCHIATRIC CONSULTATION    REASON FOR CONSULTATION:  I was asked today by the nursing staff to see the patient in 2 08 the patient for her ability to take care of self. Saw the patient, chart reviewed, spoke with Feliz Weldon, . HISTORY OF PRESENT ILLNESS:  This is a 75-year-old  female patient, seen for followup. The patient do not know why she came here. She do not know where she is at. She is a poor historian. She initially says she is 50years old, then she says 66years old. She used to work as a housekeeping, says she lives somewhere in [place], could not tell me the street number or address. Later, she corrected herself her age and even the date of birth, could not tell me who the family doctor is. She could not tell me much psychiatric care. She subsequently is disoriented, thought this is Tuesday, could not tell me the month or the year. She knew this is the hospital, could not come up with the name of the hospital.  No hallucination, no delusion, no agitation, but anxious, perplexed, confused. Poor insight, poor judgment. Did not know the day of the week and the date, date of birth. LABORATORY DATA:  Her labs, metabolic panel, slightly elevated BUN of 23. C-reactive protein elevated, 1.24. DIAGNOSES:  Cognitive impairment, probably early dementia. Altered mental status. At this point, the patient says she just woke up anxious, perplexed, poor historian. I will come back and see another time before I make recommendations. It appears to me she is not able to take care of herself, she needs someone else to help her. I will see her again tomorrow.       MD YOLIE [id number]  D:  2020 23:07  T:  2020 5:27  JOB #:  [id number]

## 2020-12-08 NOTE — OP NOTES
Operative Report    Patient: Tanner Mcdermott MRN: [id number]  SSN: [id number]    YOB: 1945  Age: 66 y.o. Sex: female       Date of Surgery:   12/8/2020     Preoperative Diagnosis:   Bilateral Leg Ulcers    Postoperative Diagnosis:   Same    Surgeon(s) and Role:     * Lbian Long, DPM - Primary    Anesthesia:   General     Procedure(s):  1. Debridement of Right Leg Ulcer  2. Debridement of Left Leg Ulcer    Procedure in Detail:   Pt was seen in the pre-operative holding area and all questions were answered and all concerns were addressed with her ANEESH. The operative procedures were discussed in great detail with her POA, with all possible complications highlighted. Her POA verbalized complete understanding and the consent was signed and witnessed. Pt was on scheduled abx per ID, thus no additional abx ordered for surgical prophylaxis. The operative limbs were marked and the pt was transported to the operating room. The pt was transferred to the operating table and anesthesia was administered as indicated above. The bilateral lower extremities were scrubbed and draped in sterile fashion. A time out was performed to confirm the correct pt, correct procedure, correct limb, abx, allergies, fire risk and attendees within the operating room. Upon completion, procedure #1 commenced.     Procedure #1: Debridement of Right Leg Ulcer  With a #15 blade, a sharp/excisional debridement was performed down to the level of bone (with no bone taken) for a total of 119.28cmsq (14.2cmx8.4cm) to the large ulcer noted to the anterior aspect of the right leg. At this time, an ultrasonic debridement was performed with the Repligenonix system to remove any remaining biofilm. Upon completion, the tissue was noted to be granular and bleeding.  A saline wet to dry dressing was applied with a light ace wrap covering.     Procedure #2: Debridement of Left Leg Ulcer  Attention was then directed to the left anterior aspect of the left leg. With a #15 blade, a sharp/excisional debridement was performed down to the level of bone (with no bone taken) for a total of cmsq 119.38 cmsq (12.7cmx9.4cm). At this time, an ultrasonic debridement was performed with the PBS-Bio system to remove any remaining biofilm. Upon completion, the tissue was noted to be granular and bleeding. A saline wet to dry dressing was applied with a light ace wrap covering.     * Pt should be ready for skin graft (allograft or xenograft) in the near future    Pt tolerated the above procedures well and all post operative counts were correct. Pt was transferred to PACU without incident. A thorough neurovascular check was then performed. Upon meeting transfer criteria, pt was transferred back to the [facility].     Estimated Blood Loss:    20cc    Tourniquet Time:   No tourniquet utilized    Implants:   No implants utilized           Specimens:   No specimen taken        Drains:   No drains utilized                Complications:   No complications encountered      Signed By:  Helena Conde DPM     December 8, 2020

## 2020-12-09 ENCOUNTER — APPOINTMENT (OUTPATIENT)
Dept: GENERAL RADIOLOGY | Age: 75
DRG: 853 | End: 2020-12-09
Attending: NURSE PRACTITIONER
Payer: MEDICARE

## 2020-12-09 LAB
ALBUMIN SERPL-MCNC: 2.1 G/DL (ref 3.5–5)
ALBUMIN SERPL-MCNC: 2.1 G/DL (ref 3.5–5)
ALBUMIN/GLOB SERPL: 0.5 {RATIO} (ref 1.1–2.2)
ALP SERPL-CCNC: 101 U/L (ref 45–117)
ALT SERPL-CCNC: 12 U/L (ref 12–78)
ANION GAP SERPL CALC-SCNC: 2 MMOL/L (ref 5–15)
ANION GAP SERPL CALC-SCNC: 3 MMOL/L (ref 5–15)
AST SERPL W P-5'-P-CCNC: 14 U/L (ref 15–37)
BASOPHILS # BLD: 0 K/UL (ref 0–0.1)
BASOPHILS NFR BLD: 0 % (ref 0–1)
BILIRUB SERPL-MCNC: 0.3 MG/DL (ref 0.2–1)
BUN SERPL-MCNC: 27 MG/DL (ref 6–20)
BUN SERPL-MCNC: 28 MG/DL (ref 6–20)
BUN/CREAT SERPL: 47 (ref 12–20)
BUN/CREAT SERPL: 51 (ref 12–20)
CA-I BLD-MCNC: 8.4 MG/DL (ref 8.5–10.1)
CA-I BLD-MCNC: 8.4 MG/DL (ref 8.5–10.1)
CHLORIDE SERPL-SCNC: 102 MMOL/L (ref 97–108)
CHLORIDE SERPL-SCNC: 103 MMOL/L (ref 97–108)
CO2 SERPL-SCNC: 36 MMOL/L (ref 21–32)
CO2 SERPL-SCNC: 36 MMOL/L (ref 21–32)
CREAT SERPL-MCNC: 0.55 MG/DL (ref 0.55–1.02)
CREAT SERPL-MCNC: 0.58 MG/DL (ref 0.55–1.02)
DIFFERENTIAL METHOD BLD: ABNORMAL
EOSINOPHIL # BLD: 0.1 K/UL (ref 0–0.4)
EOSINOPHIL NFR BLD: 1 % (ref 0–7)
ERYTHROCYTE [DISTWIDTH] IN BLOOD BY AUTOMATED COUNT: 14.5 % (ref 11.5–14.5)
GLOBULIN SER CALC-MCNC: 3.9 G/DL (ref 2–4)
GLUCOSE SERPL-MCNC: 104 MG/DL (ref 65–100)
GLUCOSE SERPL-MCNC: 105 MG/DL (ref 65–100)
HCT VFR BLD AUTO: 31.5 % (ref 35–47)
HGB BLD-MCNC: 9.3 G/DL (ref 11.5–16)
IMM GRANULOCYTES # BLD AUTO: 0 K/UL (ref 0–0.04)
IMM GRANULOCYTES NFR BLD AUTO: 0 % (ref 0–0.5)
LYMPHOCYTES # BLD: 0.6 K/UL (ref 0.8–3.5)
LYMPHOCYTES NFR BLD: 5 % (ref 12–49)
MCH RBC QN AUTO: 27.9 PG (ref 26–34)
MCHC RBC AUTO-ENTMCNC: 29.5 G/DL (ref 30–36.5)
MCV RBC AUTO: 94.6 FL (ref 80–99)
MONOCYTES # BLD: 0.7 K/UL (ref 0–1)
MONOCYTES NFR BLD: 6 % (ref 5–13)
NEUTS SEG # BLD: 10.4 K/UL (ref 1.8–8)
NEUTS SEG NFR BLD: 88 % (ref 32–75)
PHOSPHATE SERPL-MCNC: 3.4 MG/DL (ref 2.6–4.7)
PLATELET # BLD AUTO: 458 K/UL (ref 150–400)
PMV BLD AUTO: 9.4 FL (ref 8.9–12.9)
POTASSIUM SERPL-SCNC: 3.5 MMOL/L (ref 3.5–5.1)
POTASSIUM SERPL-SCNC: 3.5 MMOL/L (ref 3.5–5.1)
PROT SERPL-MCNC: 6 G/DL (ref 6.4–8.2)
RBC # BLD AUTO: 3.33 M/UL (ref 3.8–5.2)
SODIUM SERPL-SCNC: 141 MMOL/L (ref 136–145)
SODIUM SERPL-SCNC: 141 MMOL/L (ref 136–145)
WBC # BLD AUTO: 11.9 K/UL (ref 3.6–11)

## 2020-12-09 PROCEDURE — 74011250637 HC RX REV CODE- 250/637: Performed by: INTERNAL MEDICINE

## 2020-12-09 PROCEDURE — 74011250637 HC RX REV CODE- 250/637: Performed by: FAMILY MEDICINE

## 2020-12-09 PROCEDURE — 97530 THERAPEUTIC ACTIVITIES: CPT

## 2020-12-09 PROCEDURE — 94640 AIRWAY INHALATION TREATMENT: CPT

## 2020-12-09 PROCEDURE — 74011000250 HC RX REV CODE- 250: Performed by: FAMILY MEDICINE

## 2020-12-09 PROCEDURE — 65270000029 HC RM PRIVATE

## 2020-12-09 PROCEDURE — 80069 RENAL FUNCTION PANEL: CPT

## 2020-12-09 PROCEDURE — 74011250636 HC RX REV CODE- 250/636: Performed by: FAMILY MEDICINE

## 2020-12-09 PROCEDURE — 74011000258 HC RX REV CODE- 258: Performed by: FAMILY MEDICINE

## 2020-12-09 PROCEDURE — 77010033678 HC OXYGEN DAILY

## 2020-12-09 PROCEDURE — 99232 SBSQ HOSP IP/OBS MODERATE 35: CPT | Performed by: PODIATRIST

## 2020-12-09 PROCEDURE — 80053 COMPREHEN METABOLIC PANEL: CPT

## 2020-12-09 PROCEDURE — 85025 COMPLETE CBC W/AUTO DIFF WBC: CPT

## 2020-12-09 PROCEDURE — 36415 COLL VENOUS BLD VENIPUNCTURE: CPT

## 2020-12-09 PROCEDURE — 94760 N-INVAS EAR/PLS OXIMETRY 1: CPT

## 2020-12-09 PROCEDURE — 99232 SBSQ HOSP IP/OBS MODERATE 35: CPT | Performed by: INTERNAL MEDICINE

## 2020-12-09 PROCEDURE — 94762 N-INVAS EAR/PLS OXIMTRY CONT: CPT

## 2020-12-09 PROCEDURE — 71045 X-RAY EXAM CHEST 1 VIEW: CPT

## 2020-12-09 RX ORDER — FUROSEMIDE 40 MG/1
40 TABLET ORAL DAILY
Status: DISCONTINUED | OUTPATIENT
Start: 2020-12-10 | End: 2020-12-17 | Stop reason: HOSPADM

## 2020-12-09 RX ADMIN — IPRATROPIUM BROMIDE AND ALBUTEROL SULFATE 3 ML: .5; 3 SOLUTION RESPIRATORY (INHALATION) at 13:33

## 2020-12-09 RX ADMIN — IPRATROPIUM BROMIDE AND ALBUTEROL SULFATE 3 ML: .5; 3 SOLUTION RESPIRATORY (INHALATION) at 07:44

## 2020-12-09 RX ADMIN — HYOSCYAMINE SULFATE: 16 SOLUTION at 20:45

## 2020-12-09 RX ADMIN — Medication 10 ML: at 23:31

## 2020-12-09 RX ADMIN — LEVOTHYROXINE SODIUM 75 MCG: 0.07 TABLET ORAL at 05:06

## 2020-12-09 RX ADMIN — POTASSIUM CHLORIDE 40 MEQ: 750 TABLET, FILM COATED, EXTENDED RELEASE ORAL at 10:24

## 2020-12-09 RX ADMIN — ATORVASTATIN CALCIUM 40 MG: 40 TABLET, FILM COATED ORAL at 23:31

## 2020-12-09 RX ADMIN — Medication 10 ML: at 05:21

## 2020-12-09 RX ADMIN — IPRATROPIUM BROMIDE AND ALBUTEROL SULFATE 3 ML: .5; 3 SOLUTION RESPIRATORY (INHALATION) at 19:55

## 2020-12-09 RX ADMIN — Medication 10 ML: at 13:56

## 2020-12-09 RX ADMIN — HYOSCYAMINE SULFATE: 16 SOLUTION at 10:25

## 2020-12-09 RX ADMIN — Medication 10 ML: at 06:00

## 2020-12-09 RX ADMIN — ASPIRIN 325 MG ORAL TABLET 325 MG: 325 PILL ORAL at 10:24

## 2020-12-09 RX ADMIN — PIPERACILLIN SODIUM AND TAZOBACTAM SODIUM 3.38 G: 3; .375 INJECTION, POWDER, LYOPHILIZED, FOR SOLUTION INTRAVENOUS at 05:06

## 2020-12-09 RX ADMIN — PIPERACILLIN SODIUM AND TAZOBACTAM SODIUM 3.38 G: 3; .375 INJECTION, POWDER, LYOPHILIZED, FOR SOLUTION INTRAVENOUS at 12:53

## 2020-12-09 RX ADMIN — IPRATROPIUM BROMIDE AND ALBUTEROL SULFATE 3 ML: .5; 3 SOLUTION RESPIRATORY (INHALATION) at 01:09

## 2020-12-09 RX ADMIN — Medication 10 ML: at 13:55

## 2020-12-09 RX ADMIN — PIPERACILLIN SODIUM AND TAZOBACTAM SODIUM 3.38 G: 3; .375 INJECTION, POWDER, LYOPHILIZED, FOR SOLUTION INTRAVENOUS at 20:45

## 2020-12-09 RX ADMIN — AMIODARONE HYDROCHLORIDE 200 MG: 200 TABLET ORAL at 10:24

## 2020-12-09 NOTE — PROGRESS NOTES
Skin assessment performed by this Rn and Mechelle Dalton Rn. During assessment noted pressure ulcer to sacrum. Redness noted on back over spine covered with mepilex. No other skin issues noted during assessment will continue to monitor.

## 2020-12-09 NOTE — PROGRESS NOTES
Progress Note    Patient: Winnie Dawson MRN: [id number]  SSN: [id number]    YOB: 1945  Age: 66 y.o. Sex: female      Admit Date: 11/25/2020    LOS: 14 days     Subjective:   Patient followed for sepsis with cellulitis both legs with blood cultures growing Providencia and wound culture with mixed Gram negative nica identified below and MSSA. Following debridement, intra-operative cultures showing essentially similar results. She remains afebrile. WBC decreasing/CRP increasing today. Ceretec scan consistent with cellulitis. Still on Zosyn alone. Seen by Psychiatry, felt to be mentally competent. She is resting comfortably with no complaints. Objective:     Vitals:    12/08/20 2200 12/09/20 0318 12/09/20 0744 12/09/20 1004   BP: 92/60 (!) 97/58  (!) 98/59   Pulse: 82 79  74   Resp:  18  18   Temp: 97.9 °F (36.6 °C) 98.3 °F (36.8 °C)  97.6 °F (36.4 °C)   SpO2: 95% 99% 98% 95%   Weight:       Height:            Intake and Output:  Current Shift: No intake/output data recorded. Last three shifts: 12/07 1901 - 12/09 0700  In: 1120 [P.O.:920; I.V.:200]  Out: 2010 [Urine:2000]    Physical Exam:   Constitutional:       General: She is not in acute distress. Appearance: She is chronically ill-appearing. HENT:      Head: Normocephalic and atraumatic. Nose:      Comments: Nasal O2 cannula  Lungs: clear bilaterally but diminished  Genitourinary:     Comments: Purewick catheter  Musculoskeletal:         General: Swelling present. No tenderness. Right lower leg: Edema present. Left lower leg: Edema present. Comments: Both calves are bandaged at this time with bulky gauze dressing, however, erythema has markedly decreased along with swelling. Neurological: asleep   Psychiatric: asleep.      Lab/Data Review:     WBC 11,900  Procalcitonin <0.05 <0.11 <0.10 <0.69 <1.19  CRP 1.14 <0.90 < 1.24 <1.28 <1.98  <3.04 <9.26 <16.00    Covid-19 negative    Blood cultures (11/26) Providenica rettgeri  Blood cultures (11/26) Providenica rettgeri  Urine culture (11/26) No growth FiNAL  Wound culture (11/27)  MSSA, Klebsiella oxytoca, Providencia rettgeri, Morganella morganii (2 strains), Myroides species, Enterobacter cloacae  Wound culture left leg (12/4)  MSSA, Enterobacter cloacae, Morganella morganii  Wound culture left leg (12/4)  MSSA, Enterobacter cloacae x 2, Morganella morganii    Ceretec scan tibias (12/8) Superficial cellulitis. Doubt underlying osteomyelitis      Assessment:     Active Problems:    PNA (pneumonia) (11/25/2020)      Pneumonia (11/25/2020)    1. Cellulitis, bilateral lower extremities, severe, secondary to MSSA, Klebsiella oxytoca, Providencia rettgeri, Morganella morganii (2 strains), Myroides species, Enterobacter cloacae, Day #14 IV Zosyn, status post debridement, improving. 2. Gram negative bacteremia with Providencia rettgeri, probably secondary to above, on IV Zosyn. 3. Sepsis with leukocytosis, elevated procalcitonin, CRP and lactic acid, resolving. 4. SOB  5. Pyuria, negative urine culture    Comment:  WBC decreasing, CRP increasing. No clear evidence of ostomyelitis    Plan:   1. Continue IV Zosyn; may be able to transition to oral Levaquin and Doxcycycline at discharge (for 1 more week)  2.  In am, repeat CBC and CRP        Signed By: Nicanor Lock MD     December 9, 2020

## 2020-12-09 NOTE — BH NOTES
Patient seen for follow-up patient is alert today oriented to the day of the week month not exact part of the month says there is no calendar day to look at patient  Is that she is in the hospital she knew she is weak she says usually some outside help her also somebody stays with her at night she has 2 sons Sanjuanita Crawley and Demar Payer they help her they live in [place] and a couple of sisters Mile Serna did help her to. 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   I shared with Jeff Jeffers  that patient is capable of making decisions but need some skilled nursing facility physical therapy to help her increase her physical stamina thank you

## 2020-12-09 NOTE — PROGRESS NOTES
General Daily Progress Note          Patient Name:   Tanner Mcdermott       YOB: 1945       Age:  66 y.o. Admit Date: 11/25/2020      Subjective:         Seen awake not in distress confused  On 2 L oxygen by nasal cannula      Patient was drowsy and lethargic after receiving Percocet /now discontinued  Hypotensive this morning hold all blood pressure medicine    Patient had debridement of right leg ulcer and left leg ulcer             Objective:     Visit Vitals  BP (!) 97/58   Pulse 79   Temp 98.3 °F (36.8 °C)   Resp 18   Ht 5' (1.524 m)   Wt 41.7 kg (91 lb 14.9 oz)   SpO2 98%   BMI 17.95 kg/m²          [unfilled]      Review of Systems    Constitutional: Negative for chills and fever. HENT: Negative. Eyes: Negative. Respiratory: Negative. Cardiovascular: Negative. Gastrointestinal: Negative for abdominal pain and nausea. Skin: Negative. Neurological: Negative. Physical Exam:      Constitutional: Alert awake  HENT:   Head: Normocephalic and atraumatic. Eyes: Pupils are equal, round, and reactive to light. EOM are normal.   Cardiovascular: Normal rate, regular rhythm and normal heart sounds. Pulmonary/Chest: Breath sounds normal. No wheezes. No rales. Exhibits no tenderness. Abdominal: Soft. Bowel sounds are normal. There is no abdominal tenderness. There is no rebound and no guarding. Musculoskeletal: Normal range of motion. Neurological: Bilateral lower leg red swollen    [facility] PROC LTD   Final Result   IMPRESSION: Superficial cellulitis. Doubt underlying osteomyelitis      XR CHEST PA LAT   Final Result   Impression: Underexpanded lungs with bibasilar atelectasis. Small pleural   effusions. Hydrostatic edema. No significant interval change. XR CHEST SNGL V   Final Result   Impression:   Little interval change. XR CHEST PORT   Final Result   Impression: Underexpanded lungs with bibasilar atelectasis, increased.   Possible   right pleural effusion. Hydrostatic edema. XR CHEST PA LAT   Final Result   Impression: The cardiomediastinal silhouette is appropriate for age, technique,   and lung expansion. Pulmonary vasculature is not congested. The lungs are   essentially clear. No effusion or pneumothorax is seen. XR CHEST SNGL V   Final Result               Results     Procedure Component Value Units Date/Time    CULTURE, [place] STAIN [[id number]]  (Susceptibility) Collected:  12/04/20 1030    Order Status:  Completed Specimen:  Wound from Leg Updated:  12/08/20 1116     Special Requests: No Special Requests        GRAM STAIN       Occasional Gram Negative Rods           Culture result: Moderate Morganella morganii ssp morganii                  Moderate Stenotrophomonas (xantho.) maltophilia CLSI guidelines do not recommend reporting susceptibilities for s. Maltophilia. Trimethoprim/sulfamethoxazole is the drug of choice.                   Moderate Enterobacter cloacae complex                  Light Staphylococcus aureus          Susceptibility      Morganella morganii ssp morganii     WILLIAM     Amikacin ($) Susceptible     Ampicillin ($) Resistant     Ampicillin/sulbactam ($) Resistant     Cefazolin ($) Resistant     Cefepime ($$) Susceptible     Cefoxitin Intermediate     Ceftazidime ($) Susceptible     Ceftriaxone ($) Susceptible     Ciprofloxacin ($) Susceptible     Gentamicin ($) Susceptible     Levofloxacin ($) Susceptible     Meropenem ($$) Susceptible     Piperacillin/Tazobac ($) Susceptible     Tobramycin ($) Susceptible     Trimeth/Sulfa Susceptible                Susceptibility      Enterobacter cloacae complex     WILLIAM     Amikacin ($) Susceptible     Cefazolin ($) Resistant     Cefepime ($$) Susceptible     Cefoxitin Resistant     Ceftazidime ($) Susceptible     Ceftriaxone ($) Susceptible     Ciprofloxacin ($) Susceptible     Gentamicin ($) Susceptible     Levofloxacin ($) Susceptible     Meropenem ($$) Susceptible Tobramycin ($) Susceptible     Trimeth/Sulfa Susceptible                Susceptibility      Staphylococcus aureus     WILLIAM     Ciprofloxacin ($) Resistant     Clindamycin ($) Susceptible     Daptomycin ($$$$$) Susceptible     Doxycycline ($$) Susceptible     Erythromycin ($$$$) Resistant     Gentamicin ($) Susceptible     Levofloxacin ($) Intermediate     Linezolid ($$$$$) Susceptible     Moxifloxacin ($$$$) Susceptible     Oxacillin Susceptible     Rifampin ($$$$) Susceptible [1]      Tetracycline Susceptible     Trimeth/Sulfa Susceptible     Vancomycin ($) Susceptible            [1]   Rifampin is not to be used for mono-therapy. CULTURE, Jacquie Gold STAIN [[id number]]  (Susceptibility) Collected:  12/04/20 1030    Order Status:  Completed Specimen:  Wound from Leg Updated:  12/08/20 1118     Special Requests: No Special Requests        GRAM STAIN Few Gram Negative Rods        Culture result:       Light Enterobacter cloacae complex            Light Morganella morganii               Light Enterobacter cloacae complex . ..(2ND COLONY TYPE)                  Light Staphylococcus aureus          Susceptibility      Enterobacter cloacae complex (1)     WILLIAM     Amikacin ($) Susceptible     Cefazolin ($) Resistant     Cefepime ($$) Susceptible     Cefoxitin Resistant     Ceftazidime ($) Susceptible     Ceftriaxone ($) Susceptible     Ciprofloxacin ($) Susceptible     Gentamicin ($) Susceptible     Levofloxacin ($) Susceptible     Meropenem ($$) Susceptible     Tobramycin ($) Susceptible     Trimeth/Sulfa Susceptible                Susceptibility      Morganella morganii     WILLIAM     Amikacin ($) Susceptible     Ampicillin ($) Resistant     Ampicillin/sulbactam ($) Resistant     Cefazolin ($) Resistant     Cefepime ($$) Susceptible     Cefoxitin Intermediate     Ceftazidime ($) Susceptible     Ceftriaxone ($) Susceptible     Ciprofloxacin ($) Susceptible     Gentamicin ($) Susceptible     Levofloxacin ($) Susceptible     Meropenem ($$) Susceptible     Piperacillin/Tazobac ($) Susceptible     Tobramycin ($) Susceptible     Trimeth/Sulfa Susceptible                Susceptibility      Enterobacter cloacae complex (3)     WILLIAM     Amikacin ($) Susceptible     Cefazolin ($) Resistant     Cefepime ($$) Susceptible     Cefoxitin Resistant     Ceftazidime ($) Susceptible     Ceftriaxone ($) Susceptible     Ciprofloxacin ($) Susceptible     Gentamicin ($) Susceptible     Levofloxacin ($) Susceptible     Meropenem ($$) Susceptible     Tobramycin ($) Susceptible     Trimeth/Sulfa Susceptible                Susceptibility      Staphylococcus aureus     WILLIAM     Ciprofloxacin ($) Resistant     Clindamycin ($) Susceptible     Daptomycin ($$$$$) Susceptible     Doxycycline ($$) Susceptible     Erythromycin ($$$$) Resistant     Gentamicin ($) Susceptible     Levofloxacin ($) Resistant     Linezolid ($$$$$) Susceptible     Moxifloxacin ($$$$) Intermediate     Oxacillin Susceptible     Rifampin ($$$$) Susceptible [1]      Tetracycline Susceptible     Trimeth/Sulfa Susceptible     Vancomycin ($) Susceptible            [1]   Rifampin is not to be used for mono-therapy.                  CULTURE, Phoebe Clock STAIN [[id number]]  (Susceptibility) Collected:  11/27/20 1115    Order Status:  Completed Specimen:  Wound Updated:  12/02/20 1020     Special Requests: No Special Requests        GRAM STAIN Rare WBCs seen         Few Gram Negative Rods               Occasional Gram Positive Cocci in clusters           Culture result: Heavy  Mixed skin nica isolated               Heavy Staphylococcus aureus            Heavy Klebsiella oxytoca               Heavy Providencia rettgeri                  Heavy Enterobacter cloacae complex                  Heavy Morganella morganii ssp morganii                  Heavy Morganella morganii ssp morganii (2nd colony type/strain)            Light Myroides sp       Susceptibility      Staphylococcus aureus     WILLIAM Ciprofloxacin ($) Resistant     Clindamycin ($) Susceptible     Daptomycin ($$$$$) Susceptible     Doxycycline ($$) Susceptible     Erythromycin ($$$$) Resistant     Gentamicin ($) Susceptible     Levofloxacin ($) Intermediate     Linezolid ($$$$$) Susceptible     Moxifloxacin ($$$$) Susceptible     Oxacillin Susceptible     Rifampin ($$$$) Susceptible [1]      Tetracycline Susceptible     Trimeth/Sulfa Susceptible     Vancomycin ($) Susceptible            [1]   Rifampin is not to be used for mono-therapy.              Susceptibility      Klebsiella oxytoca     WILLIAM     Amikacin ($) Susceptible     Ampicillin ($) Resistant     Ampicillin/sulbactam ($) Susceptible     Cefazolin ($) Susceptible     Cefepime ($$) Susceptible     Cefoxitin Susceptible     Ceftazidime ($) Susceptible     Ceftriaxone ($) Susceptible     Ciprofloxacin ($) Susceptible     Gentamicin ($) Susceptible     Levofloxacin ($) Susceptible     Meropenem ($$) Susceptible     Tobramycin ($) Susceptible     Trimeth/Sulfa Susceptible                Susceptibility      Providencia rettgeri     WILLIAM     Amikacin ($) Susceptible     Ampicillin ($) Resistant     Ampicillin/sulbactam ($) Susceptible     Cefazolin ($) Resistant     Cefepime ($$) Susceptible     Cefoxitin Susceptible     Ceftazidime ($) Susceptible     Ceftriaxone ($) Susceptible     Ciprofloxacin ($) Susceptible     Gentamicin ($) Susceptible     Levofloxacin ($) Susceptible     Piperacillin/Tazobac ($) Susceptible     Tobramycin ($) Susceptible                Susceptibility      Enterobacter cloacae complex     WILLIAM     Amikacin ($) Susceptible     Cefazolin ($) Resistant     Cefepime ($$) Susceptible     Cefoxitin Resistant     Ceftazidime ($) Susceptible     Ceftriaxone ($) Susceptible     Ciprofloxacin ($) Susceptible     Gentamicin ($) Susceptible     Levofloxacin ($) Susceptible     Meropenem ($$) Susceptible     Tobramycin ($) Susceptible     Trimeth/Sulfa Susceptible Susceptibility      Morganella morganii ssp morganii (6)     WILLIAM     Amikacin ($) Susceptible     Ampicillin ($) Resistant     Ampicillin/sulbactam ($) Resistant     Cefazolin ($) Resistant     Cefepime ($$) Susceptible     Cefoxitin Intermediate     Ceftazidime ($) Susceptible     Ceftriaxone ($) Susceptible     Ciprofloxacin ($) Susceptible     Gentamicin ($) Susceptible     Levofloxacin ($) Susceptible     Meropenem ($$) Susceptible     Piperacillin/Tazobac ($) Susceptible     Tobramycin ($) Susceptible     Trimeth/Sulfa Susceptible                Susceptibility      Morganella morganii ssp morganii (7)     WILLIAM     Amikacin ($) Susceptible     Ampicillin ($) Resistant     Ampicillin/sulbactam ($) Resistant     Cefazolin ($) Resistant     Cefepime ($$) Susceptible     Cefoxitin Intermediate     Ceftazidime ($) Susceptible     Ceftriaxone ($) Susceptible     Ciprofloxacin ($) Susceptible     Gentamicin ($) Susceptible     Levofloxacin ($) Susceptible     Meropenem ($$) Susceptible     Piperacillin/Tazobac ($) Susceptible     Tobramycin ($) Susceptible     Trimeth/Sulfa Susceptible                Susceptibility      Myroides species     WILLIAM     Amikacin ($) Resistant     Cefepime ($$) Intermediate     Ceftazidime ($) Resistant     Ceftriaxone ($) Intermediate     Ciprofloxacin ($) Susceptible     Gentamicin ($) Resistant     Tobramycin ($) Resistant                    CULTURE, URINE [[id number]] Collected:  11/26/20 0945    Order Status:  Completed Specimen:  Urine Updated:  11/28/20 1050     Special Requests: No Special Requests        Culture result: No Growth (<1000 cfu/mL)       CULTURE, BLOOD #2 [[id number]]  (Abnormal)  (Susceptibility) Collected:  11/26/20 0122    Order Status:  Completed Specimen:  Blood Updated:  11/30/20 0927     Special Requests: No Special Requests        Culture result:       Providencia rettgeri GROWING IN THE AEROBIC AND ANAEROBIC BOTTLE (SITE = ARM)                  Gram Negative Rods CALLED TO AND READ BACK BY Steff Villanueva R.N. 2015 11/27/20          Susceptibility      Providencia rettgeri     WILLIAM     Amikacin ($) Susceptible     Ampicillin ($) Resistant     Ampicillin/sulbactam ($) Susceptible     Cefazolin ($) Resistant     Cefepime ($$) Susceptible     Cefoxitin Susceptible     Ceftazidime ($) Susceptible     Ceftriaxone ($) Susceptible     Ciprofloxacin ($) Susceptible     Gentamicin ($) Susceptible     Levofloxacin ($) Susceptible     Piperacillin/Tazobac ($) Susceptible     Tobramycin ($) Susceptible                    CULTURE, BLOOD #1 [[id number]]  (Abnormal) Collected:  11/26/20 0112    Order Status:  Completed Specimen:  Blood Updated:  11/30/20 0928     Special Requests: No Special Requests        Culture result:       Providencia rettgeri growing in both bottles drawn (SITE =ARM)                  Gram Negative Rods CALLED TO Steff Villanueva R.N. [id number] 11/27/20                  Please refer to previous blood culture  [id number], COLLECTED 11/26/20 FOR SENSITIVITIES      CULTURE, Cruzito Newton STAIN [[id number]] Collected:  11/25/20 1930    Order Status:  Completed Specimen:  Wound Updated:  12/01/20 1101     Special Requests: No Special Requests        GRAM STAIN Rare WBCs seen         Rare Gram Negative Rods               Rare Gram Positive Cocci in pairs           Culture result:       Heavy MIXED ENTERIC Gram Negative Rods REFER TO [id number] FOR ID AND SENSITIVITIES                  Heavy Staphylococcus aureus REFER TO [facility] [id number] FOR SENSITIVITIES            Heavy  Mixed skin nica isolated       COVID-19 RAPID TEST [[id number]] Collected:  11/25/20 1445    Order Status:  Completed Specimen:  Nasopharyngeal Updated:  11/25/20 1553     Specimen source Nasopharyngeal        COVID-19 rapid test Not Detected        Comment: Rapid Abbott ID Now   Rapid NAAT:  The specimen is NEGATIVE for SARS-CoV-2, the novel coronavirus associated with COVID-19.    Negative results should be treated as presumptive and, if inconsistent with clinical signs and symptoms or necessary for patient management, should be tested with an alternative molecular assay. Negative results do not preclude SARS-CoV-2 infection and should not be used as the sole basis for patient management decisions. This test has been authorized by the FDA under   an Emergency Use Authorization (EUA) for use by authorized laboratories. Fact sheet for Healthcare Providers: ConventionUpdate.co.nz Fact sheet for Patients: ConventionUpdate.co.nz   Methodology: Isothermal Nucleic Acid Amplification                Labs:     Recent Labs     12/08/20  1052 12/07/20  1120   WBC 12.1* 12.9*   HGB 10.1* 11.5   HCT 33.5* 38.3   * 526*     Recent Labs     12/08/20  1052 12/07/20  1120    142  141   K 3.0* 3.8  3.7    107  106   CO2 37* 31  31   BUN 25* 23*  24*   CREA 0.49* 0.38*  0.39*   GLU 94 82  81   CA 8.4* 8.7  8.6   PHOS  --  3.0     Recent Labs     12/08/20  1052 12/07/20  1120   ALT 13 13   AP 98 94   TBILI 0.4 0.4   TP 6.0* 6.3*   ALB 2.1* 2.2*  2.2*   GLOB 3.9 4.1*     No results for input(s): INR, PTP, APTT, INREXT, INREXT in the last 72 hours. No results for input(s): FE, TIBC, PSAT, FERR in the last 72 hours. No results found for: FOL, RBCF   No results for input(s): PH, PCO2, PO2 in the last 72 hours. No results for input(s): CPK, CKNDX, TROIQ in the last 72 hours.     No lab exists for component: CPKMB  No results found for: CHOL, CHOLX, CHLST, CHOLV, HDL, HDLP, LDL, LDLC, DLDLP, TGLX, TRIGL, TRIGP, CHHD, CHHDX  No results found for: Carmen Mcfadden  Lab Results   Component Value Date/Time    Color Yellow/Straw 11/25/2020 04:50 PM    Appearance Clear 11/25/2020 04:50 PM    Specific gravity 1.013 11/25/2020 04:50 PM    pH (UA) 5.0 11/25/2020 04:50 PM    Protein Negative 11/25/2020 04:50 PM    Glucose Negative 11/25/2020 04:50 PM    Ketone Negative 11/25/2020 04:50 PM    Bilirubin Negative 11/25/2020 04:50 PM    Urobilinogen 0.1 11/25/2020 04:50 PM    Nitrites Negative 11/25/2020 04:50 PM    Leukocyte Esterase Negative 11/25/2020 04:50 PM    Bacteria Negative 11/25/2020 04:50 PM    WBC 5-10 11/25/2020 04:50 PM    RBC 0-5 11/25/2020 04:50 PM         Assessment:   Acute hypoxemic respiratory failure on 3 L  Acute systolic heart failure with ejection fraction of 15 to 20%  Acute cellulitis of bilateral legs/wound culture positive for staph aureus/providentia/Enterobacter/Morganella/Klebsiella  Status post debridement of the both leg wounds  Gram-negative bacteremia with providentia  Sepsis leukocytosis elevated procalcitonin and lactic acid  Community-acquired pneumonia  Poor hygiene  Elevated troponin  Leukocytosis  Hypothyroidism  Anemia s/p tranfusion   Stress test done showing large infarct/declined cardiac cath  Hypotensive secondary to Percocet  Static post debridement of the bilateral leg ulcers by the podiatrist  Plan:     WBC tagged Ceretec   Hold blood pressure medications  Discontinue Percocet  Continue aspirin Lipitor levothyroxine  Discontinue vancomycin  Continue IV ZOSYN /plan for transition to Levaquin and doxycycline on discharge  Doppler studies negative for DVT  Lasix 40 mg IV twice daily  Follow-up with infectious disease  Cardiology consult for elevated troponin  PT OT consult and wound care nurse consult  Repeat the labs in the morning  Respiratory consult for weaning of oxygen  Nebulizer treatment  S/p Transfuse 2 unit packed RBC    Discussed with the patient nurse to hold blood pressure medicines as patient hypotensive      Labs pending today  PT OT consult  Discussed with the  regarding discharge planning  Patient need placement to skilled care  For follow-up Ceretec scan both legs r/o  osteomyelitis          Current Facility-Administered Medications:     sodium chloride (NS) flush 5-40 mL, 5-40 mL, IntraVENous, Q8H, Phillingane, Scott, DPM, 10 mL at 12/09/20 0521    sodium chloride (NS) flush 5-40 mL, 5-40 mL, IntraVENous, PRN, Phillingane, Scott, DPM    amiodarone (CORDARONE) tablet 200 mg, 200 mg, Oral, DAILY, Star Collet, MD, 200 mg at 12/08/20 0900    potassium chloride SR (KLOR-CON 10) tablet 40 mEq, 40 mEq, Oral, DAILY, Jamie Love MD, 40 mEq at 12/08/20 0900    sodium hypochlorite (HALF STRENGTH DAKIN'S) 0.25% irrigation (bottle), , Topical, BID, Karolina Wilcox, CRNA, Stopped at 12/08/20 2100    sodium chloride (NS) flush 5-40 mL, 5-40 mL, IntraVENous, Q8H, Phillingane, Scott, DPM, 10 mL at 12/09/20 0600    sodium chloride (NS) flush 5-40 mL, 5-40 mL, IntraVENous, PRN, Phillingane, Scott, DPM    furosemide (LASIX) injection 40 mg, 40 mg, IntraVENous, BID, Madalyn Gallagher NP, 40 mg at 12/08/20 2110    0.9% sodium chloride infusion 250 mL, 250 mL, IntraVENous, PRN, Jt Solano MD    labetaloL (NORMODYNE;TRANDATE) 20 mg/4 mL (5 mg/mL) injection 10 mg, 10 mg, IntraVENous, Q5MIN PRN, Sardino, Arlee Bumpers., MD    metoprolol (LOPRESSOR) injection 2.5 mg, 2.5 mg, IntraVENous, Q5MIN PRN, Arpit Thompson MD    albuterol-ipratropium (DUO-NEB) 2.5 MG-0.5 MG/3 ML, 3 mL, Nebulization, Q6H RT, Keith Solano MD, 3 mL at 12/09/20 0744    piperacillin-tazobactam (ZOSYN) 3.375 g in 0.9% sodium chloride (MBP/ADV) 100 mL MBP, 3.375 g, IntraVENous, Q8H, Keith Solano MD, Last Rate: 25 mL/hr at 12/09/20 0506, 3.375 g at 12/09/20 0506    levothyroxine (SYNTHROID) tablet 75 mcg, 75 mcg, Oral, 6am, Keith Solano MD, 75 mcg at 12/09/20 0506    acetaminophen (TYLENOL) tablet 650 mg, 650 mg, Oral, Q6H PRN, 650 mg at 12/07/20 0559 **OR** acetaminophen (TYLENOL) suppository 650 mg, 650 mg, Rectal, Q6H PRN, Keith Solano MD    polyethylene glycol (MIRALAX) packet 17 g, 17 g, Oral, DAILY PRN, Keith Solano MD    ondansetron (ZOFRAN ODT) tablet 4 mg, 4 mg, Oral, Q8H PRN **OR** ondansetron (ZOFRAN) injection 4 mg, 4 mg, IntraVENous, Q6H PRN, Keith Solano MD    aspirin tablet 325 mg, 325 mg, Oral, DAILY, Keith Solano MD, 325 mg at 12/08/20 0900    atorvastatin (LIPITOR) tablet 40 mg, 40 mg, Oral, QHS, Keith Solano MD, 40 mg at 12/08/20 2110

## 2020-12-09 NOTE — PROGRESS NOTES
Progress Note  Date:2020       Room:208/01  Patient Name:Naa Osorio     Date of Birth:     Age:75 y.o. Subjective    Subjective   Pt seen at [place]. No new complaints. Per nursing, no acute overnight changes    Review of Systems   Unable to accurately obtain due to altered mental status but pt admits mild pain to her legs exacerbated with dressing changes    Objective         Vitals Last 24 Hours:  TEMPERATURE:  Temp  Av.8 °F (36.6 °C)  Min: 97.4 °F (36.3 °C)  Max: 98.5 °F (36.9 °C)  RESPIRATIONS RANGE: Resp  Av.1  Min: 17  Max: 20  PULSE OXIMETRY RANGE: SpO2  Av.7 %  Min: 95 %  Max: 100 %  PULSE RANGE: Pulse  Av.6  Min: 74  Max: 87  BLOOD PRESSURE RANGE: Systolic (03REX), ALN:160 , Min:92 , POD:553   ; Diastolic (78KJP), ECB:43, Min:58, Max:61    I/O (24Hr): Intake/Output Summary (Last 24 hours) at 2020 1846  Last data filed at 2020 1723  Gross per 24 hour   Intake 720 ml   Output 1450 ml   Net -730 ml     Objective   GEN: Pt is alert and in NAD. Dressings noted to B/L LE are C/D/I. No family noted at [place]  DERM: Wounds noted to both LE with granular base. No malodor noted. Periwound erythema noted. No dry skin noted to B/L LE. No proximal lymphatic streaking noted to B/L LE. VASC: Pedal pulses (DP/PT) faintly palpable to B/L LE. CFT<3sec to all digits of B/L LE. No pedal hair growth noted to the level of the digits for B/L LE. Skin temp is warm to warm from proximal to distal for B/L LE. Neg homans/maggi signs to B/L LE.  No varicosities or telangectasias noted to B/L LE.  NEURO: Protective and epicritic sensations grossly intact to B/L LE  MSK: (+) POP, No gross deformities. Decreased muscle tone and bulk noted to B/L LE.  PSYCH: Cooperative with tearful affect    Labs/Imaging/Diagnostics    Labs:  CBC:  Recent Labs     20  0755 20  1052 20  1120   WBC 11.9* 12.1* 12.9*   RBC 3.33* 3.59* 4.04   HGB 9.3* 10.1* 11.5   HCT 31.5* 33.5* 38.3   MCV 94.6 93.3 94.8   RDW 14.5 14.3 14.4   * 519* 526*     CHEMISTRIES:  Recent Labs     12/09/20  0755 12/08/20  1052 12/07/20  1120     141 143 142  141   K 3.5  3.5 3.0* 3.8  3.7     103 102 107  106   CO2 36*  36* 37* 31  31   BUN 27*  28* 25* 23*  24*   CA 8.4*  8.4* 8.4* 8.7  8.6   PHOS 3.4  --  3.0   PT/INR:No results for input(s): INR, INREXT in the last 72 hours. No lab exists for component: PROTIME  APTT:No results for input(s): APTT in the last 72 hours. LIVER PROFILE:  Recent Labs     12/09/20  0755 12/08/20  1052 12/07/20  1120   AST 14* 14* 16   ALT 12 13 13     Lab Results   Component Value Date/Time    ALT (SGPT) 12 12/09/2020 07:55 AM    AST (SGOT) 14 (L) 12/09/2020 07:55 AM    Alk. phosphatase 101 12/09/2020 07:55 AM    Bilirubin, total 0.3 12/09/2020 07:55 AM       Imaging Last 24 Hours:  Xr Chest Port    Result Date: 12/9/2020  Chest single view. Comparison single view chest December 3, 2020. Reduced lung volumes. Similar basilar atelectasis. No gross interstitial or alveolar pulmonary edema. Right-sided cardiac device with leads overlying the right heart. Cardiac silhouette enlargement. Elongated thoracic aorta with atherosclerosis. No pneumothorax or sizable pleural effusion. Assessment//Plan   Active Problems:    PNA (pneumonia) (11/25/2020)      Pneumonia (11/25/2020)      Assessment & Plan    - Bilateral LE Wounds  - PAD  - Sepsis (resolving)  - Altered mental status (acute)        Patient seen and evaluated at bedside  - Most recent labs personally reviewed  - Cont current wound care. Wounds responding well. Cont dakins WTD BID  - Ceretec scan noted no osteo to the B/L tibias  - Abx per ID  - I will follow closely     The wounds responded similarly well to the second debridement. If still admitted, will need graft placement in the near future due to the large tissue voids        Scott HERBERT  [address], [address], [facility]  [address] [facility], [address] [place] [address],[address]  [facility] Abhinav:  [phone]  F:  [phone]  C:  [phone]    Electronically signed by Jacquie Chopra DPM on 12/9/2020 at 6:46 PM

## 2020-12-09 NOTE — PROGRESS NOTES
Cardiology Progress Note    Patient seen and examined. This is a patient who is followed for heart failure with reduced EF. She is awake in bed, no acute distress. She answers questions appropriately. She is POD 1 s/p bilateral leg ulcer debridement. She denies chest pain or shortness of breath. No other complaints reported. Telemetry reviewed, V paced 70s    Pertinent review of system items noted above, all other systems are negative. Current medications reviewed. Physical Examination  Vital signs are stable, Blood Pressure 101/66, Pulse 74  No apparent distress. Heart is regular, rate and rhythm. Normal S1, S2, murmur present, skin warm. Lungs are diminished  Abdomen is soft, nontender, normal bowel sounds. Lower extremities are dressed    Labs reviewed       Case discussed with Dr. Álvaro Moya and our impression and recommendations are as follows:  1. Heart failure with reduced EF: EF 15-20%, unsure if this is new diagnosis. Continue to diuresis, will change Lasix to PO with close monitoring of I&O and daily weights. Stress test with large infarction and only a small area of baldo-infarct. Continue medical management. No ACE/ARB therapy due to low blood pressure. Keep serum potassium between 4-5 and serum magnesium > 2.     2. PPM: [facility] PPM. Per documents implanted in 2005 by Dr. Katie Leonard for complete heart block. Device interrogated showing several episodes NSVT, unsure if they correlate with possible syncopal episode. One on 11/25 at 0942 lasting 3:36s. Consider BB once BP allows. Continue Amiodarone. Monitor telemetry and electrolytes    3. Elevated troponin: Likely demand ischemia from CHF. No active chest pain reported. 4. Cellulitis: Treatment per primary. POD 1 Bilateral lower extremity ulcer debridement. 5. Hyperlipidemia: Continue high intensity statin therapy. Please do no hesitate to call me or Dr. Álvaro Moya if additional questions arise.

## 2020-12-09 NOTE — PROGRESS NOTES
CM received the below message from [facility], in [employer]. I am just seeing this - no we have discharged this patient and unless she has a different living situation -we cannot see her. Please feel free to call me to discuss - [phone]       CM attempted to reach back out to [facility] in reference to this and was told that the person YOKASTA needs to speak with is in a meeting. CM asked for a return call. CM met with the patient and explained that [facility] will no longer be accepting her and asked again about SNF or a new HH choice. Patient remains adamant that she wants to go home with or without and accepting [facility] agency and that she does not want to go to a facility. Patient has been declared competent, by psych, to make her own decisions. CM will send out more [facility] referrals. CM will continue to follow for any additional discharge planning needs    3:44pm    CM notes patient has been accepted with [email or web address] and [facility] for [facility]. CM will follow up with patient in reference to preference. CM will continue to follow for any additional discharge planning needs.

## 2020-12-09 NOTE — ANESTHESIA POSTPROCEDURE EVALUATION
Procedure(s):  Debridement of BILATERAL leg ulcers w/ Misonix.     general    Anesthesia Post Evaluation      Multimodal analgesia: multimodal analgesia not used between 6 hours prior to anesthesia start to PACU discharge  Patient location during evaluation: PACU  Patient participation: complete - patient participated  Level of consciousness: awake and alert  Pain score: 1  Pain management: adequate  Airway patency: patent  Anesthetic complications: no  Cardiovascular status: acceptable and hemodynamically stable  Respiratory status: acceptable, nonlabored ventilation, spontaneous ventilation and room air  Hydration status: acceptable  Post anesthesia nausea and vomiting:  none  Final Post Anesthesia Temperature Assessment:  Normothermia (36.0-37.5 degrees C)      INITIAL Post-op Vital signs:   Vitals Value Taken Time   /71 12/8/2020  6:20 PM   Temp 36.9 °C (98.4 °F) 12/8/2020  6:09 PM   Pulse 84 12/8/2020  6:20 PM   Resp 16 12/8/2020  6:20 PM   SpO2 98 % 12/8/2020  6:20 PM

## 2020-12-09 NOTE — PROGRESS NOTES
Pulmonary, Critical Care Note    Name: Tanner Mcdermott MRN: [id number]   : 1945 Hospital: [address] [place] [address]   Date: 2020  Admission date: 2020 Hospital Day: 15       Subjective/Interval History:   Seen on the medical floor. Consult requested for acute hypoxic respiratory failure. She is an extremely poor historian states she feels fine to leave her alone because she is trying to eat  / respirations nonlabored she remains on 3 L of oxygen. States she is not having any surgery  12/3 awake eating breakfast respirations nonlabored   sleeping but awakens easily denies shortness of breath   awake alert wants to go home respirations nonlabored    Hospital Problems  Never Reviewed          Codes Class Noted POA    PNA (pneumonia) ICD-10-CM: J18.9  ICD-9-CM: 486  2020 Unknown        Pneumonia ICD-10-CM: J18.9  ICD-9-CM: 563  2020 Unknown              IMPRESSION:   1. Acute hypoxic respiratory failure secondary to pulmonary edema remains on 2 L of oxygen overnight oximetry did show desaturation on room air  2. Hypokalemia repleted   3. Hypercapnia likely metabolic alkalosis improved after Diamox stable at this point  4. Severe cardiomyopathy  5. Severe mitral regurg  6. Bilateral pleural effusions secondary to pulmonary edema no significant change on x-ray 12/3  7. Providencia septicemia  8. Anemia  9. Hypothyroidism treated  10. Questionable early dementia        RECOMMENDATIONS/PLAN:   1. Creatinine stable continue twice daily Lasix   2. Continue daily potassium   3. Follow renal function  4. Follow H&H  5.           [x] High complexity decision making was performed  [x] See my orders for details      Subjective/Initial History:     I was asked by Fady Man MD to see Tanner Mcdermott  a 66 y.o.    female in consultation for a chief complaint of acute hypoxic respiratory failure and pleural effusions      Allergies   Allergen Reactions    Tetracycline Other (comments)     Causes mouth sores    Morphine Nausea and Vomiting        MAR reviewed and pertinent medications noted or modified as needed     Current Facility-Administered Medications   Medication    sodium chloride (NS) flush 5-40 mL    sodium chloride (NS) flush 5-40 mL    amiodarone (CORDARONE) tablet 200 mg    potassium chloride SR (KLOR-CON 10) tablet 40 mEq    sodium hypochlorite (HALF STRENGTH DAKIN'S) 0.25% irrigation (bottle)    sodium chloride (NS) flush 5-40 mL    sodium chloride (NS) flush 5-40 mL    furosemide (LASIX) injection 40 mg    0.9% sodium chloride infusion 250 mL    labetaloL (NORMODYNE;TRANDATE) 20 mg/4 mL (5 mg/mL) injection 10 mg    metoprolol (LOPRESSOR) injection 2.5 mg    albuterol-ipratropium (DUO-NEB) 2.5 MG-0.5 MG/3 ML    piperacillin-tazobactam (ZOSYN) 3.375 g in 0.9% sodium chloride (MBP/ADV) 100 mL MBP    levothyroxine (SYNTHROID) tablet 75 mcg    acetaminophen (TYLENOL) tablet 650 mg    Or    acetaminophen (TYLENOL) suppository 650 mg    polyethylene glycol (MIRALAX) packet 17 g    ondansetron (ZOFRAN ODT) tablet 4 mg    Or    ondansetron (ZOFRAN) injection 4 mg    aspirin tablet 325 mg    atorvastatin (LIPITOR) tablet 40 mg      Patient PCP: Karl Bardales MD  PMH:  has no past medical history on file. PSH:   has no past surgical history on file. FHX: family history is not on file.    SHX:           Objective:     Vital Signs: Telemetry:    normal sinus rhythm Intake/Output:   Visit Vitals  BP (!) 98/59 (BP 1 Location: Left arm, BP Patient Position: At rest)   Pulse 74   Temp 97.6 °F (36.4 °C)   Resp 18   Ht 5' (1.524 m)   Wt 41.7 kg (91 lb 14.9 oz)   SpO2 95%   BMI 17.95 kg/m²       Temp (24hrs), Av.8 °F (36.6 °C), Min:97.4 °F (36.3 °C), Max:98.4 °F (36.9 °C)        O2 Device: Nasal cannula O2 Flow Rate (L/min): 2 l/min       Wt Readings from Last 4 Encounters:   20 41.7 kg (91 lb 14.9 oz)   20 47.7 kg (105 lb 2.6 oz) 09/20/16 57.2 kg (126 lb)   12/07/10 64 kg (141 lb)          Intake/Output Summary (Last 24 hours) at 12/9/2020 1040  Last data filed at 12/9/2020 0549  Gross per 24 hour   Intake 1120 ml   Output 1010 ml   Net 110 ml       Last shift:      No intake/output data recorded. Last 3 shifts: 12/07 1901 - 12/09 0700  In: 1120 [P.O.:920;  I.V.:200]  Out: 2010 [Urine:2000]       Physical Exam:   General:  female; chronically ill appearing frail  HEENT: NCAT, oral mucosa clear  Eyes: anicteric; conjunctiva clear extraocular movements intact  Neck: no nodes, neck veins visible no accessory muscle use  Chest: no deformity,   Cardiac: Regular rate and rhythm systolic murmur present has edema of the extremities  Lungs: distant breath sounds; no wheezing rales are heard posteriorly no breath sounds in the bases  Abd: Soft positive bowel sounds no tenderness she has some posterior abdominal wall edema  Ext: Edema of the legs arms and sacrum  : clear urine  Neuro: Awake more alert mildly confused  will move all 4 extremities  Psych- no agitation, oriented to person;   Skin: Lower extremities have very flaky skin with erythema  Pulses: Brachial radial femoral pulses intact  Capillary: Slow capillary refill    Labs:    Recent Labs     12/09/20  0755 12/08/20  1052 12/07/20  1120   WBC 11.9* 12.1* 12.9*   HGB 9.3* 10.1* 11.5   * 519* 526*     Recent Labs     12/09/20  0755 12/08/20  1052 12/07/20  1120     141 143 142  141   K 3.5  3.5 3.0* 3.8  3.7     103 102 107  106   CO2 36*  36* 37* 31  31   *  105* 94 82  81   BUN 27*  28* 25* 23*  24*   CREA 0.58  0.55 0.49* 0.38*  0.39*   CA 8.4*  8.4* 8.4* 8.7  8.6   PHOS 3.4  --  3.0   ALB 2.1*  2.1* 2.1* 2.2*  2.2*   ALT 12 13 13     2 L nasal oxygen with oxygen saturation 98%  BNP 16,597  CRP 3.04  11/26 blood culture with Providencia sensitive to Zosyn  Urine culture no growth  Wound culture with multiple organisms including methicillin sensitive staph aureus Klebsiella Providencia Enterobacter and Morganella  11/27 echo ejection fraction 20% dilated left atrium grade 2 diastolic dysfunction moderate to severe mitral regurg IVC dilatation RVSP 55  12/1 nuclear cardiac scan with ejection fraction of 19%  Lab Results   Component Value Date/Time    Culture result: Moderate Morganella morganii ssp morganii 12/04/2020 10:30 AM    Culture result:  12/04/2020 10:30 AM     Moderate Stenotrophomonas (xantho.) maltophilia CLSI guidelines do not recommend reporting susceptibilities for s. Maltophilia. Trimethoprim/sulfamethoxazole is the drug of choice. Culture result: Moderate Enterobacter cloacae complex 12/04/2020 10:30 AM    Culture result: Light Staphylococcus aureus 12/04/2020 10:30 AM    Culture result: Light Enterobacter cloacae complex 12/04/2020 10:30 AM    Culture result: Light Morganella morganii 12/04/2020 10:30 AM    Culture result:  12/04/2020 10:30 AM     Light Enterobacter cloacae complex . ..(2ND COLONY TYPE)    Culture result: Light Staphylococcus aureus 12/04/2020 10:30 AM     Lab Results   Component Value Date/Time    TSH 0.26 (L) 11/25/2020 12:30 PM       Imaging:    CXR Results  (Last 48 hours)    None        Results from [place] encounter on 11/25/20   XR CHEST PA LAT    Narrative Chest, 2 views, 12/7/2020    History: Pulmonary edema. Bilateral effusions. Comparison: Including chest 12/3/2020. Findings: A right-sided pacemaker device with leads in the right atrium and  right ventricle is in place. The cardiac silhouette is stable globular  enlargement which could indicate cardiomyopathy or pericardial effusion. The  thoracic aorta is tortuous. The lungs are underexpanded with bibasilar  atelectasis. Small pleural effusions are noted. There is pulmonary vascular  congestion and hydrostatic edema. No pneumothorax is identified.   The thoracic  spine again demonstrates kyphosis, degenerative changes and compression  deformities. Impression Impression: Underexpanded lungs with bibasilar atelectasis. Small pleural  effusions. Hydrostatic edema. No significant interval change. XR CHEST SNGL V    Narrative Study: XR CHEST SNGL V    Clinical indication: Pleural effusions    Comparison: Chest x-ray 12/1/2020. Findings: Only hazy bibasilar airspace disease/pleural effusions. No evidence of  pneumothorax. Cardiac silhouette is enlarged. Stable positioning of right chest wall pacemaker  device. Mild vascular congestion. No acute osseous abnormality identified. Impression Impression:  Little interval change. XR CHEST PORT    Narrative Chest, frontal view, 12/1/2020    History: CHF. Comparison: Including chest 11/27/2020. Findings: The patient's head obscures the left upper lung zone. Right-sided  pacemaker device with 2 leads is in place. The cardiac silhouette again  demonstrates globular enlargement which can indicate cardiomyopathy or  pericardial effusion. The lungs are underexpanded with bibasilar atelectasis,  increased as compared to chest 11/27/2020. Right pleural effusion is possible. There is pulmonary vascular congestion and hydrostatic edema. No pneumothorax  is identified. The osseous structures are stable. Impression Impression: Underexpanded lungs with bibasilar atelectasis, increased. Possible  right pleural effusion. Hydrostatic edema. · Providencia septicemia questionably urinary tract or from skin site. Urine culture was no growth I do not know if it was collected after antibiotics were given. She is currently on Zosyn   She has pleural effusions and accentuated interstitial markings consistent with CHF/pulmonary edema. She is receiving Lasix. ·   · Thank you for allowing me to see Ms. Radha Zamudio  ·     Tonja Shah MD

## 2020-12-09 NOTE — PROGRESS NOTES
OCCUPATIONAL THERAPY RE-EVALUATION / WEEKLY REASSESSMENT  Patient: Winnie Dawson (56 y.o. female)  Date: 12/9/2020  Diagnosis: PNA (pneumonia) [J18.9]  Pneumonia [J18.9]   <principal problem not specified>  Procedure(s) (LRB):  Debridement of BILATERAL leg ulcers w/ Misonix (Bilateral) 1 Day Post-Op  Precautions: Fall risk    Chart, occupational therapy assessment, plan of care, and goals were reviewed. ASSESSMENT  Based on the objective data described below, pt continues to present with overall deficits in generalized strength/AROM, functional activity tolerance, static/dynamic sitting/standing balance, and cognition/decreased safety awareness impacting overall performance of functional transfers/mobility. Pt was initially evaluated and placed on OT caseload on 12/2/20, had participated in one OT tx session since then and is now now for RA 2' to LOS. Pt initially req max A x2 for bed mobility, total A LB bathing/dressing, max A UB dressing, and setup-min A for basic grooming. Pt progressed to standing req max Ax2, and max A to don socks EOB. Pt received in semi-supine today, on O2 via NC, agreeable to working with OT and able to complete UE HEP and basic grooming task with SBA in long sitting. Supine>sit completed with max A and increased time to EOB with max A for scooting. Pt wood hair EOB with min A to reach back of head. Rest breaks req in between functional activities 2/2 to reports of fatigue/SOB. PTA initially present in room to assist with OOB mobility for increased pt and clinician safety, however pt refuses to work with male therapist at this time, therefore unable to progress to standing as pt req 2 person assist. Sit>supine completed with max A back into bed and pt left resting comfortably with call bell/needs in reach and LEs elevated on pillow.  Pt would benefit from continued skilled OT services during hospital stay and at next level of care to address impairments and improve overall IND and safety with ADLs and functional mobility. OT recommending SNF at d/c once medically appropriate. Current Level of Function Impacting Discharge (ADLs): Max A x2 for bed mobility and transfers, max A LB dressing, max A UB dressing, setup-min A basic grooming, total A toileting. Other factors to consider for discharge: Family support, DME, time since onset, severity of deficits, high fall risk         PLAN :  Recommendations and Planned Interventions: self care training, functional mobility training, therapeutic exercise, balance training, therapeutic activities, cognitive retraining, endurance activities, and patient education    Frequency/Duration: Patient will be followed by occupational therapy 4 times a week to address goals. Recommend with staff: Encourage participation in bed level ADLs    Recommend next OT session: toilet transfer    Recommendation for discharge: (in order for the patient to meet his/her long term goals)  SNF    This discharge recommendation:  Has been made in collaboration with the attending provider and/or case management       SUBJECTIVE:   Patient stated I don't want him to help me. I can stand just fine.     OBJECTIVE DATA SUMMARY:   Hospital course since last seen and reason for reevaluation: Pt was initially evaluated and placed on OT caseload on 12/2/20, had participated in one OT tx session since then and is now now for RA 2' to LOS. Pt initially req max A x2 for bed mobility, total A LB bathing/dressing, max A UB dressing, and setup-min A for basic grooming. Pt progressed to standing req max Ax2, and max A to don socks EOB. Pt now Max A x2 for bed mobility and transfers, max A LB dressing, max A UB dressing, setup-min A basic grooming, total A toileting. Cognitive/Behavioral Status:  Neurologic State: Alert  Orientation Level: Oriented to situation;Oriented to place;Oriented to person  Cognition: Follows commands;Decreased command following; Impaired decision making;Poor safety awareness    Hearing: Auditory  Auditory Impairment: None    Vision/Perceptual:     WFL      Range of Motion:  AROM: Generally decreased, functional     Strength:  Strength: Generally decreased, functional    Coordination:  Fine Motor Skills-Upper: Comment(Grossly decreased)      Tone & Sensation:  Sensation: Intact     Functional Mobility and Transfers for ADLs:  Bed Mobility:  Rolling: Moderate assistance  Supine to Sit: Maximum assistance  Sit to Supine: Maximum assistance  Scooting: Maximum assistance    Balance:  Sitting: Intact; With support  Sitting - Static: Fair (occasional)  Sitting - Dynamic: Poor (constant support)    ADL Intervention and task modifications:  Feeding  Feeding Assistance: Set-up  Utensil Management: Independent  Food to Mouth: Independent    Grooming  Grooming Assistance: Minimum assistance  Position Performed: Long sitting on bed;Seated edge of bed  Washing Face: Set-up; Supervision  Brushing/Combing Hair: Minimum assistance    Lower Body Bathing  Perineal  : Minimum assistance  Position Performed: Supine    Therapeutic Exercises:   Pt participated in UE HEP of sh flex/ex x10, elbow flex/ext x10 and hand finger flex/ex x20 at bed level today. Functional Measure:    [address] [place] [address] [address] [address] AM-PACTM \"6 Clicks\"                                                       Daily Activity Inpatient Short Form  How much help from another person does the patient currently need. .. Total; A Lot A Little None   1. Putting on and taking off regular lower body clothing? [x]  1 []  2 []  3 []  4   2. Bathing (including washing, rinsing, drying)? []  1 [x]  2 []  3 []  4   3. Toileting, which includes using toilet, bedpan or urinal? [x] 1 []  2 []  3 []  4   4. Putting on and taking off regular upper body clothing? []  1 [x]  2 []  3 []  4   5. Taking care of personal grooming such as brushing teeth? []  1 []  2 [x]  3 []  4   6. Eating meals?  []  1 []  2 [x]  3 []  4   © 2007, Trustees of [address] [place] [address] [address] [id number], under license to [employer]. All rights reserved     Score: 12/24     Interpretation of Tool:  Represents clinically-significant functional categories (i.e. Activities of daily living). Percentage of Impairment CH    0%   CI    1-19% CJ    20-39% CK    40-59% CL    60-79% CM    80-99% CN     100%   [facility]  Score 6-24 24 23 20-22 15-19 10-14 7-9 6        Pain:  0/10    Activity Tolerance:   Fair, requires rest breaks, and observed SOB with activity  Please refer to the flowsheet for vital signs taken during this treatment. After treatment patient left in no apparent distress:   Supine in bed, Heels elevated for pressure relief, Call bell within reach, Bed / chair alarm activated, and Side rails x 3    COMMUNICATION/COLLABORATION:   The patients plan of care was discussed with: Physical therapy assistant.      Miah Concepcion  Time Calculation: 41 mins   Problem: Self Care Deficits Care Plan (Adult)  Goal: *Acute Goals and Plan of Care (Insert Text)  Description: Pt will be min A sup <> sit in prep for EOB ADLs  Pt will be SBA grooming sitting EOB  Pt will be min A LE dressing sitting EOB/long sit  Pt will be min A sit <>  prep for toileting LRAD  Pt will be min A toileting/toilet transfer/cloth mgmt LRAD  Pt will be SPV following UE HEP in prep for self care tasks  Outcome: Progressing Towards Goal

## 2020-12-10 LAB
ALBUMIN SERPL-MCNC: 2 G/DL (ref 3.5–5)
ALBUMIN/GLOB SERPL: 0.6 {RATIO} (ref 1.1–2.2)
ALP SERPL-CCNC: 104 U/L (ref 45–117)
ALT SERPL-CCNC: 11 U/L (ref 12–78)
ANION GAP SERPL CALC-SCNC: 1 MMOL/L (ref 5–15)
AST SERPL W P-5'-P-CCNC: 13 U/L (ref 15–37)
BASOPHILS # BLD: 0.1 K/UL (ref 0–0.1)
BASOPHILS NFR BLD: 1 % (ref 0–1)
BILIRUB SERPL-MCNC: 0.3 MG/DL (ref 0.2–1)
BUN SERPL-MCNC: 23 MG/DL (ref 6–20)
BUN/CREAT SERPL: 68 (ref 12–20)
CA-I BLD-MCNC: 8.3 MG/DL (ref 8.5–10.1)
CHLORIDE SERPL-SCNC: 108 MMOL/L (ref 97–108)
CO2 SERPL-SCNC: 33 MMOL/L (ref 21–32)
CREAT SERPL-MCNC: 0.34 MG/DL (ref 0.55–1.02)
CRP SERPL-MCNC: 1.67 MG/DL (ref 0–0.6)
DIFFERENTIAL METHOD BLD: ABNORMAL
EOSINOPHIL # BLD: 0.2 K/UL (ref 0–0.4)
EOSINOPHIL NFR BLD: 2 % (ref 0–7)
ERYTHROCYTE [DISTWIDTH] IN BLOOD BY AUTOMATED COUNT: 14.5 % (ref 11.5–14.5)
GLOBULIN SER CALC-MCNC: 3.1 G/DL (ref 2–4)
GLUCOSE SERPL-MCNC: 82 MG/DL (ref 65–100)
HCT VFR BLD AUTO: 29.5 % (ref 35–47)
HGB BLD-MCNC: 8.7 G/DL (ref 11.5–16)
IMM GRANULOCYTES # BLD AUTO: 0 K/UL (ref 0–0.04)
IMM GRANULOCYTES NFR BLD AUTO: 0 % (ref 0–0.5)
LYMPHOCYTES # BLD: 0.8 K/UL (ref 0.8–3.5)
LYMPHOCYTES NFR BLD: 9 % (ref 12–49)
MCH RBC QN AUTO: 28.2 PG (ref 26–34)
MCHC RBC AUTO-ENTMCNC: 29.5 G/DL (ref 30–36.5)
MCV RBC AUTO: 95.8 FL (ref 80–99)
MONOCYTES # BLD: 0.6 K/UL (ref 0–1)
MONOCYTES NFR BLD: 7 % (ref 5–13)
NEUTS SEG # BLD: 7 K/UL (ref 1.8–8)
NEUTS SEG NFR BLD: 81 % (ref 32–75)
PLATELET # BLD AUTO: 430 K/UL (ref 150–400)
PMV BLD AUTO: 9.4 FL (ref 8.9–12.9)
POTASSIUM SERPL-SCNC: 3.9 MMOL/L (ref 3.5–5.1)
PROT SERPL-MCNC: 5.1 G/DL (ref 6.4–8.2)
RBC # BLD AUTO: 3.08 M/UL (ref 3.8–5.2)
SODIUM SERPL-SCNC: 142 MMOL/L (ref 136–145)
WBC # BLD AUTO: 8.6 K/UL (ref 3.6–11)

## 2020-12-10 PROCEDURE — 74011250637 HC RX REV CODE- 250/637: Performed by: NURSE PRACTITIONER

## 2020-12-10 PROCEDURE — 77010033678 HC OXYGEN DAILY

## 2020-12-10 PROCEDURE — 80053 COMPREHEN METABOLIC PANEL: CPT

## 2020-12-10 PROCEDURE — 94760 N-INVAS EAR/PLS OXIMETRY 1: CPT

## 2020-12-10 PROCEDURE — 74011000250 HC RX REV CODE- 250: Performed by: FAMILY MEDICINE

## 2020-12-10 PROCEDURE — 74011000258 HC RX REV CODE- 258: Performed by: FAMILY MEDICINE

## 2020-12-10 PROCEDURE — 36415 COLL VENOUS BLD VENIPUNCTURE: CPT

## 2020-12-10 PROCEDURE — 94640 AIRWAY INHALATION TREATMENT: CPT

## 2020-12-10 PROCEDURE — 74011250637 HC RX REV CODE- 250/637: Performed by: INTERNAL MEDICINE

## 2020-12-10 PROCEDURE — 85025 COMPLETE CBC W/AUTO DIFF WBC: CPT

## 2020-12-10 PROCEDURE — 65270000029 HC RM PRIVATE

## 2020-12-10 PROCEDURE — 74011250637 HC RX REV CODE- 250/637: Performed by: FAMILY MEDICINE

## 2020-12-10 PROCEDURE — 99232 SBSQ HOSP IP/OBS MODERATE 35: CPT | Performed by: INTERNAL MEDICINE

## 2020-12-10 PROCEDURE — 74011250636 HC RX REV CODE- 250/636: Performed by: FAMILY MEDICINE

## 2020-12-10 PROCEDURE — 97530 THERAPEUTIC ACTIVITIES: CPT

## 2020-12-10 PROCEDURE — 86140 C-REACTIVE PROTEIN: CPT

## 2020-12-10 RX ORDER — DICLOXACILLIN SODIUM 500 MG/1
500 CAPSULE ORAL
Qty: 40 CAP | Refills: 0 | Status: SHIPPED | OUTPATIENT
Start: 2020-12-10

## 2020-12-10 RX ORDER — AMIODARONE HYDROCHLORIDE 200 MG/1
200 TABLET ORAL DAILY
Qty: 30 TAB | Refills: 1 | Status: SHIPPED | OUTPATIENT
Start: 2020-12-11

## 2020-12-10 RX ORDER — ATORVASTATIN CALCIUM 40 MG/1
40 TABLET, FILM COATED ORAL
Qty: 30 TAB | Refills: 1 | Status: SHIPPED | OUTPATIENT
Start: 2020-12-10

## 2020-12-10 RX ORDER — LEVOFLOXACIN 500 MG/1
500 TABLET, FILM COATED ORAL DAILY
Qty: 10 TAB | Refills: 0 | Status: SHIPPED | OUTPATIENT
Start: 2020-12-10 | End: 2020-12-14

## 2020-12-10 RX ORDER — IPRATROPIUM BROMIDE AND ALBUTEROL SULFATE 2.5; .5 MG/3ML; MG/3ML
3 SOLUTION RESPIRATORY (INHALATION)
Qty: 30 NEBULE | Refills: 1 | Status: SHIPPED | OUTPATIENT
Start: 2020-12-10

## 2020-12-10 RX ORDER — ASPIRIN 325 MG
325 TABLET ORAL DAILY
Qty: 30 TAB | Refills: 1 | Status: SHIPPED | OUTPATIENT
Start: 2020-12-11

## 2020-12-10 RX ADMIN — IPRATROPIUM BROMIDE AND ALBUTEROL SULFATE 3 ML: .5; 3 SOLUTION RESPIRATORY (INHALATION) at 19:55

## 2020-12-10 RX ADMIN — ATORVASTATIN CALCIUM 40 MG: 40 TABLET, FILM COATED ORAL at 20:45

## 2020-12-10 RX ADMIN — PIPERACILLIN SODIUM AND TAZOBACTAM SODIUM 3.38 G: 3; .375 INJECTION, POWDER, LYOPHILIZED, FOR SOLUTION INTRAVENOUS at 20:44

## 2020-12-10 RX ADMIN — POTASSIUM CHLORIDE 40 MEQ: 750 TABLET, FILM COATED, EXTENDED RELEASE ORAL at 08:39

## 2020-12-10 RX ADMIN — AMIODARONE HYDROCHLORIDE 200 MG: 200 TABLET ORAL at 08:39

## 2020-12-10 RX ADMIN — ACETAMINOPHEN 650 MG: 325 TABLET, FILM COATED ORAL at 02:08

## 2020-12-10 RX ADMIN — FUROSEMIDE 40 MG: 40 TABLET ORAL at 08:39

## 2020-12-10 RX ADMIN — IPRATROPIUM BROMIDE AND ALBUTEROL SULFATE 3 ML: .5; 3 SOLUTION RESPIRATORY (INHALATION) at 07:16

## 2020-12-10 RX ADMIN — LEVOTHYROXINE SODIUM 75 MCG: 0.07 TABLET ORAL at 08:39

## 2020-12-10 RX ADMIN — PIPERACILLIN SODIUM AND TAZOBACTAM SODIUM 3.38 G: 3; .375 INJECTION, POWDER, LYOPHILIZED, FOR SOLUTION INTRAVENOUS at 11:47

## 2020-12-10 RX ADMIN — HYOSCYAMINE SULFATE: 16 SOLUTION at 08:42

## 2020-12-10 RX ADMIN — Medication 10 ML: at 06:51

## 2020-12-10 RX ADMIN — ASPIRIN 325 MG ORAL TABLET 325 MG: 325 PILL ORAL at 08:39

## 2020-12-10 RX ADMIN — PIPERACILLIN SODIUM AND TAZOBACTAM SODIUM 3.38 G: 3; .375 INJECTION, POWDER, LYOPHILIZED, FOR SOLUTION INTRAVENOUS at 04:44

## 2020-12-10 RX ADMIN — Medication 10 ML: at 13:06

## 2020-12-10 RX ADMIN — IPRATROPIUM BROMIDE AND ALBUTEROL SULFATE 3 ML: .5; 3 SOLUTION RESPIRATORY (INHALATION) at 02:18

## 2020-12-10 NOTE — PROGRESS NOTES
Problem: Mobility Impaired (Adult and Pediatric)  Goal: *Acute Goals and Plan of Care (Insert Text)  Description: Patient will move from supine to sit and sit to supine , scoot up and down, and roll side to side in bed with minimal assistance/contact guard assist within 7 day(s). Patient will transfer from bed to chair and chair to bed with minimal assistance/contact guard assist using the least restrictive device within 7 day(s). Patient will improve static sitting balance to modified independence within 1 week(s). Patient will ambulate 10 feet with moderate assistance with least restrictive device within 1 weeks. Outcome: Progressing Towards Goal   PHYSICAL THERAPY TREATMENT  Patient: Tona Urbina (63 y.o. female)  Date: 12/10/2020  Diagnosis: PNA (pneumonia) [J18.9]  Pneumonia [J18.9]   <principal problem not specified>  Procedure(s) (LRB):  Debridement of BILATERAL leg ulcers w/ Misonix (Bilateral) 2 Days Post-Op  Precautions:  fall, leg sores  Chart, physical therapy assessment, plan of care and goals were reviewed. ASSESSMENT  Patient continues with skilled PT services and is progressing towards goals. Patient agreeabel to work witht hear[y. She required mod to max assist for supine to stand and vice versa. Patient sat at eob > 15 mins and attampted to stand with max assist x 3 but unable to stand due to painful feet. She was unable to tolerate the slipper socks on the feet. She will benefit from cont. Of therapy. Her feet and leg appears very painful. Current Level of Function Impacting Discharge (mobility/balance): decreased rom, strength and mobility endurance. Other factors to consider for discharge: snf       PLAN :  Patient continues to benefit from skilled intervention to address the above impairments. Continue treatment per established plan of care. to address goals.     Recommendation for discharge: (in order for the patient to meet his/her long term goals)  Therapy up to 5 days/week in SNF setting    This discharge recommendation:  Has been made in collaboration with the attending provider and/or case management    IF patient discharges home will need the following DME: hospital bed, rolling walker, and wheelchair       SUBJECTIVE:   Patient stated I am very sore in my feet and neck.     OBJECTIVE DATA SUMMARY:   Critical Behavior:  Neurologic State: Alert  Orientation Level: Oriented to place, Oriented to person  Cognition: Follows commands  Safety/Judgement: Decreased insight into deficits  Functional Mobility Training:  Bed Mobility:  Rolling: Moderate assistance;Maximum assistance  Supine to Sit: Moderate assistance;Maximum assistance  Sit to Supine: Moderate assistance;Maximum assistance  Scooting: Moderate assistance;Maximum assistance        Transfers:  Sit to Stand: Maximum assistance  Stand to Sit: Maximum assistance                             Balance:  Sitting: Impaired;High guard; With support  Sitting - Static: Fair (occasional)  Sitting - Dynamic: Fair (occasional)  Standing: Impaired;Pull to stand; With support  Standing - Static: Fair;Constant support  Standing - Dynamic : Fair;Constant support  Ambulation/Gait Training:                                                     Therapeutic Exercises:   woodTherapeutic Exercises:       EXERCISE   Sets   Reps   Active Active Assist   Passive Self ROM   Comments   Ankle Pumps  5 [x] [] [] []    Quad Sets/Glut Sets   [] [] [] []    Hamstring Sets   [] [] [] []    Short Arc Quads   [] [] [] []    Heel Slides   [] [] [] []    Straight Leg Raises   [] [] [] []    Hip abd/add  5 [x] [] [] []    Long Arc Quads  5 [x] [] [] []    Marching   [] [] [] []       [] [] [] []       Pain Ratin/10    Activity Tolerance:   Fair  Please refer to the flowsheet for vital signs taken during this treatment.     After treatment patient left in no apparent distress:   Supine in bed, Call bell within reach, and Bed / chair alarm activated    COMMUNICATION/COLLABORATION:   The patients plan of care was discussed with: Registered nurse.      Norma Cruz PT   Time Calculation: 24 mins

## 2020-12-10 NOTE — PROGRESS NOTES
Problem: Falls - Risk of  Goal: *Absence of Falls  Description: Document Grant Going Fall Risk and appropriate interventions in the flowsheet.   Outcome: Progressing Towards Goal  Note: Fall Risk Interventions:  Mobility Interventions: Bed/chair exit alarm, OT consult for ADLs, Patient to call before getting OOB, Strengthening exercises (ROM-active/passive), Utilize walker, cane, or other assistive device    Mentation Interventions: Bed/chair exit alarm, More frequent rounding, Update white board    Medication Interventions: Bed/chair exit alarm, Teach patient to arise slowly, Patient to call before getting OOB    Elimination Interventions: Bed/chair exit alarm, Call light in reach, Patient to call for help with toileting needs    History of Falls Interventions: Bed/chair exit alarm, Consult care management for discharge planning

## 2020-12-10 NOTE — PROGRESS NOTES
Progress Note    Patient: Miguelina Tamayo MRN: [id number]  SSN: [id number]    YOB: 1945  Age: 66 y.o. Sex: female      Admit Date: 11/25/2020    LOS: 15 days     Subjective:   Patient followed for sepsis with cellulitis both legs with blood cultures growing Providencia and wound culture with mixed Gram negative nica identified below and MSSA. Following debridement, intra-operative cultures showing essentially similar results. She remains afebrile. WBC decreasing/CRP increasing today. Ceretec scan consistent with cellulitis. Still on Zosyn alone. Seen by Psychiatry, felt to be mentally competent. She is resting comfortably with no complaints. Objective:     Vitals:    12/10/20 0130 12/10/20 0218 12/10/20 0716 12/10/20 0754   BP: 103/60   (!) 93/57   Pulse: 76   71   Resp: 20   20   Temp: 98.1 °F (36.7 °C)   98.1 °F (36.7 °C)   SpO2: 94% 95% 95% 95%   Weight:       Height:            Intake and Output:  Current Shift: No intake/output data recorded. Last three shifts: 12/08 1901 - 12/10 0700  In: 1620 [P.O.:1420; I.V.:200]  Out: 1450 [Urine:1450]    Physical Exam:   Constitutional:       General: She is not in acute distress. Appearance: She is chronically ill-appearing. HENT:      Head: Normocephalic and atraumatic. Nose:      Comments: Nasal O2 cannula  Lungs: clear bilaterally but diminished  Genitourinary:     Comments: Purewick catheter  Musculoskeletal:         General: Swelling present. No tenderness. Right lower leg: Edema present. Left lower leg: Edema present. Comments: Left calf fully exposed and shows normal granulation bed with no exudate or erythema; right calf not examined today    Neurological: asleep   Psychiatric: asleep.      Lab/Data Review:     WBC Pending <11,900  Procalcitonin <0.05 <0.11 <0.10 <0.69 <1.19  CRP Pending < 1.14 <0.90 < 1.24 <1.28 <1.98  <3.04 <9.26 <16.00    Covid-19 negative    Blood cultures (11/26) Katharina rettgeri  Blood cultures (11/26) Providenica rettgeri  Urine culture (11/26) No growth FiNAL  Wound culture (11/27)  MSSA, Klebsiella oxytoca, Providencia rettgeri, Morganella morganii (2 strains), Myroides species, Enterobacter cloacae  Wound culture left leg (12/4)  MSSA, Enterobacter cloacae, Morganella morganii  Wound culture left leg (12/4)  MSSA, Enterobacter cloacae x 2, Morganella morganii      Assessment:     Active Problems:    PNA (pneumonia) (11/25/2020)      Pneumonia (11/25/2020)    1. Cellulitis, bilateral lower extremities, severe, secondary to MSSA, Klebsiella oxytoca, Providencia rettgeri, Morganella morganii (2 strains), Myroides species, Enterobacter cloacae, Day #15 IV Zosyn, status post debridement, improving. 2. Gram negative bacteremia with Providencia rettgeri, probably secondary to above, on IV Zosyn. 3. Sepsis with leukocytosis, elevated procalcitonin, CRP and lactic acid, resolving. 4. SOB  5. Pyuria, negative urine culture    Comment:  Wounds do not appear grossly infected at this time, but would still give oral antibiotic tail for 1 week given leukocytosis and still elevated CRP. She is allergic to Tetracycline. Plan:   1. Reasonable to transition to Levaquin 750 mg po daily (GNRs) and Dicloxacillin 500 mg po QID (for MSSA) for 1 more week  2.  Cleared for discharge from ID standpoint        Signed By: Eda Pires MD     December 10, 2020

## 2020-12-10 NOTE — PROGRESS NOTES
Comprehensive Nutrition Assessment    Type and Reason for Visit: Reassess(Goal)    Nutrition Recommendations/Plan:     Continue Cardiac diet, Soft solids  Continue Ensure TID  Continue Bert daily     Obtain updated measured wts at least weekly as pt diuresed  Nursing to document %meal and supplement intakes in I/Os      Nutrition Assessment:  Brought to ED after found hypoxic and cyanotic at home, noted poor living conditions. Pt confused per MD notes. Dx sepsis, cellulitis, PNA, pyruria. F/u CXR (12/5) showed under-expanded lungs, bibasilar atelectasis, small effusions. Bilat LE stasis wounds present on admit- [facility] and Podiatry following, noted wounds concerning for gangrene. Per ID, wound cx + Gram negative bacteremia. S/p I&D LE (12/4) and repeat debridement (12/8). Cardio following for acute CHF. On 2L NC. MD indicated stress test showed large infarct, pt refused cardiac cath. Psych deemed pt competent to make decisions, plans to d/c with HH. Ordered Cardiac diet w/ Soft solids, noted briefly onRegular diet. Recent intakes 80% recorded with 100% intake of Ensure Enlive on 12/9. Will bring pt Bert in afternoon to try d/t inability to confirm intake on interview. Appetite remains good throughout admit per pt, RN, EMR review. Good acceptance of Ensure. While NPO for procedures, pt reports hunger cues and frequently requests to eat. Previous intakes per EMR ranging 40-90% consistently. Pt remains a limited historian. On initial visit, pt appeared to have good appetite, eating during interview. Agreed to [employer]. Labs (12/9): H/H: 9.3/31.5, K WNL, BUN 27, Cr WNL, Ca 8.4, . Meds: Zosyn, statin, lasix, KCl. Malnutrition Assessment:  Malnutrition Status:   Moderate malnutrition    Context:  Chronic illness     Findings of the 6 clinical characteristics of malnutrition:   Energy Intake:  No significant decrease in energy intake  Weight Loss:  7.0 - Greater than 7.5% over 3 months(>9% x 3-4 months)     Body Fat Loss:  No significant body fat loss,     Muscle Mass Loss:  1 - Mild muscle mass loss, Clavicles (pectoralis &deltoids)  Fluid Accumulation:  No significant fluid accumulation,        Estimated Daily Nutrient Needs:  Energy (kcal): 1300kcal (32 kcal/kg EDW); Weight Used for Energy Requirements: (EDW)  Protein (g): 53 g pro (1.3g/kg); Weight Used for Protein Requirements: (EDW)  Fluid (ml/day): 1300mL; Method Used for Fluid Requirements: 1 ml/kcal      Nutrition Related Findings: Will f/u for NFPE as able. Legs remain wrapped. On visual assessment, noted muscle wasting to deltoid and clavicle. Pt denied c/s difficulty n/v or c/d. Last BM 12/4 per EMR. Edema improving, 1+pitting bilat LE edema. Wounds:    Venous stasis, Surgical incision(bilat LE wounds s/p surgical debridements)       Current Nutrition Therapies:  DIET NUTRITIONAL SUPPLEMENTS Breakfast, Lunch, Dinner; Ensure [employer] (strawberry)  DIET NUTRITIONAL SUPPLEMENTS Lunch; Bert (alt flavor)  DIET CARDIAC Soft Solids    Anthropometric Measures:  · Height:  5' (152.4 cm)  · Current Body Wt:  40.8 kg (90 lb)(EDW)   · Admission Body Wt:  91 lb 7.9 oz(bed scale 11/26; noted 3+pitting edema present)    · Usual Body Wt:  50.3 kg (111 lb)(Pt reported weight from 'several months ago' at PCP office)     · Ideal Body Wt:  100 lbs:  90 %   · BMI Category:  Normal weight (BMI 18.5-24. 9)     Wt hx: 42.6kg (12/9), 41.7kg (12/8), 45.3kg (12/7), 46.6kg (12/6), 50kg (12/2), 41.2kg (12/1), 41.5kg (11/26). Several wt fluctuations noted since admit likely 2/2 fluid shifts vs error in bed measure. Pt appears closer to admit wt. Pt reported UBW of 111lbs several months ago - indicating 20lb wt loss (>9%BW) in estimated 3-4 months - significant.     Nutrition Diagnosis:   · Underweight related to (suboptimal PO intakes to maintain appropriate BW) as evidenced by BMI, weight loss    Nutrition Interventions:   Food and/or Nutrient Delivery: Continue current diet, Continue oral nutrition supplement  Nutrition Education and Counseling: No recommendations at this time  Coordination of Nutrition Care: Continue to monitor while inpatient    Goals:  Meet >75% EENs x 5-7 days (met)  Wt gain 1kg/week from EDW (not met)  Lytes and BG WNL (met)    Nutrition Monitoring and Evaluation:   Behavioral-Environmental Outcomes: None identified  Food/Nutrient Intake Outcomes: Food and nutrient intake, Supplement intake  Physical Signs/Symptoms Outcomes: GI status, Skin, Weight    Discharge Planning:    Continue oral nutrition supplement(to promote wound healing)     Electronically signed by Camelia Ponce on 12/10/2020 at 10:10 AM    Contact: [phone]

## 2020-12-10 NOTE — PROGRESS NOTES
Pulmonary, Critical Care Note    Name: Olvin Alba MRN: [id number]   : 1945 Hospital: [address] [place] [address]   Date: 12/10/2020  Admission date: 2020 Hospital Day: 16       Subjective/Interval History:   Seen on the medical floor. Consult requested for acute hypoxic respiratory failure. She is an extremely poor historian states she feels fine to leave her alone because she is trying to eat   respirations nonlabored she remains on 3 L of oxygen. States she is not having any surgery  12/3 awake eating breakfast respirations nonlabored   sleeping but awakens easily denies shortness of breath   awake alert wants to go home respirations nonlabored  12/10 patient awake alert very weak working with physical therapy. She apparently is refusing skilled nursing facility wants to go home. She does not appear capable of taking care of herself at home    Hospital Problems  Never Reviewed          Codes Class Noted POA    PNA (pneumonia) ICD-10-CM: J18.9  ICD-9-CM: 486  2020 Unknown        Pneumonia ICD-10-CM: J18.9  ICD-9-CM: 969  2020 Unknown              IMPRESSION:   1. Acute hypoxic respiratory failure secondary to pulmonary edema remains on 2 L of oxygen overnight oximetry did show desaturation on room air and room air oxygen saturation today at 87%  2. Hypokalemia repleted   3. Hypercapnia likely metabolic alkalosis improved after Diamox   4. Severe cardiomyopathy  5. Severe mitral regurg  6. Bilateral pleural effusions secondary to pulmonary edema no significant change on x-ray 12/3  7. Providencia septicemia  8. Anemia  9. Hypothyroidism treated  10. Questionable early dementia        RECOMMENDATIONS/PLAN:   1. Creatinine stable continue Lasix decreased to daily dosing  2. Continue daily potassium   3. She appears to need continuous oxygen 2 L when discharged  4.  Follow H&H  5.           [x] High complexity decision making was performed  [x] See my orders for details      Subjective/Initial History:     I was asked by Joya Mejia MD to see Jori Burt  a 66 y.o.  female in consultation for a chief complaint of acute hypoxic respiratory failure and pleural effusions      Allergies   Allergen Reactions    Tetracycline Other (comments)     Causes mouth sores    Morphine Nausea and Vomiting        MAR reviewed and pertinent medications noted or modified as needed     Current Facility-Administered Medications   Medication    furosemide (LASIX) tablet 40 mg    sodium chloride (NS) flush 5-40 mL    sodium chloride (NS) flush 5-40 mL    amiodarone (CORDARONE) tablet 200 mg    potassium chloride SR (KLOR-CON 10) tablet 40 mEq    sodium hypochlorite (HALF STRENGTH DAKIN'S) 0.25% irrigation (bottle)    sodium chloride (NS) flush 5-40 mL    sodium chloride (NS) flush 5-40 mL    0.9% sodium chloride infusion 250 mL    labetaloL (NORMODYNE;TRANDATE) 20 mg/4 mL (5 mg/mL) injection 10 mg    metoprolol (LOPRESSOR) injection 2.5 mg    albuterol-ipratropium (DUO-NEB) 2.5 MG-0.5 MG/3 ML    piperacillin-tazobactam (ZOSYN) 3.375 g in 0.9% sodium chloride (MBP/ADV) 100 mL MBP    levothyroxine (SYNTHROID) tablet 75 mcg    acetaminophen (TYLENOL) tablet 650 mg    Or    acetaminophen (TYLENOL) suppository 650 mg    polyethylene glycol (MIRALAX) packet 17 g    ondansetron (ZOFRAN ODT) tablet 4 mg    Or    ondansetron (ZOFRAN) injection 4 mg    aspirin tablet 325 mg    atorvastatin (LIPITOR) tablet 40 mg      Patient PCP: Karl Bardales MD  PMH:  has no past medical history on file. PSH:   has no past surgical history on file. FHX: family history is not on file.    SHX:           Objective:     Vital Signs: Telemetry:    normal sinus rhythm Intake/Output:   Visit Vitals  BP (!) 93/57 (BP 1 Location: Right arm, BP Patient Position: At rest;Head of bed elevated (Comment degrees))   Pulse 71   Temp 98.1 °F (36.7 °C)   Resp 20   Ht 5' (1.524 m)   Wt 42.6 kg (93 lb 14.7 oz)   SpO2 95%   BMI 18.34 kg/m²       Temp (24hrs), Av.1 °F (36.7 °C), Min:97.5 °F (36.4 °C), Max:98.5 °F (36.9 °C)        O2 Device: Nasal cannula O2 Flow Rate (L/min): 2 l/min       Wt Readings from Last 4 Encounters:   20 42.6 kg (93 lb 14.7 oz)   20 47.7 kg (105 lb 2.6 oz)   16 57.2 kg (126 lb)   12/07/10 64 kg (141 lb)          Intake/Output Summary (Last 24 hours) at 12/10/2020 1035  Last data filed at 2020 1856  Gross per 24 hour   Intake 900 ml   Output 650 ml   Net 250 ml       Last shift:      No intake/output data recorded. Last 3 shifts:  1901 - 12/10 0700  In: 3305 [P.O.:1420;  I.V.:200]  Out: 1450 [Urine:1450]       Physical Exam:   General:  female; chronically ill appearing frail  HEENT: NCAT, oral mucosa clear  Eyes: anicteric; conjunctiva clear extraocular movements intact  Neck: no nodes, neck veins visible no accessory muscle use  Chest: no deformity,   Cardiac: Regular rate and rhythm systolic murmur present has edema of the extremities  Lungs: distant breath sounds; no wheezing rales are heard posteriorly no breath sounds in the bases  Abd: Soft positive bowel sounds no tenderness she has some posterior abdominal wall edema  Ext: Edema of the legs arms and sacrum  : clear urine  Neuro: Awake more alert mildly confused  will move all 4 extremities  Psych- no agitation, oriented to person;   Skin: Lower extremities have very flaky skin with erythema  Pulses: Brachial radial femoral pulses intact  Capillary: Slow capillary refill    Labs:    Recent Labs     12/10/20  0740 20  0755 20  1052   WBC 8.6 11.9* 12.1*   HGB 8.7* 9.3* 10.1*   * 458* 519*     Recent Labs     12/10/20  0740 20  0755 20  1052 20  1120    141  141 143 142  141   K 3.9 3.5  3.5 3.0* 3.8  3.7    102  103 102 107  106   CO2 33* 36*  36* 37* 31  31   GLU 82 104*  105* 94 82  81   BUN 23* 27*  28* 25* 23*  24*   CREA 0.34* 0.58  0.55 0.49* 0.38*  0.39*   CA 8.3* 8.4*  8.4* 8.4* 8.7  8.6   PHOS  --  3.4  --  3.0   ALB 2.0* 2.1*  2.1* 2.1* 2.2*  2.2*   ALT 11* 12 13 13     2 L nasal oxygen with oxygen saturation 98%  BNP 16,597  CRP 3.04  11/26 blood culture with Providencia sensitive to Zosyn  Urine culture no growth  Wound culture with multiple organisms including methicillin sensitive staph aureus Klebsiella Providencia Enterobacter and Morganella  11/27 echo ejection fraction 20% dilated left atrium grade 2 diastolic dysfunction moderate to severe mitral regurg IVC dilatation RVSP 55  12/1 nuclear cardiac scan with ejection fraction of 19%  Lab Results   Component Value Date/Time    Culture result: Moderate Morganella morganii ssp morganii 12/04/2020 10:30 AM    Culture result:  12/04/2020 10:30 AM     Moderate Stenotrophomonas (xantho.) maltophilia CLSI guidelines do not recommend reporting susceptibilities for s. Maltophilia. Trimethoprim/sulfamethoxazole is the drug of choice. Culture result: Moderate Enterobacter cloacae complex 12/04/2020 10:30 AM    Culture result: Light Staphylococcus aureus 12/04/2020 10:30 AM    Culture result: Light Enterobacter cloacae complex 12/04/2020 10:30 AM    Culture result: Light Morganella morganii 12/04/2020 10:30 AM    Culture result:  12/04/2020 10:30 AM     Light Enterobacter cloacae complex . ..(2ND COLONY TYPE)    Culture result: Light Staphylococcus aureus 12/04/2020 10:30 AM     Lab Results   Component Value Date/Time    TSH 0.26 (L) 11/25/2020 12:30 PM       Imaging:    CXR Results  (Last 48 hours)               12/09/20 1330  XR CHEST PORT Final result    Narrative:  Chest single view. Comparison single view chest December 3, 2020. Reduced lung volumes. Similar basilar atelectasis. No gross interstitial or   alveolar pulmonary edema. Right-sided cardiac device with leads overlying the   right heart. Cardiac silhouette enlargement.  Elongated thoracic aorta with atherosclerosis. No pneumothorax or sizable pleural effusion. Results from [place] encounter on 11/25/20   XR CHEST PORT    Narrative Chest single view. Comparison single view chest December 3, 2020. Reduced lung volumes. Similar basilar atelectasis. No gross interstitial or  alveolar pulmonary edema. Right-sided cardiac device with leads overlying the  right heart. Cardiac silhouette enlargement. Elongated thoracic aorta with  atherosclerosis. No pneumothorax or sizable pleural effusion. XR CHEST PA LAT    Narrative Chest, 2 views, 12/7/2020    History: Pulmonary edema. Bilateral effusions. Comparison: Including chest 12/3/2020. Findings: A right-sided pacemaker device with leads in the right atrium and  right ventricle is in place. The cardiac silhouette is stable globular  enlargement which could indicate cardiomyopathy or pericardial effusion. The  thoracic aorta is tortuous. The lungs are underexpanded with bibasilar  atelectasis. Small pleural effusions are noted. There is pulmonary vascular  congestion and hydrostatic edema. No pneumothorax is identified. The thoracic  spine again demonstrates kyphosis, degenerative changes and compression  deformities. Impression Impression: Underexpanded lungs with bibasilar atelectasis. Small pleural  effusions. Hydrostatic edema. No significant interval change. XR CHEST SNGL V    Narrative Study: XR CHEST SNGL V    Clinical indication: Pleural effusions    Comparison: Chest x-ray 12/1/2020. Findings: Only hazy bibasilar airspace disease/pleural effusions. No evidence of  pneumothorax. Cardiac silhouette is enlarged. Stable positioning of right chest wall pacemaker  device. Mild vascular congestion. No acute osseous abnormality identified. Impression Impression:  Little interval change. · Providencia septicemia questionably urinary tract or from skin site.   Urine culture was no growth I do not know if it was collected after antibiotics were given. She is currently on Zosyn   She has pleural effusions and accentuated interstitial markings consistent with CHF/pulmonary edema. She is receiving Lasix. · Hypoxia persists today at rest in bed room air oxygen saturation 87%  · Thank you for allowing me to see MsAdele  Alina Newtonn  ·     Lloyd Taylor MD

## 2020-12-10 NOTE — PROGRESS NOTES
CM spoke to sister Marylou Range [phone]); regarding patient discharge. Sister reported that patient needs HH. Sister reported that the patient is confused, her son's do not live with her. Sister reported that her neighbors do not buy the patient food on a consistent basis. Sister reported that patient needs SNF. CM reported that patient was deemed competent and the patient can make her own decisions. Sister reported that patient is noncompliant with medications and with [facility] services in the past.    CM will continue to follow the patient for discharge planning needs.

## 2020-12-10 NOTE — PROGRESS NOTES
Cardiology Progress Note    Patient seen and examined. This is a patient who is followed for heart failure with reduced EF. She is awake in bed, no acute distress. She answers questions appropriately. She is POD 2 s/p bilateral leg ulcer debridement. She denies chest pain or shortness of breath. No other complaints reported. Telemetry reviewed, V paced 70s    Pertinent review of system items noted above, all other systems are negative. Current medications reviewed. Physical Examination  Vital signs are stable, Blood Pressure 101/55, Pulse 78  No apparent distress. Heart is regular, rate and rhythm. Normal S1, S2, murmur present, skin warm. Lungs are clear but diminished  Abdomen is soft, nontender, normal bowel sounds. Lower extremities are dressed    Labs reviewed       Case discussed with Dr. Kaela Peck and our impression and recommendations are as follows:  1. Heart failure with reduced EF: EF 15-20%, unsure if this is new diagnosis. Continue to diuresis, Lasix PO with close monitoring of I&O and daily weights. Stress test with large infarction and only a small area of baldo-infarct. No intervention performed, patient declined. Continue medical management. No ACE/ARB therapy due to low blood pressure. Keep serum potassium between 4-5 and serum magnesium > 2.     2. PPM: St. Fredi PPM. Per documents implanted in 2005 by Dr. Bijan Thomas for complete heart block. Device interrogated showing several episodes NSVT, unsure if they correlate with possible syncopal episode. One on 11/25 at 0942 lasting 3:36s. Continue Amiodarone. Monitor telemetry and electrolytes    3. Elevated troponin: Likely demand ischemia from CHF. No active chest pain reported. 4. Cellulitis: Treatment per primary. POD 2 Bilateral lower extremity ulcer debridement. 5. Hyperlipidemia: Continue high intensity statin therapy. Please do no hesitate to call me or Dr. Kaela Peck if additional questions arise.

## 2020-12-10 NOTE — PROGRESS NOTES
CM spoke to Dr. Paola Dubon via phone regarding patient refusing SNF and patient wanting to be discharged with [facility]. Dr. Paola Dubon stated that the patient needs SNF because \"she can barely walk\". CM spoke to patient at bedside. CM asked patient for a SNF choice. Patient refused SNF and reported \"I need to go home\". Patient reported that her neighbors buy her groceries for her and her sons help her inside the home. CM spoke to sister Kate Price [phone]) regarding discharge planning. CM asked sister if patient son's help patient at homes. [place]. Sister reported that the patient's son's don't help her. Sister stated that she had to call CM back. CM provided sister phone number. CM will continue to follow the patient for discharge planning needs. ADDENDUM  CM spoke to sister Yessi Chaves [phone]) regarding patient discharge plan to home with [facility]. Sister reported that she does not drive and neither does other sister Kate Price). Sister reported that patient lies; her son's do not visit her. CM reported to sister that patient is competent and wants to be discharged home with [facility].

## 2020-12-10 NOTE — PROGRESS NOTES
[facility] notes Dr. Nathanael Foreman ordered continuous O2 @2l/min due to resting O2 sat of 87%. Nurse, Michaela Evans assessed patients' Pulse Ox resting and under exertion using required form from [facility] and reports that patient remained at 95% on room air at rest and dropped to 93% when exerting herself to sit up on the side of the bed. Nurse reports patient does not ambulate. Order and required form as well as Dr. Estuardo Smart notes were faxed to [facility] for O2. CM will follow for acceptance. 4:27pm    CM received the following messages from [facility]:    \"good afternoon, do you have qualifying testing for this patient? the testing loaded does not qualify the patient for oxygen. \"    \"also, this patient has refused oxygen from us just a few months ago. im not sure if she is going to take it from us. previously, she was upset because she wasn't explained by the hospital that any doctor had ordered her oxygen and she wasn't going to use it. Is the patient willing to use it this time? \"    Tonna Rolling, we are showing that the patient has been living with her daughter [place]. If she is going back to that location, we will not be able to service equipment as that is out of our service area. \"    CM notified Dr. Radha Peck and he advised to send the overnight study report to [facility]. CM uploaded the report to [facility] and advised [facility] that the order is changed to nocturnal O2 and that she wanted the order sent to them and was discharging home.     CM will continue to follow

## 2020-12-10 NOTE — DISCHARGE SUMMARY
Discharge Summary       PATIENT ID: Tona Urbina  MRN: [id number]   YOB: 1945    DATE OF ADMISSION: 11/25/2020 11:23 AM    DATE OF DISCHARGE:   PRIMARY CARE PROVIDER: Karl Bardales MD     ATTENDING PHYSICIAN: Kurt Solano  DISCHARGING PROVIDER: Kurt Solano      CONSULTATIONS: IP CONSULT TO CARDIOLOGY  IP CONSULT TO CARDIOLOGY  IP CONSULT TO INFECTIOUS DISEASES  IP CONSULT TO PODIATRY  IP CONSULT TO PSYCHIATRY  IP CONSULT TO VASCULAR SURGERY  IP CONSULT TO PULMONOLOGY    PROCEDURES/SURGERIES: Procedure(s):  Debridement of BILATERAL leg ulcers w/ Misonix    ADMITTING DIAGNOSES:    Patient Active Problem List    Diagnosis Date Noted    PNA (pneumonia) 11/25/2020    Pneumonia 11/25/2020       DISCHARGE DIAGNOSES / PLAN:    Acute hypoxemic respiratory failure on 3 L  Acute systolic heart failure with ejection fraction of 15 to 20%  Acute cellulitis of bilateral legs/wound culture positive for staph aureus/providentia/Enterobacter/Morganella/Klebsiella  Status post debridement of the both leg wounds  Gram-negative bacteremia with providentia  Sepsis leukocytosis elevated procalcitonin and lactic acid  Community-acquired pneumonia  Poor hygiene  Elevated troponin  Leukocytosis  Hypothyroidism  Anemia s/p tranfusion   Stress test done showing large infarct/declined cardiac cath  Hypotensive secondary to Percocet  Static post debridement of the bilateral leg ulcers by the podiatrist  :         DISCHARGE MEDICATIONS:  Current Discharge Medication List      START taking these medications    Details   albuterol-ipratropium (DUO-NEB) 2.5 mg-0.5 mg/3 ml nebu 3 mL by Nebulization route every six (6) hours as needed for Wheezing. Qty: 30 Nebule, Refills: 1      amiodarone (CORDARONE) 200 mg tablet Take 1 Tab by mouth daily. Qty: 30 Tab, Refills: 1      aspirin (ASPIRIN) 325 mg tablet Take 1 Tab by mouth daily.   Qty: 30 Tab, Refills: 1      atorvastatin (LIPITOR) 40 mg tablet Take 1 Tab by mouth nightly. Qty: 30 Tab, Refills: 1      levoFLOXacin (Levaquin) 500 mg tablet Take 1 Tab by mouth daily. Qty: 10 Tab, Refills: 0         CONTINUE these medications which have NOT CHANGED    Details   furosemide (LASIX) 40 mg tablet Take 40 mg by mouth daily. potassium chloride (K-DUR, KLOR-CON) 20 mEq tablet Take 20 mEq by mouth daily. omeprazole (PRILOSEC) 20 mg capsule Take 20 mg by mouth daily. levothyroxine (SYNTHROID) 75 mcg tablet Take 75 mcg by mouth daily (before breakfast). Indications: HYPOTHYROIDISM         STOP taking these medications       carvediloL (COREG) 3.125 mg tablet Comments:   Reason for Stopping:         lisinopriL (PRINIVIL, ZESTRIL) 5 mg tablet Comments:   Reason for Stopping:         methocarbamoL (ROBAXIN) 500 mg tablet Comments:   Reason for Stopping:         apixaban (ELIQUIS) 2.5 mg tablet Comments:   Reason for Stopping:                 NOTIFY YOUR PHYSICIAN FOR ANY OF THE FOLLOWING:   Fever over 101 degrees for 24 hours. Chest pain, shortness of breath, fever, chills, nausea, vomiting, diarrhea, change in mentation, falling, weakness, bleeding. Severe pain or pain not relieved by medications. Or, any other signs or symptoms that you may have questions about. DISPOSITION:  x  Home With:   OT  PT  HH  RN       Long term SNF/Inpatient Rehab    Independent/assisted living    Hospice    Other:       PATIENT CONDITION AT DISCHARGE: Stable      PHYSICAL EXAMINATION AT DISCHARGE:  General:          Alert, cooperative, no distress, appears stated age. HEENT:           Atraumatic, anicteric sclerae, pink conjunctivae                          No oral ulcers, mucosa moist, throat clear, dentition fair  Neck:               Supple, symmetrical  Lungs:             Clear to auscultation bilaterally. No Wheezing or Rhonchi. No rales. Chest wall:      No tenderness  No Accessory muscle use.   Heart:              Regular  rhythm,  No  murmur   No edema  Abdomen:        Soft, non-tender. Not distended. Bowel sounds normal  Extremities:     No cyanosis. No clubbing,                            Skin turgor normal, Capillary refill normal  Skin:                Not pale. Not Jaundiced  No rashes   Psych:             Not anxious or agitated. Neurologic:      Alert, moves all extremities, answers questions appropriately and responds to commands     XR CHEST PORT   Final Result      NM INFLAM PROC LTD   Final Result   IMPRESSION: Superficial cellulitis. Doubt underlying osteomyelitis      XR CHEST PA LAT   Final Result   Impression: Underexpanded lungs with bibasilar atelectasis. Small pleural   effusions. Hydrostatic edema. No significant interval change. XR CHEST SNGL V   Final Result   Impression:   Little interval change. XR CHEST PORT   Final Result   Impression: Underexpanded lungs with bibasilar atelectasis, increased. Possible   right pleural effusion. Hydrostatic edema. XR CHEST PA LAT   Final Result   Impression: The cardiomediastinal silhouette is appropriate for age, technique,   and lung expansion. Pulmonary vasculature is not congested. The lungs are   essentially clear. No effusion or pneumothorax is seen. XR CHEST SNGL V   Final Result           No results found for this or any previous visit (from the past 24 hour(s)).        HOSPITAL COURSE:  Patient is a 66y.o. year old female with past medical history unknown came to the ER ER under medical TDO patient was found by her home health aide on the last visit patient will hypoxic with her oxygen off there was trash around the house approximately 1 week patient in poor hygiene patient had bilateral leg wounds and patient using tissue paper and spraying with water seen by the ER physician chest x-ray shows pneumonia the patient was admitted for further evaluation treatment  History of present illness please refer to history and physical at the time of admission at the patient was admitted because of bilateral leg wounds shortness of breath hypoxemia chest x-ray shows pneumonia further work-up while in the hospital patient was seen by the wound care nurse podiatrist infectious disease pulmonologist and cardiology echo done shows ejection fraction of 15 to 20% acute cellulitis of bilateral leg and wounds positive for staph aureus procidentia Enterobacter Morganella Klebsiella patient have wound debridement chest x-ray shows community-acquired pneumonia patient was treated with IV Zosyn ordered MRI which unable to be done also seen by the psychiatrist patient is alert awake confused patient had blood transfusion during this admission patient have a stress test done shows large infarct cardiac cath was declined gram-negative bacteremia with procidentia patient now more alert awake but confused patient also seen by physical therapy occupational therapy recommend to discharge to SNF care    Discussed with the infectious disease patient can be discharged home on p.o.  Levaquin  Patient is not a candidate for anticoagulation  Patient seen by the cardiology patient have several episode of NSVT event continue amiodarone      Medication reconciliation done time to discharge patient 35 minutes 50% time spent in counseling and coronation of care    Signed:   [id number] Bruno Villanueva MD  12/10/2020  8:55 AM

## 2020-12-11 PROCEDURE — 99232 SBSQ HOSP IP/OBS MODERATE 35: CPT | Performed by: INTERNAL MEDICINE

## 2020-12-11 PROCEDURE — 65270000029 HC RM PRIVATE

## 2020-12-11 PROCEDURE — 94762 N-INVAS EAR/PLS OXIMTRY CONT: CPT

## 2020-12-11 PROCEDURE — 74011250636 HC RX REV CODE- 250/636: Performed by: FAMILY MEDICINE

## 2020-12-11 PROCEDURE — 74011000250 HC RX REV CODE- 250: Performed by: FAMILY MEDICINE

## 2020-12-11 PROCEDURE — 94640 AIRWAY INHALATION TREATMENT: CPT

## 2020-12-11 PROCEDURE — 94760 N-INVAS EAR/PLS OXIMETRY 1: CPT

## 2020-12-11 PROCEDURE — 74011000250 HC RX REV CODE- 250: Performed by: NURSE ANESTHETIST, CERTIFIED REGISTERED

## 2020-12-11 PROCEDURE — 74011000258 HC RX REV CODE- 258: Performed by: FAMILY MEDICINE

## 2020-12-11 PROCEDURE — 74011250637 HC RX REV CODE- 250/637: Performed by: NURSE PRACTITIONER

## 2020-12-11 PROCEDURE — 74011250637 HC RX REV CODE- 250/637: Performed by: FAMILY MEDICINE

## 2020-12-11 PROCEDURE — 74011250637 HC RX REV CODE- 250/637: Performed by: INTERNAL MEDICINE

## 2020-12-11 RX ADMIN — Medication 10 ML: at 00:21

## 2020-12-11 RX ADMIN — IPRATROPIUM BROMIDE AND ALBUTEROL SULFATE 3 ML: .5; 3 SOLUTION RESPIRATORY (INHALATION) at 07:42

## 2020-12-11 RX ADMIN — POTASSIUM CHLORIDE 40 MEQ: 750 TABLET, FILM COATED, EXTENDED RELEASE ORAL at 11:48

## 2020-12-11 RX ADMIN — IPRATROPIUM BROMIDE AND ALBUTEROL SULFATE 3 ML: .5; 3 SOLUTION RESPIRATORY (INHALATION) at 14:04

## 2020-12-11 RX ADMIN — IPRATROPIUM BROMIDE AND ALBUTEROL SULFATE 3 ML: .5; 3 SOLUTION RESPIRATORY (INHALATION) at 20:03

## 2020-12-11 RX ADMIN — HYOSCYAMINE SULFATE: 16 SOLUTION at 09:31

## 2020-12-11 RX ADMIN — IPRATROPIUM BROMIDE AND ALBUTEROL SULFATE 3 ML: .5; 3 SOLUTION RESPIRATORY (INHALATION) at 01:59

## 2020-12-11 RX ADMIN — PIPERACILLIN SODIUM AND TAZOBACTAM SODIUM 3.38 G: 3; .375 INJECTION, POWDER, LYOPHILIZED, FOR SOLUTION INTRAVENOUS at 11:05

## 2020-12-11 RX ADMIN — ASPIRIN 325 MG ORAL TABLET 325 MG: 325 PILL ORAL at 11:05

## 2020-12-11 RX ADMIN — ACETAMINOPHEN 650 MG: 325 TABLET, FILM COATED ORAL at 00:20

## 2020-12-11 RX ADMIN — LEVOTHYROXINE SODIUM 75 MCG: 0.07 TABLET ORAL at 11:05

## 2020-12-11 RX ADMIN — AMIODARONE HYDROCHLORIDE 200 MG: 200 TABLET ORAL at 09:31

## 2020-12-11 RX ADMIN — Medication 10 ML: at 16:28

## 2020-12-11 RX ADMIN — Medication 10 ML: at 05:12

## 2020-12-11 RX ADMIN — PIPERACILLIN SODIUM AND TAZOBACTAM SODIUM 3.38 G: 3; .375 INJECTION, POWDER, LYOPHILIZED, FOR SOLUTION INTRAVENOUS at 04:20

## 2020-12-11 RX ADMIN — PIPERACILLIN SODIUM AND TAZOBACTAM SODIUM 3.38 G: 3; .375 INJECTION, POWDER, LYOPHILIZED, FOR SOLUTION INTRAVENOUS at 19:52

## 2020-12-11 RX ADMIN — ACETAMINOPHEN 650 MG: 325 TABLET, FILM COATED ORAL at 11:47

## 2020-12-11 RX ADMIN — FUROSEMIDE 40 MG: 40 TABLET ORAL at 11:48

## 2020-12-11 NOTE — PROGRESS NOTES
Cardiology Progress Note    Patient seen and examined. This is a patient who is followed for heart failure with reduced EF. She is awake in bed, no acute distress. She answers questions appropriately but confused at times. She is POD 3 s/p bilateral leg ulcer debridement. She denies chest pain or shortness of breath. No other complaints reported. Telemetry reviewed, V paced 70s    Pertinent review of system items noted above, all other systems are negative. Current medications reviewed. Physical Examination  Vital signs are stable, Blood Pressure 103/64, Pulse 76  No apparent distress. Heart is regular, rate and rhythm. Normal S1, S2, murmur present, skin warm. Lungs are clear but diminished  Abdomen is soft, nontender, normal bowel sounds. Lower extremities are dressed    Labs reviewed       Case discussed with Dr. Ryan Hernandez and our impression and recommendations are as follows:  1. Heart failure with reduced EF: EF 15-20%, unsure if this is new diagnosis. Continue to diuresis, Lasix PO with close monitoring of I&O and daily weights. Stress test with large infarction and only a small area of baldo-infarct. No intervention performed, patient declined. Continue medical management. No ACE/ARB therapy due to low blood pressure. Keep serum potassium between 4-5 and serum magnesium > 2.     2. PPM: St. Fredi PPM. Per documents implanted in 2005 by Dr. Quinn Lara for complete heart block. Device interrogated showing several episodes NSVT, unsure if they correlate with possible syncopal episode. One on 11/25 at 0942 lasting 3:36s. Continue Amiodarone. Monitor telemetry and electrolytes    3. Elevated troponin: Likely demand ischemia from heart failure. No reports of chest pain. 4. Cellulitis: Treatment per primary. POD 3 Bilateral lower extremity ulcer debridement. 5. Hyperlipidemia: Continue high intensity statin therapy. We will sign off on patient.  Please do no hesitate to call Dr. Ryan Hernandez if additional questions arise.

## 2020-12-11 NOTE — PROGRESS NOTES
Progress Note    Patient: Diya Madison MRN: [id number]  SSN: [id number]    YOB: 1945  Age: 66 y.o. Sex: female      Admit Date: 11/25/2020    LOS: 16 days     Subjective:   Patient followed for sepsis with cellulitis both legs with blood cultures growing Providencia and wound culture with mixed Gram negative nica identified below and MSSA. She remains afebrile. WBC now normal /CRP increasing today. Ceretec scan consistent with cellulitis. Still on Zosyn alone. She is resting comfortably with no complaints. Objective:     Vitals:    12/11/20 0021 12/11/20 0158 12/11/20 0742 12/11/20 0806   BP: 115/71   103/64   Pulse: 82   76   Resp: 20   20   Temp: 98 °F (36.7 °C)   97.4 °F (36.3 °C)   SpO2: 92% 93% 93% 94%   Weight:       Height:            Intake and Output:  Current Shift: No intake/output data recorded. Last three shifts: 12/09 1901 - 12/11 0700  In: 500 [P.O.:500]  Out: 800 [Urine:800]    Physical Exam:   Constitutional:       General: She is not in acute distress. Appearance: She is chronically ill-appearing. HENT:      Head: Normocephalic and atraumatic. Nose:      Comments: Nasal O2 cannula  Lungs: clear bilaterally but diminished  Genitourinary:     Comments: Purewick catheter  Musculoskeletal:         General: Swelling present. No tenderness. Right lower leg: Edema present. Left lower leg: Edema present. Comments: Left calf fully exposed and shows normal granulation bed with no exudate or erythema; right calf not examined today    Neurological: asleep   Psychiatric: asleep.      Lab/Data Review:     WBC 8,600  Procalcitonin <0.05 <0.11 <0.10 <0.69 <1.19  CRP 1.67 < 1.14 <0.90 < 1.24 <1.28 <1.98  <3.04 <9.26 <16.00    Covid-19 negative    Blood cultures (11/26) Providenica rettgeri  Blood cultures (11/26) Providenica rettgeri  Urine culture (11/26) No growth FiNAL  Wound culture (11/27)  MSSA, Klebsiella oxytoca, Providencia rettgeri, Morganella morganii (2 strains), Myroides species, Enterobacter cloacae  Wound culture left leg (12/4)  MSSA, Enterobacter cloacae, Morganella morganii  Wound culture left leg (12/4)  MSSA, Enterobacter cloacae x 2, Morganella morganii      Assessment:     Active Problems:    PNA (pneumonia) (11/25/2020)      Pneumonia (11/25/2020)    1. Cellulitis, bilateral lower extremities, severe, secondary to MSSA, Klebsiella oxytoca, Providencia rettgeri, Morganella morganii (2 strains), Myroides species, Enterobacter cloacae, Day #16 IV Zosyn, status post debridement, improving. 2. Gram negative bacteremia with Providencia rettgeri, probably secondary to above, on IV Zosyn. 3. Sepsis with leukocytosis, elevated procalcitonin, CRP and lactic acid, resolving. 4. SOB  5. Pyuria, negative urine culture    Comment:  Wounds do not appear grossly infected at this time, but would still give oral antibiotic tail for 1 week given leukocytosis and still elevated CRP. She is allergic to Tetracycline. Plan:   1. Reasonable to transition to Levaquin 750 mg po daily (GNRs) and Dicloxacillin 500 mg po QID (for MSSA) for 1 more week  2.  Cleared for discharge from ID standpoint        Signed By: Kimmie Mohamud MD     December 11, 2020

## 2020-12-11 NOTE — PROGRESS NOTES
CM spoke to sheryl Snyder [phone]). To get a choice for SNF. Son gave no preference for SNF choice. CM sent multiple referrals to multiple SNF homes.

## 2020-12-11 NOTE — PROGRESS NOTES
Patient has episodes of confusion and wanting to come off the bed,her safety home alone is questionable and therefore I talked to my Nurse Manager [facility] consult further on discharge and in the meantime No transport arrangements made.

## 2020-12-11 NOTE — PROGRESS NOTES
Pulmonary, Critical Care Note    Name: Miguelina Tamayo MRN: [id number]   : 1945 Hospital: [address] [place] [address]   Date: 2020  Admission date: 2020 Hospital Day: 17       Subjective/Interval History:   Seen on the medical floor. Consult requested for acute hypoxic respiratory failure. She is an extremely poor historian states she feels fine to leave her alone because she is trying to eat   respirations nonlabored she remains on 3 L of oxygen. States she is not having any surgery  12/3 awake eating breakfast respirations nonlabored   sleeping but awakens easily denies shortness of breath   awake alert wants to go home respirations nonlabored  12/10 patient awake alert very weak working with physical therapy. She apparently is refusing skilled nursing facility wants to go home. She does not appear capable of taking care of herself at home   seems stronger but still very weak continues to refuse skilled nursing facility care insists on going home. She does not appear capable of caring for herself. She is now telling me today that she does have oxygen at home which she was denying earlier in the week. I am not sure if I trust her statement    Hospital Problems  Never Reviewed          Codes Class Noted POA    PNA (pneumonia) ICD-10-CM: J18.9  ICD-9-CM: 296  2020 Unknown        Pneumonia ICD-10-CM: J18.9  ICD-9-CM: 065  2020 Unknown              IMPRESSION:   1. Acute hypoxic respiratory failure secondary to pulmonary edema overnight oximetry did show desaturation on room air will need nocturnal oxygen  2. Hypokalemia repleted   3. Hypercapnia likely metabolic alkalosis improved after Diamox   4. Severe cardiomyopathy  5. Severe mitral regurg  6. Bilateral pleural effusions secondary to pulmonary edema no significant change on x-ray 12/3  7. Providencia septicemia  8. Anemia  9. Hypothyroidism treated  10.  Questionable early dementia RECOMMENDATIONS/PLAN:   1. Creatinine stable continue Lasix decreased to daily dosing  2. Continue daily potassium   3. She appears to need nocturnal oxygen  4. Follow H&H  5.           [x] High complexity decision making was performed  [x] See my orders for details      Subjective/Initial History:     I was asked by Radha Dozier MD to see Tona Urbina  a 66 y.o.  female in consultation for a chief complaint of acute hypoxic respiratory failure and pleural effusions      Allergies   Allergen Reactions    Tetracycline Other (comments)     Causes mouth sores    Morphine Nausea and Vomiting        MAR reviewed and pertinent medications noted or modified as needed     Current Facility-Administered Medications   Medication    furosemide (LASIX) tablet 40 mg    sodium chloride (NS) flush 5-40 mL    sodium chloride (NS) flush 5-40 mL    amiodarone (CORDARONE) tablet 200 mg    potassium chloride SR (KLOR-CON 10) tablet 40 mEq    sodium hypochlorite (HALF STRENGTH DAKIN'S) 0.25% irrigation (bottle)    sodium chloride (NS) flush 5-40 mL    sodium chloride (NS) flush 5-40 mL    0.9% sodium chloride infusion 250 mL    labetaloL (NORMODYNE;TRANDATE) 20 mg/4 mL (5 mg/mL) injection 10 mg    metoprolol (LOPRESSOR) injection 2.5 mg    albuterol-ipratropium (DUO-NEB) 2.5 MG-0.5 MG/3 ML    piperacillin-tazobactam (ZOSYN) 3.375 g in 0.9% sodium chloride (MBP/ADV) 100 mL MBP    levothyroxine (SYNTHROID) tablet 75 mcg    acetaminophen (TYLENOL) tablet 650 mg    Or    acetaminophen (TYLENOL) suppository 650 mg    polyethylene glycol (MIRALAX) packet 17 g    ondansetron (ZOFRAN ODT) tablet 4 mg    Or    ondansetron (ZOFRAN) injection 4 mg    aspirin tablet 325 mg    atorvastatin (LIPITOR) tablet 40 mg      Patient PCP: Jeffy, MD Karl  PMH:  has no past medical history on file. PSH:   has no past surgical history on file. FHX: family history is not on file.    SHX:           Objective:     Vital Signs: Telemetry:    normal sinus rhythm Intake/Output:   Visit Vitals  /64 (BP 1 Location: Left arm, BP Patient Position: At rest)   Pulse 76   Temp 97.4 °F (36.3 °C)   Resp 20   Ht 5' (1.524 m)   Wt 42.6 kg (93 lb 14.7 oz)   SpO2 94%   BMI 18.34 kg/m²       Temp (24hrs), Av.8 °F (36.6 °C), Min:97.4 °F (36.3 °C), Max:98.1 °F (36.7 °C)        O2 Device: Room air O2 Flow Rate (L/min): 2 l/min       Wt Readings from Last 4 Encounters:   20 42.6 kg (93 lb 14.7 oz)   20 47.7 kg (105 lb 2.6 oz)   16 57.2 kg (126 lb)   12/07/10 64 kg (141 lb)          Intake/Output Summary (Last 24 hours) at 2020 1010  Last data filed at 12/10/2020 1800  Gross per 24 hour   Intake 500 ml   Output 800 ml   Net -300 ml       Last shift:      No intake/output data recorded.   Last 3 shifts:  1901 -  0700  In: 500 [P.O.:500]  Out: 800 [Urine:800]       Physical Exam:   General:  female; chronically ill appearing frail  HEENT: NCAT, oral mucosa clear  Eyes: anicteric; conjunctiva clear extraocular movements intact  Neck: no nodes, neck veins visible no accessory muscle use  Chest: no deformity,   Cardiac: Regular rate and rhythm systolic murmur present has edema of the extremities  Lungs: distant breath sounds; no wheezing rales are heard posteriorly no breath sounds in the bases  Abd: Soft positive bowel sounds no tenderness she has some posterior abdominal wall edema  Ext: Edema of the legs arms and sacrum  : clear urine  Neuro: Awake more alert mildly confused  will move all 4 extremities  Psych- no agitation, oriented to person;   Skin: Lower extremities have very flaky skin with erythema  Pulses: Brachial radial femoral pulses intact  Capillary: Slow capillary refill    Labs:    Recent Labs     12/10/20  0740 20  0755 20  1052   WBC 8.6 11.9* 12.1*   HGB 8.7* 9.3* 10.1*   * 458* 519*     Recent Labs     12/10/20  0740 20  0755 20  1052    141  141 143   K 3.9 3.5  3.5 3.0*    102  103 102   CO2 33* 36*  36* 37*   GLU 82 104*  105* 94   BUN 23* 27*  28* 25*   CREA 0.34* 0.58  0.55 0.49*   CA 8.3* 8.4*  8.4* 8.4*   PHOS  --  3.4  --    ALB 2.0* 2.1*  2.1* 2.1*   ALT 11* 12 13     12/11 room air oxygen saturation 94%  BNP 16,597  CRP 3.04  11/26 blood culture with Providencia sensitive to Zosyn  Urine culture no growth  Wound culture with multiple organisms including methicillin sensitive staph aureus Klebsiella Providencia Enterobacter and Morganella  11/27 echo ejection fraction 20% dilated left atrium grade 2 diastolic dysfunction moderate to severe mitral regurg IVC dilatation RVSP 55  12/1 nuclear cardiac scan with ejection fraction of 19%  Lab Results   Component Value Date/Time    Culture result: Moderate Morganella morganii ssp morganii 12/04/2020 10:30 AM    Culture result:  12/04/2020 10:30 AM     Moderate Stenotrophomonas (xantho.) maltophilia CLSI guidelines do not recommend reporting susceptibilities for s. Maltophilia. Trimethoprim/sulfamethoxazole is the drug of choice. Culture result: Moderate Enterobacter cloacae complex 12/04/2020 10:30 AM    Culture result: Light Staphylococcus aureus 12/04/2020 10:30 AM    Culture result: Light Enterobacter cloacae complex 12/04/2020 10:30 AM    Culture result: Light Morganella morganii 12/04/2020 10:30 AM    Culture result:  12/04/2020 10:30 AM     Light Enterobacter cloacae complex . ..(2ND COLONY TYPE)    Culture result: Light Staphylococcus aureus 12/04/2020 10:30 AM     Lab Results   Component Value Date/Time    TSH 0.26 (L) 11/25/2020 12:30 PM       Imaging:    CXR Results  (Last 48 hours)               12/09/20 1330  XR CHEST PORT Final result    Narrative:  Chest single view. Comparison single view chest December 3, 2020. Reduced lung volumes. Similar basilar atelectasis. No gross interstitial or   alveolar pulmonary edema.  Right-sided cardiac device with leads overlying the   right heart. Cardiac silhouette enlargement. Elongated thoracic aorta with   atherosclerosis. No pneumothorax or sizable pleural effusion. Results from [place] encounter on 11/25/20   XR CHEST PORT    Narrative Chest single view. Comparison single view chest December 3, 2020. Reduced lung volumes. Similar basilar atelectasis. No gross interstitial or  alveolar pulmonary edema. Right-sided cardiac device with leads overlying the  right heart. Cardiac silhouette enlargement. Elongated thoracic aorta with  atherosclerosis. No pneumothorax or sizable pleural effusion. XR CHEST PA LAT    Narrative Chest, 2 views, 12/7/2020    History: Pulmonary edema. Bilateral effusions. Comparison: Including chest 12/3/2020. Findings: A right-sided pacemaker device with leads in the right atrium and  right ventricle is in place. The cardiac silhouette is stable globular  enlargement which could indicate cardiomyopathy or pericardial effusion. The  thoracic aorta is tortuous. The lungs are underexpanded with bibasilar  atelectasis. Small pleural effusions are noted. There is pulmonary vascular  congestion and hydrostatic edema. No pneumothorax is identified. The thoracic  spine again demonstrates kyphosis, degenerative changes and compression  deformities. Impression Impression: Underexpanded lungs with bibasilar atelectasis. Small pleural  effusions. Hydrostatic edema. No significant interval change. XR CHEST SNGL V    Narrative Study: XR CHEST SNGL V    Clinical indication: Pleural effusions    Comparison: Chest x-ray 12/1/2020. Findings: Only hazy bibasilar airspace disease/pleural effusions. No evidence of  pneumothorax. Cardiac silhouette is enlarged. Stable positioning of right chest wall pacemaker  device. Mild vascular congestion. No acute osseous abnormality identified. Impression Impression:  Little interval change.      · Providencia septicemia questionably urinary tract or from skin site. Urine culture was no growth I do not know if it was collected after antibiotics were given. She is currently on Zosyn   She has pleural effusions and accentuated interstitial markings consistent with CHF/pulmonary edema. She is receiving Lasix. · Room air oxygen saturation has remained steady. Overnight oximetry did show desaturation for 27 minutes. She will need nocturnal oxygen. We will repeat overnight oximetry tonight if she stays in the hospital  · Thank you for allowing me to see Ms. Lj Marrero  ·     Suki Mitchell MD

## 2020-12-11 NOTE — PROGRESS NOTES
CM spoke to psychiatrist Dr. Tatianna Tamayo; Dr. Tatianna Tamayo deemed patient incompetent. CM contacted sister Landry Baird [phone]) to get son's phone number. CM received no answer and left a voice message. CM spoke to sister Antonieta Fraser [phone]). Sister reported that she does not have the son's contact information. Sister reported that patient not close to her sons. Sister reported that other sister has contact information. CM contacted sister Landry Baird [phone]); received no answer.

## 2020-12-11 NOTE — PROGRESS NOTES
Discharge Summary       PATIENT ID: Rosario Cedeño  MRN: [id number]   YOB: 1945    DATE OF ADMISSION: 11/25/2020 11:23 AM    DATE OF DISCHARGE:   PRIMARY CARE PROVIDER: Karl Bardales MD     ATTENDING PHYSICIAN: Jt Solano  DISCHARGING PROVIDER: Jt Solano      CONSULTATIONS: IP CONSULT TO CARDIOLOGY  IP CONSULT TO CARDIOLOGY  IP CONSULT TO INFECTIOUS DISEASES  IP CONSULT TO PODIATRY  IP CONSULT TO PSYCHIATRY  IP CONSULT TO VASCULAR SURGERY  IP CONSULT TO PULMONOLOGY    PROCEDURES/SURGERIES: Procedure(s):  Debridement of BILATERAL leg ulcers w/ Misonix    ADMITTING DIAGNOSES:    Patient Active Problem List    Diagnosis Date Noted    PNA (pneumonia) 11/25/2020    Pneumonia 11/25/2020       DISCHARGE DIAGNOSES / PLAN:    Acute hypoxemic respiratory failure on 3 L  Acute systolic heart failure with ejection fraction of 15 to 20%  Acute cellulitis of bilateral legs/wound culture positive for staph aureus/providentia/Enterobacter/Morganella/Klebsiella  Status post debridement of the both leg wounds  Gram-negative bacteremia with providentia  Sepsis leukocytosis elevated procalcitonin and lactic acid  Community-acquired pneumonia  Poor hygiene  Elevated troponin  Leukocytosis  Hypothyroidism  Anemia s/p tranfusion   Stress test done showing large infarct/declined cardiac cath  Hypotensive secondary to Percocet  Static post debridement of the bilateral leg ulcers by the podiatrist  :         DISCHARGE MEDICATIONS:  Current Discharge Medication List      START taking these medications    Details   albuterol-ipratropium (DUO-NEB) 2.5 mg-0.5 mg/3 ml nebu 3 mL by Nebulization route every six (6) hours as needed for Wheezing. Qty: 30 Nebule, Refills: 1      amiodarone (CORDARONE) 200 mg tablet Take 1 Tab by mouth daily. Qty: 30 Tab, Refills: 1      aspirin (ASPIRIN) 325 mg tablet Take 1 Tab by mouth daily.   Qty: 30 Tab, Refills: 1      atorvastatin (LIPITOR) 40 mg tablet Take 1 Tab by mouth nightly. Qty: 30 Tab, Refills: 1      levoFLOXacin (Levaquin) 500 mg tablet Take 1 Tab by mouth daily. Qty: 10 Tab, Refills: 0      dicloxacillin (DYNAPEN) 500 mg capsule Take 1 Cap by mouth Before breakfast, lunch, dinner and at bedtime. Qty: 40 Cap, Refills: 0         CONTINUE these medications which have NOT CHANGED    Details   furosemide (LASIX) 40 mg tablet Take 40 mg by mouth daily. potassium chloride (K-DUR, KLOR-CON) 20 mEq tablet Take 20 mEq by mouth daily. omeprazole (PRILOSEC) 20 mg capsule Take 20 mg by mouth daily. levothyroxine (SYNTHROID) 75 mcg tablet Take 75 mcg by mouth daily (before breakfast). Indications: HYPOTHYROIDISM         STOP taking these medications       carvediloL (COREG) 3.125 mg tablet Comments:   Reason for Stopping:         lisinopriL (PRINIVIL, ZESTRIL) 5 mg tablet Comments:   Reason for Stopping:         methocarbamoL (ROBAXIN) 500 mg tablet Comments:   Reason for Stopping:         apixaban (ELIQUIS) 2.5 mg tablet Comments:   Reason for Stopping:                 NOTIFY YOUR PHYSICIAN FOR ANY OF THE FOLLOWING:   Fever over 101 degrees for 24 hours. Chest pain, shortness of breath, fever, chills, nausea, vomiting, diarrhea, change in mentation, falling, weakness, bleeding. Severe pain or pain not relieved by medications. Or, any other signs or symptoms that you may have questions about. DISPOSITION:  x  Home With:   OT  PT  HH  RN       Long term SNF/Inpatient Rehab    Independent/assisted living    Hospice    Other:       PATIENT CONDITION AT DISCHARGE: Stable      PHYSICAL EXAMINATION AT DISCHARGE:  General:          Alert, cooperative, no distress, appears stated age. HEENT:           Atraumatic, anicteric sclerae, pink conjunctivae                          No oral ulcers, mucosa moist, throat clear, dentition fair  Neck:               Supple, symmetrical  Lungs:             Clear to auscultation bilaterally. No Wheezing or Rhonchi. No rales.   Chest wall:      No tenderness  No Accessory muscle use. Heart:              Regular  rhythm,  No  murmur   No edema  Abdomen:        Soft, non-tender. Not distended. Bowel sounds normal  Extremities:     No cyanosis. No clubbing,                            Skin turgor normal, Capillary refill normal  Skin:                Not pale. Not Jaundiced  No rashes   Psych:             Not anxious or agitated. Neurologic:      Alert, moves all extremities, answers questions appropriately and responds to commands     XR CHEST PORT   Final Result      NM INFLAM PROC LTD   Final Result   IMPRESSION: Superficial cellulitis. Doubt underlying osteomyelitis      XR CHEST PA LAT   Final Result   Impression: Underexpanded lungs with bibasilar atelectasis. Small pleural   effusions. Hydrostatic edema. No significant interval change. XR CHEST SNGL V   Final Result   Impression:   Little interval change. XR CHEST PORT   Final Result   Impression: Underexpanded lungs with bibasilar atelectasis, increased. Possible   right pleural effusion. Hydrostatic edema. XR CHEST PA LAT   Final Result   Impression: The cardiomediastinal silhouette is appropriate for age, technique,   and lung expansion. Pulmonary vasculature is not congested. The lungs are   essentially clear. No effusion or pneumothorax is seen. XR CHEST SNGL V   Final Result           No results found for this or any previous visit (from the past 24 hour(s)).        HOSPITAL COURSE:  Patient is a 66y.o. year old female with past medical history unknown came to the ER ER under medical TDO patient was found by her home health aide on the last visit patient will hypoxic with her oxygen off there was trash around the house approximately 1 week patient in poor hygiene patient had bilateral leg wounds and patient using tissue paper and spraying with water seen by the ER physician chest x-ray shows pneumonia the patient was admitted for further evaluation treatment  History of present illness please refer to history and physical at the time of admission at the patient was admitted because of bilateral leg wounds shortness of breath hypoxemia chest x-ray shows pneumonia further work-up while in the hospital patient was seen by the wound care nurse podiatrist infectious disease pulmonologist and cardiology echo done shows ejection fraction of 15 to 20% acute cellulitis of bilateral leg and wounds positive for staph aureus procidentia Enterobacter Morganella Klebsiella patient have wound debridement chest x-ray shows community-acquired pneumonia patient was treated with IV Zosyn ordered MRI which unable to be done also seen by the psychiatrist patient is alert awake confused patient had blood transfusion during this admission patient have a stress test done shows large infarct cardiac cath was declined gram-negative bacteremia with procidentia patient now more alert awake but confused patient also seen by physical therapy occupational therapy recommend to discharge to SNF care    Discussed with the infectious disease patient can be discharged home on p.o. Levaquin  Patient is not a candidate for anticoagulation  Patient seen by the cardiology patient have several episode of NSVT event continue amiodarone      Patient alert awake resting the bed waiting to  be discharged  Yesterday infectious disease changes antibiotic to Levaquin and .  Dicloxacillin on discharge     involved for discharge planning    Signed:   Monae Mendez MD  12/11/2020  8:55 AM

## 2020-12-12 PROCEDURE — 74011000250 HC RX REV CODE- 250: Performed by: NURSE ANESTHETIST, CERTIFIED REGISTERED

## 2020-12-12 PROCEDURE — 94640 AIRWAY INHALATION TREATMENT: CPT

## 2020-12-12 PROCEDURE — 74011250637 HC RX REV CODE- 250/637: Performed by: NURSE PRACTITIONER

## 2020-12-12 PROCEDURE — 99232 SBSQ HOSP IP/OBS MODERATE 35: CPT | Performed by: INTERNAL MEDICINE

## 2020-12-12 PROCEDURE — 74011000250 HC RX REV CODE- 250: Performed by: FAMILY MEDICINE

## 2020-12-12 PROCEDURE — 97530 THERAPEUTIC ACTIVITIES: CPT

## 2020-12-12 PROCEDURE — 94760 N-INVAS EAR/PLS OXIMETRY 1: CPT

## 2020-12-12 PROCEDURE — 74011250637 HC RX REV CODE- 250/637: Performed by: INTERNAL MEDICINE

## 2020-12-12 PROCEDURE — 65270000029 HC RM PRIVATE

## 2020-12-12 PROCEDURE — 74011250637 HC RX REV CODE- 250/637: Performed by: FAMILY MEDICINE

## 2020-12-12 RX ORDER — DICLOXACILLIN SODIUM 250 MG/1
500 CAPSULE ORAL EVERY 6 HOURS
Status: DISCONTINUED | OUTPATIENT
Start: 2020-12-12 | End: 2020-12-12

## 2020-12-12 RX ORDER — IPRATROPIUM BROMIDE AND ALBUTEROL SULFATE 2.5; .5 MG/3ML; MG/3ML
3 SOLUTION RESPIRATORY (INHALATION)
Status: DISCONTINUED | OUTPATIENT
Start: 2020-12-12 | End: 2020-12-17 | Stop reason: HOSPADM

## 2020-12-12 RX ORDER — LEVOFLOXACIN 5 MG/ML
750 INJECTION, SOLUTION INTRAVENOUS EVERY 24 HOURS
Status: DISCONTINUED | OUTPATIENT
Start: 2020-12-12 | End: 2020-12-12

## 2020-12-12 RX ADMIN — AMIODARONE HYDROCHLORIDE 200 MG: 200 TABLET ORAL at 08:05

## 2020-12-12 RX ADMIN — ASPIRIN 325 MG ORAL TABLET 325 MG: 325 PILL ORAL at 08:05

## 2020-12-12 RX ADMIN — POTASSIUM CHLORIDE 40 MEQ: 750 TABLET, FILM COATED, EXTENDED RELEASE ORAL at 08:05

## 2020-12-12 RX ADMIN — IPRATROPIUM BROMIDE AND ALBUTEROL SULFATE 3 ML: .5; 3 SOLUTION RESPIRATORY (INHALATION) at 07:32

## 2020-12-12 RX ADMIN — FUROSEMIDE 40 MG: 40 TABLET ORAL at 08:05

## 2020-12-12 RX ADMIN — IPRATROPIUM BROMIDE AND ALBUTEROL SULFATE 3 ML: .5; 3 SOLUTION RESPIRATORY (INHALATION) at 14:00

## 2020-12-12 NOTE — PROGRESS NOTES
Discharge Summary       PATIENT ID: Светлана Thornton  MRN: [id number]   YOB: 1945    DATE OF ADMISSION: 11/25/2020 11:23 AM    DATE OF DISCHARGE:   PRIMARY CARE PROVIDER: Karl Bardales MD     ATTENDING PHYSICIAN: Jose A Solano  DISCHARGING PROVIDER: Jose A Solano      CONSULTATIONS: IP CONSULT TO CARDIOLOGY  IP CONSULT TO CARDIOLOGY  IP CONSULT TO INFECTIOUS DISEASES  IP CONSULT TO PODIATRY  IP CONSULT TO PSYCHIATRY  IP CONSULT TO VASCULAR SURGERY  IP CONSULT TO PULMONOLOGY    PROCEDURES/SURGERIES: Procedure(s):  Debridement of BILATERAL leg ulcers w/ Misonix    ADMITTING DIAGNOSES:    Patient Active Problem List    Diagnosis Date Noted    PNA (pneumonia) 11/25/2020    Pneumonia 11/25/2020       DISCHARGE DIAGNOSES / PLAN:    Acute hypoxemic respiratory failure on 3 L  Acute systolic heart failure with ejection fraction of 15 to 20%  Acute cellulitis of bilateral legs/wound culture positive for staph aureus/providentia/Enterobacter/Morganella/Klebsiella  Status post debridement of the both leg wounds  Gram-negative bacteremia with providentia  Sepsis leukocytosis elevated procalcitonin and lactic acid  Community-acquired pneumonia  Poor hygiene  Elevated troponin  Leukocytosis  Hypothyroidism  Anemia s/p tranfusion   Stress test done showing large infarct/declined cardiac cath  Hypotensive secondary to Percocet  Static post debridement of the bilateral leg ulcers by the podiatrist  :         DISCHARGE MEDICATIONS:  Current Discharge Medication List      START taking these medications    Details   albuterol-ipratropium (DUO-NEB) 2.5 mg-0.5 mg/3 ml nebu 3 mL by Nebulization route every six (6) hours as needed for Wheezing. Qty: 30 Nebule, Refills: 1      amiodarone (CORDARONE) 200 mg tablet Take 1 Tab by mouth daily. Qty: 30 Tab, Refills: 1      aspirin (ASPIRIN) 325 mg tablet Take 1 Tab by mouth daily.   Qty: 30 Tab, Refills: 1      atorvastatin (LIPITOR) 40 mg tablet Take 1 Tab by mouth nightly. Qty: 30 Tab, Refills: 1      levoFLOXacin (Levaquin) 500 mg tablet Take 1 Tab by mouth daily. Qty: 10 Tab, Refills: 0      dicloxacillin (DYNAPEN) 500 mg capsule Take 1 Cap by mouth Before breakfast, lunch, dinner and at bedtime. Qty: 40 Cap, Refills: 0         CONTINUE these medications which have NOT CHANGED    Details   furosemide (LASIX) 40 mg tablet Take 40 mg by mouth daily. potassium chloride (K-DUR, KLOR-CON) 20 mEq tablet Take 20 mEq by mouth daily. omeprazole (PRILOSEC) 20 mg capsule Take 20 mg by mouth daily. levothyroxine (SYNTHROID) 75 mcg tablet Take 75 mcg by mouth daily (before breakfast). Indications: HYPOTHYROIDISM         STOP taking these medications       carvediloL (COREG) 3.125 mg tablet Comments:   Reason for Stopping:         lisinopriL (PRINIVIL, ZESTRIL) 5 mg tablet Comments:   Reason for Stopping:         methocarbamoL (ROBAXIN) 500 mg tablet Comments:   Reason for Stopping:         apixaban (ELIQUIS) 2.5 mg tablet Comments:   Reason for Stopping:                 NOTIFY YOUR PHYSICIAN FOR ANY OF THE FOLLOWING:   Fever over 101 degrees for 24 hours. Chest pain, shortness of breath, fever, chills, nausea, vomiting, diarrhea, change in mentation, falling, weakness, bleeding. Severe pain or pain not relieved by medications. Or, any other signs or symptoms that you may have questions about. DISPOSITION:  x  Home With:   OT  PT  HH  RN       Long term SNF/Inpatient Rehab    Independent/assisted living    Hospice    Other:       PATIENT CONDITION AT DISCHARGE: Stable      PHYSICAL EXAMINATION AT DISCHARGE:  General:          Alert, attempting to get out of bed    HEENT:           Atraumatic, anicteric sclerae, pink conjunctivae                          No oral ulcers, mucosa moist, throat clear, dentition fair  Neck:               Supple, symmetrical  Lungs:             Clear to auscultation bilaterally. No Wheezing or Rhonchi. No rales.   Chest wall:      No tenderness  No Accessory muscle use. Heart:              Regular  rhythm,  No  murmur   No edema  Abdomen:        Soft, non-tender. Not distended. Bowel sounds normal  Extremities:     No cyanosis. No clubbing,                            Skin turgor normal, Capillary refill normal  Skin:                Not pale. Not Jaundiced  No rashes   Psych:             Not anxious or agitated. Neurologic:      Alert, moves all extremities, answers questions appropriately and responds to commands     XR CHEST PORT   Final Result      NM INFLAM PROC LTD   Final Result   IMPRESSION: Superficial cellulitis. Doubt underlying osteomyelitis      XR CHEST PA LAT   Final Result   Impression: Underexpanded lungs with bibasilar atelectasis. Small pleural   effusions. Hydrostatic edema. No significant interval change. XR CHEST SNGL V   Final Result   Impression:   Little interval change. XR CHEST PORT   Final Result   Impression: Underexpanded lungs with bibasilar atelectasis, increased. Possible   right pleural effusion. Hydrostatic edema. XR CHEST PA LAT   Final Result   Impression: The cardiomediastinal silhouette is appropriate for age, technique,   and lung expansion. Pulmonary vasculature is not congested. The lungs are   essentially clear. No effusion or pneumothorax is seen. XR CHEST SNGL V   Final Result           No results found for this or any previous visit (from the past 24 hour(s)).        HOSPITAL COURSE:  Patient is a 66y.o. year old female with past medical history unknown came to the ER ER under medical TDO patient was found by her home health aide on the last visit patient will hypoxic with her oxygen off there was trash around the house approximately 1 week patient in poor hygiene patient had bilateral leg wounds and patient using tissue paper and spraying with water seen by the ER physician chest x-ray shows pneumonia the patient was admitted for further evaluation treatment  History of present illness please refer to history and physical at the time of admission at the patient was admitted because of bilateral leg wounds shortness of breath hypoxemia chest x-ray shows pneumonia further work-up while in the hospital patient was seen by the wound care nurse podiatrist infectious disease pulmonologist and cardiology echo done shows ejection fraction of 15 to 20% acute cellulitis of bilateral leg and wounds positive for staph aureus procidentia Enterobacter Morganella Klebsiella patient have wound debridement chest x-ray shows community-acquired pneumonia patient was treated with IV Zosyn ordered MRI which unable to be done also seen by the psychiatrist patient is alert awake confused patient had blood transfusion during this admission patient have a stress test done shows large infarct cardiac cath was declined gram-negative bacteremia with procidentia patient now more alert awake but confused patient also seen by physical therapy occupational therapy recommend to discharge to SNF care    Discussed with the infectious disease patient can be discharged home on p.o. Levaquin  Patient is not a candidate for anticoagulation  Patient seen by the cardiology patient have several episode of NSVT event continue amiodarone      Patient alert awake resting the bed waiting to  be discharged  Yesterday infectious disease changes antibiotic to Levaquin and .  Dicloxacillin on discharge     involved for discharge planning    Signed:   Celeste Rai MD  12/12/2020  8:55 AM

## 2020-12-12 NOTE — PROGRESS NOTES
Problem: Mobility Impaired (Adult and Pediatric)  Goal: *Acute Goals and Plan of Care (Insert Text)  Description: Patient will move from supine to sit and sit to supine , scoot up and down, and roll side to side in bed with minimal assistance/contact guard assist within 7 day(s). Patient will transfer from bed to chair and chair to bed with minimal assistance/contact guard assist using the least restrictive device within 7 day(s). Patient will improve static sitting balance to modified independence within 1 week(s). Patient will ambulate 10 feet with moderate assistance with least restrictive device within 1 weeks. Outcome: Progressing Towards Goal   PHYSICAL THERAPY TREATMENT  Patient: Vin Valencia (77 y.o. female)  Date: 12/12/2020  Diagnosis: PNA (pneumonia) [J18.9]  Pneumonia [J18.9]   <principal problem not specified>  Procedure(s) (LRB):  Debridement of BILATERAL leg ulcers w/ Misonix (Bilateral) 4 Days Post-Op  Precautions:    Chart, physical therapy assessment, plan of care and goals were reviewed. ASSESSMENT  Patient continues with skilled PT services and is progressing towards goals. Nursing staff requesting PT come work with patient as she was extremely agitated and flailing around in bed this AM, demanding to get out of bed and up to the bathroom to have a BM; however, therapy attempted yesterday to stand patient with Ax3 and still could not get her off the bed despite her very small stature. I told patient today that we could attempt standing but if I felt it was unsafe then we must sit back down. Glynn to get to sitting EOB with max cueing for sequencing and hand placement. Patient sat EOB for ~15 min to sit up for a while and she participated with LE and UE strengthening exercises. Attempted to stand 1 time with maxA from therapist and patient trying to pull up from therapist's shoulders but unable to get glut clearance  from bed so immediately sat back down.  \"I'm going to poop everywhere if I don't get to the bathroom,\" patient confusingly states. She the returned to semi-supine with modA at LEs and trendelenburg bed feature used with maxA x 1 to scoot patient to [facility] for comfort as well as rolled L<>R with Josue to change out soiled rudy pads. Instructed patient to call for nursing when she felt that she needed the bed pan to have a BM. Patient would benefit from continued skilled therapy services to improve her LE and UE strength for carryover to transfers to decrease assistance from caregivers and return to OF. Limited by decreased cognition and safety awareness . Current Level of Function Impacting Discharge (mobility/balance): MaxA x multiples to attempt sit<>stand to no avail, decreased cognition, impulsive, poor safety awareness     Other factors to consider for discharge: declining medical management of wounds on LEs         PLAN :  Patient continues to benefit from skilled intervention to address the above impairments. Continue treatment per established plan of care. to address goals. Recommendation for discharge: (in order for the patient to meet his/her long term goals)  Therapy up to 5 days/week in SNF setting    This discharge recommendation:  Has been made in collaboration with the attending provider and/or case management    IF patient discharges home will need the following DME: to be determined (TBD)       SUBJECTIVE:   Patient stated I need to have a BM.     OBJECTIVE DATA SUMMARY:   Critical Behavior:  Neurologic State: Alert  Orientation Level: Oriented to place, Oriented to person  Cognition: Decreased attention/concentration, Decreased command following, Impaired decision making, Poor safety awareness  Safety/Judgement: Decreased insight into deficits  Functional Mobility Training:  Bed Mobility:  Rolling: Minimum assistance  Supine to Sit: Minimum assistance  Sit to Supine:  Moderate assistance  Scooting: Maximum assistance  Verbal cueing for sequence of transfer but only Josue neeeded to complete and hold herself in S/L     Transfers:  Sit to Stand: Maximum assistance  Stand to Sit: Maximum assistance  Increased time needed to attempt sit<>stand transfer and max cueing and instructing needed. Unable to complete stand due to weakness in LEs and confusion. Frequent rest breaks throughout due to SOB  Balance:  Sitting: Impaired; With support  Sitting - Static: Fair (occasional)  Sitting - Dynamic: Fair (occasional)  Standing: Impaired;Pull to stand; With support      Therapeutic Exercises:   Seated, 20x BLEs and BUEs: LAQ, AP, marching/ shoulder flexion, bicep curls, cross body shoulder taps   Pain Ratin/10, LEs    Activity Tolerance:   Fair, requires frequent rest breaks, and observed SOB with activity  Please refer to the flowsheet for vital signs taken during this treatment. After treatment patient left in no apparent distress:   Supine in bed, Call bell within reach, Bed / chair alarm activated, Side rails x 3, and discussed treatment session with supervising nurse     COMMUNICATION/COLLABORATION:   The patients plan of care was discussed with: Physical therapist and Registered nurse.      Joesph Schlatter   Time Calculation: 28 mins

## 2020-12-12 NOTE — PROGRESS NOTES
Pulmonary, Critical Care Note    Name: Diya Madison MRN: [id number]   : 1945 Hospital: [facility]   Date: 2020  Admission date: 2020 Hospital Day:        Subjective/Interval History:   Seen on the medical floor. Consult requested for acute hypoxic respiratory failure. She is an extremely poor historian states she feels fine to leave her alone because she is trying to eat   respirations nonlabored she remains on 3 L of oxygen. States she is not having any surgery  12/3 awake eating breakfast respirations nonlabored   sleeping but awakens easily denies shortness of breath   awake alert wants to go home respirations nonlabored  12/10 patient awake alert very weak working with physical therapy. She apparently is refusing skilled nursing facility wants to go home. She does not appear capable of taking care of herself at home   seems stronger but still very weak continues to refuse skilled nursing facility care insists on going home. She does not appear capable of caring for herself. She is now telling me today that she does have oxygen at home which she was denying earlier in the week. I am not sure if I trust her statement   awake alert still seems confused today she tells me she does not have oxygen at home and complains of constipation  Hospital Problems  Never Reviewed          Codes Class Noted POA    PNA (pneumonia) ICD-10-CM: J18.9  ICD-9-CM: 486  2020 Unknown        Pneumonia ICD-10-CM: J18.9  ICD-9-CM: 486  2020 Unknown              IMPRESSION:   1. Acute hypoxic respiratory failure secondary to pulmonary edema overnight oximetry continues to show desaturation as low as 70% with 13 minutes below 88%  2. Hypokalemia repleted   3. Hypercapnia likely metabolic alkalosis improved after Diamox   4. Severe cardiomyopathy  5. Severe mitral regurg  6.  Bilateral pleural effusions secondary to pulmonary edema no significant change on x-ray 12/3  7. Providencia septicemia  8. Anemia  9. Hypothyroidism treated  10. Questionable early dementia        RECOMMENDATIONS/PLAN:   1. Creatinine stable continue Lasix decreased to daily dosing  2. Continue daily potassium   3. She appears to need nocturnal oxygen  4. Follow H&H  5.           [x] High complexity decision making was performed  [x] See my orders for details      Subjective/Initial History:     I was asked by Linette Fuentes MD to see Charline Valdez  a 66 y.o.  female in consultation for a chief complaint of acute hypoxic respiratory failure and pleural effusions      Allergies   Allergen Reactions    Tetracycline Other (comments)     Causes mouth sores    Morphine Nausea and Vomiting        MAR reviewed and pertinent medications noted or modified as needed     Current Facility-Administered Medications   Medication    furosemide (LASIX) tablet 40 mg    sodium chloride (NS) flush 5-40 mL    sodium chloride (NS) flush 5-40 mL    amiodarone (CORDARONE) tablet 200 mg    potassium chloride SR (KLOR-CON 10) tablet 40 mEq    sodium hypochlorite (HALF STRENGTH DAKIN'S) 0.25% irrigation (bottle)    sodium chloride (NS) flush 5-40 mL    0.9% sodium chloride infusion 250 mL    labetaloL (NORMODYNE;TRANDATE) 20 mg/4 mL (5 mg/mL) injection 10 mg    metoprolol (LOPRESSOR) injection 2.5 mg    albuterol-ipratropium (DUO-NEB) 2.5 MG-0.5 MG/3 ML    piperacillin-tazobactam (ZOSYN) 3.375 g in 0.9% sodium chloride (MBP/ADV) 100 mL MBP    levothyroxine (SYNTHROID) tablet 75 mcg    acetaminophen (TYLENOL) tablet 650 mg    Or    acetaminophen (TYLENOL) suppository 650 mg    polyethylene glycol (MIRALAX) packet 17 g    ondansetron (ZOFRAN ODT) tablet 4 mg    Or    ondansetron (ZOFRAN) injection 4 mg    aspirin tablet 325 mg    atorvastatin (LIPITOR) tablet 40 mg      Patient PCP: Karl Bardales MD  PMH:  has no past medical history on file.   PSH:   has no past surgical history on file.   FHX: family history is not on file. SHX:           Objective:     Vital Signs: Telemetry:    normal sinus rhythm Intake/Output:   Visit Vitals  /70 (BP 1 Location: Left arm, BP Patient Position: At rest)   Pulse 89   Temp 98.3 °F (36.8 °C)   Resp 18   Ht 5' (1.524 m)   Wt 42.5 kg (93 lb 11.1 oz)   SpO2 93%   BMI 18.30 kg/m²       Temp (24hrs), Av.2 °F (36.8 °C), Min:97.7 °F (36.5 °C), Max:98.6 °F (37 °C)        O2 Device: Room air O2 Flow Rate (L/min): 2 l/min       Wt Readings from Last 4 Encounters:   20 42.5 kg (93 lb 11.1 oz)   20 47.7 kg (105 lb 2.6 oz)   16 57.2 kg (126 lb)   12/07/10 64 kg (141 lb)          Intake/Output Summary (Last 24 hours) at 2020 1354  Last data filed at 2020 0210  Gross per 24 hour   Intake 480 ml   Output 800 ml   Net -320 ml       Last shift:      No intake/output data recorded.   Last 3 shifts: 12/10 1901 -  0700  In: 200 [P.O.:980]  Out: 800 [Urine:800]       Physical Exam:   General:  female; chronically ill appearing frail  HEENT: NCAT, oral mucosa clear  Eyes: anicteric; conjunctiva clear extraocular movements intact  Neck: no nodes, neck veins visible no accessory muscle use  Chest: no deformity,   Cardiac: Regular rate and rhythm systolic murmur present has edema of the extremities  Lungs: distant breath sounds; no wheezing rales are heard posteriorly no breath sounds in the bases  Abd: Soft positive bowel sounds no tenderness she has some posterior abdominal wall edema  Ext: Edema of the legs arms and sacrum  : clear urine  Neuro: Awake more alert mildly confused  will move all 4 extremities  Psych- no agitation, oriented to person;   Skin: Lower extremities have very flaky skin with erythema  Pulses: Brachial radial femoral pulses intact  Capillary: Slow capillary refill    Labs:    Recent Labs     12/10/20  0740   WBC 8.6   HGB 8.7*   *     Recent Labs     12/10/20  0740      K 3.9      CO2 33*   GLU 82   BUN 23*   CREA 0.34*   CA 8.3*   ALB 2.0*   ALT 11*     12/11 room air oxygen saturation 94%  BNP 16,597  CRP 3.04  11/26 blood culture with Providencia sensitive to Zosyn  Urine culture no growth  Wound culture with multiple organisms including methicillin sensitive staph aureus Klebsiella Providencia Enterobacter and Morganella  11/27 echo ejection fraction 20% dilated left atrium grade 2 diastolic dysfunction moderate to severe mitral regurg IVC dilatation RVSP 55  12/1 nuclear cardiac scan with ejection fraction of 19%  Lab Results   Component Value Date/Time    Culture result: Moderate Morganella morganii ssp morganii 12/04/2020 10:30 AM    Culture result:  12/04/2020 10:30 AM     Moderate Stenotrophomonas (xantho.) maltophilia CLSI guidelines do not recommend reporting susceptibilities for s. Maltophilia. Trimethoprim/sulfamethoxazole is the drug of choice. Culture result: Moderate Enterobacter cloacae complex 12/04/2020 10:30 AM    Culture result: Light Staphylococcus aureus 12/04/2020 10:30 AM    Culture result: Light Enterobacter cloacae complex 12/04/2020 10:30 AM    Culture result: Light Morganella morganii 12/04/2020 10:30 AM    Culture result:  12/04/2020 10:30 AM     Light Enterobacter cloacae complex . ..(2ND COLONY TYPE)    Culture result: Light Staphylococcus aureus 12/04/2020 10:30 AM     Lab Results   Component Value Date/Time    TSH 0.26 (L) 11/25/2020 12:30 PM       Imaging:    CXR Results  (Last 48 hours)    None        Results from [place] encounter on 11/25/20   XR CHEST PORT    Narrative Chest single view. Comparison single view chest December 3, 2020. Reduced lung volumes. Similar basilar atelectasis. No gross interstitial or  alveolar pulmonary edema. Right-sided cardiac device with leads overlying the  right heart. Cardiac silhouette enlargement. Elongated thoracic aorta with  atherosclerosis. No pneumothorax or sizable pleural effusion.    XR CHEST PA LAT    Narrative Chest, 2 views, 12/7/2020    History: Pulmonary edema. Bilateral effusions. Comparison: Including chest 12/3/2020. Findings: A right-sided pacemaker device with leads in the right atrium and  right ventricle is in place. The cardiac silhouette is stable globular  enlargement which could indicate cardiomyopathy or pericardial effusion. The  thoracic aorta is tortuous. The lungs are underexpanded with bibasilar  atelectasis. Small pleural effusions are noted. There is pulmonary vascular  congestion and hydrostatic edema. No pneumothorax is identified. The thoracic  spine again demonstrates kyphosis, degenerative changes and compression  deformities. Impression Impression: Underexpanded lungs with bibasilar atelectasis. Small pleural  effusions. Hydrostatic edema. No significant interval change. XR CHEST SNGL V    Narrative Study: XR CHEST SNGL V    Clinical indication: Pleural effusions    Comparison: Chest x-ray 12/1/2020. Findings: Only hazy bibasilar airspace disease/pleural effusions. No evidence of  pneumothorax. Cardiac silhouette is enlarged. Stable positioning of right chest wall pacemaker  device. Mild vascular congestion. No acute osseous abnormality identified. Impression Impression:  Little interval change. · Providencia septicemia questionably urinary tract or from skin site. Urine culture was no growth I do not know if it was collected after antibiotics were given. She is currently on Zosyn   She has pleural effusions and accentuated interstitial markings consistent with CHF/pulmonary edema. She is receiving Lasix. · Room air oxygen saturation has remained steady. Overnight oximetry did show desaturation for 13 minutes minutes 12/12. She will need nocturnal oxygen. · Thank you for allowing me to see MsAdele  Fernanda Lea  ·     Ruslan Medley MD

## 2020-12-12 NOTE — PROGRESS NOTES
Patient refusing to wear her telemetry box took it off threw it across the room on the floor. Patient took dressings off legs and refuses nurse to put dressings back kicking and screaming.

## 2020-12-12 NOTE — PROGRESS NOTES
CM spoke to on call doctor, Dr. Neil Montero via phone. CM asked Dr. Neil Montero to order a Covid test for SNF placement.

## 2020-12-12 NOTE — PROGRESS NOTES
Problem: Falls - Risk of  Goal: *Absence of Falls  Description: Document Mazin Szymanski Fall Risk and appropriate interventions in the flowsheet. Outcome: Progressing Towards Goal  Note: Fall Risk Interventions:  Mobility Interventions: Bed/chair exit alarm    Mentation Interventions: Bed/chair exit alarm    Medication Interventions: Bed/chair exit alarm    Elimination Interventions: Call light in reach    History of Falls Interventions: Bed/chair exit alarm         Problem: Patient Education: Go to Patient Education Activity  Goal: Patient/Family Education  Outcome: Progressing Towards Goal     Problem: Pressure Injury - Risk of  Goal: *Prevention of pressure injury  Description: Document Dustin Scale and appropriate interventions in the flowsheet.   Outcome: Progressing Towards Goal  Note: Pressure Injury Interventions:  Sensory Interventions: Keep linens dry and wrinkle-free    Moisture Interventions: Absorbent underpads    Activity Interventions: PT/OT evaluation    Mobility Interventions: HOB 30 degrees or less    Nutrition Interventions: Document food/fluid/supplement intake    Friction and Shear Interventions: HOB 30 degrees or less                Problem: Patient Education: Go to Patient Education Activity  Goal: Patient/Family Education  Outcome: Progressing Towards Goal     Problem: Nutrition Deficit  Goal: *Optimize nutritional status  Outcome: Progressing Towards Goal     Problem: Patient Education: Go to Patient Education Activity  Goal: Patient/Family Education  Outcome: Progressing Towards Goal     Problem: Patient Education: Go to Patient Education Activity  Goal: Patient/Family Education  Outcome: Progressing Towards Goal

## 2020-12-12 NOTE — PROGRESS NOTES
Infectious Disease Progress Note           Subjective:   Pt seen on f/u for Dr Jimenez Macias. He is being followed by ID for b/l LE cellulitis/ulceration, and GNR bacteremia. I was contacted by RN today due to no IV access for antibiotic administration, pt has reportedly been pulling out IV accesses. She removed dressings on b/l LE and does not want it replaced due to pain. Objective:   Physical Exam:     Visit Vitals  /65 (BP 1 Location: Left arm, BP Patient Position: At rest)   Pulse 91   Temp 98.5 °F (36.9 °C)   Resp 18   Ht 5' (1.524 m)   Wt 93 lb 11.1 oz (42.5 kg)   SpO2 94%   BMI 18.30 kg/m²    O2 Flow Rate (L/min): 2 l/min O2 Device: Room air    Temp (24hrs), Av.3 °F (36.8 °C), Min:98 °F (36.7 °C), Max:98.6 °F (37 °C)    No intake/output data recorded. 12/10 1901 -  0700  In: 200 [P.O.:980]  Out: 800 [Urine:800]    General: NAD, alert, confused   HEENT: REINALDO, Moist mucosa   Lungs: CTA b/l, decreased at the bases, no wheeze/rhonchi   Heart: S1S2+, RRR, no murmur  Abdo: Soft, NT, ND, +BS   Exts: Large ulcerated areas on b/l anterior legs, bloody drainage, no purulence or evidence of tissues necrosis   Skin: B/l leg wounds       Data Review:       Recent Days:  Recent Labs     12/10/20  0740   WBC 8.6   HGB 8.7*   HCT 29.5*   *     Recent Labs     12/10/20  0740   BUN 23*   CREA 0.34*       Lab Results   Component Value Date/Time    C-Reactive protein 1.67 (H) 12/10/2020 07:40 AM          Microbiology   - Blood Cx : Providencia rettgeri   - Right leg wound Cx : Morganella Morganii, MSSA and E. Cloacae   - Left leg wound Cx : Morganella Morganii, MSSA and E. Cloacae   - Right leg wound Cx : Klebsiella oxytoca, Providencia rettgeri , Enterobacter cloacae complex     Diagnostics   CXR Results  (Last 48 hours)    None          Assessment/Plan     1.  Cellulitis involving b/l lower exts/large ulcerations on anterior legs, suspected stasis ulcers Morganella Morganii, MSSA and E. Cloacae isolated from b/l leg ulcers on 12/04      Klebsiella oxytoca, Providencia rettgeri , Enterobacter cloacae complex isolated from right leg on 11/27      Pt is currently on Zosyn, but has not IV access, continues to pull out IV accesses per RN       Dr Rudolph Hill had recommended dicloxacillin and Levaquin upon discharge      Levaquin is contraindicated with amiodarone, will start on Suprax for GNR and MSSA coverage while hospitalized     2. GNR bacteremia w Isolation of Providencia rettgeri from blood Cx on 11/27, was on Zosyn discontinued today due to limited IV access    3. B/l LE pain: Mgt per primary, removed all dressings due to pain     4. Pyuria: Urine Cx from 11/26 neg         5.  AMS: Baseline mentation unknown     Mirian Singh MD    12/12/2020

## 2020-12-12 NOTE — BH NOTES
I was asked to use see the patient again by nursing staff and  about the patient competency there is a new information even though patient is alert verbal oriented coherent there is a discrepancy about what she says she follow through she made it sound like the neighbors help children come and visit her and keep an eye on her helping and that she cooperate with the home health been told that the patient even though verbalizes these things are not accurate neighbors do not come as often she had made it sound like children do not come and visit apparently one of the sister Maranda Torres had some concern about her competency following through and Jen Witt take care and they are concerned that she may putting herself in danger given the information even though she verbally talk about talks about it she does not follow through and putting herself in danger there is putting at risk she is considered is not competent to make decisions probably the need to take one of the steps to take assistance making helping decision making thank you end of dictation

## 2020-12-12 NOTE — PROGRESS NOTES
Patient refused dressing to bilateral lower extremities after several attempts. Educated patient regarding the importance of dressing changes and the infection that she currently has; patient continues to refuse. 0500 - Patient took off the old dressing that was placed on BLE. Refused to have it secured even with kerlix. Refused to have another IV inserted; stated, \"I'm going to go home this morning, I don't need you to put another one on me. \" Will cont to follow.

## 2020-12-13 LAB — SARS-COV-2, COV2: NORMAL

## 2020-12-13 PROCEDURE — 74011250637 HC RX REV CODE- 250/637: Performed by: INTERNAL MEDICINE

## 2020-12-13 PROCEDURE — 74011250637 HC RX REV CODE- 250/637: Performed by: NURSE PRACTITIONER

## 2020-12-13 PROCEDURE — 65270000029 HC RM PRIVATE

## 2020-12-13 PROCEDURE — 97530 THERAPEUTIC ACTIVITIES: CPT | Performed by: PHYSICAL THERAPIST

## 2020-12-13 PROCEDURE — 87635 SARS-COV-2 COVID-19 AMP PRB: CPT

## 2020-12-13 PROCEDURE — 74011250637 HC RX REV CODE- 250/637: Performed by: FAMILY MEDICINE

## 2020-12-13 NOTE — PROGRESS NOTES
Patient refused to have the dressing on her BLE changed. Patient started kicking and got agitated when told about changing her dressing. Educated patient about importance of dressing change. RN [place] Sizer as witness.

## 2020-12-13 NOTE — PROGRESS NOTES
Pulmonary, Critical Care Note    Name: Diya Madison MRN: [id number]   : 1945 Hospital: [facility]   Date: 2020  Admission date: 2020 Hospital Day: 23       Subjective/Interval History:   Seen on the medical floor. Consult requested for acute hypoxic respiratory failure. She is an extremely poor historian states she feels fine to leave her alone because she is trying to eat   respirations nonlabored she remains on 3 L of oxygen. States she is not having any surgery  12/3 awake eating breakfast respirations nonlabored   sleeping but awakens easily denies shortness of breath   awake alert wants to go home respirations nonlabored  12/10 patient awake alert very weak working with physical therapy. She apparently is refusing skilled nursing facility wants to go home. She does not appear capable of taking care of herself at home   seems stronger but still very weak continues to refuse skilled nursing facility care insists on going home. She does not appear capable of caring for herself. She is now telling me today that she does have oxygen at home which she was denying earlier in the week. I am not sure if I trust her statement   awake alert still seems confused today she tells me she does not have oxygen at home and complains of constipation   awake alert when told she still has some drainage on her left leg bandage she states there is no drainage and its dry that the bandages is just stained  Hospital Problems  Never Reviewed          Codes Class Noted POA    PNA (pneumonia) ICD-10-CM: J18.9  ICD-9-CM: 486  2020 Unknown        Pneumonia ICD-10-CM: J18.9  ICD-9-CM: 486  2020 Unknown              IMPRESSION:   1.  Acute hypoxic respiratory failure secondary to pulmonary edema overnight oximetry continues to show desaturation as low as 70% with 13 minutes below 88% we will repeat overnight oximetry to keep her qualified for home oxygen  2. Hypokalemia repleted   3. Hypercapnia likely metabolic alkalosis improved after Diamox   4. Severe cardiomyopathy  5. Severe mitral regurg  6. Bilateral pleural effusions secondary to pulmonary edema no significant change on x-ray 12/3  7. Providencia septicemia  8. Anemia  9. Hypothyroidism treated  10. Questionable early dementia        RECOMMENDATIONS/PLAN:   1. Creatinine stable continue Lasix decreased to daily dosing  2. Continue daily potassium   3. She appears to need nocturnal oxygen  4. Follow H&H  5.           [x] High complexity decision making was performed  [x] See my orders for details      Subjective/Initial History:     I was asked by Rosa Titus MD to see Winnie Dawson  a 66 y.o.    female in consultation for a chief complaint of acute hypoxic respiratory failure and pleural effusions      Allergies   Allergen Reactions    Tetracycline Other (comments)     Causes mouth sores    Morphine Nausea and Vomiting        MAR reviewed and pertinent medications noted or modified as needed     Current Facility-Administered Medications   Medication    albuterol-ipratropium (DUO-NEB) 2.5 MG-0.5 MG/3 ML    cefixime (SUPRAX) chewable tablet 400 mg    furosemide (LASIX) tablet 40 mg    amiodarone (CORDARONE) tablet 200 mg    potassium chloride SR (KLOR-CON 10) tablet 40 mEq    sodium hypochlorite (HALF STRENGTH DAKIN'S) 0.25% irrigation (bottle)    0.9% sodium chloride infusion 250 mL    labetaloL (NORMODYNE;TRANDATE) 20 mg/4 mL (5 mg/mL) injection 10 mg    metoprolol (LOPRESSOR) injection 2.5 mg    levothyroxine (SYNTHROID) tablet 75 mcg    acetaminophen (TYLENOL) tablet 650 mg    Or    acetaminophen (TYLENOL) suppository 650 mg    polyethylene glycol (MIRALAX) packet 17 g    ondansetron (ZOFRAN ODT) tablet 4 mg    Or    ondansetron (ZOFRAN) injection 4 mg    aspirin tablet 325 mg    atorvastatin (LIPITOR) tablet 40 mg      Patient PCP: Jeffy, MD Karl  PMH:  has no past medical history on file. PSH:   has no past surgical history on file. FHX: family history is not on file. SHX:           Objective:     Vital Signs: Telemetry:    normal sinus rhythm Intake/Output:   Visit Vitals  /73 (BP 1 Location: Left arm, BP Patient Position: At rest)   Pulse (!) 54   Temp 97.8 °F (36.6 °C)   Resp 24   Ht 5' (1.524 m)   Wt 42.5 kg (93 lb 11.1 oz)   SpO2 (!) 76%   BMI 18.30 kg/m²       Temp (24hrs), Av.1 °F (36.7 °C), Min:97.8 °F (36.6 °C), Max:98.5 °F (36.9 °C)        O2 Device: Room air O2 Flow Rate (L/min): 2 l/min       Wt Readings from Last 4 Encounters:   20 42.5 kg (93 lb 11.1 oz)   20 47.7 kg (105 lb 2.6 oz)   16 57.2 kg (126 lb)   12/07/10 64 kg (141 lb)          Intake/Output Summary (Last 24 hours) at 2020 1233  Last data filed at 2020 0109  Gross per 24 hour   Intake 200 ml   Output    Net 200 ml       Last shift:      No intake/output data recorded.   Last 3 shifts:  1901 -  0700  In: 65 [P.O.:680]  Out: 800 [Urine:800]       Physical Exam:   General:  female; chronically ill appearing frail  HEENT: NCAT, oral mucosa clear  Eyes: anicteric; conjunctiva clear extraocular movements intact  Neck: no nodes, neck veins visible no accessory muscle use  Chest: no deformity,   Cardiac: Regular rate and rhythm systolic murmur present has edema of the extremities  Lungs: distant breath sounds; no wheezing rales are heard posteriorly no breath sounds in the bases  Abd: Soft positive bowel sounds no tenderness she has some posterior abdominal wall edema  Ext: Edema of the legs arms and sacrum  : clear urine  Neuro: Awake more alert mildly confused  will move all 4 extremities  Psych- no agitation, oriented to person;   Skin: Lower extremities have very flaky skin with erythema  Pulses: Brachial radial femoral pulses intact  Capillary: Slow capillary refill    Labs:    No results for input(s): WBC, HGB, PLT, INR, APTT, HGBEXT, PLTEXT, INREXT, HGBEXT, PLTEXT, INREXT in the last 72 hours. No lab exists for component: FIB, DDMER  No results for input(s): NA, K, CL, CO2, GLU, BUN, CREA, CA, MG, PHOS, LAC, ALB, TBIL, ALT, AML, LPSE in the last 72 hours. No lab exists for component: SGOT  12/11 room air oxygen saturation 94%  BNP 16,597  CRP 3.04  11/26 blood culture with Providencia sensitive to Zosyn  Urine culture no growth  Wound culture with multiple organisms including methicillin sensitive staph aureus Klebsiella Providencia Enterobacter and Morganella  11/27 echo ejection fraction 20% dilated left atrium grade 2 diastolic dysfunction moderate to severe mitral regurg IVC dilatation RVSP 55  12/1 nuclear cardiac scan with ejection fraction of 19%  Lab Results   Component Value Date/Time    Culture result: Moderate Morganella morganii ssp morganii 12/04/2020 10:30 AM    Culture result:  12/04/2020 10:30 AM     Moderate Stenotrophomonas (xantho.) maltophilia CLSI guidelines do not recommend reporting susceptibilities for s. Maltophilia. Trimethoprim/sulfamethoxazole is the drug of choice. Culture result: Moderate Enterobacter cloacae complex 12/04/2020 10:30 AM    Culture result: Light Staphylococcus aureus 12/04/2020 10:30 AM    Culture result: Light Enterobacter cloacae complex 12/04/2020 10:30 AM    Culture result: Light Morganella morganii 12/04/2020 10:30 AM    Culture result:  12/04/2020 10:30 AM     Light Enterobacter cloacae complex . ..(2ND COLONY TYPE)    Culture result: Light Staphylococcus aureus 12/04/2020 10:30 AM     Lab Results   Component Value Date/Time    TSH 0.26 (L) 11/25/2020 12:30 PM       Imaging:    CXR Results  (Last 48 hours)    None        Results from [place] encounter on 11/25/20   XR CHEST PORT    Narrative Chest single view. Comparison single view chest December 3, 2020. Reduced lung volumes. Similar basilar atelectasis. No gross interstitial or  alveolar pulmonary edema. Right-sided cardiac device with leads overlying the  right heart. Cardiac silhouette enlargement. Elongated thoracic aorta with  atherosclerosis. No pneumothorax or sizable pleural effusion. XR CHEST PA LAT    Narrative Chest, 2 views, 12/7/2020    History: Pulmonary edema. Bilateral effusions. Comparison: Including chest 12/3/2020. Findings: A right-sided pacemaker device with leads in the right atrium and  right ventricle is in place. The cardiac silhouette is stable globular  enlargement which could indicate cardiomyopathy or pericardial effusion. The  thoracic aorta is tortuous. The lungs are underexpanded with bibasilar  atelectasis. Small pleural effusions are noted. There is pulmonary vascular  congestion and hydrostatic edema. No pneumothorax is identified. The thoracic  spine again demonstrates kyphosis, degenerative changes and compression  deformities. Impression Impression: Underexpanded lungs with bibasilar atelectasis. Small pleural  effusions. Hydrostatic edema. No significant interval change. XR CHEST SNGL V    Narrative Study: XR CHEST SNGL V    Clinical indication: Pleural effusions    Comparison: Chest x-ray 12/1/2020. Findings: Only hazy bibasilar airspace disease/pleural effusions. No evidence of  pneumothorax. Cardiac silhouette is enlarged. Stable positioning of right chest wall pacemaker  device. Mild vascular congestion. No acute osseous abnormality identified. Impression Impression:  Little interval change. · Providencia septicemia questionably urinary tract or from skin site. Urine culture was no growth I do not know if it was collected after antibiotics were given. She is currently on Zosyn   She has pleural effusions and accentuated interstitial markings consistent with CHF/pulmonary edema. She is receiving Lasix. · Room air oxygen saturation has remained steady. Overnight oximetry did show desaturation for 13 minutes minutes 12/12. She will need nocturnal oxygen. · Thank you for allowing me to see Ms. Cata Jordan  ·     Brandon Andrews MD

## 2020-12-13 NOTE — PROGRESS NOTES
Discharge Summary       PATIENT ID: Oralia Galarza  MRN: [id number]   YOB: 1945    DATE OF ADMISSION: 11/25/2020 11:23 AM    DATE OF DISCHARGE:   PRIMARY CARE PROVIDER: Karl Bardales MD     ATTENDING PHYSICIAN: Emily Solano  DISCHARGING PROVIDER: Emily Solano      CONSULTATIONS: IP CONSULT TO CARDIOLOGY  IP CONSULT TO CARDIOLOGY  IP CONSULT TO INFECTIOUS DISEASES  IP CONSULT TO PODIATRY  IP CONSULT TO PSYCHIATRY  IP CONSULT TO VASCULAR SURGERY  IP CONSULT TO PULMONOLOGY    PROCEDURES/SURGERIES: Procedure(s):  Debridement of BILATERAL leg ulcers w/ Misonix    ADMITTING DIAGNOSES:    Patient Active Problem List    Diagnosis Date Noted    PNA (pneumonia) 11/25/2020    Pneumonia 11/25/2020       DISCHARGE DIAGNOSES / PLAN:    Acute hypoxemic respiratory failure on 3 L  Acute systolic heart failure with ejection fraction of 15 to 20%  Acute cellulitis of bilateral legs/wound culture positive for staph aureus/providentia/Enterobacter/Morganella/Klebsiella  Status post debridement of the both leg wounds  Gram-negative bacteremia with providentia  Sepsis leukocytosis elevated procalcitonin and lactic acid  Community-acquired pneumonia  Poor hygiene  Elevated troponin  Leukocytosis  Hypothyroidism  Anemia s/p tranfusion   Stress test done showing large infarct/declined cardiac cath  Hypotensive secondary to Percocet  Static post debridement of the bilateral leg ulcers by the podiatrist  :         DISCHARGE MEDICATIONS:  Current Discharge Medication List      START taking these medications    Details   albuterol-ipratropium (DUO-NEB) 2.5 mg-0.5 mg/3 ml nebu 3 mL by Nebulization route every six (6) hours as needed for Wheezing. Qty: 30 Nebule, Refills: 1      amiodarone (CORDARONE) 200 mg tablet Take 1 Tab by mouth daily. Qty: 30 Tab, Refills: 1      aspirin (ASPIRIN) 325 mg tablet Take 1 Tab by mouth daily.   Qty: 30 Tab, Refills: 1      atorvastatin (LIPITOR) 40 mg tablet Take 1 Tab by mouth nightly. Qty: 30 Tab, Refills: 1      levoFLOXacin (Levaquin) 500 mg tablet Take 1 Tab by mouth daily. Qty: 10 Tab, Refills: 0      dicloxacillin (DYNAPEN) 500 mg capsule Take 1 Cap by mouth Before breakfast, lunch, dinner and at bedtime. Qty: 40 Cap, Refills: 0         CONTINUE these medications which have NOT CHANGED    Details   furosemide (LASIX) 40 mg tablet Take 40 mg by mouth daily. potassium chloride (K-DUR, KLOR-CON) 20 mEq tablet Take 20 mEq by mouth daily. omeprazole (PRILOSEC) 20 mg capsule Take 20 mg by mouth daily. levothyroxine (SYNTHROID) 75 mcg tablet Take 75 mcg by mouth daily (before breakfast). Indications: HYPOTHYROIDISM         STOP taking these medications       carvediloL (COREG) 3.125 mg tablet Comments:   Reason for Stopping:         lisinopriL (PRINIVIL, ZESTRIL) 5 mg tablet Comments:   Reason for Stopping:         methocarbamoL (ROBAXIN) 500 mg tablet Comments:   Reason for Stopping:         apixaban (ELIQUIS) 2.5 mg tablet Comments:   Reason for Stopping:                 NOTIFY YOUR PHYSICIAN FOR ANY OF THE FOLLOWING:   Fever over 101 degrees for 24 hours. Chest pain, shortness of breath, fever, chills, nausea, vomiting, diarrhea, change in mentation, falling, weakness, bleeding. Severe pain or pain not relieved by medications. Or, any other signs or symptoms that you may have questions about. DISPOSITION:  x  Home With:   OT  PT  HH  RN       Long term SNF/Inpatient Rehab    Independent/assisted living    Hospice    Other:       PATIENT CONDITION AT DISCHARGE: Stable      PHYSICAL EXAMINATION AT DISCHARGE:  General:          Alert, attempting to get out of bed    HEENT:           Atraumatic, anicteric sclerae, pink conjunctivae                          No oral ulcers, mucosa moist, throat clear, dentition fair  Neck:               Supple, symmetrical  Lungs:             Clear to auscultation bilaterally. No Wheezing or Rhonchi. No rales.   Chest wall:      No tenderness  No Accessory muscle use. Heart:              Regular  rhythm,  No  murmur   No edema  Abdomen:        Soft, non-tender. Not distended. Bowel sounds normal  Extremities:     No cyanosis. No clubbing,                            Skin turgor normal, Capillary refill normal  Skin:                Not pale. Not Jaundiced  No rashes   Psych:             Not anxious or agitated. Neurologic:      Alert, moves all extremities, answers questions appropriately and responds to commands     XR CHEST PORT   Final Result      NM INFLAM PROC LTD   Final Result   IMPRESSION: Superficial cellulitis. Doubt underlying osteomyelitis      XR CHEST PA LAT   Final Result   Impression: Underexpanded lungs with bibasilar atelectasis. Small pleural   effusions. Hydrostatic edema. No significant interval change. XR CHEST SNGL V   Final Result   Impression:   Little interval change. XR CHEST PORT   Final Result   Impression: Underexpanded lungs with bibasilar atelectasis, increased. Possible   right pleural effusion. Hydrostatic edema. XR CHEST PA LAT   Final Result   Impression: The cardiomediastinal silhouette is appropriate for age, technique,   and lung expansion. Pulmonary vasculature is not congested. The lungs are   essentially clear. No effusion or pneumothorax is seen.          XR CHEST SNGL V   Final Result           Recent Results (from the past 24 hour(s))   SARS-COV-2    Collection Time: 12/13/20  4:30 AM   Result Value Ref Range    SARS-CoV-2 Please find results under separate order            HOSPITAL COURSE:  Patient is a 66y.o. year old female with past medical history unknown came to the ER ER under medical TDO patient was found by her home health aide on the last visit patient will hypoxic with her oxygen off there was trash around the house approximately 1 week patient in poor hygiene patient had bilateral leg wounds and patient using tissue paper and spraying with water seen by the ER physician chest x-ray shows pneumonia the patient was admitted for further evaluation treatment  History of present illness please refer to history and physical at the time of admission at the patient was admitted because of bilateral leg wounds shortness of breath hypoxemia chest x-ray shows pneumonia further work-up while in the hospital patient was seen by the wound care nurse podiatrist infectious disease pulmonologist and cardiology echo done shows ejection fraction of 15 to 20% acute cellulitis of bilateral leg and wounds positive for staph aureus procidentia Enterobacter Morganella Klebsiella patient have wound debridement chest x-ray shows community-acquired pneumonia patient was treated with IV Zosyn ordered MRI which unable to be done also seen by the psychiatrist patient is alert awake confused patient had blood transfusion during this admission patient have a stress test done shows large infarct cardiac cath was declined gram-negative bacteremia with procidentia patient now more alert awake but confused patient also seen by physical therapy occupational therapy recommend to discharge to SNF care    Discussed with the infectious disease patient can be discharged home on p.o. Levaquin  Patient is not a candidate for anticoagulation  Patient seen by the cardiology patient have several episode of NSVT event continue amiodarone      Patient alert awake resting the bed waiting to  be discharged  Yesterday infectious disease changes antibiotic to Levaquin and .  Dicloxacillin on discharge     involved for discharge planning    Signed:   Abilio Garcia MD  12/13/2020  8:55 AM

## 2020-12-13 NOTE — PROGRESS NOTES
All meds were scanned for patient to take. Pt. Refused to take any of her meds. Stating that she is \"going home today\" . Pt. Was educated on the reasoning for the medication. Pt. refuses to take anymore meds until it is said that she can go home.

## 2020-12-13 NOTE — PROGRESS NOTES
Problem: Mobility Impaired (Adult and Pediatric)  Goal: *Acute Goals and Plan of Care (Insert Text)  Description: Patient will move from supine to sit and sit to supine , scoot up and down, and roll side to side in bed with minimal assistance/contact guard assist within 7 day(s). Patient will transfer from bed to chair and chair to bed with minimal assistance/contact guard assist using the least restrictive device within 7 day(s). Patient will improve static sitting balance to modified independence within 1 week(s). Patient will ambulate 10 feet with moderate assistance with least restrictive device within 1 weeks. Outcome: Progressing Towards Goal     Problem: Patient Education: Go to Patient Education Activity  Goal: Patient/Family Education  Outcome: Progressing Towards Goal     PHYSICAL THERAPY REEVALUATION  Patient: Rona Aguirre (6 y.o. female)  Date: 12/13/2020  Primary Diagnosis: PNA (pneumonia) [J18.9]  Pneumonia [J18.9]  Procedure(s) (LRB):  Debridement of BILATERAL leg ulcers w/ Misonix (Bilateral) 5 Days Post-Op   Precautions: falls      ASSESSMENT  66 yof who came to hosptial due to PNA. She was evaled by PT on 12/2/20 and since then, she was seen by PT 5 times. When she stated on PT caseload, she was TA/Max A with all functional mobilitiy. Pt was agreeable to PT re-assessment today. Supine to sit at [address] [place] [address]. Once sitting EOB, she was able to stand up at [address] [place] [address]. PT noticed that pt had stool on her rbottom when she stood so PT attempted to clean pt's bottom while she was standing. Pt has poor standing endurance so she needed to stand up 3 times while PT cleaned her bottom. Standing completed at [facility] for about 30 seconds each time. Sit to supine at Mod A. Once she was supine in bed, rolling to the L at [facility]. Pt cotnionues to demo weakness, poor balance, impaired transfsers, and difficulty with ambulation, she would benefit from acute PT to address her limitaitons.  Since the start of PT POC, she has made gains towards he rtherapy goals and towards improved funcitonal mobility. Pt did well today, much better than the past few days. This PT thinks that she did well today due to cognition. PT continues to rec DC to SNF at this time. APS is involved in her case. Current Level of Function Impacting Discharge (mobility/balance): today at [facility] with rolling, Min A with supine to sit, Mod A with si tto supine, and Min A with sit<>Stand    Other factors to consider for discharge: Lives alone, unsafe at this time to go home dejesus. Patient will benefit from skilled therapy intervention to address the above noted impairments. PLAN :  Recommendations and Planned Interventions: bed mobility training, transfer training, gait training, therapeutic exercises, neuromuscular re-education, and therapeutic activities      Frequency/Duration: Patient will be followed by physical therapy:  4 times a week to address goals. Recommendation for discharge: (in order for the patient to meet his/her long term goals)  Therapy up to 5 days/week in SNF setting    This discharge recommendation:  Has not yet been discussed the attending provider and/or case management    Equipment recommendations for successful discharge (if) home: none         SUBJECTIVE:   Patient stated I want to take these bandages on my legs off, they are itchy.     OBJECTIVE DATA SUMMARY:   HISTORY:    No past medical history on file. No past surgical history on file.   Hospital course since last seen and reason for reevaluation: PT meet over one week ago, functional mobility for most of the week has been Max A    Personal factors and/or comorbidities impacting plan of care: see pt chart    Home Situation  Home Environment: Private residence  # Steps to Enter: 2  Rails to Enter: Yes  Hand Rails : Bilateral  One/Two Story Residence: One story  Living Alone: Yes  Support Systems: Other (comments)(Home health aide)  Patient Expects to be Discharged to[de-identified] Skilled nursing facility  Current DME Used/Available at Home: Hospital bed, Johanny Bu[employer], rollator, Shower chair, Cane, straight  Tub or Shower Type: Tub/Shower combination    EXAMINATION/PRESENTATION/DECISION MAKING:   Critical Behavior:  Neurologic State: Alert  Orientation Level: Oriented to place, Oriented to person  Cognition: Follows commands  Safety/Judgement: Decreased insight into deficits  Hearing: Auditory  Auditory Impairment: None    Range Of Motion:      decreased                    Strength:        decreased                  Functional Mobility:  Bed Mobility:  Rolling: Contact guard assistance  Supine to Sit: Minimum assistance  Sit to Supine: Moderate assistance     Transfers:  Sit to Stand: Minimum assistance  Stand to Sit: Minimum assistance                       Balance:   Sitting: Without support; Impaired  Sitting - Static: Fair (occasional)  Sitting - Dynamic: Fair (occasional)  Standing: Impaired; With support  Standing - Static: Poor         Pain Ratin/10    Activity Tolerance:   Poor  Please refer to the flowsheet for vital signs taken during this treatment. After treatment patient left in no apparent distress:   Supine in bed, Call bell within reach, and Bed / chair alarm activated    COMMUNICATION/EDUCATION:   The patients plan of care was discussed with: Registered nurse. Patient is unable to participate in goal setting and plan of care.     Thank you for this referral.  Lynnette Kim   Time Calculation: 30 mins

## 2020-12-13 NOTE — PROGRESS NOTES
Pt. Has refused wound care and changing of dressing on lower extremities. Pt. States that she does not need the dressing and that her legs are fine. Dressing and wound care not provided.

## 2020-12-13 NOTE — PROGRESS NOTES
Problem: Falls - Risk of  Goal: *Absence of Falls  Description: Document Mazin Szymanski Fall Risk and appropriate interventions in the flowsheet. Outcome: Progressing Towards Goal  Note: Fall Risk Interventions:  Mobility Interventions: OT consult for ADLs, PT Consult for mobility concerns, PT Consult for assist device competence, Strengthening exercises (ROM-active/passive), Bed/chair exit alarm    Mentation Interventions: Bed/chair exit alarm, Update white board, Increase mobility, Reorient patient, More frequent rounding, Toileting rounds    Medication Interventions: Bed/chair exit alarm    Elimination Interventions: Bed/chair exit alarm, Call light in reach, Patient to call for help with toileting needs, Toileting schedule/hourly rounds    History of Falls Interventions: Bed/chair exit alarm         Problem: Patient Education: Go to Patient Education Activity  Goal: Patient/Family Education  Outcome: Progressing Towards Goal     Problem: Pressure Injury - Risk of  Goal: *Prevention of pressure injury  Description: Document Dustin Scale and appropriate interventions in the flowsheet.   Outcome: Progressing Towards Goal  Note: Pressure Injury Interventions:  Sensory Interventions: Keep linens dry and wrinkle-free    Moisture Interventions: Absorbent underpads, Minimize layers    Activity Interventions: PT/OT evaluation    Mobility Interventions: PT/OT evaluation    Nutrition Interventions: Document food/fluid/supplement intake    Friction and Shear Interventions: Minimize layers, HOB 30 degrees or less, Apply protective barrier, creams and emollients                Problem: Patient Education: Go to Patient Education Activity  Goal: Patient/Family Education  Outcome: Progressing Towards Goal     Problem: Nutrition Deficit  Goal: *Optimize nutritional status  Outcome: Progressing Towards Goal     Problem: Patient Education: Go to Patient Education Activity  Goal: Patient/Family Education  Outcome: Progressing Towards Goal     Problem: Patient Education: Go to Patient Education Activity  Goal: Patient/Family Education  Outcome: Progressing Towards Goal Scribe Attestation (For Scribes USE Only)... Scribe Attestation (For Scribes USE Only).../Attending Attestation (For Attendings USE Only)...

## 2020-12-14 ENCOUNTER — ANESTHESIA EVENT (OUTPATIENT)
Dept: SURGERY | Age: 75
DRG: 853 | End: 2020-12-14
Payer: MEDICARE

## 2020-12-14 LAB
ALBUMIN SERPL-MCNC: 2.3 G/DL (ref 3.5–5)
ANION GAP SERPL CALC-SCNC: 6 MMOL/L (ref 5–15)
BASOPHILS # BLD: 0.1 K/UL (ref 0–0.1)
BASOPHILS NFR BLD: 1 % (ref 0–1)
BUN SERPL-MCNC: 16 MG/DL (ref 6–20)
BUN/CREAT SERPL: 48 (ref 12–20)
CA-I BLD-MCNC: 8.5 MG/DL (ref 8.5–10.1)
CHLORIDE SERPL-SCNC: 107 MMOL/L (ref 97–108)
CO2 SERPL-SCNC: 31 MMOL/L (ref 21–32)
CREAT SERPL-MCNC: 0.33 MG/DL (ref 0.55–1.02)
DIFFERENTIAL METHOD BLD: ABNORMAL
EOSINOPHIL # BLD: 0.1 K/UL (ref 0–0.4)
EOSINOPHIL NFR BLD: 1 % (ref 0–7)
ERYTHROCYTE [DISTWIDTH] IN BLOOD BY AUTOMATED COUNT: 14.6 % (ref 11.5–14.5)
GLUCOSE SERPL-MCNC: 80 MG/DL (ref 65–100)
HCT VFR BLD AUTO: 35 % (ref 35–47)
HGB BLD-MCNC: 10.6 G/DL (ref 11.5–16)
IMM GRANULOCYTES # BLD AUTO: 0 K/UL (ref 0–0.04)
IMM GRANULOCYTES NFR BLD AUTO: 0 % (ref 0–0.5)
LYMPHOCYTES # BLD: 1.2 K/UL (ref 0.8–3.5)
LYMPHOCYTES NFR BLD: 11 % (ref 12–49)
MCH RBC QN AUTO: 27.9 PG (ref 26–34)
MCHC RBC AUTO-ENTMCNC: 30.3 G/DL (ref 30–36.5)
MCV RBC AUTO: 92.1 FL (ref 80–99)
MONOCYTES # BLD: 0.6 K/UL (ref 0–1)
MONOCYTES NFR BLD: 5 % (ref 5–13)
NEUTS SEG # BLD: 9.1 K/UL (ref 1.8–8)
NEUTS SEG NFR BLD: 82 % (ref 32–75)
PHOSPHATE SERPL-MCNC: 3.3 MG/DL (ref 2.6–4.7)
PLATELET # BLD AUTO: 486 K/UL (ref 150–400)
PMV BLD AUTO: 9.4 FL (ref 8.9–12.9)
POTASSIUM SERPL-SCNC: 3.4 MMOL/L (ref 3.5–5.1)
RBC # BLD AUTO: 3.8 M/UL (ref 3.8–5.2)
SARS-COV-2, COV2NT: NOT DETECTED
SODIUM SERPL-SCNC: 144 MMOL/L (ref 136–145)
WBC # BLD AUTO: 11.1 K/UL (ref 3.6–11)

## 2020-12-14 PROCEDURE — 99232 SBSQ HOSP IP/OBS MODERATE 35: CPT | Performed by: PODIATRIST

## 2020-12-14 PROCEDURE — 97530 THERAPEUTIC ACTIVITIES: CPT

## 2020-12-14 PROCEDURE — 74011250637 HC RX REV CODE- 250/637: Performed by: INTERNAL MEDICINE

## 2020-12-14 PROCEDURE — 74011250637 HC RX REV CODE- 250/637: Performed by: NURSE PRACTITIONER

## 2020-12-14 PROCEDURE — 74011250637 HC RX REV CODE- 250/637: Performed by: FAMILY MEDICINE

## 2020-12-14 PROCEDURE — 85025 COMPLETE CBC W/AUTO DIFF WBC: CPT

## 2020-12-14 PROCEDURE — 99232 SBSQ HOSP IP/OBS MODERATE 35: CPT | Performed by: INTERNAL MEDICINE

## 2020-12-14 PROCEDURE — 65270000029 HC RM PRIVATE

## 2020-12-14 PROCEDURE — 80069 RENAL FUNCTION PANEL: CPT

## 2020-12-14 PROCEDURE — 36415 COLL VENOUS BLD VENIPUNCTURE: CPT

## 2020-12-14 RX ORDER — CEFDINIR 300 MG/1
300 CAPSULE ORAL EVERY 12 HOURS
Status: DISCONTINUED | OUTPATIENT
Start: 2020-12-14 | End: 2020-12-17 | Stop reason: HOSPADM

## 2020-12-14 RX ORDER — IPRATROPIUM BROMIDE AND ALBUTEROL SULFATE 2.5; .5 MG/3ML; MG/3ML
3 SOLUTION RESPIRATORY (INHALATION)
Qty: 30 NEBULE | Refills: 1 | Status: SHIPPED | OUTPATIENT
Start: 2020-12-14

## 2020-12-14 RX ORDER — DICLOXACILLIN SODIUM 250 MG/1
500 CAPSULE ORAL
Status: DISCONTINUED | OUTPATIENT
Start: 2020-12-14 | End: 2020-12-17 | Stop reason: HOSPADM

## 2020-12-14 RX ADMIN — ATORVASTATIN CALCIUM 40 MG: 40 TABLET, FILM COATED ORAL at 20:39

## 2020-12-14 RX ADMIN — LEVOTHYROXINE SODIUM 75 MCG: 0.07 TABLET ORAL at 12:57

## 2020-12-14 RX ADMIN — FUROSEMIDE 40 MG: 40 TABLET ORAL at 10:27

## 2020-12-14 RX ADMIN — CEFDINIR 300 MG: 300 CAPSULE ORAL at 21:00

## 2020-12-14 RX ADMIN — HYOSCYAMINE SULFATE: 16 SOLUTION at 03:30

## 2020-12-14 RX ADMIN — ATORVASTATIN CALCIUM 40 MG: 40 TABLET, FILM COATED ORAL at 10:27

## 2020-12-14 RX ADMIN — AMIODARONE HYDROCHLORIDE 200 MG: 200 TABLET ORAL at 10:27

## 2020-12-14 RX ADMIN — ASPIRIN 325 MG ORAL TABLET 325 MG: 325 PILL ORAL at 10:27

## 2020-12-14 RX ADMIN — CEFIXIME 400 MG: 200 TABLET, CHEWABLE ORAL at 10:27

## 2020-12-14 RX ADMIN — DICLOXACILLIN SODIUM 500 MG: 250 CAPSULE ORAL at 21:00

## 2020-12-14 NOTE — PROGRESS NOTES
Progress Note  Date:2020       Room:208/01  Patient Name:Naa Osorio     Date of Birth:     Age:75 y.o. Subjective    Subjective   Pt seen at [facility]. States she is unhappy and wants to go home. Denies any new complaints. Per nursing, pt has been combative and removing her dressings. Review of Systems   Constitutional: No fever, No chills, No sweats, + weakness, No fatigue  Immunologic: No immunocompromised, No recurrent fevers, No recurrent infections. Musculoskeletal: No back pain, No neck pain, No joint pain, No muscle pain, No claudication, No decreased range of motion, No trauma. Integumentary: + B/L LE wounds, No rash, No pruritus, No abrasions. Neurologic: Alert, No abnormal balance, No headache, + confusion, No numbness, No tingling. Psychiatric: + anxiety, No depression, No hussain. Objective         Vitals Last 24 Hours:  TEMPERATURE:  Temp  Av °F (36.7 °C)  Min: 97.4 °F (36.3 °C)  Max: 98.7 °F (37.1 °C)  RESPIRATIONS RANGE: Resp  Av.6  Min: 19  Max: 26  PULSE OXIMETRY RANGE: SpO2  Av.6 %  Min: 92 %  Max: 97 %  PULSE RANGE: Pulse  Av.8  Min: 84  Max: 96  BLOOD PRESSURE RANGE: Systolic (12KII), YVL:304 , Min:114 , DMO:715   ; Diastolic (26DCI), SPO:57, Min:73, Max:78    I/O (24Hr): Intake/Output Summary (Last 24 hours) at 2020 1319  Last data filed at 2020 0241  Gross per 24 hour   Intake 350 ml   Output 1 ml   Net 349 ml     Objective   GEN: Pt is alert and in NAD. Dressings noted to B/L LE are C/D/I. No family noted at [facility]  DERM: Wounds noted to both LE with granular base. No malodor noted. Periwound erythema noted. No dry skin noted to B/L LE. No proximal lymphatic streaking noted to B/L LE. VASC: Pedal pulses (DP/PT) faintly palpable to B/L LE. CFT<3sec to all digits of B/L LE. No pedal hair growth noted to the level of the digits for B/L LE. Skin temp is warm to warm from proximal to distal for B/L LE.  Neg homans/maggi signs to B/L LE. No varicosities or telangectasias noted to B/L LE.  NEURO: Protective and epicritic sensations grossly intact to B/L LE  MSK: (+) POP, No gross deformities. Decreased muscle tone and bulk noted to B/L LE.  PSYCH: Cooperative with tearful affect    Labs/Imaging/Diagnostics    Labs:  CBC:  Recent Labs     12/14/20  0845   WBC 11.1*   RBC 3.80   HGB 10.6*   HCT 35.0   MCV 92.1   RDW 14.6*   *     CHEMISTRIES:  Recent Labs     12/14/20  0845      K 3.4*      CO2 31   BUN 16   CA 8.5   PHOS 3.3   PT/INR:No results for input(s): INR, INREXT in the last 72 hours. No lab exists for component: PROTIME  APTT:No results for input(s): APTT in the last 72 hours. LIVER PROFILE:No results for input(s): AST, ALT in the last 72 hours. No lab exists for component: BILIDIR, BILITOT, ALKPHOS  Lab Results   Component Value Date/Time    ALT (SGPT) 11 (L) 12/10/2020 07:40 AM    AST (SGOT) 13 (L) 12/10/2020 07:40 AM    Alk. phosphatase 104 12/10/2020 07:40 AM    Bilirubin, total 0.3 12/10/2020 07:40 AM       Imaging Last 24 Hours:  No results found. Assessment//Plan   Active Problems:    PNA (pneumonia) (11/25/2020)      Pneumonia (11/25/2020)      Assessment & Plan    - Bilateral LE Wounds  - PAD  - Sepsis (resolved)  - Altered mental status (in and out)        Patient seen and evaluated at bedside  - Most recent labs personally reviewed  - Cont current wound care. Wounds responding well. - Abx per ID  - NPO after midnight and consent to be signed  - I will follow closely     Plan for wound bed preparation and graft placement tomorrow in the [address].  [address] Magy Harris, DPHIRAM, [facility]  [address] [place] [address], [address] [place] [address], [address] [place] [address],[address]  Meghan Sha:  [phone]  F:  [phone]  C:  [phone]    Electronically signed by Shemar Gordon DPM on 12/14/2020 at 1:19 PM

## 2020-12-14 NOTE — PROGRESS NOTES
OCCUPATIONAL THERAPY TREATMENT  Patient: Blanca Cameron (62 y.o. female)  Date: 12/14/2020  Diagnosis: PNA (pneumonia) [J18.9]  Pneumonia [J18.9]   <principal problem not specified>  Procedure(s) (LRB):  Debridement of BILATERAL leg ulcers w/ Misonix (Bilateral) 6 Days Post-Op  Precautions: Fall risk   Chart, occupational therapy assessment, plan of care, and goals were reviewed. ASSESSMENT  Patient continues with skilled OT services and is progressing towards goals. Pt continues to present with overall deficits in generalized strength/AROM, coordination, static/dynamic sitting and standing balance, functional activity tolerance, and back pain impacting ADLs and functional mobility. Pt received semi-supine in bed agreeable to working with OT and PT today for co-tx session for increased pt/clinician safety. Pt req total A to don socks in long sitting 2' to limited anterior reach and poor sitting balance, supine>sit completed with mod A x2 to EOB. Pt requests to use restroom at this time, transfers sit><stand to/from EOB with mod A x2, however pt unable to tolerate standing/steps requiring mod A x2 to sit on bedpan at EOB. Total A req for bowel hygiene following toileting routine and mod Ax2 to bring back to supine and reposition for comfort. Pt would benefit from continued skilled OT to address impairments and improve IND and safety with ADLs and mobility. Continue to progress towards goals. OT recommending SNF at d/c once medically stable. Other factors to consider for discharge: Family support, DME, time since onset, severity of deficits          PLAN :  Patient continues to benefit from skilled intervention to address the above impairments. Continue treatment per established plan of care. to address goals. Recommend with staff: Encourage participation in bed level ADLs    Recommend next OT session:  Toilet transfer    Recommendation for discharge: (in order for the patient to meet his/her long term goals)  SNF    This discharge recommendation:  Has been made in collaboration with the attending provider and/or case management       SUBJECTIVE:   Patient stated Next time don't bring a male therapist with you.     OBJECTIVE DATA SUMMARY:   Cognitive/Behavioral Status:  Neurologic State: Alert;Confused  Orientation Level: Oriented to person;Oriented to situation  Cognition: Follows commands;Decreased attention/concentration; Impaired decision making;Poor safety awareness     Functional Mobility and Transfers for ADLs:  Bed Mobility:  Rolling: Moderate assistance;Assist x2  Supine to Sit: Moderate assistance;Assist x2  Sit to Supine: Moderate assistance;Assist x2  Scooting: Moderate assistance;Assist x2    Transfers:  Sit to Stand: Moderate assistance;Assist x2  Functional Transfers  Toilet Transfer : Moderate assistance;Assist x2(SImulated STS on bedpan)    Balance:  Sitting: Impaired; With support  Sitting - Static: Poor (constant support); Fair (occasional)  Sitting - Dynamic: Poor (constant support); Fair (occasional)  Standing: Impaired;Pull to stand; With support  Standing - Static: Poor;Constant support  Standing - Dynamic : Poor;Constant support    ADL Intervention:  Lower Body Dressing Assistance  Socks: Total assistance (dependent)  Position Performed: Long sitting on bed    Toileting  Toileting Assistance: Total assistance(dependent)  Bowel Hygiene: Total assistance (dependent)    Pain:  Pt c/o back pain with bed mobility, not formally rated at this time. Activity Tolerance:   Fair and requires rest breaks  Please refer to the flowsheet for vital signs taken during this treatment. After treatment patient left in no apparent distress:   Supine in bed, Heels elevated for pressure relief, Call bell within reach, Bed / chair alarm activated, and Side rails x 3    COMMUNICATION/COLLABORATION:   The patients plan of care was discussed with: Physical therapy assistant and Registered nurse.      OT/PT sessions occurred together for increased patient and clinician safety as pt with decreased activity tolerance and requires A of 2 for mobility at this time    Miah Concepcion  Time Calculation: 25 mins   Problem: Self Care Deficits Care Plan (Adult)  Goal: *Acute Goals and Plan of Care (Insert Text)  Description: Pt will be min A sup <> sit in prep for EOB ADLs  Pt will be SBA grooming sitting EOB  Pt will be min A LE dressing sitting EOB/long sit  Pt will be min A sit <>  prep for toileting LRAD  Pt will be min A toileting/toilet transfer/cloth mgmt LRAD  Pt will be SPV following UE HEP in prep for self care tasks  Outcome: Progressing Towards Goal

## 2020-12-14 NOTE — PROGRESS NOTES
General Daily Progress Note          Patient Name:   Linus Hudson       YOB: 1945       Age:  66 y.o. Admit Date: 11/25/2020      Subjective:         Patient alert awake not in distress eating breakfast this morning  Patient had debridement of right leg ulcer and left leg ulcer             Objective:     Visit Vitals  /77 (BP 1 Location: Left arm, BP Patient Position: At rest)   Pulse 85   Temp 97.4 °F (36.3 °C)   Resp 19   Ht 5' (1.524 m)   Wt 42.5 kg (93 lb 11.1 oz)   SpO2 95%   BMI 18.30 kg/m²          [unfilled]      Review of Systems    Constitutional: Negative for chills and fever. HENT: Negative. Eyes: Negative. Respiratory: Negative. Cardiovascular: Negative. Gastrointestinal: Negative for abdominal pain and nausea. Skin: Negative. Neurological: Negative. Physical Exam:      Constitutional: Alert awake  HENT:   Head: Normocephalic and atraumatic. Eyes: Pupils are equal, round, and reactive to light. EOM are normal.   Cardiovascular: Normal rate, regular rhythm and normal heart sounds. Pulmonary/Chest: Breath sounds normal. No wheezes. No rales. Exhibits no tenderness. Abdominal: Soft. Bowel sounds are normal. There is no abdominal tenderness. There is no rebound and no guarding. Musculoskeletal: Normal range of motion. Neurological: Bilateral lower leg red swollen    XR CHEST PORT   Final Result      NM INFLAM PROC LTD   Final Result   IMPRESSION: Superficial cellulitis. Doubt underlying osteomyelitis      XR CHEST PA LAT   Final Result   Impression: Underexpanded lungs with bibasilar atelectasis. Small pleural   effusions. Hydrostatic edema. No significant interval change. XR CHEST SNGL V   Final Result   Impression:   Little interval change. XR CHEST PORT   Final Result   Impression: Underexpanded lungs with bibasilar atelectasis, increased. Possible   right pleural effusion. Hydrostatic edema.       XR CHEST PA LAT Final Result   Impression: The cardiomediastinal silhouette is appropriate for age, technique,   and lung expansion. Pulmonary vasculature is not congested. The lungs are   essentially clear. No effusion or pneumothorax is seen. XR CHEST SNGL V   Final Result               Results     Procedure Component Value Units Date/Time    CULTURE, Kreg Forward STAIN [[id number]]  (Susceptibility) Collected:  12/04/20 1030    Order Status:  Completed Specimen:  Wound from Leg Updated:  12/08/20 1116     Special Requests: No Special Requests        GRAM STAIN       Occasional Gram Negative Rods           Culture result: Moderate Morganella morganii ssp morganii                  Moderate Stenotrophomonas (xantho.) maltophilia CLSI guidelines do not recommend reporting susceptibilities for s. Maltophilia. Trimethoprim/sulfamethoxazole is the drug of choice.                   Moderate Enterobacter cloacae complex                  Light Staphylococcus aureus          Susceptibility      Morganella morganii ssp morganii     WILLIAM     Amikacin ($) Susceptible     Ampicillin ($) Resistant     Ampicillin/sulbactam ($) Resistant     Cefazolin ($) Resistant     Cefepime ($$) Susceptible     Cefoxitin Intermediate     Ceftazidime ($) Susceptible     Ceftriaxone ($) Susceptible     Ciprofloxacin ($) Susceptible     Gentamicin ($) Susceptible     Levofloxacin ($) Susceptible     Meropenem ($$) Susceptible     Piperacillin/Tazobac ($) Susceptible     Tobramycin ($) Susceptible     Trimeth/Sulfa Susceptible                Susceptibility      Enterobacter cloacae complex     WILLIAM     Amikacin ($) Susceptible     Cefazolin ($) Resistant     Cefepime ($$) Susceptible     Cefoxitin Resistant     Ceftazidime ($) Susceptible     Ceftriaxone ($) Susceptible     Ciprofloxacin ($) Susceptible     Gentamicin ($) Susceptible     Levofloxacin ($) Susceptible     Meropenem ($$) Susceptible     Tobramycin ($) Susceptible     Trimeth/Sulfa Susceptible                Susceptibility      Staphylococcus aureus     WILLIAM     Ciprofloxacin ($) Resistant     Clindamycin ($) Susceptible     Daptomycin ($$$$$) Susceptible     Doxycycline ($$) Susceptible     Erythromycin ($$$$) Resistant     Gentamicin ($) Susceptible     Levofloxacin ($) Intermediate     Linezolid ($$$$$) Susceptible     Moxifloxacin ($$$$) Susceptible     Oxacillin Susceptible     Rifampin ($$$$) Susceptible [1]      Tetracycline Susceptible     Trimeth/Sulfa Susceptible     Vancomycin ($) Susceptible            [1]   Rifampin is not to be used for mono-therapy. CULTURE, [place] STAIN [[id number]]  (Susceptibility) Collected:  12/04/20 1030    Order Status:  Completed Specimen:  Wound from Leg Updated:  12/08/20 1118     Special Requests: No Special Requests        GRAM STAIN Few Gram Negative Rods        Culture result:       Light Enterobacter cloacae complex            Light Morganella morganii               Light Enterobacter cloacae complex . ..(2ND COLONY TYPE)                  Light Staphylococcus aureus          Susceptibility      Enterobacter cloacae complex (1)     WILLIAM     Amikacin ($) Susceptible     Cefazolin ($) Resistant     Cefepime ($$) Susceptible     Cefoxitin Resistant     Ceftazidime ($) Susceptible     Ceftriaxone ($) Susceptible     Ciprofloxacin ($) Susceptible     Gentamicin ($) Susceptible     Levofloxacin ($) Susceptible     Meropenem ($$) Susceptible     Tobramycin ($) Susceptible     Trimeth/Sulfa Susceptible                Susceptibility      Morganella morganii     WILLIAM     Amikacin ($) Susceptible     Ampicillin ($) Resistant     Ampicillin/sulbactam ($) Resistant     Cefazolin ($) Resistant     Cefepime ($$) Susceptible     Cefoxitin Intermediate     Ceftazidime ($) Susceptible     Ceftriaxone ($) Susceptible     Ciprofloxacin ($) Susceptible     Gentamicin ($) Susceptible     Levofloxacin ($) Susceptible     Meropenem ($$) Susceptible Piperacillin/Tazobac ($) Susceptible     Tobramycin ($) Susceptible     Trimeth/Sulfa Susceptible                Susceptibility      Enterobacter cloacae complex (3)     WILLIAM     Amikacin ($) Susceptible     Cefazolin ($) Resistant     Cefepime ($$) Susceptible     Cefoxitin Resistant     Ceftazidime ($) Susceptible     Ceftriaxone ($) Susceptible     Ciprofloxacin ($) Susceptible     Gentamicin ($) Susceptible     Levofloxacin ($) Susceptible     Meropenem ($$) Susceptible     Tobramycin ($) Susceptible     Trimeth/Sulfa Susceptible                Susceptibility      Staphylococcus aureus     WILLIAM     Ciprofloxacin ($) Resistant     Clindamycin ($) Susceptible     Daptomycin ($$$$$) Susceptible     Doxycycline ($$) Susceptible     Erythromycin ($$$$) Resistant     Gentamicin ($) Susceptible     Levofloxacin ($) Resistant     Linezolid ($$$$$) Susceptible     Moxifloxacin ($$$$) Intermediate     Oxacillin Susceptible     Rifampin ($$$$) Susceptible [1]      Tetracycline Susceptible     Trimeth/Sulfa Susceptible     Vancomycin ($) Susceptible            [1]   Rifampin is not to be used for mono-therapy. Labs:     No results for input(s): WBC, HGB, HCT, PLT, HGBEXT, HCTEXT, PLTEXT, HGBEXT, HCTEXT, PLTEXT in the last 72 hours. No results for input(s): NA, K, CL, CO2, BUN, CREA, GLU, CA, MG, PHOS, URICA in the last 72 hours. No results for input(s): ALT, AP, TBIL, TBILI, TP, ALB, GLOB, GGT, AML, LPSE in the last 72 hours. No lab exists for component: SGOT, GPT, AMYP, HLPSE  No results for input(s): INR, PTP, APTT, INREXT, INREXT in the last 72 hours. No results for input(s): FE, TIBC, PSAT, FERR in the last 72 hours. No results found for: FOL, RBCF   No results for input(s): PH, PCO2, PO2 in the last 72 hours. No results for input(s): CPK, CKNDX, TROIQ in the last 72 hours.     No lab exists for component: CPKMB  No results found for: CHOL, CHOLX, CHLST, CHOLV, HDL, HDLP, LDL, LDLC, DLDLP, Tuyet Hampton, TRIGP, CHHD, CHHDX  No results found for: [facility]  Lab Results   Component Value Date/Time    Color Yellow/Straw 11/25/2020 04:50 PM    Appearance Clear 11/25/2020 04:50 PM    Specific gravity 1.013 11/25/2020 04:50 PM    pH (UA) 5.0 11/25/2020 04:50 PM    Protein Negative 11/25/2020 04:50 PM    Glucose Negative 11/25/2020 04:50 PM    Ketone Negative 11/25/2020 04:50 PM    Bilirubin Negative 11/25/2020 04:50 PM    Urobilinogen 0.1 11/25/2020 04:50 PM    Nitrites Negative 11/25/2020 04:50 PM    Leukocyte Esterase Negative 11/25/2020 04:50 PM    Bacteria Negative 11/25/2020 04:50 PM    WBC 5-10 11/25/2020 04:50 PM    RBC 0-5 11/25/2020 04:50 PM         Assessment:   Acute hypoxemic respiratory failure resolved   acute systolic heart failure with ejection fraction of 15 to 20%  Acute cellulitis of bilateral legs/wound culture positive for staph aureus/providentia/Enterobacter/Morganella/Klebsiella  Status post debridement of the both leg wounds  Gram-negative bacteremia with providentia  Sepsis leukocytosis elevated procalcitonin and lactic acid  Community-acquired pneumonia  Poor hygiene  Elevated troponin  Leukocytosis  Hypothyroidism  Anemia s/p tranfusion   Stress test done showing large infarct/declined cardiac cath  Hypotensive secondary to Percocet  Static post debridement of the bilateral leg ulcers by the podiatrist  Plan:     WBC tagged Ceretec   Hold blood pressure medications  Discontinue Percocet  Continue aspirin Lipitor levothyroxine  Discontinue vancomycin  Continue IV ZOSYN /plan for transition to Levaquin and doxycycline on discharge  No on Suprax 400 mg daily  Doppler studies negative for DVT  Now on Lasix 40 mg daily  S/p Transfuse 2 unit packed RBC          Discharge planning pending  already involved          Current Facility-Administered Medications:     albuterol-ipratropium (DUO-NEB) 2.5 MG-0.5 MG/3 ML, 3 mL, Nebulization, Q6H PRN, Lenny Gutiérerz MD    cefixime (SUPRAX) chewable tablet 400 mg, 400 mg, Oral, DAILY, Marlee Schulte MD    furosemide (LASIX) tablet 40 mg, 40 mg, Oral, DAILY, Isabella Feliciano FNP, 40 mg at 12/12/20 0805    amiodarone (CORDARONE) tablet 200 mg, 200 mg, Oral, DAILY, Johnson Alva MD, 200 mg at 12/12/20 0805    potassium chloride SR (KLOR-CON 10) tablet 40 mEq, 40 mEq, Oral, DAILY, Sergo Nunez MD, 40 mEq at 12/12/20 0805    sodium hypochlorite (HALF STRENGTH DAKIN'S) 0.25% irrigation (bottle), , Topical, BID, Lulú Wilcox CRNA    0.9% sodium chloride infusion 250 mL, 250 mL, IntraVENous, PRN, Faustino Solano MD    labetaloL (NORMODYNE;TRANDATE) 20 mg/4 mL (5 mg/mL) injection 10 mg, 10 mg, IntraVENous, Q5MIN PRN, Veronica Ortiz MD    metoprolol [facility]) injection 2.5 mg, 2.5 mg, IntraVENous, Q5MIN PRN, Anabel Murillo MD    levothyroxine (SYNTHROID) tablet 75 mcg, 75 mcg, Oral, 6am, Keith Solano MD, 75 mcg at 12/11/20 1105    acetaminophen (TYLENOL) tablet 650 mg, 650 mg, Oral, Q6H PRN, 650 mg at 12/11/20 1147 **OR** acetaminophen (TYLENOL) suppository 650 mg, 650 mg, Rectal, Q6H PRN, Faustino Solano MD    polyethylene glycol (MIRALAX) packet 17 g, 17 g, Oral, DAILY PRN, Faustino Solano MD    ondansetron (ZOFRAN ODT) tablet 4 mg, 4 mg, Oral, Q8H PRN **OR** ondansetron (ZOFRAN) injection 4 mg, 4 mg, IntraVENous, Q6H PRN, Keith Solano MD    aspirin tablet 325 mg, 325 mg, Oral, DAILY, Keith Solano MD, 325 mg at 12/12/20 0805    atorvastatin (LIPITOR) tablet 40 mg, 40 mg, Oral, QHS, Keith Solano MD, 40 mg at 12/10/20 2045

## 2020-12-14 NOTE — WOUND CARE
IP WOUND CONSULT Carolyn Green MEDICAL RECORD NUMBER:  [id number] AGE: 66 y.o. GENDER: female  : 1945 TODAY'S DATE:  2020 GENERAL  
 
[] Follow-up [x] New Consult Carolyn Green is a 66 y.o. female referred by:  
[x] Physician 
[] Nursing 
[] Other: PAST MEDICAL HISTORY No past medical history on file. PAST SURGICAL HISTORY No past surgical history on file. FAMILY HISTORY No family history on file. ALLERGIES Allergies Allergen Reactions  Tetracycline Other (comments) Causes mouth sores  Morphine Nausea and Vomiting MEDICATIONS No current facility-administered medications on file prior to encounter. Current Outpatient Medications on File Prior to Encounter Medication Sig Dispense Refill  carvediloL (COREG) 3.125 mg tablet Take 6.25 mg by mouth two (2) times daily (with meals).  furosemide (LASIX) 40 mg tablet Take 40 mg by mouth daily.  potassium chloride (K-DUR, KLOR-CON) 20 mEq tablet Take 20 mEq by mouth daily.  lisinopriL (PRINIVIL, ZESTRIL) 5 mg tablet Take 5 mg by mouth daily.  methocarbamoL (ROBAXIN) 500 mg tablet Take 500 mg by mouth three (3) times daily.  omeprazole (PRILOSEC) 20 mg capsule Take 20 mg by mouth daily.  apixaban (ELIQUIS) 2.5 mg tablet Take 2.5 mg by mouth two (2) times a day.  levothyroxine (SYNTHROID) 75 mcg tablet Take 75 mcg by mouth daily (before breakfast). Indications: HYPOTHYROIDISM    
 
 
 
[unfilled] Visit Vitals /77 (BP 1 Location: Left arm, BP Patient Position: At rest) Pulse 84 Temp 98.7 °F (37.1 °C) Resp 19 Ht 5' (1.524 m) Wt 42.5 kg (93 lb 11.1 oz) SpO2 92% BMI 18.30 kg/m² ASSESSMENT Wound Identification: Stage 3 PI to sacrococcygeal; MASD r/t to GI and  incontinence with friction/shear injury Dressing change: zinc paste Verbal consent for picture: no picture, patient refused Contributing Factors: decreased mobility, shear force, incontinence of stool, incontinence of urine, poor hygiene and non-adherence Wound Pretibial Left; Anterior; Lateral (Active) Wound Etiology Surgical 12/13/20 0700 Dressing Status New dressing applied 12/14/20 0406 Cleansed Wound cleanser 12/14/20 0406 Dressing/Treatment ABD pad; Ace wrap;Gauze dressing/dressing sponge;Non-adherent 12/14/20 0406 Wound Length (cm) 15 cm 11/27/20 1127 Wound Width (cm) 14 cm 11/27/20 1127 Wound Surface Area (cm^2) 210 cm^2 11/27/20 1127 Wound Assessment Exposed Structure Bone 12/05/20 0354 Drainage Amount None 12/10/20 0800 Drainage Description Sanguineous 12/05/20 0354 Wound Odor None 12/10/20 0800 Dorothy-Wound/Incision Assessment Fragile; Warm 11/27/20 1127 Edges Undefined edges 11/27/20 1127 Wound Thickness Description Full thickness 12/05/20 0354 Number of days: 18 Wound Pretibial Right; Anterior; Lower (Active) Wound Etiology Surgical 12/13/20 0700 Dressing Status New dressing applied 12/14/20 0406 Cleansed Wound cleanser 12/14/20 0406 Dressing/Treatment ABD pad; Ace wrap;Gauze dressing/dressing sponge;Non-adherent 12/14/20 0406 Wound Length (cm) 16.5 cm 11/27/20 1127 Wound Width (cm) 15 cm 11/27/20 1127 Wound Surface Area (cm^2) 247.5 cm^2 11/27/20 1127 Wound Assessment Exposed Structure Bone 12/05/20 0358 Drainage Amount None 12/10/20 0800 Drainage Description Serous 12/05/20 0358 Wound Odor None 12/10/20 0800 Dorothy-Wound/Incision Assessment Fragile; Warm 11/27/20 1127 Edges Undefined edges 11/27/20 1127 Wound Thickness Description Full thickness 12/05/20 0358 Number of days: 18 Wound Leg (Active) Number of days: 10 Wound Leg (Active) Number of days: 6 Wound Sacral/coccyx Full Thickness Tissue Loss 12/14/20 (Active) Wound Etiology Pressure Stage 3 12/14/20 1226 Dressing Status Intact; New dressing applied 12/14/20 1226 Dressing/Treatment Zinc paste 12/14/20 1226 Wound Assessment Dry;Subcutaneous 12/14/20 1226 Drainage Amount None 12/14/20 1226 Wound Odor None 12/14/20 1226 Dorothy-Wound/Incision Assessment Fragile 12/14/20 1226 Edges Undefined edges 12/14/20 1226 Wound Thickness Description Full thickness 12/14/20 1226 Number of days: 0 PLAN Skin Care & Pressure Relief Recommendations Speciality bed Waffle Overlay Minimize layers of linen Turn/reposition approximately every 2 hours Pillow wedges Manage incontinence Promote continence; Skin Protective lotion/cream to buttocks and sacrum daily and as needed with incontinence care Offload heels pillows Physician/Provider notified:  
Recommendations: patient is currently on a waffle overlay and turns well, however, she prefers to remain on back and is noncompliant. Zinc paste to buttocks/sacrum TID or prn for soiling. Apply Pure Kristi West if patient will allow. Teaching completed with:  
[] Patient          
[] Family member      
[] Caregiver         
[] Nursing 
[] Other Patient/Caregiver Teaching: 
Level of patient/caregiver understanding able to:  
[] Indicates understanding       [] Needs reinforcement 
[] Unsuccessful      [] Verbal Understanding 
[] Demonstrated understanding       [] No evidence of learning 
[] Refused teaching         [] N/A Electronically signed by Selwyn Salter RN on 12/14/2020 at 12:31 PM

## 2020-12-14 NOTE — PROGRESS NOTES
Progress Note    Patient: Jonnie Crisostomo MRN: [id number]  SSN: [id number]    YOB: 1945  Age: 66 y.o. Sex: female      Admit Date: 11/25/2020    LOS: 19 days     Subjective:   Patient followed for sepsis with cellulitis both legs with blood cultures growing Providencia and wound culture with mixed Gram negative nica identified below and MSSA. She remains afebrile. She had been on Zosyn but over the weekend she pulled out her IVs  and was started on Cefixime. She is resting comfortably, complaining about wanting to go home and having bandages removed from her legs. Apparently waiting for SNF approval.  Objective:     Vitals:    12/13/20 1500 12/13/20 1906 12/14/20 0238 12/14/20 0834   BP: 126/78 119/73 133/75 131/77   Pulse: 87 87 96 85   Resp: 26 20 19 19   Temp: 97.5 °F (36.4 °C) 97.9 °F (36.6 °C) 98.4 °F (36.9 °C) 97.4 °F (36.3 °C)   SpO2: 94% 95% 97% 95%   Weight:       Height:            Intake and Output:  Current Shift: No intake/output data recorded. Last three shifts: 12/12 1901 - 12/14 0700  In: 1050 [P.O.:1050]  Out: 1     Physical Exam:   Constitutional:       General: She is not in acute distress. Appearance: She is chronically ill-appearing. HENT:      Head: Normocephalic and atraumatic. Nose:      Comments: Nasal O2 cannula  Lungs: clear bilaterally but diminished  Genitourinary:     Comments: Purewick catheter  Musculoskeletal:         General: Swelling present. No tenderness. Right lower leg: Edema present. Left lower leg: Edema present. Comments: Left calf fully exposed and shows normal granulation bed with no exudate or erythema; right calf not examined today    Neurological: asleep   Psychiatric: asleep.      Lab/Data Review:     WBC 8,600  Procalcitonin <0.05 <0.11 <0.10 <0.69 <1.19  CRP 1.67 < 1.14 <0.90 < 1.24 <1.28 <1.98  <3.04 <9.26 <16.00    Covid-19 negative    Blood cultures (11/26) Providenica rettgeri  Blood cultures (11/26) Providenica rettgeri  Urine culture (11/26) No growth FiNAL  Wound culture (11/27)  MSSA, Klebsiella oxytoca, Providencia rettgeri, Morganella morganii (2 strains), Myroides species, Enterobacter cloacae  Wound culture left leg (12/4)  MSSA, Enterobacter cloacae, Morganella morganii  Wound culture left leg (12/4)  MSSA, Enterobacter cloacae x 2, Morganella morganii      Assessment:     Active Problems:    PNA (pneumonia) (11/25/2020)      Pneumonia (11/25/2020)    1. Cellulitis, bilateral lower extremities, severe, secondary to MSSA, Klebsiella oxytoca, Providencia rettgeri, Morganella morganii (2 strains), Myroides species, Enterobacter cloacae, Day #19 IV Zosyn, status post debridement, improving. 2. Gram negative bacteremia with Providencia rettgeri, probably secondary to above, status post IV Zosyn. 3. Sepsis with leukocytosis, elevated procalcitonin, CRP and lactic acid, resolving. 4. SOB  5. Pyuria, negative urine culture    Plan:   1. Contacted by Pharmacy and informed that Cefixime is not available on formulary and will switch to Cefdinir (Omnicef) 300 mg po BID  2. Start Dicloxacillin to cover MSSA    2.  Cleared for discharge from ID standpoint        Signed By: Patt Rey MD     December 14, 2020

## 2020-12-14 NOTE — PROGRESS NOTES
Problem: Falls - Risk of  Goal: *Absence of Falls  Description: Document Yakov Curiel Fall Risk and appropriate interventions in the flowsheet.   Outcome: Progressing Towards Goal  Note: Fall Risk Interventions:  Mobility Interventions: Bed/chair exit alarm, PT Consult for mobility concerns, PT Consult for assist device competence, Strengthening exercises (ROM-active/passive), Utilize walker, cane, or other assistive device, Utilize gait belt for transfers/ambulation    Mentation Interventions: Bed/chair exit alarm, Gait belt with transfers/ambulation, More frequent rounding, Toileting rounds, Update white board    Medication Interventions: Bed/chair exit alarm, Utilize gait belt for transfers/ambulation    Elimination Interventions: Bed/chair exit alarm, Call light in reach, Patient to call for help with toileting needs    History of Falls Interventions: Bed/chair exit alarm

## 2020-12-14 NOTE — PROGRESS NOTES
PHYSICAL THERAPY TREATMENT  Patient: Rosario Cedeño (89 y.o. female)  Date: 12/14/2020  Diagnosis: PNA (pneumonia) [J18.9]  Pneumonia [J18.9]   <principal problem not specified>  Procedure(s) (LRB):  Debridement of BILATERAL leg ulcers w/ Misonix (Bilateral) 6 Days Post-Op  Precautions:    Chart, physical therapy assessment, plan of care and goals were reviewed. ASSESSMENT  Patient continues with skilled PT services and is not progressing towards goals. Pt. Semi supine in bed upon arrival and agreeable to therapy session. Co treat with OT for patient and therapist safety. Pt. Refuses to work with male PT, complains the entire session, is in denial  of why she is in the hospital, and overall grumpy. Pt. Gives up with bed mobility and complains of back pain. Pt. Requested use to go have a BM, pt initially tried to have BM on bed pan in supine, but was encourage to attempt in seated EOB with SBA for seated posture. Willing to perform 4 seated LE TE. Has poor sitting posture and balance due to scoliosis. Pt. Whispered to OT Requesting to have two female therapist for next session otherwise will refuse to work with Rehab. Recommend DC to SNF    Current Level of Function Impacting Discharge (mobility/balance): REFUSAL TO PARTICIPATE WITH MALE THERAPIST    Other factors to consider for discharge: PARTICIPATION         PLAN :  Patient continues to benefit from skilled intervention to address the above impairments. Continue treatment per established plan of care. to address goals. Recommendation for discharge: (in order for the patient to meet his/her long term goals)  Therapy up to 5 days/week in SNF setting    This discharge recommendation:  Has been made in collaboration with the attending provider and/or case management    IF patient discharges home will need the following DME: wheelchair       SUBJECTIVE:   Patient stated IM OKAY.     OBJECTIVE DATA SUMMARY:   Critical Behavior:  Neurologic State: Alert, Confused, Lethargic, Irritable  Orientation Level: Oriented to person, Oriented to situation  Cognition: Poor safety awareness  Safety/Judgement: Decreased insight into deficits  Functional Mobility Training:  Bed Mobility:  Rolling: Moderate assistance;Assist x2  Supine to Sit: Moderate assistance;Assist x2  Sit to Supine: Moderate assistance;Assist x2  Scooting: Moderate assistance;Assist x2        Transfers:  Sit to Stand: Moderate assistance;Assist x2  Stand to Sit: Moderate assistance;Assist x2  Stand Pivot Transfers: Moderate assistance;Assist x2                          Balance:  Sitting: Impaired; With support  Sitting - Static: Poor (constant support); Fair (occasional)  Sitting - Dynamic: Poor (constant support); Fair (occasional)  Standing: Impaired;Pull to stand; With support  Standing - Static: Poor;Constant support  Standing - Dynamic : Poor;Constant support  Ambulation/Gait Training:                                                        Stairs: Therapeutic Exercises:   woodTherapeutic Exercises:       EXERCISE   Sets   Reps   Active Active Assist   Passive Self ROM   Comments   Ankle Pumps  10 [x] [] [] []    Quad Sets/Glut Sets   [] [] [] []    Hamstring Sets   [] [] [] []    Short Arc Quads   [] [] [] []    Heel Slides   [] [] [] []    Straight Leg Raises   [] [] [] []    Hip abd/add  10 [x] [] [] []    Long Arc Quads  10 [x] [] [] []    Marching  10 [x] [] [] []       [] [] [] []        Pain Rating:  Complained of back pain after rolling to get bed cleaned from having pooped on bed    Activity Tolerance:   Poor  Please refer to the flowsheet for vital signs taken during this treatment. After treatment patient left in no apparent distress:   Supine in bed    COMMUNICATION/COLLABORATION:   The patients plan of care was discussed with: Physical therapy assistant.      Juan Moya   Time Calculation: 24 mins

## 2020-12-14 NOTE — PROGRESS NOTES
Comprehensive Nutrition Assessment    Type and Reason for Visit: Reassess(Interim)    Nutrition Recommendations/Plan:     Continue Cardiac diet, Soft solids  Continue Ensure TID  Continue Bert daily     Obtain updated measured wts at least weekly as pt diuresed  Nursing to document %meal and supplement intakes in I/Os    Nutrition Assessment:  Brought to ED after found hypoxic and cyanotic at home, noted poor living conditions. Pt confused per MD notes. Dx sepsis, cellulitis, PNA, pyruria. F/u CXR (12/9) showed similar findings of reduced lung volumes, basilar atelectasis. Bilat LE stasis wounds present on admit- WCN and Podiatry following, noted wounds concerning for gangrene. Per ID, wound cx + Gram negative bacteremia. S/p I&D LE (12/4) and repeat debridement (12/8). Cardio following for acute CHF. Now weaned to RA. MD indicated stress test showed large infarct, pt refused cardiac cath. Psych initially deemed pt competent to make decisions, however now deemed incompetent on f/u, CM arranging d/c to SNF. Ordered Cardiac diet w/ Soft solids, noted briefly onRegular diet. Recent intakes ranging 60-90% over past few days, will intake of Ensure Enlive 50-75%. Spoke to pt briefly at bedside who reported liking Ensure (strawberry) and willingness to drink Bert however remained confused during discussion, will continue to f/u with nsg to ensure intake. Appetite remains good throughout admit per pt, RN, EMR review. Good acceptance of Ensure. While NPO for procedures, pt reports hunger cues and frequently requests to eat. Previous intakes per EMR ranging 40-90% consistently. Pt remains a limited historian. On initial visit, pt appeared to have good appetite, eating during interview. Labs (12/10): H/H: 8.7/29.5, K WNL, BUN 23, Cr WNL, Ca 8.3, BG WNL. Meds: Statin, lasix. Malnutrition Assessment:  Malnutrition Status:   Moderate malnutrition    Context:  Chronic illness       Estimated Daily Nutrient Needs:  Energy (kcal): 1300kcal (32 kcal/kg EDW); Weight Used for Energy Requirements: (EDW)  Protein (g): 53 g pro (1.3g/kg); Weight Used for Protein Requirements: (EDW)  Fluid (ml/day): 1300mL; Method Used for Fluid Requirements: 1 ml/kcal      Nutrition Related Findings: Will f/u for NFPE as able. Legs remain wrapped. On visual assessment, noted muscle wasting to deltoid and clavicle. Pt denied c/s difficulty n/v or c/d. Last BM 12/13 per EMR. Edema improving, non-pitting bilat LE edema. Wounds:    Venous stasis, Surgical incision(bilat LE wounds s/p surgical debridements)       Current Nutrition Therapies:  DIET NUTRITIONAL SUPPLEMENTS Breakfast, Lunch, Dinner; Ensure [employer] (strawberry)  DIET NUTRITIONAL SUPPLEMENTS Lunch; Bert (alt flavor)  DIET CARDIAC Soft Solids    Anthropometric Measures:  · Height:  5' (152.4 cm)  · Current Body Wt:  40.8 kg (90 lb)(EDW)   · Admission Body Wt:  91 lb 7.9 oz(bed scale 11/26; noted 3+pitting edema present)    · Usual Body Wt:  50.3 kg (111 lb)(Pt reported weight from 'several months ago' at PCP office)     · Ideal Body Wt:  100 lbs:  90 %   · BMI Category:  Normal weight (BMI 18.5-24. 9)     Wt hx: 42.5kg (12/11), 42.6kg (12/9), 41.7kg (12/8), 45.3kg (12/7), 46.6kg (12/6), 50kg (12/2), 41.2kg (12/1), 41.5kg (11/26). Several wt fluctuations noted since admit likely 2/2 fluid shifts vs error in bed measure. Pt appears closer to admit wt. Pt reported UBW of 111lbs several months ago - indicating 20lb wt loss (>9%BW) in estimated 3-4 months - significant.     Nutrition Diagnosis:   · Underweight related to (suboptimal PO intakes to maintain appropriate BW) as evidenced by BMI, weight loss    Nutrition Interventions:   Food and/or Nutrient Delivery: Continue current diet, Continue oral nutrition supplement  Nutrition Education and Counseling: No recommendations at this time  Coordination of Nutrition Care: Continue to monitor while inpatient    Goals:  Meet >75% EENs x 5-7 days (progressing)  Wt gain 1kg/week from EDW (NEDA)  Lytes and BG WNL (progressing)      Nutrition Monitoring and Evaluation:   Behavioral-Environmental Outcomes: None identified  Food/Nutrient Intake Outcomes: Food and nutrient intake, Supplement intake  Physical Signs/Symptoms Outcomes: GI status, Weight, Skin    Discharge Planning:    Continue oral nutrition supplement(to promote wound healing)     Electronically signed by Jose Watson on 12/14/2020 at 9:31 AM    Contact: [phone]

## 2020-12-14 NOTE — PROGRESS NOTES
Problem: Falls - Risk of  Goal: *Absence of Falls  Description: Document Yuan Hammer Fall Risk and appropriate interventions in the flowsheet. Outcome: Progressing Towards Goal  Note: Fall Risk Interventions:  Mobility Interventions: Bed/chair exit alarm    Mentation Interventions: Bed/chair exit alarm    Medication Interventions: Bed/chair exit alarm    Elimination Interventions: Bed/chair exit alarm, Call light in reach    History of Falls Interventions: Bed/chair exit alarm         Problem: Patient Education: Go to Patient Education Activity  Goal: Patient/Family Education  Outcome: Progressing Towards Goal     Problem: Pressure Injury - Risk of  Goal: *Prevention of pressure injury  Description: Document Dustin Scale and appropriate interventions in the flowsheet.   Outcome: Progressing Towards Goal  Note: Pressure Injury Interventions:  Sensory Interventions: Keep linens dry and wrinkle-free    Moisture Interventions: Absorbent underpads, Minimize layers    Activity Interventions: PT/OT evaluation    Mobility Interventions: HOB 30 degrees or less    Nutrition Interventions: Document food/fluid/supplement intake    Friction and Shear Interventions: Minimize layers                Problem: Patient Education: Go to Patient Education Activity  Goal: Patient/Family Education  Outcome: Progressing Towards Goal     Problem: Nutrition Deficit  Goal: *Optimize nutritional status  Outcome: Progressing Towards Goal     Problem: Patient Education: Go to Patient Education Activity  Goal: Patient/Family Education  Outcome: Progressing Towards Goal     Problem: Patient Education: Go to Patient Education Activity  Goal: Patient/Family Education  Outcome: Progressing Towards Goal

## 2020-12-14 NOTE — PROGRESS NOTES
Pulmonary, Critical Care Note    Name: Shlomo Leventhal MRN: [id number]   : 1945 Hospital: [address] [place] [address]   Date: 2020  Admission date: 2020 Hospital Day:        Subjective/Interval History:   Seen on the medical floor. Consult requested for acute hypoxic respiratory failure. She is an extremely poor historian states she feels fine to leave her alone because she is trying to eat   respirations nonlabored she remains on 3 L of oxygen. States she is not having any surgery  12/3 awake eating breakfast respirations nonlabored   sleeping but awakens easily denies shortness of breath   awake alert wants to go home respirations nonlabored  12/10 patient awake alert very weak working with physical therapy. She apparently is refusing skilled nursing facility wants to go home. She does not appear capable of taking care of herself at home   seems stronger but still very weak continues to refuse skilled nursing facility care insists on going home. She does not appear capable of caring for herself. She is now telling me today that she does have oxygen at home which she was denying earlier in the week. I am not sure if I trust her statement   awake alert still seems confused today she tells me she does not have oxygen at home and complains of constipation   awake alert when told she still has some drainage on her left leg bandage she states there is no drainage and its dry that the bandages is just stained  Hospital Problems  Never Reviewed          Codes Class Noted POA    PNA (pneumonia) ICD-10-CM: J18.9  ICD-9-CM: 486  2020 Unknown        Pneumonia ICD-10-CM: J18.9  ICD-9-CM: 486  2020 Unknown              IMPRESSION:   1.  Acute hypoxic respiratory failure secondary to pulmonary edema overnight oximetry continues to show desaturation as low as 70% with 13 minutes below 88% we will repeat overnight oximetry to keep her qualified for home oxygen  2. Hypokalemia repleted   3. Hypercapnia likely metabolic alkalosis improved after Diamox   4. Severe cardiomyopathy  5. Severe mitral regurg  6. Bilateral pleural effusions secondary to pulmonary edema no significant change on x-ray 12/3  7. Providencia septicemia  8. Anemia  9. Hypothyroidism treated  10. Questionable early dementia        RECOMMENDATIONS/PLAN:   1. Creatinine stable continue Lasix decreased to daily dosing  2. Continue daily potassium   3. She appears to need nocturnal oxygen  4. Follow H&H            [x] High complexity decision making was performed  [x] See my orders for details      Subjective/Initial History:     I was asked by Camilla Maldonado MD to see Coty Lerma  a 66 y.o.    female in consultation for a chief complaint of acute hypoxic respiratory failure and pleural effusions      Allergies   Allergen Reactions    Tetracycline Other (comments)     Causes mouth sores    Morphine Nausea and Vomiting        MAR reviewed and pertinent medications noted or modified as needed     Current Facility-Administered Medications   Medication    albuterol-ipratropium (DUO-NEB) 2.5 MG-0.5 MG/3 ML    cefixime (SUPRAX) chewable tablet 400 mg    furosemide (LASIX) tablet 40 mg    amiodarone (CORDARONE) tablet 200 mg    potassium chloride SR (KLOR-CON 10) tablet 40 mEq    sodium hypochlorite (HALF STRENGTH DAKIN'S) 0.25% irrigation (bottle)    0.9% sodium chloride infusion 250 mL    labetaloL (NORMODYNE;TRANDATE) 20 mg/4 mL (5 mg/mL) injection 10 mg    metoprolol (LOPRESSOR) injection 2.5 mg    levothyroxine (SYNTHROID) tablet 75 mcg    acetaminophen (TYLENOL) tablet 650 mg    Or    acetaminophen (TYLENOL) suppository 650 mg    polyethylene glycol (MIRALAX) packet 17 g    ondansetron (ZOFRAN ODT) tablet 4 mg    Or    ondansetron (ZOFRAN) injection 4 mg    aspirin tablet 325 mg    atorvastatin (LIPITOR) tablet 40 mg      Patient PCP: Karl Bardales MD  PMH:  has no past medical history on file. PSH:   has no past surgical history on file. FHX: family history is not on file. SHX:           Objective:     Vital Signs: Telemetry:    normal sinus rhythm Intake/Output:   Visit Vitals  /77 (BP 1 Location: Left arm, BP Patient Position: At rest)   Pulse 85   Temp 97.4 °F (36.3 °C)   Resp 19   Ht 5' (1.524 m)   Wt 42.5 kg (93 lb 11.1 oz)   SpO2 95%   BMI 18.30 kg/m²       Temp (24hrs), Av.8 °F (36.6 °C), Min:97.4 °F (36.3 °C), Max:98.4 °F (36.9 °C)        O2 Device: Room air O2 Flow Rate (L/min): 2 l/min       Wt Readings from Last 4 Encounters:   20 42.5 kg (93 lb 11.1 oz)   20 47.7 kg (105 lb 2.6 oz)   16 57.2 kg (126 lb)   12/07/10 64 kg (141 lb)          Intake/Output Summary (Last 24 hours) at 2020 1003  Last data filed at 2020 0241  Gross per 24 hour   Intake 600 ml   Output 1 ml   Net 599 ml       Last shift:      No intake/output data recorded.   Last 3 shifts:  1901 -  0700  In: 1050 [P.O.:1050]  Out: 1        Physical Exam:   General:  female; chronically ill appearing frail  HEENT: NCAT, oral mucosa clear  Eyes: anicteric; conjunctiva clear extraocular movements intact  Neck: no nodes, neck veins visible no accessory muscle use  Chest: no deformity,   Cardiac: Regular rate and rhythm systolic murmur present has edema of the extremities  Lungs: distant breath sounds; no wheezing rales are heard posteriorly no breath sounds in the bases  Abd: Soft positive bowel sounds no tenderness she has some posterior abdominal wall edema  Ext: Edema of the legs arms and sacrum  : clear urine  Neuro: Awake more alert mildly confused  will move all 4 extremities  Psych- no agitation, oriented to person;   Skin: Lower extremities have very flaky skin with erythema  Pulses: Brachial radial femoral pulses intact  Capillary: Slow capillary refill    Labs:    No results for input(s): WBC, HGB, PLT, INR, APTT, HGBEXT, PLTEXT, INREXT, HGBEXT, PLTEXT, INREXT in the last 72 hours. No lab exists for component: FIB, DDMER  No results for input(s): NA, K, CL, CO2, GLU, BUN, CREA, CA, MG, PHOS, LAC, ALB, TBIL, ALT, AML, LPSE in the last 72 hours. No lab exists for component: SGOT  12/11 room air oxygen saturation 94%  BNP 16,597  CRP 3.04  11/26 blood culture with Providencia sensitive to Zosyn  Urine culture no growth  Wound culture with multiple organisms including methicillin sensitive staph aureus Klebsiella Providencia Enterobacter and Morganella  11/27 echo ejection fraction 20% dilated left atrium grade 2 diastolic dysfunction moderate to severe mitral regurg IVC dilatation RVSP 55  12/1 nuclear cardiac scan with ejection fraction of 19%  Lab Results   Component Value Date/Time    Culture result: Moderate Morganella morganii ssp morganii 12/04/2020 10:30 AM    Culture result:  12/04/2020 10:30 AM     Moderate Stenotrophomonas (xantho.) maltophilia CLSI guidelines do not recommend reporting susceptibilities for s. Maltophilia. Trimethoprim/sulfamethoxazole is the drug of choice. Culture result: Moderate Enterobacter cloacae complex 12/04/2020 10:30 AM    Culture result: Light Staphylococcus aureus 12/04/2020 10:30 AM    Culture result: Light Enterobacter cloacae complex 12/04/2020 10:30 AM    Culture result: Light Morganella morganii 12/04/2020 10:30 AM    Culture result:  12/04/2020 10:30 AM     Light Enterobacter cloacae complex . ..(2ND COLONY TYPE)    Culture result: Light Staphylococcus aureus 12/04/2020 10:30 AM     Lab Results   Component Value Date/Time    TSH 0.26 (L) 11/25/2020 12:30 PM       Imaging:    CXR Results  (Last 48 hours)    None        Results from [place] encounter on 11/25/20   XR CHEST PORT    Narrative Chest single view. Comparison single view chest December 3, 2020. Reduced lung volumes. Similar basilar atelectasis. No gross interstitial or  alveolar pulmonary edema.  Right-sided cardiac device with leads overlying the  right heart. Cardiac silhouette enlargement. Elongated thoracic aorta with  atherosclerosis. No pneumothorax or sizable pleural effusion. XR CHEST PA LAT    Narrative Chest, 2 views, 12/7/2020    History: Pulmonary edema. Bilateral effusions. Comparison: Including chest 12/3/2020. Findings: A right-sided pacemaker device with leads in the right atrium and  right ventricle is in place. The cardiac silhouette is stable globular  enlargement which could indicate cardiomyopathy or pericardial effusion. The  thoracic aorta is tortuous. The lungs are underexpanded with bibasilar  atelectasis. Small pleural effusions are noted. There is pulmonary vascular  congestion and hydrostatic edema. No pneumothorax is identified. The thoracic  spine again demonstrates kyphosis, degenerative changes and compression  deformities. Impression Impression: Underexpanded lungs with bibasilar atelectasis. Small pleural  effusions. Hydrostatic edema. No significant interval change. XR CHEST SNGL V    Narrative Study: XR CHEST SNGL V    Clinical indication: Pleural effusions    Comparison: Chest x-ray 12/1/2020. Findings: Only hazy bibasilar airspace disease/pleural effusions. No evidence of  pneumothorax. Cardiac silhouette is enlarged. Stable positioning of right chest wall pacemaker  device. Mild vascular congestion. No acute osseous abnormality identified. Impression Impression:  Little interval change. · Providencia septicemia questionably urinary tract or from skin site. Urine culture was no growth I do not know if it was collected after antibiotics were given. She is currently on Zosyn   She has pleural effusions and accentuated interstitial markings consistent with CHF/pulmonary edema. She is receiving Lasix. · Room air oxygen saturation has remained steady. Overnight oximetry did show desaturation for 13 minutes minutes 12/12.   She will need nocturnal oxygen. · Thank you for allowing me to see Ms.  Cata Jordan  ·     Jeff Martinez MD

## 2020-12-15 ENCOUNTER — ANESTHESIA (OUTPATIENT)
Dept: SURGERY | Age: 75
DRG: 853 | End: 2020-12-15
Payer: MEDICARE

## 2020-12-15 LAB — CRP SERPL-MCNC: 1.16 MG/DL (ref 0–0.6)

## 2020-12-15 PROCEDURE — 74011250636 HC RX REV CODE- 250/636: Performed by: FAMILY MEDICINE

## 2020-12-15 PROCEDURE — 36415 COLL VENOUS BLD VENIPUNCTURE: CPT

## 2020-12-15 PROCEDURE — 77030039465 HC PRB ASPIR SONICVAC MSNX -F: Performed by: PODIATRIST

## 2020-12-15 PROCEDURE — 86140 C-REACTIVE PROTEIN: CPT

## 2020-12-15 PROCEDURE — 74011250637 HC RX REV CODE- 250/637: Performed by: INTERNAL MEDICINE

## 2020-12-15 PROCEDURE — 74011250636 HC RX REV CODE- 250/636

## 2020-12-15 PROCEDURE — 76060000032 HC ANESTHESIA 0.5 TO 1 HR: Performed by: PODIATRIST

## 2020-12-15 PROCEDURE — 76210000006 HC OR PH I REC 0.5 TO 1 HR: Performed by: PODIATRIST

## 2020-12-15 PROCEDURE — 74011250637 HC RX REV CODE- 250/637: Performed by: NURSE PRACTITIONER

## 2020-12-15 PROCEDURE — 15274 SKN SUB GRFT T/A/L CHILD ADD: CPT | Performed by: PODIATRIST

## 2020-12-15 PROCEDURE — 97110 THERAPEUTIC EXERCISES: CPT

## 2020-12-15 PROCEDURE — 77030000032 HC CUF TRNQT ZIMM -B: Performed by: PODIATRIST

## 2020-12-15 PROCEDURE — 74011000258 HC RX REV CODE- 258: Performed by: NURSE ANESTHETIST, CERTIFIED REGISTERED

## 2020-12-15 PROCEDURE — 74011000272 HC RX REV CODE- 272: Performed by: PODIATRIST

## 2020-12-15 PROCEDURE — 2709999900 HC NON-CHARGEABLE SUPPLY: Performed by: PODIATRIST

## 2020-12-15 PROCEDURE — 77030031139 HC SUT VCRL2 J&J -A: Performed by: PODIATRIST

## 2020-12-15 PROCEDURE — 74011250637 HC RX REV CODE- 250/637: Performed by: FAMILY MEDICINE

## 2020-12-15 PROCEDURE — 77030039464 HC TU IRR ENDO DISP MSNX -B: Performed by: PODIATRIST

## 2020-12-15 PROCEDURE — 74011250636 HC RX REV CODE- 250/636: Performed by: NURSE ANESTHETIST, CERTIFIED REGISTERED

## 2020-12-15 PROCEDURE — 0HRLXK3 REPLACEMENT OF LEFT LOWER LEG SKIN WITH NONAUTOLOGOUS TISSUE SUBSTITUTE, FULL THICKNESS, EXTERNAL APPROACH: ICD-10-PCS | Performed by: PODIATRIST

## 2020-12-15 PROCEDURE — 65270000029 HC RM PRIVATE

## 2020-12-15 PROCEDURE — 0HRKXK3 REPLACEMENT OF RIGHT LOWER LEG SKIN WITH NONAUTOLOGOUS TISSUE SUBSTITUTE, FULL THICKNESS, EXTERNAL APPROACH: ICD-10-PCS | Performed by: PODIATRIST

## 2020-12-15 PROCEDURE — 15002 WOUND PREP TRK/ARM/LEG: CPT | Performed by: PODIATRIST

## 2020-12-15 PROCEDURE — 15003 WOUND PREP ADDL 100 CM: CPT | Performed by: PODIATRIST

## 2020-12-15 PROCEDURE — 99232 SBSQ HOSP IP/OBS MODERATE 35: CPT | Performed by: INTERNAL MEDICINE

## 2020-12-15 PROCEDURE — 74011250637 HC RX REV CODE- 250/637: Performed by: ANESTHESIOLOGY

## 2020-12-15 PROCEDURE — 15273 SKIN SUB GRFT T/ARM/LG CHILD: CPT | Performed by: PODIATRIST

## 2020-12-15 PROCEDURE — 76010000138 HC OR TIME 0.5 TO 1 HR: Performed by: PODIATRIST

## 2020-12-15 PROCEDURE — 77030041703 HC SLV COMPR DVT ARJO -B: Performed by: PODIATRIST

## 2020-12-15 DEVICE — GRAFT TISS 7.6X15.2CM 116SQ CM THERASKIN XL: Type: IMPLANTABLE DEVICE | Site: LEG | Status: FUNCTIONAL

## 2020-12-15 RX ORDER — MIDAZOLAM HYDROCHLORIDE 1 MG/ML
INJECTION, SOLUTION INTRAMUSCULAR; INTRAVENOUS AS NEEDED
Status: DISCONTINUED | OUTPATIENT
Start: 2020-12-15 | End: 2020-12-15 | Stop reason: HOSPADM

## 2020-12-15 RX ORDER — SODIUM CHLORIDE 0.9 % (FLUSH) 0.9 %
5-40 SYRINGE (ML) INJECTION AS NEEDED
Status: DISCONTINUED | OUTPATIENT
Start: 2020-12-15 | End: 2020-12-17 | Stop reason: HOSPADM

## 2020-12-15 RX ORDER — ONDANSETRON 2 MG/ML
4 INJECTION INTRAMUSCULAR; INTRAVENOUS AS NEEDED
Status: DISCONTINUED | OUTPATIENT
Start: 2020-12-15 | End: 2020-12-15 | Stop reason: HOSPADM

## 2020-12-15 RX ORDER — FENTANYL CITRATE 50 UG/ML
INJECTION, SOLUTION INTRAMUSCULAR; INTRAVENOUS AS NEEDED
Status: DISCONTINUED | OUTPATIENT
Start: 2020-12-15 | End: 2020-12-15 | Stop reason: HOSPADM

## 2020-12-15 RX ORDER — METOPROLOL TARTRATE 5 MG/5ML
2.5 INJECTION INTRAVENOUS
Status: DISCONTINUED | OUTPATIENT
Start: 2020-12-15 | End: 2020-12-17 | Stop reason: HOSPADM

## 2020-12-15 RX ORDER — HYDROMORPHONE HYDROCHLORIDE 1 MG/ML
0.5 INJECTION, SOLUTION INTRAMUSCULAR; INTRAVENOUS; SUBCUTANEOUS
Status: DISCONTINUED | OUTPATIENT
Start: 2020-12-15 | End: 2020-12-15 | Stop reason: HOSPADM

## 2020-12-15 RX ORDER — SODIUM CHLORIDE 0.9 % (FLUSH) 0.9 %
5-40 SYRINGE (ML) INJECTION EVERY 8 HOURS
Status: DISCONTINUED | OUTPATIENT
Start: 2020-12-15 | End: 2020-12-15 | Stop reason: HOSPADM

## 2020-12-15 RX ORDER — SODIUM CHLORIDE 0.9 % (FLUSH) 0.9 %
5-40 SYRINGE (ML) INJECTION AS NEEDED
Status: DISCONTINUED | OUTPATIENT
Start: 2020-12-15 | End: 2020-12-15 | Stop reason: HOSPADM

## 2020-12-15 RX ORDER — SODIUM CHLORIDE 0.9 G/100ML
IRRIGANT IRRIGATION AS NEEDED
Status: DISCONTINUED | OUTPATIENT
Start: 2020-12-15 | End: 2020-12-15 | Stop reason: HOSPADM

## 2020-12-15 RX ORDER — SODIUM CHLORIDE 0.9 % (FLUSH) 0.9 %
5-40 SYRINGE (ML) INJECTION EVERY 8 HOURS
Status: DISCONTINUED | OUTPATIENT
Start: 2020-12-15 | End: 2020-12-17 | Stop reason: HOSPADM

## 2020-12-15 RX ORDER — FENTANYL CITRATE 50 UG/ML
50 INJECTION, SOLUTION INTRAMUSCULAR; INTRAVENOUS
Status: DISCONTINUED | OUTPATIENT
Start: 2020-12-15 | End: 2020-12-15 | Stop reason: HOSPADM

## 2020-12-15 RX ORDER — ONDANSETRON 2 MG/ML
INJECTION INTRAMUSCULAR; INTRAVENOUS AS NEEDED
Status: DISCONTINUED | OUTPATIENT
Start: 2020-12-15 | End: 2020-12-15 | Stop reason: HOSPADM

## 2020-12-15 RX ORDER — SODIUM CHLORIDE 9 MG/ML
1000 INJECTION, SOLUTION INTRAVENOUS CONTINUOUS
Status: DISCONTINUED | OUTPATIENT
Start: 2020-12-15 | End: 2020-12-15 | Stop reason: HOSPADM

## 2020-12-15 RX ORDER — LABETALOL HCL 20 MG/4 ML
10 SYRINGE (ML) INTRAVENOUS
Status: DISCONTINUED | OUTPATIENT
Start: 2020-12-15 | End: 2020-12-17 | Stop reason: HOSPADM

## 2020-12-15 RX ORDER — FENTANYL CITRATE 50 UG/ML
INJECTION, SOLUTION INTRAMUSCULAR; INTRAVENOUS
Status: COMPLETED
Start: 2020-12-15 | End: 2020-12-15

## 2020-12-15 RX ORDER — DEXAMETHASONE SODIUM PHOSPHATE 4 MG/ML
INJECTION, SOLUTION INTRA-ARTICULAR; INTRALESIONAL; INTRAMUSCULAR; INTRAVENOUS; SOFT TISSUE AS NEEDED
Status: DISCONTINUED | OUTPATIENT
Start: 2020-12-15 | End: 2020-12-15 | Stop reason: HOSPADM

## 2020-12-15 RX ADMIN — DEXAMETHASONE SODIUM PHOSPHATE 4 MG: 4 INJECTION, SOLUTION INTRA-ARTICULAR; INTRALESIONAL; INTRAMUSCULAR; INTRAVENOUS; SOFT TISSUE at 12:16

## 2020-12-15 RX ADMIN — DICLOXACILLIN SODIUM 500 MG: 250 CAPSULE ORAL at 22:00

## 2020-12-15 RX ADMIN — DICLOXACILLIN SODIUM 500 MG: 250 CAPSULE ORAL at 16:30

## 2020-12-15 RX ADMIN — DICLOXACILLIN SODIUM 500 MG: 250 CAPSULE ORAL at 11:30

## 2020-12-15 RX ADMIN — Medication 5 ML: at 14:00

## 2020-12-15 RX ADMIN — LEVOTHYROXINE SODIUM 75 MCG: 0.07 TABLET ORAL at 07:50

## 2020-12-15 RX ADMIN — PHENYLEPHRINE HYDROCHLORIDE 200 MCG: 10 INJECTION INTRAVENOUS at 12:13

## 2020-12-15 RX ADMIN — SODIUM CHLORIDE: 9 INJECTION, SOLUTION INTRAVENOUS at 12:04

## 2020-12-15 RX ADMIN — FENTANYL CITRATE 50 MCG: 50 INJECTION, SOLUTION INTRAMUSCULAR; INTRAVENOUS at 12:08

## 2020-12-15 RX ADMIN — SODIUM CHLORIDE: 9 INJECTION, SOLUTION INTRAVENOUS at 12:45

## 2020-12-15 RX ADMIN — POTASSIUM CHLORIDE 40 MEQ: 750 TABLET, FILM COATED, EXTENDED RELEASE ORAL at 14:17

## 2020-12-15 RX ADMIN — ASPIRIN 325 MG ORAL TABLET 325 MG: 325 PILL ORAL at 14:17

## 2020-12-15 RX ADMIN — ONDANSETRON 4 MG: 2 INJECTION INTRAMUSCULAR; INTRAVENOUS at 12:16

## 2020-12-15 RX ADMIN — Medication 10 ML: at 22:23

## 2020-12-15 RX ADMIN — CEFDINIR 300 MG: 300 CAPSULE ORAL at 21:00

## 2020-12-15 RX ADMIN — FENTANYL CITRATE 50 MCG: 50 INJECTION INTRAMUSCULAR; INTRAVENOUS at 12:57

## 2020-12-15 RX ADMIN — AMIODARONE HYDROCHLORIDE 200 MG: 200 TABLET ORAL at 14:17

## 2020-12-15 RX ADMIN — MIDAZOLAM HYDROCHLORIDE 2 MG: 2 INJECTION, SOLUTION INTRAMUSCULAR; INTRAVENOUS at 12:05

## 2020-12-15 RX ADMIN — ATORVASTATIN CALCIUM 40 MG: 40 TABLET, FILM COATED ORAL at 22:21

## 2020-12-15 RX ADMIN — NITROGLYCERIN 0.5 INCH: 20 OINTMENT TOPICAL at 19:03

## 2020-12-15 RX ADMIN — FUROSEMIDE 40 MG: 40 TABLET ORAL at 14:17

## 2020-12-15 NOTE — PROGRESS NOTES
Progress Note    Patient: Aristeo Jacome MRN: [id number]  SSN: [id number]    YOB: 1945  Age: 66 y.o. Sex: female      Admit Date: 11/25/2020    LOS: 20 days     Subjective:   Patient followed for sepsis with cellulitis both legs with blood cultures growing Providencia and wound culture with mixed Gram negative nica identified below and MSSA. Now on Omnicef and Dicloxacillin after losing IV access. She remains afebrile but WBC and CRP increased. She is currently off the floor. Objective:     Vitals:    12/14/20 1450 12/14/20 2026 12/14/20 2349 12/15/20 0800   BP: 121/76 127/79 118/70 138/85   Pulse: 91 87 87 78   Resp: 19 24 20 18   Temp: 98 °F (36.7 °C) 97.9 °F (36.6 °C) 98.1 °F (36.7 °C) 97.6 °F (36.4 °C)   SpO2: 93% 93% 94% 93%   Weight:       Height:            Intake and Output:  Current Shift: No intake/output data recorded. Last three shifts: 12/13 1901 - 12/15 0700  In: 18 [P.O.:990]  Out: 252 [Urine:250]    Physical Exam:   Patient unavailable for re-examination  Constitutional:       General: She is not in acute distress. Appearance: She is chronically ill-appearing. HENT:      Head: Normocephalic and atraumatic. Nose:      Comments: Nasal O2 cannula  Lungs: clear bilaterally but diminished  Genitourinary:     Comments: Purewick catheter  Musculoskeletal:         General: Swelling present. No tenderness. Right lower leg: Edema present. Left lower leg: Edema present. Comments: Left calf fully exposed and shows normal granulation bed with no exudate or erythema; right calf not examined today    Neurological: asleep   Psychiatric: asleep.      Lab/Data Review:     WBC 11,100  Procalcitonin <0.05 <0.11 <0.10 <0.69 <1.19  CRP 1.67 < 1.14 <0.90 < 1.24 <1.28 <1.98  <3.04 <9.26 <16.00    Covid-19 negative    Blood cultures (11/26) Providenica rettgeri  Blood cultures (11/26) Providenica rettgeri  Urine culture (11/26) No growth FiNAL  Wound culture (11/27) MSSA, Klebsiella oxytoca, Providencia rettgeri, Morganella morganii (2 strains), Myroides species, Enterobacter cloacae  Wound culture left leg (12/4)  MSSA, Enterobacter cloacae, Morganella morganii  Wound culture left leg (12/4)  MSSA, Enterobacter cloacae x 2, Morganella morganii      Assessment:     Active Problems:    PNA (pneumonia) (11/25/2020)      Pneumonia (11/25/2020)    1. Cellulitis, bilateral lower extremities, severe, secondary to MSSA, Klebsiella oxytoca, Providencia rettgeri, Morganella morganii (2 strains), Myroides species, Enterobacter cloacae, Day #19 IV Zosyn, status post debridement, improving. 2. Gram negative bacteremia with Providencia rettgeri, probably secondary to above, status post IV Zosyn. 3. Sepsis with leukocytosis, elevated procalcitonin, CRP and lactic acid, resolving. 4. SOB  5. Pyuria, negative urine culture    Comment:  Significance of WBC and CRP increased unclear. Plan:   1. Continue Omnicef and Dicloxacillin for 1 more week  2. In am, repeat CBC and CRP if still inpatient    3.  Cleared for discharge from ID standpoint        Signed By: Geri Lin MD     December 15, 2020

## 2020-12-15 NOTE — PROGRESS NOTES
PHYSICAL THERAPY TREATMENT  Patient: Jaimie Fry (49 y.o. female)  Date: 12/15/2020  Diagnosis: PNA (pneumonia) [J18.9]  Pneumonia [J18.9]   <principal problem not specified>  Procedure(s) (LRB):  BILATERAL FOOT WOUND BED PREPERATION & APPLICATION OF ALLOGRAFT (Bilateral) Day of Surgery  Precautions:    Chart, physical therapy assessment, plan of care and goals were reviewed. ASSESSMENT  Patient continues with skilled PT services and is not progressing towards goals. Pt seated EOB upon arrival and initially stating she would not work with therapy until they took the bandages off her legs. Attempted to educate patient that the bandages needed to remain on until nursing took them off and patient became irritable. Returned patient to semi supine position as it is not safe for her to be sitting EOB unsupervised with min A x 1 for BLE. Patient was then agreeable to perform semi supine therex, see details below. Current Level of Function Impacting Discharge (mobility/balance): level of assistance required for mobility    Other factors to consider for discharge: decreased safety awareness, assistance required for tasks         PLAN :  Patient continues to benefit from skilled intervention to address the above impairments. Continue treatment per established plan of care. to address goals.     Recommendation for discharge: (in order for the patient to meet his/her long term goals)  Therapy up to 5 days/week in SNF setting    This discharge recommendation:  Has been made in collaboration with the attending provider and/or case management    IF patient discharges home will need the following DME: to be determined (TBD)       SUBJECTIVE:   Patient stated these bandages are not supposed to be on my legs    OBJECTIVE DATA SUMMARY:   Critical Behavior:  Neurologic State: Alert, Confused, Irritable  Orientation Level: Oriented X4  Cognition: Impaired decision making  Safety/Judgement: Decreased insight into deficits    Functional Mobility Training:  Bed Mobility:  Sit to Supine: Minimum assistance     Balance:  Sitting: Impaired; With support  Sitting - Static: Fair (occasional); Poor (constant support)      Therapeutic Exercises:   1 x20 AP semi supine  1 x 20 Hip ab/ad semi supine  1 x 20 Heel slides  1 x 20 SLR  Required rest breaks between reps and sets. Pain Ratin/10    Activity Tolerance:   Poor and requires rest breaks  Please refer to the flowsheet for vital signs taken during this treatment. After treatment patient left in no apparent distress:   Supine in bed, Call bell within reach, Bed / chair alarm activated, and Side rails x 3    COMMUNICATION/COLLABORATION:   The patients plan of care was discussed with: Registered nurse. Problem: Mobility Impaired (Adult and Pediatric)  Goal: *Acute Goals and Plan of Care (Insert Text)  Description: Patient will move from supine to sit and sit to supine , scoot up and down, and roll side to side in bed with minimal assistance/contact guard assist within 7 day(s). Patient will transfer from bed to chair and chair to bed with minimal assistance/contact guard assist using the least restrictive device within 7 day(s). Patient will improve static sitting balance to modified independence within 1 week(s). Patient will ambulate 10 feet with moderate assistance with least restrictive device within 1 weeks.        Outcome: Progressing Towards Goal       Juliette Vaz PTA   Time Calculation: 15 mins

## 2020-12-15 NOTE — PROGRESS NOTES
Pt refused to let anyone touch her, attempted to put pulse oxim tape on finger, pt refuses. Pt alert, oriented to month, year, president-said octavio then samantha, and location, thought this was the old hospital location on [facility]. Gauri Mooney and ann aware.

## 2020-12-15 NOTE — ANESTHESIA PREPROCEDURE EVALUATION
Relevant Problems   No relevant active problems       Anesthetic History   No history of anesthetic complications            Review of Systems / Medical History  Patient summary reviewed, nursing notes reviewed and pertinent labs reviewed    Pulmonary    COPD      Pneumonia and shortness of breath      Comments: Home Oxygen  Acute PNA   Neuro/Psych   Within defined limits           Cardiovascular          CHF: NYHA Classification IV, dyspnea on exertion  Dysrhythmias   Pacemaker and CAD    Exercise tolerance: <4 METS: Cardiology Consult Assessment and Plan:  Case discussed with Dr. Napoleon Ortiz and our impression and recommendations are as follows:  1. Acute systolic CHF: EF 94-45, UNCLEAR IF NEW OR OLD. Continue diuresis, with lasix IV 40 bid. BNP 65399, still elevated. Renal function low normal. Will monitor. Stress test done today and pending reading. Will add low dose ACE/BB once euvolemic. Please keep serum potassium between 4-5 and serum magnesium > 2. K 2.9, repletion have been ordered already. Will monitor with diuresis. 2. PPM: [facility] PPM. Contacted rep. Per documents implanted in 2005 by Dr. Rajiv Watson for complete heart block. Interrogated today showing several NSVT, unsure if they correlate with possible syncopal episode. 3. Elevated troponin: Likely demand ischemia from CHF. No active chest pain. 4. Pneumonia and cellulitis: Rx per primary. Pending lower extremity wound debridement.          Comments: V paced - hx of complete heart block  Nuclear Stress Test 12/1/2020  · Baseline ECG: Normal EKG, rare PVCs. · Gated SPECT: Left ventricular function post-stress was abnormal. Calculated ejection fraction is 19%. · Myocardial perfusion imaging defect 1: There is a defect that is large in size with a severe reduction in uptake present in the inferior and apex location(s) that is non-reversible. There is abnormal wall motion in the defect area. Viability in the area is poor.  The defect appears to be infarction. Perfusion defect was visually and quantitatively present. · Myocardial perfusion imaging defect 2: There is a defect that is small in size affecting the anteroseptal location(s) that is partially reversible. The defect appears to be infarction with baldo-infarct ischemia. Perfusion defect was visually and quantitatively present. · Left ventricular perfusion is abnormal.  · Myocardial perfusion imaging supports an intermediate risk stress test.       ECHO 11/27/2020  · LV: Estimated LVEF is 15 - 20%. Moderately dilated left ventricle. Severe concentric hypertrophy. Moderate-to-severely reduced systolic function. Akinesis of the basal anteroseptal and basal anterior wall(s). Refused definity. · LA: Severely dilated left atrium. · RV: Moderately dilated right ventricle. Pacing wire present  · RA: Mildly dilated right atrium. · AV: Cannot rule out bicuspid aortic valve. · MV: Mitral valve non-specific thickening. Moderate to severe mitral valve regurgitation is present. · TV: Moderate tricuspid valve regurgitation is present. · IVC: Moderately elevated central venous pressure (10-15 mmHg); IVC diameter is larger than 21 mm and collapses more than 50% with respiration. · RVSP 55.  · Pericardium: There is a small left pleural effusion. GI/Hepatic/Renal         Renal disease: CRI       Endo/Other      Hypothyroidism       Other Findings            No past medical history on file. No past surgical history on file.     Current Outpatient Medications   Medication Instructions    albuterol-ipratropium (DUO-NEB) 2.5 mg-0.5 mg/3 ml nebu 3 mL, Nebulization, EVERY 6 HOURS AS NEEDED    albuterol-ipratropium (DUO-NEB) 2.5 mg-0.5 mg/3 ml nebu 3 mL, Nebulization, EVERY 6 HOURS AS NEEDED    amiodarone (CORDARONE) 200 mg, Oral, DAILY    apixaban (ELIQUIS) 2.5 mg, Oral, 2 TIMES DAILY    aspirin (ASPIRIN) 325 mg, Oral, DAILY    atorvastatin (LIPITOR) 40 mg, Oral, EVERY BEDTIME    carvediloL (COREG) 6.25 mg, Oral, 2 TIMES DAILY WITH MEALS    cefixime (SUPRAX) 400 mg, Oral, DAILY    dicloxacillin (DYNAPEN) 500 mg, Oral, 4 TIMES DAILY BEFORE MEALS & NIGHTLY    furosemide (LASIX) 40 mg, Oral, DAILY    levothyroxine (SYNTHROID) 75 mcg, DAILY BEFORE BREAKFAST    lisinopriL (PRINIVIL, ZESTRIL) 5 mg, Oral, DAILY    methocarbamoL (ROBAXIN) 500 mg, Oral, 3 TIMES DAILY    omeprazole (PRILOSEC) 20 mg, Oral, DAILY    potassium chloride (K-DUR, KLOR-CON) 20 mEq tablet 20 mEq, Oral, DAILY       Current Facility-Administered Medications   Medication Dose Route Frequency    cefdinir (OMNICEF) capsule 300 mg  300 mg Oral Q12H    dicloxacillin (DYNAPEN) capsule 500 mg  500 mg Oral AC&HS    albuterol-ipratropium (DUO-NEB) 2.5 MG-0.5 MG/3 ML  3 mL Nebulization Q6H PRN    furosemide (LASIX) tablet 40 mg  40 mg Oral DAILY    amiodarone (CORDARONE) tablet 200 mg  200 mg Oral DAILY    potassium chloride SR (KLOR-CON 10) tablet 40 mEq  40 mEq Oral DAILY    sodium hypochlorite (HALF STRENGTH DAKIN'S) 0.25% irrigation (bottle)   Topical BID    0.9% sodium chloride infusion 250 mL  250 mL IntraVENous PRN    labetaloL (NORMODYNE;TRANDATE) 20 mg/4 mL (5 mg/mL) injection 10 mg  10 mg IntraVENous Q5MIN PRN    metoprolol (LOPRESSOR) injection 2.5 mg  2.5 mg IntraVENous Q5MIN PRN    levothyroxine (SYNTHROID) tablet 75 mcg  75 mcg Oral 6am    acetaminophen (TYLENOL) tablet 650 mg  650 mg Oral Q6H PRN    Or    acetaminophen (TYLENOL) suppository 650 mg  650 mg Rectal Q6H PRN    polyethylene glycol (MIRALAX) packet 17 g  17 g Oral DAILY PRN    ondansetron (ZOFRAN ODT) tablet 4 mg  4 mg Oral Q8H PRN    Or    ondansetron (ZOFRAN) injection 4 mg  4 mg IntraVENous Q6H PRN    aspirin tablet 325 mg  325 mg Oral DAILY    atorvastatin (LIPITOR) tablet 40 mg  40 mg Oral QHS       Patient Vitals for the past 24 hrs:   Temp Pulse Resp BP SpO2   12/14/20 2026 36.6 °C (97.9 °F) 87 24 127/79 93 %   12/14/20 1450 36.7 °C (98 °F) 91 19 121/76 93 %   12/14/20 1105 37.1 °C (98.7 °F) 84 19 114/77 92 %   12/14/20 0834 36.3 °C (97.4 °F) 85 19 131/77 95 %   12/14/20 0238 36.9 °C (98.4 °F) 96 19 133/75 97 %       Lab Results   Component Value Date/Time    WBC 11.1 (H) 12/14/2020 08:45 AM    HGB 10.6 (L) 12/14/2020 08:45 AM    HCT 35.0 12/14/2020 08:45 AM    PLATELET 811 (H) 58/25/8133 08:45 AM    MCV 92.1 12/14/2020 08:45 AM     Lab Results   Component Value Date/Time    Sodium 144 12/14/2020 08:45 AM    Potassium 3.4 (L) 12/14/2020 08:45 AM    Chloride 107 12/14/2020 08:45 AM    CO2 31 12/14/2020 08:45 AM    Anion gap 6 12/14/2020 08:45 AM    Glucose 80 12/14/2020 08:45 AM    BUN 16 12/14/2020 08:45 AM    Creatinine 0.33 (L) 12/14/2020 08:45 AM    BUN/Creatinine ratio 48 (H) 12/14/2020 08:45 AM    GFR est AA >60 12/14/2020 08:45 AM    GFR est non-AA >60 12/14/2020 08:45 AM    Calcium 8.5 12/14/2020 08:45 AM     No results found for: APTT, PTP, INR, INREXT, INREXT  Lab Results   Component Value Date/Time    Glucose 80 12/14/2020 08:45 AM     Physical Exam    Airway  Mallampati: II  TM Distance: 4 - 6 cm  Neck ROM: normal range of motion   Mouth opening: Normal     Cardiovascular    Rhythm: regular  Rate: normal        Comments: V paced Dental    Dentition: Poor dentition     Pulmonary          Rales       Abdominal  GI exam deferred       Other Findings            Anesthetic Plan    ASA: 4  Anesthesia type: general          Induction: Intravenous  Anesthetic plan and risks discussed with: Patient, Family and Sibling

## 2020-12-15 NOTE — ANESTHESIA POSTPROCEDURE EVALUATION
Procedure(s):  BILATERAL LEG WOUND BED PREPERATION & APPLICATION OF ALLOGRAFT.     general    Anesthesia Post Evaluation      Multimodal analgesia: multimodal analgesia used between 6 hours prior to anesthesia start to PACU discharge  Patient location during evaluation: PACU  Patient participation: complete - patient participated  Level of consciousness: awake  Pain score: 0  Pain management: adequate  Airway patency: patent  Anesthetic complications: no  Cardiovascular status: acceptable  Respiratory status: acceptable  Hydration status: acceptable  Post anesthesia nausea and vomiting:  controlled  Final Post Anesthesia Temperature Assessment:  Normothermia (36.0-37.5 degrees C)      INITIAL Post-op Vital signs:   Vitals Value Taken Time   /67 12/15/2020 12:59 PM   Temp 36.1 °C (97 °F) 12/15/2020 12:59 PM   Pulse 81 12/15/2020 12:59 PM   Resp 20 12/15/2020 12:59 PM   SpO2 96 % 12/15/2020 12:59 PM

## 2020-12-15 NOTE — PROGRESS NOTES
General Daily Progress Note          Patient Name:   Carolyn Green       YOB: 1945       Age:  66 y.o. Admit Date: 11/25/2020      Subjective:         Patient alert awake not in distress eating breakfast this morning  Patient had debridement of right leg ulcer and left leg ulcer    Plan for wound bed preparation and graft placement tiday           Objective:     Visit Vitals  /70 (BP 1 Location: Left arm, BP Patient Position: At rest)   Pulse 87   Temp 98.1 °F (36.7 °C)   Resp 20   Ht 5' (1.524 m)   Wt 42.5 kg (93 lb 11.1 oz)   SpO2 94%   BMI 18.30 kg/m²          [unfilled]      Review of Systems    Constitutional: Negative for chills and fever. HENT: Negative. Eyes: Negative. Respiratory: Negative. Cardiovascular: Negative. Gastrointestinal: Negative for abdominal pain and nausea. Skin: Negative. Neurological: Negative. Physical Exam:      Constitutional: Alert awake  HENT:   Head: Normocephalic and atraumatic. Eyes: Pupils are equal, round, and reactive to light. EOM are normal.   Cardiovascular: Normal rate, regular rhythm and normal heart sounds. Pulmonary/Chest: Breath sounds normal. No wheezes. No rales. Exhibits no tenderness. Abdominal: Soft. Bowel sounds are normal. There is no abdominal tenderness. There is no rebound and no guarding. Musculoskeletal: Normal range of motion. Neurological: Bilateral lower leg red swollen    XR CHEST PORT   Final Result      NM INFLAM PROC LTD   Final Result   IMPRESSION: Superficial cellulitis. Doubt underlying osteomyelitis      XR CHEST PA LAT   Final Result   Impression: Underexpanded lungs with bibasilar atelectasis. Small pleural   effusions. Hydrostatic edema. No significant interval change. XR CHEST SNGL V   Final Result   Impression:   Little interval change. XR CHEST PORT   Final Result   Impression: Underexpanded lungs with bibasilar atelectasis, increased.   Possible   right pleural effusion. Hydrostatic edema. XR CHEST PA LAT   Final Result   Impression: The cardiomediastinal silhouette is appropriate for age, technique,   and lung expansion. Pulmonary vasculature is not congested. The lungs are   essentially clear. No effusion or pneumothorax is seen. XR CHEST SNGL V   Final Result               Results     Procedure Component Value Units Date/Time    CULTURE, Sigmund Ill STAIN [[id number]]  (Susceptibility) Collected:  12/04/20 1030    Order Status:  Completed Specimen:  Wound from Leg Updated:  12/08/20 1116     Special Requests: No Special Requests        GRAM STAIN       Occasional Gram Negative Rods           Culture result: Moderate Morganella morganii ssp morganii                  Moderate Stenotrophomonas (xantho.) maltophilia CLSI guidelines do not recommend reporting susceptibilities for s. Maltophilia. Trimethoprim/sulfamethoxazole is the drug of choice.                   Moderate Enterobacter cloacae complex                  Light Staphylococcus aureus          Susceptibility      Morganella morganii ssp morganii     WILLIAM     Amikacin ($) Susceptible     Ampicillin ($) Resistant     Ampicillin/sulbactam ($) Resistant     Cefazolin ($) Resistant     Cefepime ($$) Susceptible     Cefoxitin Intermediate     Ceftazidime ($) Susceptible     Ceftriaxone ($) Susceptible     Ciprofloxacin ($) Susceptible     Gentamicin ($) Susceptible     Levofloxacin ($) Susceptible     Meropenem ($$) Susceptible     Piperacillin/Tazobac ($) Susceptible     Tobramycin ($) Susceptible     Trimeth/Sulfa Susceptible                Susceptibility      Enterobacter cloacae complex     WILLIAM     Amikacin ($) Susceptible     Cefazolin ($) Resistant     Cefepime ($$) Susceptible     Cefoxitin Resistant     Ceftazidime ($) Susceptible     Ceftriaxone ($) Susceptible     Ciprofloxacin ($) Susceptible     Gentamicin ($) Susceptible     Levofloxacin ($) Susceptible     Meropenem ($$) Susceptible Tobramycin ($) Susceptible     Trimeth/Sulfa Susceptible                Susceptibility      Staphylococcus aureus     WILLIAM     Ciprofloxacin ($) Resistant     Clindamycin ($) Susceptible     Daptomycin ($$$$$) Susceptible     Doxycycline ($$) Susceptible     Erythromycin ($$$$) Resistant     Gentamicin ($) Susceptible     Levofloxacin ($) Intermediate     Linezolid ($$$$$) Susceptible     Moxifloxacin ($$$$) Susceptible     Oxacillin Susceptible     Rifampin ($$$$) Susceptible [1]      Tetracycline Susceptible     Trimeth/Sulfa Susceptible     Vancomycin ($) Susceptible            [1]   Rifampin is not to be used for mono-therapy. CULTURE, Johnny Riff STAIN [[id number]]  (Susceptibility) Collected:  12/04/20 1030    Order Status:  Completed Specimen:  Wound from Leg Updated:  12/08/20 1118     Special Requests: No Special Requests        GRAM STAIN Few Gram Negative Rods        Culture result:       Light Enterobacter cloacae complex            Light Morganella morganii               Light Enterobacter cloacae complex . ..(2ND COLONY TYPE)                  Light Staphylococcus aureus          Susceptibility      Enterobacter cloacae complex (1)     WILLIAM     Amikacin ($) Susceptible     Cefazolin ($) Resistant     Cefepime ($$) Susceptible     Cefoxitin Resistant     Ceftazidime ($) Susceptible     Ceftriaxone ($) Susceptible     Ciprofloxacin ($) Susceptible     Gentamicin ($) Susceptible     Levofloxacin ($) Susceptible     Meropenem ($$) Susceptible     Tobramycin ($) Susceptible     Trimeth/Sulfa Susceptible                Susceptibility      Morganella morganii     WILLIAM     Amikacin ($) Susceptible     Ampicillin ($) Resistant     Ampicillin/sulbactam ($) Resistant     Cefazolin ($) Resistant     Cefepime ($$) Susceptible     Cefoxitin Intermediate     Ceftazidime ($) Susceptible     Ceftriaxone ($) Susceptible     Ciprofloxacin ($) Susceptible     Gentamicin ($) Susceptible     Levofloxacin ($) Susceptible     Meropenem ($$) Susceptible     Piperacillin/Tazobac ($) Susceptible     Tobramycin ($) Susceptible     Trimeth/Sulfa Susceptible                Susceptibility      Enterobacter cloacae complex (3)     WILLIAM     Amikacin ($) Susceptible     Cefazolin ($) Resistant     Cefepime ($$) Susceptible     Cefoxitin Resistant     Ceftazidime ($) Susceptible     Ceftriaxone ($) Susceptible     Ciprofloxacin ($) Susceptible     Gentamicin ($) Susceptible     Levofloxacin ($) Susceptible     Meropenem ($$) Susceptible     Tobramycin ($) Susceptible     Trimeth/Sulfa Susceptible                Susceptibility      Staphylococcus aureus     WILLIAM     Ciprofloxacin ($) Resistant     Clindamycin ($) Susceptible     Daptomycin ($$$$$) Susceptible     Doxycycline ($$) Susceptible     Erythromycin ($$$$) Resistant     Gentamicin ($) Susceptible     Levofloxacin ($) Resistant     Linezolid ($$$$$) Susceptible     Moxifloxacin ($$$$) Intermediate     Oxacillin Susceptible     Rifampin ($$$$) Susceptible [1]      Tetracycline Susceptible     Trimeth/Sulfa Susceptible     Vancomycin ($) Susceptible            [1]   Rifampin is not to be used for mono-therapy. Labs:     Recent Labs     12/14/20  0845   WBC 11.1*   HGB 10.6*   HCT 35.0   *     Recent Labs     12/14/20  0845      K 3.4*      CO2 31   BUN 16   CREA 0.33*   GLU 80   CA 8.5   PHOS 3.3     Recent Labs     12/14/20  0845   ALB 2.3*     No results for input(s): INR, PTP, APTT, INREXT, INREXT in the last 72 hours. No results for input(s): FE, TIBC, PSAT, FERR in the last 72 hours. No results found for: FOL, RBCF   No results for input(s): PH, PCO2, PO2 in the last 72 hours. No results for input(s): CPK, CKNDX, TROIQ in the last 72 hours.     No lab exists for component: CPKMB  No results found for: CHOL, CHOLX, CHLST, CHOLV, HDL, HDLP, LDL, LDLC, DLDLP, TGLX, TRIGL, TRIGP, CHHD, CHHDX  No results found for: GLUCPOC  Lab Results   Component Value Date/Time    Color Yellow/Straw 11/25/2020 04:50 PM    Appearance Clear 11/25/2020 04:50 PM    Specific gravity 1.013 11/25/2020 04:50 PM    pH (UA) 5.0 11/25/2020 04:50 PM    Protein Negative 11/25/2020 04:50 PM    Glucose Negative 11/25/2020 04:50 PM    Ketone Negative 11/25/2020 04:50 PM    Bilirubin Negative 11/25/2020 04:50 PM    Urobilinogen 0.1 11/25/2020 04:50 PM    Nitrites Negative 11/25/2020 04:50 PM    Leukocyte Esterase Negative 11/25/2020 04:50 PM    Bacteria Negative 11/25/2020 04:50 PM    WBC 5-10 11/25/2020 04:50 PM    RBC 0-5 11/25/2020 04:50 PM         Assessment:   Acute hypoxemic respiratory failure resolved   acute systolic heart failure with ejection fraction of 15 to 20%  Acute cellulitis of bilateral legs/wound culture positive for staph aureus/providentia/Enterobacter/Morganella/Klebsiella  Status post debridement of the both leg wounds  Gram-negative bacteremia with providentia  Sepsis leukocytosis elevated procalcitonin and lactic acid  Community-acquired pneumonia  Poor hygiene  Elevated troponin  Leukocytosis  Hypothyroidism  Anemia s/p tranfusion   Stress test done showing large infarct/declined cardiac cath  Hypotensive secondary to Percocet  Static post debridement of the bilateral leg ulcers by the podiatrist  Plan:       Hold blood pressure medications  Discontinue Percocet  Continue aspirin Lipitor levothyroxine  Discontinue vancomycin  Omnicef 300 twice daily  Dicloxacillin 500 4 times daily  Doppler studies negative for DVT   Lasix 40 mg daily  S/p Transfuse 2 unit packed RBC      Plan for wound bed preparation and graft placement today     Discharge planning pending  already involved          Current Facility-Administered Medications:     labetaloL (NORMODYNE;TRANDATE) 20 mg/4 mL (5 mg/mL) injection 10 mg, 10 mg, IntraVENous, Q5MIN PRN, Crystal Theodore MD    metoprolol (LOPRESSOR) injection 2.5 mg, 2.5 mg, IntraVENous, Q5MIN PRN, Ashley Duffy MD    nitroglycerin (NITROBID) 2 % ointment 0.5 Inch, 0.5 Inch, Topical, Q6H, Dipesh Moon MD    cefdinir (OMNICEF) capsule 300 mg, 300 mg, Oral, Q12H, Suki Castrejon MD, 300 mg at 12/14/20 2100    dicloxacillin (DYNAPEN) capsule 500 mg, 500 mg, Oral, AC&HS, Suki Castrejon MD, 500 mg at 12/14/20 2100    albuterol-ipratropium (DUO-NEB) 2.5 MG-0.5 MG/3 ML, 3 mL, Nebulization, Q6H PRN, Sandra Benjamin MD    furosemide (LASIX) tablet 40 mg, 40 mg, Oral, DAILY, Isabelal Nunez, FNP, 40 mg at 12/14/20 1027    amiodarone (CORDARONE) tablet 200 mg, 200 mg, Oral, DAILY, Katherine Foss MD, 200 mg at 12/14/20 1027    potassium chloride SR (KLOR-CON 10) tablet 40 mEq, 40 mEq, Oral, DAILY, Sandra Benjamin MD, 40 mEq at 12/12/20 0805    sodium hypochlorite (HALF STRENGTH DAKIN'S) 0.25% irrigation (bottle), , Topical, BID, Gini Wilcox CRNA    0.9% sodium chloride infusion 250 mL, 250 mL, IntraVENous, PRN, Diego Solano MD    metoprolol (LOPRESSOR) injection 2.5 mg, 2.5 mg, IntraVENous, Q5MIN PRN, Ashley Duffy MD    levothyroxine (SYNTHROID) tablet 75 mcg, 75 mcg, Oral, 6am, Diego Solano MD, 75 mcg at 12/15/20 0750    acetaminophen (TYLENOL) tablet 650 mg, 650 mg, Oral, Q6H PRN, 650 mg at 12/11/20 1147 **OR** acetaminophen (TYLENOL) suppository 650 mg, 650 mg, Rectal, Q6H PRN, Diego Solano MD    polyethylene glycol (MIRALAX) packet 17 g, 17 g, Oral, DAILY PRN, Diego Solano MD    ondansetron (ZOFRAN ODT) tablet 4 mg, 4 mg, Oral, Q8H PRN **OR** ondansetron (ZOFRAN) injection 4 mg, 4 mg, IntraVENous, Q6H PRN, Keith Solano MD    aspirin tablet 325 mg, 325 mg, Oral, DAILY, Keith Solano MD, 325 mg at 12/14/20 1027    atorvastatin (LIPITOR) tablet 40 mg, 40 mg, Oral, QHS, Keith Solano MD, 40 mg at 12/14/20 2039

## 2020-12-15 NOTE — PROGRESS NOTES
Pulmonary,  Note    Name: Coty Lerma MRN: [id number]   : 1945 Hospital: [address] [place] [address]   Date: 12/15/2020  Admission date: 2020 Hospital Day:        Subjective/Interval History:   Seen on the medical floor. Consult requested for acute hypoxic respiratory failure. She is an extremely poor historian states she feels fine to leave her alone because she is trying to eat   respirations nonlabored she remains on 3 L of oxygen. States she is not having any surgery  12/3 awake eating breakfast respirations nonlabored   sleeping but awakens easily denies shortness of breath   awake alert wants to go home respirations nonlabored  12/10 patient awake alert very weak working with physical therapy. She apparently is refusing skilled nursing facility wants to go home. She does not appear capable of taking care of herself at home   seems stronger but still very weak continues to refuse skilled nursing facility care insists on going home. She does not appear capable of caring for herself. She is now telling me today that she does have oxygen at home which she was denying earlier in the week. I am not sure if I trust her statement   awake alert still seems confused today she tells me she does not have oxygen at home and complains of constipation   awake alert when told she still has some drainage on her left leg bandage she states there is no drainage and its dry that the bandages is just stained  Hospital Problems  Never Reviewed          Codes Class Noted POA    PNA (pneumonia) ICD-10-CM: J18.9  ICD-9-CM: 486  2020 Unknown        Pneumonia ICD-10-CM: J18.9  ICD-9-CM: 486  2020 Unknown              IMPRESSION:   1.  Acute hypoxic respiratory failure secondary to pulmonary edema overnight oximetry continues to show desaturation as low as 70% with 13 minutes below 88% we will repeat overnight oximetry to keep her qualified for home oxygen  2. Hypokalemia repleted   3. Hypercapnia likely metabolic alkalosis improved after Diamox   4. Severe cardiomyopathy  5. Severe mitral regurg  6. Bilateral pleural effusions secondary to pulmonary edema no significant change on x-ray 12/3  7. Providencia septicemia  8. Anemia  9. Hypothyroidism treated  10. Questionable early dementia        RECOMMENDATIONS/PLAN:   1. Creatinine stable continue Lasix decreased to daily dosing  2. Continue daily potassium   3. She appears to need nocturnal oxygen  4. Follow H&H                  [x] High complexity decision making was performed  [x] See my orders for details      Subjective/Initial History:     I was asked by Lucy Borges MD to see Lorenza Kowalski  a 66 y.o.    female in consultation for a chief complaint of acute hypoxic respiratory failure and pleural effusions      Allergies   Allergen Reactions    Tetracycline Other (comments)     Causes mouth sores    Morphine Nausea and Vomiting        MAR reviewed and pertinent medications noted or modified as needed     Current Facility-Administered Medications   Medication    labetaloL (NORMODYNE;TRANDATE) 20 mg/4 mL (5 mg/mL) injection 10 mg    metoprolol (LOPRESSOR) injection 2.5 mg    nitroglycerin (NITROBID) 2 % ointment 0.5 Inch    cefdinir (OMNICEF) capsule 300 mg    dicloxacillin (DYNAPEN) capsule 500 mg    albuterol-ipratropium (DUO-NEB) 2.5 MG-0.5 MG/3 ML    furosemide (LASIX) tablet 40 mg    amiodarone (CORDARONE) tablet 200 mg    potassium chloride SR (KLOR-CON 10) tablet 40 mEq    sodium hypochlorite (HALF STRENGTH DAKIN'S) 0.25% irrigation (bottle)    0.9% sodium chloride infusion 250 mL    metoprolol (LOPRESSOR) injection 2.5 mg    levothyroxine (SYNTHROID) tablet 75 mcg    acetaminophen (TYLENOL) tablet 650 mg    Or    acetaminophen (TYLENOL) suppository 650 mg    polyethylene glycol (MIRALAX) packet 17 g    ondansetron (ZOFRAN ODT) tablet 4 mg    Or    ondansetron [facility]) injection 4 mg    aspirin tablet 325 mg    atorvastatin (LIPITOR) tablet 40 mg      Patient PCP: Jeffy, MD Karl  PMH:  has no past medical history on file. PSH:   has no past surgical history on file. FHX: family history is not on file. SHX:           Objective:     Vital Signs: Telemetry:    normal sinus rhythm Intake/Output:   Visit Vitals  /85 (BP 1 Location: Right arm, BP Patient Position: At rest)   Pulse 78   Temp 97.6 °F (36.4 °C)   Resp 18   Ht 5' (1.524 m)   Wt 42.5 kg (93 lb 11.1 oz)   SpO2 93%   BMI 18.30 kg/m²       Temp (24hrs), Av.9 °F (36.6 °C), Min:97.6 °F (36.4 °C), Max:98.1 °F (36.7 °C)        O2 Device: Room air O2 Flow Rate (L/min): 2 l/min       Wt Readings from Last 4 Encounters:   20 42.5 kg (93 lb 11.1 oz)   20 47.7 kg (105 lb 2.6 oz)   16 57.2 kg (126 lb)   12/07/10 64 kg (141 lb)          Intake/Output Summary (Last 24 hours) at 12/15/2020 1113  Last data filed at 12/15/2020 0602  Gross per 24 hour   Intake 640 ml   Output 251 ml   Net 389 ml       Last shift:      No intake/output data recorded.   Last 3 shifts:  1901 - 12/15 0700  In: 18 [P.O.:990]  Out: 252 [Urine:250]       Physical Exam:   General:  female; chronically ill appearing frail  HEENT: NCAT, oral mucosa clear  Eyes: anicteric; conjunctiva clear extraocular movements intact  Neck: no nodes, neck veins visible no accessory muscle use  Chest: no deformity,   Cardiac: Regular rate and rhythm systolic murmur present has edema of the extremities  Lungs: distant breath sounds; no wheezing rales are heard posteriorly no breath sounds in the bases  Abd: Soft positive bowel sounds no tenderness she has some posterior abdominal wall edema  Ext: Edema of the legs arms and sacrum  : clear urine  Neuro: Awake more alert mildly confused  will move all 4 extremities  Psych- no agitation, oriented to person;   Skin: Lower extremities have very flaky skin with erythema  Pulses: Brachial radial femoral pulses intact  Capillary: Slow capillary refill    Labs:    Recent Labs     12/14/20  0845   WBC 11.1*   HGB 10.6*   *     Recent Labs     12/14/20  0845      K 3.4*      CO2 31   GLU 80   BUN 16   CREA 0.33*   CA 8.5   PHOS 3.3   ALB 2.3*     12/11 room air oxygen saturation 94%  BNP 16,597  CRP 3.04  11/26 blood culture with Providencia sensitive to Zosyn  Urine culture no growth  Wound culture with multiple organisms including methicillin sensitive staph aureus Klebsiella Providencia Enterobacter and Morganella  11/27 echo ejection fraction 20% dilated left atrium grade 2 diastolic dysfunction moderate to severe mitral regurg IVC dilatation RVSP 55  12/1 nuclear cardiac scan with ejection fraction of 19%  Lab Results   Component Value Date/Time    Culture result: Moderate Morganella morganii ssp morganii 12/04/2020 10:30 AM    Culture result:  12/04/2020 10:30 AM     Moderate Stenotrophomonas (xantho.) maltophilia CLSI guidelines do not recommend reporting susceptibilities for s. Maltophilia. Trimethoprim/sulfamethoxazole is the drug of choice. Culture result: Moderate Enterobacter cloacae complex 12/04/2020 10:30 AM    Culture result: Light Staphylococcus aureus 12/04/2020 10:30 AM    Culture result: Light Enterobacter cloacae complex 12/04/2020 10:30 AM    Culture result: Light Morganella morganii 12/04/2020 10:30 AM    Culture result:  12/04/2020 10:30 AM     Light Enterobacter cloacae complex . ..(2ND COLONY TYPE)    Culture result: Light Staphylococcus aureus 12/04/2020 10:30 AM     Lab Results   Component Value Date/Time    TSH 0.26 (L) 11/25/2020 12:30 PM       Imaging:    CXR Results  (Last 48 hours)    None        Results from [place] encounter on 11/25/20   XR CHEST PORT    Narrative Chest single view. Comparison single view chest December 3, 2020. Reduced lung volumes. Similar basilar atelectasis.  No gross interstitial or  alveolar pulmonary edema. Right-sided cardiac device with leads overlying the  right heart. Cardiac silhouette enlargement. Elongated thoracic aorta with  atherosclerosis. No pneumothorax or sizable pleural effusion. XR CHEST PA LAT    Narrative Chest, 2 views, 12/7/2020    History: Pulmonary edema. Bilateral effusions. Comparison: Including chest 12/3/2020. Findings: A right-sided pacemaker device with leads in the right atrium and  right ventricle is in place. The cardiac silhouette is stable globular  enlargement which could indicate cardiomyopathy or pericardial effusion. The  thoracic aorta is tortuous. The lungs are underexpanded with bibasilar  atelectasis. Small pleural effusions are noted. There is pulmonary vascular  congestion and hydrostatic edema. No pneumothorax is identified. The thoracic  spine again demonstrates kyphosis, degenerative changes and compression  deformities. Impression Impression: Underexpanded lungs with bibasilar atelectasis. Small pleural  effusions. Hydrostatic edema. No significant interval change. XR CHEST SNGL V    Narrative Study: XR CHEST SNGL V    Clinical indication: Pleural effusions    Comparison: Chest x-ray 12/1/2020. Findings: Only hazy bibasilar airspace disease/pleural effusions. No evidence of  pneumothorax. Cardiac silhouette is enlarged. Stable positioning of right chest wall pacemaker  device. Mild vascular congestion. No acute osseous abnormality identified. Impression Impression:  Little interval change. · Providencia septicemia questionably urinary tract or from skin site. Urine culture was no growth I do not know if it was collected after antibiotics were given. She is currently on Zosyn   She has pleural effusions and accentuated interstitial markings consistent with CHF/pulmonary edema. She is receiving Lasix. · Room air oxygen saturation has remained steady.   Overnight oximetry did show desaturation for 13 minutes minutes 12/12. She will need nocturnal oxygen. · Thank you for allowing me to see Ms. Prema Chua  ·     Cris Khan MD

## 2020-12-15 NOTE — OP NOTES
Operative Report    Patient: Светлана Thornton MRN: [id number]  SSN: [id number]    YOB: 1945  Age: 66 y.o. Sex: female       Date of Surgery:   12/15/2020     Preoperative Diagnosis:   Bilateral Leg Ulcers     Postoperative Diagnosis:   Same    Surgeon(s) and Role:     * Radha Long, WHITNEY - Primary    Anesthesia:   General     Procedure(s):  1. Wound Bed Preparation, Right Leg Ulcer  2. Application of Allograft, Right Leg Ulcer  3. Wound Bed Preparation, Left Leg Ulcer  4. Application of Allograft, Left Leg Ulcer    Procedure in Detail:   Pt was seen in the pre-operative holding area and all questions were answered and all concerns were addressed with the pt and her sister. The operative procedures were discussed in great detail, with all possible complications highlighted. Pt and her sister  verbalized complete understanding and the consent was signed and witnessed by her sister. Pt is on scheduled abx per ID, thus no additional abx ordered for surgical prophylaxis. The operative limbs were marked and the pt was transported to the operating room. The pt was transferred to the operating table and anesthesia was administered as indicated above. The bilateral lower extremities were scrubbed and draped in sterile fashion. A time out was performed to confirm the correct pt, correct procedure, correct limb, abx, allergies, fire risk and attendees within the operating room. Upon completion, procedure #1 commenced. Procedure #1: Wound Bed Preparation, Right Leg Ulcer  Attention was directed to the wound bed of the right leg and preparing the tissue for the graft application. Utilizing a #15 blade, a sharp excisional debridement was performed to the wound bed noted to the anterior aspect of the leg, down to level of subq and bleeding tissue. Upon completion of the sharp debridement, an ultrasonic debridement was performed with the [employer] system to remove any remaining biofilm.  The resulting wound bed was beefy red with bleeding, granular tissue. The size of the wound bed was noted to be 119.28cmsq. Procedure #2: Application of Allograft, Right Leg Ulcer  At this time, a 3x6 inch (116sqcm) theraskin allograft was thawed for the appropriate amount of time and then sutured to the wound bed with 4.0 vicryl sutures. A bolster dressing was applied with xeroform, lubricating jelly, 4x4s, kerlex and a lite ace wrap. Procedure #3: Wound Bed Preparation, Left Leg Ulcer  Attention was then directed to the wound bed of the left leg and preparing the tissue for the graft application. Utilizing a fresh #15 blade, a sharp excisional debridement was performed to the wound bed noted to the anterior aspect of the leg, down to level of subq and bleeding tissue. Upon completion of the sharp debridement, an ultrasonic debridement was performed with the [employer] system to remove any remaining biofilm. The resulting wound bed was beefy red with bleeding, granular tissue. The size of the wound bed was noted to be 119.38cmsq . Procedure #4: Application of Allograft, Left Leg Ulcer  Lastly, an additional 3x6 inch (116sqcm) theraskin allograft was thawed for the appropriate amount of time and then sutured to the wound bed with 4.0 vicryl sutures. A bolster dressing was applied with xeroform, lubricating jelly, 4x4s, kerlex and a lite ace wrap. Pt tolerated the above procedures well and all post operative counts were correct. Pt was transferred to PACU without incident. A thorough neurovascular check was then performed. Upon meeting transfer criteria, pt was transferred back to the surgical floor. Her dressings will stay intact for (5) days    Estimated Blood Loss:    15cc    Tourniquet Time:   No tourniquet was utilized    Implants:   Implant Name Type Inv.  Item Serial No.  Lot No. LRB No. Used Action   GRAFT HUM TISS XL Maxiimliano Keto [id number] Juan Tony - [id number]  GRAFT HUM TISS XL Maximiliano Keto [id number] Juan Cecily [id number] [facility] SKIN WND ALLOGRFT INST_WD [id number] Left 2 Implanted               Specimens:   No specimens were taken    Drains:   No drains were utilized                Complications:   No complications were encountered      Signed By:  Marquis Mikhail DPM     December 15, 2020

## 2020-12-15 NOTE — PROGRESS NOTES
CM notes patient has been accepted to [facility]. Updated PT/OT uploaded to [facility]    CM will continue to follow for any additional discharge planning needs.

## 2020-12-16 LAB
BASOPHILS # BLD: 0 K/UL (ref 0–0.1)
BASOPHILS NFR BLD: 0 % (ref 0–1)
CRP SERPL-MCNC: 0.85 MG/DL (ref 0–0.6)
DIFFERENTIAL METHOD BLD: ABNORMAL
EOSINOPHIL # BLD: 0 K/UL (ref 0–0.4)
EOSINOPHIL NFR BLD: 0 % (ref 0–7)
ERYTHROCYTE [DISTWIDTH] IN BLOOD BY AUTOMATED COUNT: 14.6 % (ref 11.5–14.5)
HCT VFR BLD AUTO: 32.4 % (ref 35–47)
HGB BLD-MCNC: 9.6 G/DL (ref 11.5–16)
IMM GRANULOCYTES # BLD AUTO: 0 K/UL (ref 0–0.04)
IMM GRANULOCYTES NFR BLD AUTO: 0 % (ref 0–0.5)
LYMPHOCYTES # BLD: 1 K/UL (ref 0.8–3.5)
LYMPHOCYTES NFR BLD: 14 % (ref 12–49)
MCH RBC QN AUTO: 28 PG (ref 26–34)
MCHC RBC AUTO-ENTMCNC: 29.6 G/DL (ref 30–36.5)
MCV RBC AUTO: 94.5 FL (ref 80–99)
MONOCYTES # BLD: 0.6 K/UL (ref 0–1)
MONOCYTES NFR BLD: 9 % (ref 5–13)
NEUTS SEG # BLD: 5.6 K/UL (ref 1.8–8)
NEUTS SEG NFR BLD: 77 % (ref 32–75)
PLATELET # BLD AUTO: 451 K/UL (ref 150–400)
PMV BLD AUTO: 8.9 FL (ref 8.9–12.9)
RBC # BLD AUTO: 3.43 M/UL (ref 3.8–5.2)
WBC # BLD AUTO: 7.3 K/UL (ref 3.6–11)

## 2020-12-16 PROCEDURE — 97116 GAIT TRAINING THERAPY: CPT

## 2020-12-16 PROCEDURE — 36415 COLL VENOUS BLD VENIPUNCTURE: CPT

## 2020-12-16 PROCEDURE — 65270000029 HC RM PRIVATE

## 2020-12-16 PROCEDURE — 97530 THERAPEUTIC ACTIVITIES: CPT

## 2020-12-16 PROCEDURE — 85025 COMPLETE CBC W/AUTO DIFF WBC: CPT

## 2020-12-16 PROCEDURE — 74011250637 HC RX REV CODE- 250/637: Performed by: FAMILY MEDICINE

## 2020-12-16 PROCEDURE — 99232 SBSQ HOSP IP/OBS MODERATE 35: CPT | Performed by: INTERNAL MEDICINE

## 2020-12-16 PROCEDURE — 74011250637 HC RX REV CODE- 250/637: Performed by: INTERNAL MEDICINE

## 2020-12-16 PROCEDURE — 94760 N-INVAS EAR/PLS OXIMETRY 1: CPT

## 2020-12-16 PROCEDURE — 86140 C-REACTIVE PROTEIN: CPT

## 2020-12-16 RX ORDER — CEFDINIR 300 MG/1
300 CAPSULE ORAL EVERY 12 HOURS
Qty: 14 CAP | Refills: 0 | Status: SHIPPED | OUTPATIENT
Start: 2020-12-16

## 2020-12-16 RX ORDER — DICLOXACILLIN SODIUM 500 MG/1
500 CAPSULE ORAL
Qty: 40 CAP | Refills: 0 | Status: SHIPPED | OUTPATIENT
Start: 2020-12-16

## 2020-12-16 RX ADMIN — ATORVASTATIN CALCIUM 40 MG: 40 TABLET, FILM COATED ORAL at 22:01

## 2020-12-16 RX ADMIN — DICLOXACILLIN SODIUM 500 MG: 250 CAPSULE ORAL at 22:00

## 2020-12-16 RX ADMIN — LEVOTHYROXINE SODIUM 75 MCG: 0.07 TABLET ORAL at 08:13

## 2020-12-16 RX ADMIN — Medication 10 ML: at 08:13

## 2020-12-16 RX ADMIN — CEFDINIR 300 MG: 300 CAPSULE ORAL at 21:00

## 2020-12-16 RX ADMIN — DICLOXACILLIN SODIUM 500 MG: 250 CAPSULE ORAL at 16:30

## 2020-12-16 NOTE — PROGRESS NOTES
OCCUPATIONAL THERAPY RE-EVALUATION  Patient: Jaimie Fry (39 y.o. female)  Date: 12/16/2020  Diagnosis: PNA (pneumonia) [J18.9]  Pneumonia [J18.9] <principal problem not specified>  Procedure(s) (LRB):  BILATERAL LEG WOUND BED PREPERATION & APPLICATION OF ALLOGRAFT (Bilateral) 1 Day Post-Op  Precautions:  Fall, patient confused  Chart, occupational therapy assessment, plan of care, and goals were reviewed. ASSESSMENT  Based on the objective data described below, patient currently presents with confusion, irritability/agitation, generalized deconditioning, impaired standing balance, limited activity tolerance, increased need for A with self care and functional mobility/transfers. Patient initially seen for OT evaluation 12/02/2020 and has been seen for 1 skilled session, prior to re-evaluation completed 12/09/2020 and 1 additional skilled session. Since time of initial evaluation pt has progressed toward initially set goals and goals have been updated for current functional level, pt often difficult to work with due to irritability throughout stay and not always willing to participate with therapy, and seen today for reevaluation due to LOS. Patient performed bed level toileting per patient request with total A, min A sup -> sit and scooting, min A x2 sit <> stand and patient requesting to ambulate to bathroom for additional toileting. Patient min A x2 toilet transfer, min A toileting in bathroom, total A LE dressing EOB. Patient repeatedly complaining of pravin LE dressings over wounds staying \"the doctor said I could take them off, nursing put them on just to be funny. Take these off my legs\". Patient educated on role of therapy in the hospital and importance of wound care management but confused and perseverating throughout. Pt continues to benefit from continued skilled OT services, continue to progress toward goals, patient priority changed to 3 times/week at this time.  Recommend discharge to SNF when medically appropriate. Current Level of Function Impacting Discharge (ADLs): min A for toileting in bathroom, total in bed, patient self-limiting but able to participate when motivated    Other factors to consider for discharge: limited participation with therapy, irritability, confusion         PLAN :  Recommendations and Planned Interventions: self care training, functional mobility training, therapeutic exercise, balance training, therapeutic activities, endurance activities, patient education and family training/education    Frequency/Duration: Patient will be followed by occupational therapy 3 times a week to address goals. Recommend with staff: Up to bathroom for toileting as able    Recommend next OT session: grooming tasks EOB, UE HEP    Recommendation for discharge: (in order for the patient to meet his/her long term goals)  SNF    This discharge recommendation:  Has been made in collaboration with the attending provider and/or case management         SUBJECTIVE:   Patient stated Get these things off my legs.  Pointing to pravin LE dressings    OBJECTIVE DATA SUMMARY:     Cognitive/Behavioral Status:  Neurologic State: Alert  Orientation Level: Oriented to person;Oriented to place; Disoriented to situation    Hearing:   Auditory  Auditory Impairment: None    Vision/Perceptual:      Range of Motion:  Generally decreased, functional    Strength:  RUE Strength  Observation: grossly observed to be 3-/5     LUE Strength  Observation: grossly observed to be 3-/5    Coordination:  Generally intact    Tone & Sensation:  Tone: normal    Functional Mobility and Transfers for ADLs:  Bed Mobility:  Rolling: Minimum assistance  Supine to Sit: Minimum assistance  Sit to Supine: Minimum assistance  Scooting: Minimum assistance    Transfers:  Sit to Stand: Minimum assistance;Assist x2  Functional Transfers  Bathroom Mobility: Minimum assistance  Toilet Transfer : Minimum assistance;Assist x2    Balance:  Sitting: Intact; With support  Sitting - Static: Fair (occasional)  Standing: Impaired; With support  Standing - Static: Constant support;Occasional  Standing - Dynamic : Constant support;Occasional    ADL Assessment:    Lower Body Dressing: Total assistance    Toileting: Minimum assistance    ADL Intervention and task modifications:    Lower Body Dressing Assistance  Dressing Assistance: Total assistance(dependent)  Socks: Total assistance (dependent)  Position Performed: Seated edge of bed    Toileting  Toileting Assistance: Minimum assistance  Bladder Hygiene: Minimum assistance  Bowel Hygiene: Minimum assistance(for thoroughness)  Clothing Management: Minimum assistance    Therapeutic Exercises: Will benefit from UE HEP when patient motivated to participate    Functional Measure:    [address] [place] [address] AM-PACTM \"6 Clicks\"                                                       Daily Activity Inpatient Short Form  How much help from another person does the patient currently need. .. Total; A Lot A Little None   1. Putting on and taking off regular lower body clothing? [x]  1 []  2 []  3 []  4   2. Bathing (including washing, rinsing, drying)? []  1 [x]  2 []  3 []  4   3. Toileting, which includes using toilet, bedpan or urinal? [] 1 []  2 [x]  3 []  4   4. Putting on and taking off regular upper body clothing? []  1 []  2 [x]  3 []  4   5. Taking care of personal grooming such as brushing teeth? []  1 []  2 [x]  3 []  4   6. Eating meals? []  1 []  2 [x]  3 []  4   © 2007, Trustees of [address] [place] [address], under license to [employer]. All rights reserved     Score: 15/24     Interpretation of Tool:  Represents clinically-significant functional categories (i.e. Activities of daily living).   Percentage of Impairment CH    0%   CI    1-19% CJ    20-39% CK    40-59% CL    60-79% CM    80-99% CN     100%   AMPAC  Score 6-24 24 23 20-22 15-19 10-14 7-9 6     Pain:  Patient c/o pain when donning pravin socks, did not rate    Activity Tolerance:   Fair and requires frequent rest breaks    After treatment patient left in no apparent distress:   Supine in bed, Call bell within reach, Bed / chair alarm activated and Side rails x 3    COMMUNICATION/COLLABORATION:   The patients plan of care was discussed with: Physical therapy assistant and Registered nurse. OT/PT sessions occurred together for increased patient and clinician safety, patient frequently refuses therapy, would not participate twice.     Problem: Self Care Deficits Care Plan (Adult)  Goal: *Acute Goals and Plan of Care (Insert Text)  Description: Pt will be SBA sup <> sit in prep for EOB ADLs  Pt will be SBA grooming sitting EOB  Pt will be min A LE dressing sitting EOB/long sit  Pt will be SBA sit <>  prep for toileting LRAD  Pt will be SBA toileting/toilet transfer/cloth mgmt LRAD  Pt will be SPV following UE HEP in prep for self care tasks  Outcome: Progressing Towards Goal    Denise Jewell OTR/L  Time Calculation: 26 mins

## 2020-12-16 NOTE — PROGRESS NOTES
Pulmonary,  Note    Name: Brigette Luz MRN: [id number]   : 1945 Hospital: [address] [place] [address]   Date: 2020  Admission date: 2020 Hospital Day:        Subjective/Interval History:   Seen on the medical floor. Consult requested for acute hypoxic respiratory failure. She is an extremely poor historian states she feels fine to leave her alone because she is trying to eat   respirations nonlabored she remains on 3 L of oxygen. States she is not having any surgery  12/3 awake eating breakfast respirations nonlabored   sleeping but awakens easily denies shortness of breath   awake alert wants to go home respirations nonlabored  12/10 patient awake alert very weak working with physical therapy. She apparently is refusing skilled nursing facility wants to go home. She does not appear capable of taking care of herself at home   seems stronger but still very weak continues to refuse skilled nursing facility care insists on going home. She does not appear capable of caring for herself. She is now telling me today that she does have oxygen at home which she was denying earlier in the week. I am not sure if I trust her statement   awake alert still seems confused today she tells me she does not have oxygen at home and complains of constipation   awake alert when told she still has some drainage on her left leg bandage she states there is no drainage and its dry that the bandages is just stained  Hospital Problems  Never Reviewed          Codes Class Noted POA    PNA (pneumonia) ICD-10-CM: J18.9  ICD-9-CM: 486  2020 Unknown        Pneumonia ICD-10-CM: J18.9  ICD-9-CM: 486  2020 Unknown              IMPRESSION:   1. Acute hypoxic respiratory failure secondary to pulmonary edema  2. Hypokalemia repleted   3. Severe cardiomyopathy  4. Severe mitral regurg  5.  Bilateral pleural effusions secondary to pulmonary edema no significant change on x-ray 12/3  6. Providencia septicemia  7. Anemia  8. Hypothyroidism treated        RECOMMENDATIONS/PLAN:   1. Creatinine stable continue Lasix decreased to daily dosing  2. Continue daily potassium   3. OK to discharge                  [x] High complexity decision making was performed  [x] See my orders for details      Subjective/Initial History:     I was asked by Shayla Conde MD to see Oralia Galarza  a 66 y.o.  female in consultation for a chief complaint of acute hypoxic respiratory failure and pleural effusions      Allergies   Allergen Reactions    Tetracycline Other (comments)     Causes mouth sores    Morphine Nausea and Vomiting        MAR reviewed and pertinent medications noted or modified as needed     Current Facility-Administered Medications   Medication    labetaloL (NORMODYNE;TRANDATE) 20 mg/4 mL (5 mg/mL) injection 10 mg    metoprolol (LOPRESSOR) injection 2.5 mg    nitroglycerin (NITROBID) 2 % ointment 0.5 Inch    sodium chloride (NS) flush 5-40 mL    sodium chloride (NS) flush 5-40 mL    cefdinir (OMNICEF) capsule 300 mg    dicloxacillin (DYNAPEN) capsule 500 mg    albuterol-ipratropium (DUO-NEB) 2.5 MG-0.5 MG/3 ML    furosemide (LASIX) tablet 40 mg    amiodarone (CORDARONE) tablet 200 mg    potassium chloride SR (KLOR-CON 10) tablet 40 mEq    sodium hypochlorite (HALF STRENGTH DAKIN'S) 0.25% irrigation (bottle)    0.9% sodium chloride infusion 250 mL    metoprolol (LOPRESSOR) injection 2.5 mg    levothyroxine (SYNTHROID) tablet 75 mcg    acetaminophen (TYLENOL) tablet 650 mg    Or    acetaminophen (TYLENOL) suppository 650 mg    polyethylene glycol (MIRALAX) packet 17 g    ondansetron (ZOFRAN ODT) tablet 4 mg    Or    ondansetron (ZOFRAN) injection 4 mg    aspirin tablet 325 mg    atorvastatin (LIPITOR) tablet 40 mg      Patient PCP: Jeffy, MD Karl  PMH:  has no past medical history on file. PSH:   has no past surgical history on file.    FHX: family history is not on file. SHX:           Objective:     Vital Signs: Telemetry:    normal sinus rhythm Intake/Output:   Visit Vitals  /69 (BP 1 Location: Right arm)   Pulse 76   Temp 97.7 °F (36.5 °C)   Resp 16   Ht 5' (1.524 m)   Wt 42.5 kg (93 lb 11.1 oz)   SpO2 93%   BMI 18.30 kg/m²       Temp (24hrs), Av.7 °F (36.5 °C), Min:97 °F (36.1 °C), Max:98.7 °F (37.1 °C)        O2 Device: Nasal cannula O2 Flow Rate (L/min): 2 l/min       Wt Readings from Last 4 Encounters:   20 42.5 kg (93 lb 11.1 oz)   20 47.7 kg (105 lb 2.6 oz)   16 57.2 kg (126 lb)   12/07/10 64 kg (141 lb)          Intake/Output Summary (Last 24 hours) at 2020 0937  Last data filed at 2020 0643  Gross per 24 hour   Intake 980 ml   Output 350 ml   Net 630 ml       Last shift:      No intake/output data recorded. Last 3 shifts:  1901 -  0700  In: 6024 [P.O.:720;  I.V.:500]  Out: 601 [Urine:600]       Physical Exam:   General:  female; chronically ill appearing frail  HEENT: NCAT, oral mucosa clear  Eyes: anicteric; conjunctiva clear extraocular movements intact  Neck: no nodes, neck veins visible no accessory muscle use  Chest: no deformity,   Cardiac: Regular rate and rhythm systolic murmur present has edema of the extremities  Lungs: distant breath sounds; no wheezing rales are heard posteriorly no breath sounds in the bases  Abd: Soft positive bowel sounds no tenderness she has some posterior abdominal wall edema  Ext: Edema of the legs arms and sacrum  : clear urine  Neuro: Awake more alert mildly confused  will move all 4 extremities  Psych- no agitation, oriented to person;   Skin: Lower extremities have very flaky skin with erythema  Pulses: Brachial radial femoral pulses intact  Capillary: Slow capillary refill    Labs:    Recent Labs     20  0845 20  0845   WBC 7.3 11.1*   HGB 9.6* 10.6*   * 486*     Recent Labs     20  0845      K 3.4*      CO2 31   GLU 80 BUN 16   CREA 0.33*   CA 8.5   PHOS 3.3   ALB 2.3*     12/11 room air oxygen saturation 94%  BNP 16,597  CRP 3.04  11/26 blood culture with Providencia sensitive to Zosyn  Urine culture no growth  Wound culture with multiple organisms including methicillin sensitive staph aureus Klebsiella Providencia Enterobacter and Morganella  11/27 echo ejection fraction 20% dilated left atrium grade 2 diastolic dysfunction moderate to severe mitral regurg IVC dilatation RVSP 55  12/1 nuclear cardiac scan with ejection fraction of 19%  Lab Results   Component Value Date/Time    Culture result: Moderate Morganella morganii ssp morganii 12/04/2020 10:30 AM    Culture result:  12/04/2020 10:30 AM     Moderate Stenotrophomonas (xantho.) maltophilia CLSI guidelines do not recommend reporting susceptibilities for s. Maltophilia. Trimethoprim/sulfamethoxazole is the drug of choice. Culture result: Moderate Enterobacter cloacae complex 12/04/2020 10:30 AM    Culture result: Light Staphylococcus aureus 12/04/2020 10:30 AM    Culture result: Light Enterobacter cloacae complex 12/04/2020 10:30 AM    Culture result: Light Morganella morganii 12/04/2020 10:30 AM    Culture result:  12/04/2020 10:30 AM     Light Enterobacter cloacae complex . ..(2ND COLONY TYPE)    Culture result: Light Staphylococcus aureus 12/04/2020 10:30 AM     Lab Results   Component Value Date/Time    TSH 0.26 (L) 11/25/2020 12:30 PM       Imaging:    CXR Results  (Last 48 hours)    None        Results from [place] encounter on 11/25/20   XR CHEST PORT    Narrative Chest single view. Comparison single view chest December 3, 2020. Reduced lung volumes. Similar basilar atelectasis. No gross interstitial or  alveolar pulmonary edema. Right-sided cardiac device with leads overlying the  right heart. Cardiac silhouette enlargement. Elongated thoracic aorta with  atherosclerosis. No pneumothorax or sizable pleural effusion.    XR CHEST PA LAT    Narrative Chest, 2 views, 12/7/2020    History: Pulmonary edema. Bilateral effusions. Comparison: Including chest 12/3/2020. Findings: A right-sided pacemaker device with leads in the right atrium and  right ventricle is in place. The cardiac silhouette is stable globular  enlargement which could indicate cardiomyopathy or pericardial effusion. The  thoracic aorta is tortuous. The lungs are underexpanded with bibasilar  atelectasis. Small pleural effusions are noted. There is pulmonary vascular  congestion and hydrostatic edema. No pneumothorax is identified. The thoracic  spine again demonstrates kyphosis, degenerative changes and compression  deformities. Impression Impression: Underexpanded lungs with bibasilar atelectasis. Small pleural  effusions. Hydrostatic edema. No significant interval change. XR CHEST SNGL V    Narrative Study: XR CHEST SNGL V    Clinical indication: Pleural effusions    Comparison: Chest x-ray 12/1/2020. Findings: Only hazy bibasilar airspace disease/pleural effusions. No evidence of  pneumothorax. Cardiac silhouette is enlarged. Stable positioning of right chest wall pacemaker  device. Mild vascular congestion. No acute osseous abnormality identified. Impression Impression:  Little interval change.        Cheryl Leonardo MD

## 2020-12-16 NOTE — PROGRESS NOTES
Progress Note    Patient: Vin Valencia MRN: [id number]  SSN: [id number]    YOB: 1945  Age: 66 y.o. Sex: female      Admit Date: 11/25/2020    LOS: 21 days     Subjective:   Patient followed for sepsis with cellulitis both legs with blood cultures growing Providencia and wound culture with mixed Gram negative nica identified below and MSSA. Now on Omnicef and Dicloxacillin after losing IV access. She remains afebrile. She underwent skin grafts yesterday. It appears that patient is going to SNF but she thinks that she is being discharged home. Objective:     Vitals:    12/15/20 2022 12/15/20 2031 12/16/20 0009 12/16/20 0450   BP:  99/64 95/61 106/72   Pulse:  79 78 74   Resp:  20 20 16   Temp:  97.8 °F (36.6 °C) 98.7 °F (37.1 °C) 97.9 °F (36.6 °C)   SpO2: 92% 95% 97% 96%   Weight:       Height:            Intake and Output:  Current Shift: No intake/output data recorded. Last three shifts: 12/14 1901 - 12/16 0700  In: 2964 [P.O.:720; I.V.:500]  Out: 601 [Urine:600]    Physical Exam:   Constitutional:       General: She is not in acute distress. Appearance: She is chronically ill-appearing. HENT:      Head: Normocephalic and atraumatic. Nose:      Comments: No nasal O2 cannula  Lungs: clear bilaterally but diminished  Genitourinary:     Comments: Purewick catheter  Musculoskeletal:         General: Swelling present. No tenderness. Right lower leg: Edema present. Left lower leg: Edema present. Comments:  Both calves bandaged and not disturbed today  Neurological: nonfocal, mild confusion   Psychiatric: cooperative     Lab/Data Review:     WBC 11,100  Procalcitonin <0.05 <0.11 <0.10 <0.69 <1.19  CRP 1.16 <1.67 < 1.14 <0.90 < 1.24 <1.28 <1.98  <3.04 <9.26 <16.00    Covid-19 negative    Blood cultures (11/26) Providenica rettgeri  Blood cultures (11/26) Providenica rettgeri  Urine culture (11/26) No growth FiNAL  Wound culture (11/27)  MSSA, Klebsiella oxytoca, Providencia rettgeri, Morganella morganii (2 strains), Myroides species, Enterobacter cloacae  Wound culture left leg (12/4)  MSSA, Enterobacter cloacae, Morganella morganii  Wound culture left leg (12/4)  MSSA, Enterobacter cloacae x 2, Morganella morganii      Assessment:     Active Problems:    PNA (pneumonia) (11/25/2020)      Pneumonia (11/25/2020)    1. Cellulitis, bilateral lower extremities, severe, secondary to MSSA, Klebsiella oxytoca, Providencia rettgeri, Morganella morganii (2 strains), Myroides species, Enterobacter cloacae, Day #20 Antibiotics, now Cefdinir and Dicloxacillin, status post debridement, skin graft. 2. Gram negative bacteremia with Providencia rettgeri, probably secondary to above, status post IV Zosyn. 3. Sepsis with leukocytosis, elevated procalcitonin, CRP and lactic acid, resolving. 4. SOB  5. Pyuria, negative urine culture    Comment:  CRP decreasing today. Plan:   1. Continue Omnicef and Dicloxacillin for 1 more week  2.  Cleared for discharge from ID standpoint        Signed By: Edmund Goldstein MD     December 16, 2020

## 2020-12-16 NOTE — PROGRESS NOTES
Pt seen at [facility]  - Surgical dressings kept intact. Please do not remove for another (4) days to allow skin graft to take  - Cont abx per ID  - Pt stable from podiatry standpoint for DC with outpatient f/u in my office  - I will follow periodically while pt in house to update [address] [place] [address].  Mini Haider, [address] [place] [address] and Foot Surgery  [address] [place] [address]  O: [phone]  F: [phone]  C: [phone]

## 2020-12-16 NOTE — DISCHARGE SUMMARY
Discharge Summary       PATIENT ID: Ernst Styles  MRN: [id number]   YOB: 1945    DATE OF ADMISSION: 11/25/2020 11:23 AM    DATE OF DISCHARGE:   PRIMARY CARE PROVIDER: Karl Bardales MD     ATTENDING PHYSICIAN: Easton Solano  DISCHARGING PROVIDER: Easton Solano      CONSULTATIONS: IP CONSULT TO CARDIOLOGY  IP CONSULT TO CARDIOLOGY  IP CONSULT TO INFECTIOUS DISEASES  IP CONSULT TO PODIATRY  IP CONSULT TO PSYCHIATRY  IP CONSULT TO VASCULAR SURGERY  IP CONSULT TO PULMONOLOGY    PROCEDURES/SURGERIES: Procedure(s) with comments:  BILATERAL LEG WOUND BED PREPERATION & APPLICATION OF ALLOGRAFT - Avail. [id number] or after 1630    ADMITTING DIAGNOSES:    Patient Active Problem List    Diagnosis Date Noted    PNA (pneumonia) 11/25/2020    Pneumonia 11/25/2020       DISCHARGE DIAGNOSES / PLAN:    Acute hypoxemic respiratory failure on room air now  Acute systolic heart failure with ejection fraction of 15 to 20%  Acute cellulitis of bilateral legs/wound culture positive for staph aureus/providentia/Enterobacter/Morganella/Klebsiella  Status post debridement of the both leg wounds  Gram-negative bacteremia with providentia  Sepsis leukocytosis elevated procalcitonin and lactic acid  Community-acquired pneumonia  Poor hygiene  Elevated troponin  Leukocytosis  Hypothyroidism  Anemia s/p tranfusion   Stress test done showing large infarct/declined cardiac cath  Hypotensive secondary to Percocet  Static post debridement of the bilateral leg ulcers by the podiatrist  S/p    Procedure(s):  1. Wound Bed Preparation, Right Leg Ulcer  2. Application of Allograft, Right Leg Ulcer  3. Wound Bed Preparation, Left Leg Ulcer  4. Application of Allograft, Left Leg Ulcer    :         DISCHARGE MEDICATIONS:  Current Discharge Medication List      START taking these medications    Details   !! dicloxacillin (DYNAPEN) 500 mg capsule Take 1 Cap by mouth Before breakfast, lunch, dinner and at bedtime.   Qty: 40 Cap, Refills: 0 cefdinir (OMNICEF) 300 mg capsule Take 1 Cap by mouth every twelve (12) hours. Qty: 14 Cap, Refills: 0      !! albuterol-ipratropium (DUO-NEB) 2.5 mg-0.5 mg/3 ml nebu 3 mL by Nebulization route every six (6) hours as needed for Wheezing. Qty: 30 Nebule, Refills: 1      cefixime (SUPRAX) 200 mg chew chewable tablet Take 2 Tabs by mouth daily. Qty: 14 Tab, Refills: 0      !! albuterol-ipratropium (DUO-NEB) 2.5 mg-0.5 mg/3 ml nebu 3 mL by Nebulization route every six (6) hours as needed for Wheezing. Qty: 30 Nebule, Refills: 1      amiodarone (CORDARONE) 200 mg tablet Take 1 Tab by mouth daily. Qty: 30 Tab, Refills: 1      aspirin (ASPIRIN) 325 mg tablet Take 1 Tab by mouth daily. Qty: 30 Tab, Refills: 1      atorvastatin (LIPITOR) 40 mg tablet Take 1 Tab by mouth nightly. Qty: 30 Tab, Refills: 1      !! dicloxacillin (DYNAPEN) 500 mg capsule Take 1 Cap by mouth Before breakfast, lunch, dinner and at bedtime. Qty: 40 Cap, Refills: 0       !! - Potential duplicate medications found. Please discuss with provider. CONTINUE these medications which have NOT CHANGED    Details   furosemide (LASIX) 40 mg tablet Take 40 mg by mouth daily. potassium chloride (K-DUR, KLOR-CON) 20 mEq tablet Take 20 mEq by mouth daily. omeprazole (PRILOSEC) 20 mg capsule Take 20 mg by mouth daily. levothyroxine (SYNTHROID) 75 mcg tablet Take 75 mcg by mouth daily (before breakfast). Indications: HYPOTHYROIDISM         STOP taking these medications       carvediloL (COREG) 3.125 mg tablet Comments:   Reason for Stopping:         lisinopriL (PRINIVIL, ZESTRIL) 5 mg tablet Comments:   Reason for Stopping:         methocarbamoL (ROBAXIN) 500 mg tablet Comments:   Reason for Stopping:         apixaban (ELIQUIS) 2.5 mg tablet Comments:   Reason for Stopping:                 NOTIFY YOUR PHYSICIAN FOR ANY OF THE FOLLOWING:   Fever over 101 degrees for 24 hours.    Chest pain, shortness of breath, fever, chills, nausea, vomiting, diarrhea, change in mentation, falling, weakness, bleeding. Severe pain or pain not relieved by medications. Or, any other signs or symptoms that you may have questions about. DISPOSITION:  x  Home With:   OT  PT  HH  RN       Long term SNF/Inpatient Rehab    Independent/assisted living    Hospice    Other:       PATIENT CONDITION AT DISCHARGE: Stable      PHYSICAL EXAMINATION AT DISCHARGE:  General:          Alert, cooperative, no distress, appears stated age. HEENT:           Atraumatic, anicteric sclerae, pink conjunctivae                          No oral ulcers, mucosa moist, throat clear, dentition fair  Neck:               Supple, symmetrical  Lungs:             Clear to auscultation bilaterally. No Wheezing or Rhonchi. No rales. Chest wall:      No tenderness  No Accessory muscle use. Heart:              Regular  rhythm,  No  murmur   No edema  Abdomen:        Soft, non-tender. Not distended. Bowel sounds normal  Extremities:     No cyanosis. No clubbing,                            Skin turgor normal, Capillary refill normal  Skin:                Not pale. Not Jaundiced  No rashes   Psych:             Not anxious or agitated. Neurologic:      Alert, moves all extremities, answers questions appropriately and responds to commands     XR CHEST PORT   Final Result      NM INFLAM PROC LTD   Final Result   IMPRESSION: Superficial cellulitis. Doubt underlying osteomyelitis      XR CHEST PA LAT   Final Result   Impression: Underexpanded lungs with bibasilar atelectasis. Small pleural   effusions. Hydrostatic edema. No significant interval change. XR CHEST SNGL V   Final Result   Impression:   Little interval change. XR CHEST PORT   Final Result   Impression: Underexpanded lungs with bibasilar atelectasis, increased. Possible   right pleural effusion. Hydrostatic edema.       XR CHEST PA LAT   Final Result   Impression: The cardiomediastinal silhouette is appropriate for age, technique,   and lung expansion. Pulmonary vasculature is not congested. The lungs are   essentially clear. No effusion or pneumothorax is seen.          XR CHEST SNGL V   Final Result           Recent Results (from the past 24 hour(s))   C REACTIVE PROTEIN, QT    Collection Time: 12/15/20 11:30 AM   Result Value Ref Range    C-Reactive protein 1.16 (H) 0.00 - 0.60 mg/dL          HOSPITAL COURSE:  Patient is a 66y.o. year old female with past medical history unknown came to the ER ER under medical TDO patient was found by her home health aide on the last visit patient will hypoxic with her oxygen off there was trash around the house approximately 1 week patient in poor hygiene patient had bilateral leg wounds and patient using tissue paper and spraying with water seen by the ER physician chest x-ray shows pneumonia the patient was admitted for further evaluation treatment  History of present illness please refer to history and physical at the time of admission at the patient was admitted because of bilateral leg wounds shortness of breath hypoxemia chest x-ray shows pneumonia further work-up while in the hospital patient was seen by the wound care nurse podiatrist infectious disease pulmonologist and cardiology echo done shows ejection fraction of 15 to 20% acute cellulitis of bilateral leg and wounds positive for staph aureus procidentia Enterobacter Morganella Klebsiella patient have wound debridement chest x-ray shows community-acquired pneumonia patient was treated with IV Zosyn ordered MRI which unable to be done also seen by the psychiatrist patient is alert awake confused patient had blood transfusion during this admission patient have a stress test done shows large infarct cardiac cath was declined gram-negative bacteremia with procidentia patient now more alert awake but confused patient also seen by physical therapy occupational therapy recommend to discharge to SNF care    Patient seen by the podiatrist and patient have incision and drainage of the wound and also had application of allograft on both legs    Discussed with the infectious disease patient can be discharged home on p.o.  Omnicef and dicloxacillin  Patient is not a candidate for anticoagulation  Patient seen by the cardiology patient have several episode of NSVT event continue amiodarone      Medication reconciliation done time to discharge patient 35 minutes 50% time spent in counseling and coronation of care  Wound care nurse PCP and the podiatrist and the cardiologist    Signed:   Jason Alva MD  12/16/2020  8:55 AM

## 2020-12-16 NOTE — PROGRESS NOTES
General Daily Progress Note          Patient Name:   Rona Aguirre       YOB: 1945       Age:  66 y.o. Admit Date: 11/25/2020      Subjective:         Patient alert awake not in distress eating breakfast this morning  Patient had debridement of right leg ulcer and left leg ulcer    S/p    Procedure(s):  1. Wound Bed Preparation, Right Leg Ulcer  2. Application of Allograft, Right Leg Ulcer  3. Wound Bed Preparation, Left Leg Ulcer  4. Application of Allograft, Left Leg Ulcer           Objective:     Visit Vitals  /69 (BP 1 Location: Right arm)   Pulse 76   Temp 97.7 °F (36.5 °C)   Resp 16   Ht 5' (1.524 m)   Wt 42.5 kg (93 lb 11.1 oz)   SpO2 93%   BMI 18.30 kg/m²          [unfilled]      Review of Systems    Constitutional: Negative for chills and fever. HENT: Negative. Eyes: Negative. Respiratory: Negative. Cardiovascular: Negative. Gastrointestinal: Negative for abdominal pain and nausea. Skin: Negative. Neurological: Negative. Physical Exam:      Constitutional: Alert awake  HENT:   Head: Normocephalic and atraumatic. Eyes: Pupils are equal, round, and reactive to light. EOM are normal.   Cardiovascular: Normal rate, regular rhythm and normal heart sounds. Pulmonary/Chest: Breath sounds normal. No wheezes. No rales. Exhibits no tenderness. Abdominal: Soft. Bowel sounds are normal. There is no abdominal tenderness. There is no rebound and no guarding. Musculoskeletal: Normal range of motion. Neurological: Bilateral lower leg red swollen    XR CHEST PORT   Final Result      NM INFLChief Trunk PROC LTD   Final Result   IMPRESSION: Superficial cellulitis. Doubt underlying osteomyelitis      XR CHEST PA LAT   Final Result   Impression: Underexpanded lungs with bibasilar atelectasis. Small pleural   effusions. Hydrostatic edema. No significant interval change. XR CHEST SNGL V   Final Result   Impression:   Little interval change.       XR CHEST PORT Final Result   Impression: Underexpanded lungs with bibasilar atelectasis, increased. Possible   right pleural effusion. Hydrostatic edema. XR CHEST PA LAT   Final Result   Impression: The cardiomediastinal silhouette is appropriate for age, technique,   and lung expansion. Pulmonary vasculature is not congested. The lungs are   essentially clear. No effusion or pneumothorax is seen. XR CHEST SNGL V   Final Result               Results     Procedure Component Value Units Date/Time    CULTURE, Lidia  STAIN [[id number]]  (Susceptibility) Collected: 12/04/20 1030    Order Status: Completed Specimen: Wound from Leg Updated: 12/08/20 1116     Special Requests: No Special Requests        GRAM STAIN       Occasional Gram Negative Rods           Culture result: Moderate Morganella morganii ssp morganii                  Moderate Stenotrophomonas (xantho.) maltophilia CLSI guidelines do not recommend reporting susceptibilities for s. Maltophilia. Trimethoprim/sulfamethoxazole is the drug of choice.                   Moderate Enterobacter cloacae complex                  Light Staphylococcus aureus          Susceptibility      Morganella morganii ssp morganii     WILLIAM     Amikacin ($) Susceptible     Ampicillin ($) Resistant     Ampicillin/sulbactam ($) Resistant     Cefazolin ($) Resistant     Cefepime ($$) Susceptible     Cefoxitin Intermediate     Ceftazidime ($) Susceptible     Ceftriaxone ($) Susceptible     Ciprofloxacin ($) Susceptible     Gentamicin ($) Susceptible     Levofloxacin ($) Susceptible     Meropenem ($$) Susceptible     Piperacillin/Tazobac ($) Susceptible     Tobramycin ($) Susceptible     Trimeth/Sulfa Susceptible                Susceptibility      Enterobacter cloacae complex     WILLIAM     Amikacin ($) Susceptible     Cefazolin ($) Resistant     Cefepime ($$) Susceptible     Cefoxitin Resistant     Ceftazidime ($) Susceptible     Ceftriaxone ($) Susceptible     Ciprofloxacin ($) Susceptible     Gentamicin ($) Susceptible     Levofloxacin ($) Susceptible     Meropenem ($$) Susceptible     Tobramycin ($) Susceptible     Trimeth/Sulfa Susceptible                Susceptibility      Staphylococcus aureus     WILLIAM     Ciprofloxacin ($) Resistant     Clindamycin ($) Susceptible     Daptomycin ($$$$$) Susceptible     Doxycycline ($$) Susceptible     Erythromycin ($$$$) Resistant     Gentamicin ($) Susceptible     Levofloxacin ($) Intermediate     Linezolid ($$$$$) Susceptible     Moxifloxacin ($$$$) Susceptible     Oxacillin Susceptible     Rifampin ($$$$) Susceptible [1]      Tetracycline Susceptible     Trimeth/Sulfa Susceptible     Vancomycin ($) Susceptible            [1]   Rifampin is not to be used for mono-therapy. CULTURE, Sigmund Ill STAIN [[id number]]  (Susceptibility) Collected: 12/04/20 1030    Order Status: Completed Specimen: Wound from Leg Updated: 12/08/20 1118     Special Requests: No Special Requests        GRAM STAIN Few Gram Negative Rods        Culture result:       Light Enterobacter cloacae complex            Light Morganella morganii               Light Enterobacter cloacae complex . ..(2ND COLONY TYPE)                  Light Staphylococcus aureus          Susceptibility      Enterobacter cloacae complex (1)     WILLIAM     Amikacin ($) Susceptible     Cefazolin ($) Resistant     Cefepime ($$) Susceptible     Cefoxitin Resistant     Ceftazidime ($) Susceptible     Ceftriaxone ($) Susceptible     Ciprofloxacin ($) Susceptible     Gentamicin ($) Susceptible     Levofloxacin ($) Susceptible     Meropenem ($$) Susceptible     Tobramycin ($) Susceptible     Trimeth/Sulfa Susceptible                Susceptibility      Morganella morganii     WILLIAM     Amikacin ($) Susceptible     Ampicillin ($) Resistant     Ampicillin/sulbactam ($) Resistant     Cefazolin ($) Resistant     Cefepime ($$) Susceptible     Cefoxitin Intermediate     Ceftazidime ($) Susceptible     Ceftriaxone ($) Susceptible     Ciprofloxacin ($) Susceptible     Gentamicin ($) Susceptible     Levofloxacin ($) Susceptible     Meropenem ($$) Susceptible     Piperacillin/Tazobac ($) Susceptible     Tobramycin ($) Susceptible     Trimeth/Sulfa Susceptible                Susceptibility      Enterobacter cloacae complex (3)     WILLIAM     Amikacin ($) Susceptible     Cefazolin ($) Resistant     Cefepime ($$) Susceptible     Cefoxitin Resistant     Ceftazidime ($) Susceptible     Ceftriaxone ($) Susceptible     Ciprofloxacin ($) Susceptible     Gentamicin ($) Susceptible     Levofloxacin ($) Susceptible     Meropenem ($$) Susceptible     Tobramycin ($) Susceptible     Trimeth/Sulfa Susceptible                Susceptibility      Staphylococcus aureus     WILLIAM     Ciprofloxacin ($) Resistant     Clindamycin ($) Susceptible     Daptomycin ($$$$$) Susceptible     Doxycycline ($$) Susceptible     Erythromycin ($$$$) Resistant     Gentamicin ($) Susceptible     Levofloxacin ($) Resistant     Linezolid ($$$$$) Susceptible     Moxifloxacin ($$$$) Intermediate     Oxacillin Susceptible     Rifampin ($$$$) Susceptible [1]      Tetracycline Susceptible     Trimeth/Sulfa Susceptible     Vancomycin ($) Susceptible            [1]   Rifampin is not to be used for mono-therapy. Labs:     Recent Labs     12/14/20  0845   WBC 11.1*   HGB 10.6*   HCT 35.0   *     Recent Labs     12/14/20  0845      K 3.4*      CO2 31   BUN 16   CREA 0.33*   GLU 80   CA 8.5   PHOS 3.3     Recent Labs     12/14/20  0845   ALB 2.3*     No results for input(s): INR, PTP, APTT, INREXT, INREXT in the last 72 hours. No results for input(s): FE, TIBC, PSAT, FERR in the last 72 hours. No results found for: FOL, RBCF   No results for input(s): PH, PCO2, PO2 in the last 72 hours. No results for input(s): CPK, CKNDX, TROIQ in the last 72 hours.     No lab exists for component: CPKMB  No results found for: [facility]OL, [facility] [address], HDL, HDLP, LDL, LDLC, DLDLP, TGLX, TRIGL, TRIGP, CHHD, CHHDX  No results found for: [facility]  Lab Results   Component Value Date/Time    Color Yellow/Straw 11/25/2020 04:50 PM    Appearance Clear 11/25/2020 04:50 PM    Specific gravity 1.013 11/25/2020 04:50 PM    pH (UA) 5.0 11/25/2020 04:50 PM    Protein Negative 11/25/2020 04:50 PM    Glucose Negative 11/25/2020 04:50 PM    Ketone Negative 11/25/2020 04:50 PM    Bilirubin Negative 11/25/2020 04:50 PM    Urobilinogen 0.1 11/25/2020 04:50 PM    Nitrites Negative 11/25/2020 04:50 PM    Leukocyte Esterase Negative 11/25/2020 04:50 PM    Bacteria Negative 11/25/2020 04:50 PM    WBC 5-10 11/25/2020 04:50 PM    RBC 0-5 11/25/2020 04:50 PM         Assessment:   Acute hypoxemic respiratory failure resolved   acute systolic heart failure with ejection fraction of 15 to 20%  Acute cellulitis of bilateral legs/wound culture positive for staph aureus/providentia/Enterobacter/Morganella/Klebsiella  Status post debridement of the both leg wounds  Gram-negative bacteremia with providentia  Sepsis leukocytosis elevated procalcitonin and lactic acid  Community-acquired pneumonia  Poor hygiene  Elevated troponin  Leukocytosis  Hypothyroidism  Anemia s/p tranfusion   Stress test done showing large infarct/declined cardiac cath  Hypotensive secondary to Percocet  Static post debridement of the bilateral leg ulcers by the podiatrist  Plan:       Hold blood pressure medications  Discontinue Percocet  Continue aspirin Lipitor levothyroxine  Discontinue vancomycin  Omnicef 300 twice daily  Dicloxacillin 500 4 times daily  Doppler studies negative for DVT   Lasix 40 mg daily  S/p Transfuse 2 unit packed RBC    S/p    Procedure(s):  1. Wound Bed Preparation, Right Leg Ulcer  2. Application of Allograft, Right Leg Ulcer  3. Wound Bed Preparation, Left Leg Ulcer  4.  Application of Allograft, Left Leg Ulcer      D/c today          Current Facility-Administered Medications:     labetaloL (NORMODYNE;TRANDATE) 20 mg/4 mL (5 mg/mL) injection 10 mg, 10 mg, IntraVENous, Q5MIN PRN, Keila Moon MD    metoprolol [facility]) injection 2.5 mg, 2.5 mg, IntraVENous, Q5MIN PRN, Smith Rangel MD    nitroglycerin (NITROBID) 2 % ointment 0.5 Inch, 0.5 Inch, Topical, Q6H, Keila Moon MD, 0.5 Inch at 12/15/20 1903    sodium chloride (NS) flush 5-40 mL, 5-40 mL, IntraVENous, Q8H, Phillingane, Scott, DPM, 10 mL at 12/16/20 0813    sodium chloride (NS) flush 5-40 mL, 5-40 mL, IntraVENous, PRN, Phillingane, Scott, DPM    cefdinir (OMNICEF) capsule 300 mg, 300 mg, Oral, Q12H, Nayana Mackay MD, 300 mg at 12/15/20 2100    dicloxacillin (DYNAPEN) capsule 500 mg, 500 mg, Oral, AC&HS, Nayana Mackay MD, 500 mg at 12/15/20 2200    albuterol-ipratropium (DUO-NEB) 2.5 MG-0.5 MG/3 ML, 3 mL, Nebulization, Q6H PRN, Luigi Calixto MD    furosemide (LASIX) tablet 40 mg, 40 mg, Oral, DAILY, Isabella Feliciano FNP, 40 mg at 12/15/20 1417    amiodarone (CORDARONE) tablet 200 mg, 200 mg, Oral, DAILY, Karl Lefort, MD, 200 mg at 12/15/20 1417    potassium chloride SR (KLOR-CON 10) tablet 40 mEq, 40 mEq, Oral, DAILY, Luigi Calixto MD, 40 mEq at 12/15/20 1417    sodium hypochlorite (HALF STRENGTH DAKIN'S) 0.25% irrigation (bottle), , Topical, BID, Eber Wilcox, CRNA, Stopped at 12/15/20 0900    0.9% sodium chloride infusion 250 mL, 250 mL, IntraVENous, PRN, Keith Solano MD, New Bag at 12/15/20 1245    metoprolol (LOPRESSOR) injection 2.5 mg, 2.5 mg, IntraVENous, Q5MIN PRN, Sheri Ernandez MD    levothyroxine (SYNTHROID) tablet 75 mcg, 75 mcg, Oral, 6am, Keith Solano MD, 75 mcg at 12/16/20 0813    acetaminophen (TYLENOL) tablet 650 mg, 650 mg, Oral, Q6H PRN, 650 mg at 12/11/20 1147 **OR** acetaminophen (TYLENOL) suppository 650 mg, 650 mg, Rectal, Q6H PRN, Keith Solano MD    polyethylene glycol (MIRALAX) packet 17 g, 17 g, Oral, DAILY PRN, Nicole Al MD    ondansetron (ZOFRAN ODT) tablet 4 mg, 4 mg, Oral, Q8H PRN **OR** ondansetron (ZOFRAN) injection 4 mg, 4 mg, IntraVENous, Q6H PRN, Keith Solano MD    aspirin tablet 325 mg, 325 mg, Oral, DAILY, Keith Solano MD, 325 mg at 12/15/20 1417    atorvastatin (LIPITOR) tablet 40 mg, 40 mg, Oral, QHS, Keith Solano MD, 40 mg at 12/15/20 2221

## 2020-12-16 NOTE — PROGRESS NOTES
CM uploaded initial PT assessment for PLOF for auth.     Will continue to follow for any additional DC planning needs

## 2020-12-16 NOTE — PROGRESS NOTES
PHYSICAL THERAPY TREATMENT  Patient: Tona Urbina (15 y.o. female)  Date: 12/16/2020  Diagnosis: PNA (pneumonia) [J18.9]  Pneumonia [J18.9] <principal problem not specified>  Procedure(s) (LRB):  BILATERAL LEG WOUND BED PREPERATION & APPLICATION OF ALLOGRAFT (Bilateral) 1 Day Post-Op  Precautions:    Chart, physical therapy assessment, plan of care and goals were reviewed. ASSESSMENT   Patient continues with skilled PT services and is progressing towards goals this visit. Pt agitated during session and very particular about certain things and req encouragement to participate with therapy. .Patient was requesting to be cleaned up upon arrival, performed bed mob and pericare prior to OOB mobility. Once seated EOB pt requested to walk to the bathroom with RW and pt performed well with min A x2. Pt demos very kyphotic posture with gait but amb with no LOB during session. Pt improving with mobility however very agitated at times and kept reporting concerns about wrapping on legs from procedure. Despite education that they could not be removed pt continued to insist that they should be removed. Pt uncooperative and agitated frequently during tx today. Will continue to progress towards PT goals. Current Level of Function Impacting Discharge (mobility/balance):  unsteady gait, req assistance for mobility    Other factors to consider for discharge: uncooperative at times with staff. PLAN :  Patient continues to benefit from skilled intervention to address the above impairments. Continue treatment per established plan of care. to address goals.     Recommendation for discharge: (in order for the patient to meet his/her long term goals)  Therapy up to 5 days/week in SNF setting    This discharge recommendation:  Has been made in collaboration with the attending provider and/or case management    IF patient discharges home will need the following DME: to be determined (TBD)       SUBJECTIVE:   Patient stated I want these wraps off my legs    OBJECTIVE DATA SUMMARY:   Critical Behavior:  Neurologic State: Alert  Orientation Level: Oriented to person, Oriented to place, Disoriented to situation  Cognition: Impaired decision making  Safety/Judgement: Decreased insight into deficits  Functional Mobility Training:  Bed Mobility:  Rolling: Minimum assistance  Supine to Sit: Minimum assistance  Sit to Supine: Minimum assistance  Scooting: Minimum assistance        Transfers:  Sit to Stand: Minimum assistance;Assist x2  Stand to Sit: Minimum assistance;Assist x2        Balance:  Sitting: Intact; With support  Sitting - Static: Fair (occasional)  Standing: Impaired; With support  Standing - Static: Constant support;Occasional  Standing - Dynamic : Constant support;Occasional  Ambulation/Gait Training:  Distance (ft): 16 Feet (ft)(8 ft plus 8 ft)  Assistive Device: Gait belt;Walker, rolling  Ambulation - Level of Assistance: Minimal assistance;Assist x2  Speed/Nereyda: Shuffled; Slow  Step Length: Left shortened;Right shortened      Pain Rating: Moderate-severe leg pain at the site of the wounds. Activity Tolerance:   Fair  Please refer to the flowsheet for vital signs taken during this treatment. After treatment patient left in no apparent distress:   Supine in bed and Call bell within reach    COMMUNICATION/COLLABORATION:   The patients plan of care was discussed with: Occupational therapist and Registered nurse. Cotx with OT for increased safety with mobility as well as activity tolerance. Lissett Hagen   Time Calculation: 26 mins         Problem: Mobility Impaired (Adult and Pediatric)  Goal: *Acute Goals and Plan of Care (Insert Text)  Description: Patient will move from supine to sit and sit to supine , scoot up and down, and roll side to side in bed with minimal assistance/contact guard assist within 7 day(s).     Patient will transfer from bed to chair and chair to bed with minimal assistance/contact guard assist using the least restrictive device within 7 day(s). Patient will improve static sitting balance to modified independence within 1 week(s). Patient will ambulate 10 feet with moderate assistance with least restrictive device within 1 weeks.        Outcome: Progressing Towards Goal

## 2020-12-17 VITALS
BODY MASS INDEX: 17.83 KG/M2 | RESPIRATION RATE: 18 BRPM | HEART RATE: 76 BPM | SYSTOLIC BLOOD PRESSURE: 103 MMHG | TEMPERATURE: 97.8 F | HEIGHT: 60 IN | DIASTOLIC BLOOD PRESSURE: 61 MMHG | OXYGEN SATURATION: 99 % | WEIGHT: 90.83 LBS

## 2020-12-17 PROCEDURE — 74011250637 HC RX REV CODE- 250/637: Performed by: INTERNAL MEDICINE

## 2020-12-17 PROCEDURE — 99232 SBSQ HOSP IP/OBS MODERATE 35: CPT | Performed by: INTERNAL MEDICINE

## 2020-12-17 PROCEDURE — 74011250637 HC RX REV CODE- 250/637: Performed by: NURSE PRACTITIONER

## 2020-12-17 PROCEDURE — 74011250637 HC RX REV CODE- 250/637: Performed by: ANESTHESIOLOGY

## 2020-12-17 PROCEDURE — 74011250637 HC RX REV CODE- 250/637: Performed by: FAMILY MEDICINE

## 2020-12-17 RX ADMIN — DICLOXACILLIN SODIUM 500 MG: 250 CAPSULE ORAL at 08:00

## 2020-12-17 RX ADMIN — LEVOTHYROXINE SODIUM 75 MCG: 0.07 TABLET ORAL at 06:35

## 2020-12-17 RX ADMIN — NITROGLYCERIN 0.5 INCH: 20 OINTMENT TOPICAL at 00:05

## 2020-12-17 RX ADMIN — Medication 10 ML: at 00:05

## 2020-12-17 RX ADMIN — NITROGLYCERIN 0.5 INCH: 20 OINTMENT TOPICAL at 06:35

## 2020-12-17 RX ADMIN — ASPIRIN 325 MG ORAL TABLET 325 MG: 325 PILL ORAL at 09:47

## 2020-12-17 NOTE — DISCHARGE SUMMARY
Discharge Summary       PATIENT ID: Soto Recinos  MRN: [id number]   YOB: 1945    DATE OF ADMISSION: 11/25/2020 11:23 AM    DATE OF DISCHARGE:   PRIMARY CARE PROVIDER: Karl Bardales MD     ATTENDING PHYSICIAN: Kristyn Solano  DISCHARGING PROVIDER: Kristyn Solano      CONSULTATIONS: IP CONSULT TO CARDIOLOGY  IP CONSULT TO CARDIOLOGY  IP CONSULT TO INFECTIOUS DISEASES  IP CONSULT TO PODIATRY  IP CONSULT TO PSYCHIATRY  IP CONSULT TO VASCULAR SURGERY  IP CONSULT TO PULMONOLOGY    PROCEDURES/SURGERIES: Procedure(s) with comments:  BILATERAL LEG WOUND BED PREPERATION & APPLICATION OF ALLOGRAFT - [facility]. [id number] or after 1630    ADMITTING DIAGNOSES:    Patient Active Problem List    Diagnosis Date Noted    PNA (pneumonia) 11/25/2020    Pneumonia 11/25/2020       DISCHARGE DIAGNOSES / PLAN:    Acute hypoxemic respiratory failure on room air now  Acute systolic heart failure with ejection fraction of 15 to 20%  Acute cellulitis of bilateral legs/wound culture positive for staph aureus/providentia/Enterobacter/Morganella/Klebsiella  Status post debridement of the both leg wounds  Gram-negative bacteremia with providentia  Sepsis leukocytosis elevated procalcitonin and lactic acid  Community-acquired pneumonia  Poor hygiene  Elevated troponin  Leukocytosis  Hypothyroidism  Anemia s/p tranfusion   Stress test done showing large infarct/declined cardiac cath  Hypotensive secondary to Percocet  Static post debridement of the bilateral leg ulcers by the podiatrist  S/p    Procedure(s):  1. Wound Bed Preparation, Right Leg Ulcer  2. Application of Allograft, Right Leg Ulcer  3. Wound Bed Preparation, Left Leg Ulcer  4. Application of Allograft, Left Leg Ulcer    :         DISCHARGE MEDICATIONS:  Current Discharge Medication List      START taking these medications    Details   !! dicloxacillin (DYNAPEN) 500 mg capsule Take 1 Cap by mouth Before breakfast, lunch, dinner and at bedtime.   Qty: 40 Cap, Refills: 0 cefdinir (OMNICEF) 300 mg capsule Take 1 Cap by mouth every twelve (12) hours. Qty: 14 Cap, Refills: 0      !! albuterol-ipratropium (DUO-NEB) 2.5 mg-0.5 mg/3 ml nebu 3 mL by Nebulization route every six (6) hours as needed for Wheezing. Qty: 30 Nebule, Refills: 1      cefixime (SUPRAX) 200 mg chew chewable tablet Take 2 Tabs by mouth daily. Qty: 14 Tab, Refills: 0      !! albuterol-ipratropium (DUO-NEB) 2.5 mg-0.5 mg/3 ml nebu 3 mL by Nebulization route every six (6) hours as needed for Wheezing. Qty: 30 Nebule, Refills: 1      amiodarone (CORDARONE) 200 mg tablet Take 1 Tab by mouth daily. Qty: 30 Tab, Refills: 1      aspirin (ASPIRIN) 325 mg tablet Take 1 Tab by mouth daily. Qty: 30 Tab, Refills: 1      atorvastatin (LIPITOR) 40 mg tablet Take 1 Tab by mouth nightly. Qty: 30 Tab, Refills: 1      !! dicloxacillin (DYNAPEN) 500 mg capsule Take 1 Cap by mouth Before breakfast, lunch, dinner and at bedtime. Qty: 40 Cap, Refills: 0       !! - Potential duplicate medications found. Please discuss with provider. CONTINUE these medications which have NOT CHANGED    Details   furosemide (LASIX) 40 mg tablet Take 40 mg by mouth daily. potassium chloride (K-DUR, KLOR-CON) 20 mEq tablet Take 20 mEq by mouth daily. omeprazole (PRILOSEC) 20 mg capsule Take 20 mg by mouth daily. levothyroxine (SYNTHROID) 75 mcg tablet Take 75 mcg by mouth daily (before breakfast). Indications: HYPOTHYROIDISM         STOP taking these medications       carvediloL (COREG) 3.125 mg tablet Comments:   Reason for Stopping:         lisinopriL (PRINIVIL, ZESTRIL) 5 mg tablet Comments:   Reason for Stopping:         methocarbamoL (ROBAXIN) 500 mg tablet Comments:   Reason for Stopping:         apixaban (ELIQUIS) 2.5 mg tablet Comments:   Reason for Stopping:                 NOTIFY YOUR PHYSICIAN FOR ANY OF THE FOLLOWING:   Fever over 101 degrees for 24 hours.    Chest pain, shortness of breath, fever, chills, nausea, vomiting, diarrhea, change in mentation, falling, weakness, bleeding. Severe pain or pain not relieved by medications. Or, any other signs or symptoms that you may have questions about. DISPOSITION:  x  Home With:   OT  PT  HH  RN       Long term SNF/Inpatient Rehab    Independent/assisted living    Hospice    Other:       PATIENT CONDITION AT DISCHARGE: Stable      PHYSICAL EXAMINATION AT DISCHARGE:  General:          Alert, cooperative, no distress, appears stated age. HEENT:           Atraumatic, anicteric sclerae, pink conjunctivae                          No oral ulcers, mucosa moist, throat clear, dentition fair  Neck:               Supple, symmetrical  Lungs:             Clear to auscultation bilaterally. No Wheezing or Rhonchi. No rales. Chest wall:      No tenderness  No Accessory muscle use. Heart:              Regular  rhythm,  No  murmur   No edema  Abdomen:        Soft, non-tender. Not distended. Bowel sounds normal  Extremities:     No cyanosis. No clubbing,                            Skin turgor normal, Capillary refill normal  Skin:                Not pale. Not Jaundiced  No rashes   Psych:             Not anxious or agitated. Neurologic:      Alert, moves all extremities, answers questions appropriately and responds to commands     XR CHEST PORT   Final Result      NM INFLAM PROC LTD   Final Result   IMPRESSION: Superficial cellulitis. Doubt underlying osteomyelitis      XR CHEST PA LAT   Final Result   Impression: Underexpanded lungs with bibasilar atelectasis. Small pleural   effusions. Hydrostatic edema. No significant interval change. XR CHEST SNGL V   Final Result   Impression:   Little interval change. XR CHEST PORT   Final Result   Impression: Underexpanded lungs with bibasilar atelectasis, increased. Possible   right pleural effusion. Hydrostatic edema.       XR CHEST PA LAT   Final Result   Impression: The cardiomediastinal silhouette is appropriate for age, technique,   and lung expansion. Pulmonary vasculature is not congested. The lungs are   essentially clear. No effusion or pneumothorax is seen. XR CHEST SNGL V   Final Result           No results found for this or any previous visit (from the past 24 hour(s)).        HOSPITAL COURSE:  Patient is a 66y.o. year old female with past medical history unknown came to the ER ER under medical TDO patient was found by her home health aide on the last visit patient will hypoxic with her oxygen off there was trash around the house approximately 1 week patient in poor hygiene patient had bilateral leg wounds and patient using tissue paper and spraying with water seen by the ER physician chest x-ray shows pneumonia the patient was admitted for further evaluation treatment  History of present illness please refer to history and physical at the time of admission at the patient was admitted because of bilateral leg wounds shortness of breath hypoxemia chest x-ray shows pneumonia further work-up while in the hospital patient was seen by the wound care nurse podiatrist infectious disease pulmonologist and cardiology echo done shows ejection fraction of 15 to 20% acute cellulitis of bilateral leg and wounds positive for staph aureus procidentia Enterobacter Morganella Klebsiella patient have wound debridement chest x-ray shows community-acquired pneumonia patient was treated with IV Zosyn ordered MRI which unable to be done also seen by the psychiatrist patient is alert awake confused patient had blood transfusion during this admission patient have a stress test done shows large infarct cardiac cath was declined gram-negative bacteremia with procidentia patient now more alert awake but confused patient also seen by physical therapy occupational therapy recommend to discharge to SNF care    Patient seen by the podiatrist and patient have incision and drainage of the wound and also had application of allograft on both legs    Discussed with the infectious disease patient can be discharged home on p.o.  Omnicef and dicloxacillin  Patient is not a candidate for anticoagulation  Patient seen by the cardiology patient have several episode of NSVT event continue amiodarone      Medication reconciliation done time to discharge patient 35 minutes 50% time spent in counseling and coronation of care  Wound care nurse PCP and the podiatrist and the cardiologist    Signed:   Maira John MD  12/17/2020  8:55 AM

## 2020-12-17 NOTE — PROGRESS NOTES
Patient is being discharged today to [facility]. Patient is going to wing 2 and nurse can call report to [phone]. CM spoke to patient son Deja Benson at nurse's station regarding discharge plans to SNF.

## 2020-12-17 NOTE — PROGRESS NOTES
Comprehensive Nutrition Assessment    Type and Reason for Visit: Reassess(Goal)    Nutrition Recommendations/Plan:     Continue Diabetic diet    D/c Ensure until stockpile consumed  Continue Bert daily    If discharged, please send unopened supplementation with patient belongings    Nursing to document %meal and supplement intakes in I/Os      Nutrition Assessment:  Brought to ED after found hypoxic and cyanotic at home, noted poor living conditions. Pt confused per MD notes. Dx sepsis, cellulitis, PNA, pyruria. F/u CXR (12/9) showed similar findings of reduced lung volumes, basilar atelectasis. Bilat LE stasis wounds present on admit- WCN and Podiatry following, noted wounds concerning for gangrene. Per ID, wound cx + Gram negative bacteremia. S/p I&D LE (12/4) and repeat debridement (12/8). Cardio following for acute CHF. Now weaned to RA. MD indicated stress test showed large infarct, pt refused cardiac cath. Psych initially deemed pt competent to make decisions, however now deemed incompetent on f/u. Placement secured, discharge planning in progress, possibly today. Previously ordered Cardiac diet w/ Soft solids, now on Diabetic diet. Recent intakes 50% of meals and supplementation. Previous intakes ranging 60-90% over past few days, with intake of Ensure Enlive 50-75%. Pt very tearful today, requesting to go home. Stated desire to leave interfering with appetite. Took one sip of Ensure for provider however declined to continue. RN reported pt consumed 25% of breakfast and 50% of lunch meal. Plans to encourage supplements later. On previous visit, pt reported liking supplementation, unable to detail foods eaten d/t confusion. Appetite primarily good throughout admit per pt, RN, EMR review. Good acceptance of Ensure. While NPO for procedures, pt reports hunger cues and frequently requests to eat. Previous intakes per EMR ranging 40-90% consistently. Pt remains a limited historian.  On initial visit, pt appeared to have good appetite, eating during interview. Labs reviewed. Meds: Statin, lasix. Malnutrition Assessment:  Malnutrition Status: Moderate malnutrition    Context:  Chronic illness     Findings of the 6 clinical characteristics of malnutrition:   Energy Intake:  No significant decrease in energy intake  Weight Loss:  7.0 - Greater than 7.5% over 3 months(>9% x 3-4 months)     Body Fat Loss:  No significant body fat loss,     Muscle Mass Loss:  1 - Mild muscle mass loss, Clavicles (pectoralis &deltoids)  Fluid Accumulation:  No significant fluid accumulation,        Estimated Daily Nutrient Needs:  Energy (kcal): 1300kcal (32 kcal/kg EDW); Weight Used for Energy Requirements: (EDW)  Protein (g): 53 g pro (1.3g/kg); Weight Used for Protein Requirements: (EDW)  Fluid (ml/day): 1300mL; Method Used for Fluid Requirements: 1 ml/kcal      Nutrition Related Findings: Will f/u for NFPE as able. Legs remain wrapped. On visual assessment, noted muscle wasting to deltoid and clavicle. Pt denied c/s difficulty n/v or c/d. Loose smear 12/15 per EMR. Edema improving, non-pitting bilat LE edema. Wounds:    Stage III, Surgical incision, Venous stasis(bilat LE wounds s/p surgical debridements; St 3 to sacrum)       Current Nutrition Therapies:  DIET NUTRITIONAL SUPPLEMENTS Breakfast, Lunch, Dinner; Ensure [employer] (strawberry)  DIET NUTRITIONAL SUPPLEMENTS Lunch; Bert (alt flavor)  DIET DIABETIC CONSISTENT CARB Regular    Anthropometric Measures:  · Height:  5' (152.4 cm)  · Current Body Wt:  40.8 kg (90 lb)(EDW)   · Admission Body Wt:  91 lb 7.9 oz(bed scale 11/26; noted 3+pitting edema present)    · Usual Body Wt:  50.3 kg (111 lb)(Pt reported weight from 'several months ago' at PCP office)     · Ideal Body Wt:  100 lbs:  90 %   · BMI Category:  Normal weight (BMI 18.5-24. 9)     Wt hx: 42.5kg (12/11), 42.6kg (12/9), 41.7kg (12/8), 45.3kg (12/7), 46.6kg (12/6), 50kg (12/2), 41.2kg (12/1), 41.5kg (11/26).   Several wt fluctuations noted since admit likely 2/2 fluid shifts vs error in bed measure. Pt appears closer to admit wt.   Pt reported UBW of 111lbs several months ago - indicating 20lb wt loss (>9%BW) in estimated 3-4 months - significant.     Nutrition Diagnosis:   · Underweight related to (suboptimal PO intakes to maintain appropriate BW) as evidenced by BMI, weight loss      Nutrition Interventions:   Food and/or Nutrient Delivery: Continue current diet, Discontinue oral nutrition supplement(until able to utilize current stockpile)  Nutrition Education and Counseling: No recommendations at this time  Coordination of Nutrition Care: Continue to monitor while inpatient    Goals:  Meet >75% EENs x 5-7 days (met)  Wt gain 1kg/week from EDW (NEDA)  Lytes and BG WNL (met)       Nutrition Monitoring and Evaluation:   Behavioral-Environmental Outcomes: None identified  Food/Nutrient Intake Outcomes: Food and nutrient intake, Supplement intake  Physical Signs/Symptoms Outcomes: GI status, Weight, Skin    Discharge Planning:    Continue oral nutrition supplement(to promote wound healing)     Electronically signed by Tracie Villa on 12/17/2020 at 3:16 PM    Contact: [phone] [phone]

## 2020-12-17 NOTE — PROGRESS NOTES
Progress Note    Patient: Bar Hager MRN: [id number]  SSN: [id number]    YOB: 1945  Age: 66 y.o. Sex: female      Admit Date: 11/25/2020    LOS: 22 days     Subjective:   Patient followed for sepsis with cellulitis both legs with blood cultures growing Providencia and wound culture with mixed Gram negative nica identified below and MSSA. Now on Omnicef and Dicloxacillin after losing IV access. She remains afebrile with normal WBC and decreasing CRP. Patient resting comfortably and still asking when she is going home. No complaints. Objective:     Vitals:    12/16/20 1611 12/16/20 2023 12/17/20 0006 12/17/20 0632   BP: 108/66 112/63 136/60 113/70   Pulse: 67 85 64 70   Resp: 18 18 18    Temp: 98.1 °F (36.7 °C) 98.5 °F (36.9 °C) 98.3 °F (36.8 °C)    SpO2: 90% 91% 99%    Weight:    90 lb 13.3 oz (41.2 kg)   Height:            Intake and Output:  Current Shift: No intake/output data recorded. Last three shifts: 12/15 1901 - 12/17 0700  In: 480 [P.O.:480]  Out: 350 [Urine:350]    Physical Exam:   Constitutional:       General: She is not in acute distress. Appearance: She is chronically ill-appearing. HENT:      Head: Normocephalic and atraumatic. Nose:      Comments: No nasal O2 cannula  Lungs: clear bilaterally but diminished  Genitourinary:     Comments: Purewick catheter  Musculoskeletal:         General: Swelling present. No tenderness. Right lower leg: Edema present. Left lower leg: Edema present. Comments:  Both calves bandaged and not disturbed today  Neurological: nonfocal, mild confusion   Psychiatric: cooperative     Lab/Data Review:     WBC 7,300  Procalcitonin <0.05 <0.11 <0.10 <0.69 <1.19  CRP 0.85 <1.16 <1.67 < 1.14 <0.90 < 1.24 <1.28 <1.98  <3.04 <9.26 <16.00    Covid-19 negative    Blood cultures (11/26) Providenica rettgeri  Blood cultures (11/26) Providenica rettgeri  Urine culture (11/26) No growth FiNAL  Wound culture (11/27)  MSSA, Klebsiella oxytoca, Providencia rettgeri, Morganella morganii (2 strains), Myroides species, Enterobacter cloacae  Wound culture left leg (12/4)  MSSA, Enterobacter cloacae, Morganella morganii  Wound culture left leg (12/4)  MSSA, Enterobacter cloacae x 2, Morganella morganii      Assessment:     Active Problems:    PNA (pneumonia) (11/25/2020)      Pneumonia (11/25/2020)    1. Cellulitis, bilateral lower extremities, severe, secondary to MSSA, Klebsiella oxytoca, Providencia rettgeri, Morganella morganii (2 strains), Myroides species, Enterobacter cloacae, Day #21 Antibiotics, now Cefdinir and Dicloxacillin, status post debridement, skin graft. 2. Gram negative bacteremia with Providencia rettgeri, probably secondary to above, status post IV Zosyn. 3. Sepsis with leukocytosis, elevated procalcitonin, CRP and lactic acid, resolving. 4. SOB  5. Pyuria, negative urine culture    Comment:  CRP still decreasing and WBC returned to normal.     Plan:   1. Continue Omnicef 300 mg BID and Dicloxacillin 500 mg QID for 1 more week  2. Cleared for discharge from ID standpoint   3. Follow-up with Podiatry as scheduled  4.  Follow-up with ID Clinic as needed    Signed By: Eloisa Murphy MD     December 17, 2020

## 2020-12-17 NOTE — PROGRESS NOTES
Report called to [facility] and given directly to Logan Mckeon LPN. Discharge Summary and Summary of Care Report printed and placed in transport discharge packet. Peripheral IV removed by nurse tech. Adult brief

## 2020-12-18 NOTE — PROGRESS NOTES
Discharge plan of care/case management plan validated with provider discharge order. Report called to [facility] [place] and given directly to Saatchi Art, LPN. DC summary and summary of care printed and placed in transport packet. IV removed by tech- catheter tip intact. Absorbent underpads changed prior to transport taking patient.

## 2021-01-21 ENCOUNTER — VIRTUAL VISIT (OUTPATIENT)
Dept: PODIATRY | Age: 76
End: 2021-01-21
Payer: MEDICARE

## 2021-01-21 DIAGNOSIS — L97.913 NON-PRESSURE CHRONIC ULCER OF RIGHT LOWER LEG WITH NECROSIS OF MUSCLE (HCC): ICD-10-CM

## 2021-01-21 DIAGNOSIS — L97.923 NON-PRESSURE CHRONIC ULCER OF LEFT LOWER LEG WITH NECROSIS OF MUSCLE (HCC): Primary | ICD-10-CM

## 2021-01-21 PROCEDURE — 99213 OFFICE O/P EST LOW 20 MIN: CPT | Performed by: PODIATRIST

## 2021-01-29 PROBLEM — L97.923 NON-PRESSURE CHRONIC ULCER OF LEFT LOWER LEG WITH NECROSIS OF MUSCLE (HCC): Status: ACTIVE | Noted: 2021-01-29

## 2021-01-29 PROBLEM — L97.913 NON-PRESSURE CHRONIC ULCER OF RIGHT LOWER LEG WITH NECROSIS OF MUSCLE (HCC): Status: ACTIVE | Noted: 2021-01-29

## 2021-01-29 NOTE — PROGRESS NOTES
[facility]    Subjective:         Patient is a 66 y.o. female who is being seen as a returning patient for wound care to her bilateral lower extremities. Patient is a poor historian due to dementia. The HPI is provided by her nurse practitioner providing care at her long-term facility. She states that Ms. Shahzad Mata is noncompliant with her dressings and removes them within 5 to 10 minutes of wound care being performed. Ms. Shahzad Mata states that she does not have any wounds. The nurse practitioner states that she does not complain of any pain. She denies any recent trauma. She denies any systemic signs of infection. She denies any other lotion with complaints    No past medical history on file. No past surgical history on file. No family history on file. Social History     Tobacco Use    Smoking status: Not on file   Substance Use Topics    Alcohol use: Not on file     Allergies   Allergen Reactions    Tetracycline Other (comments)     Causes mouth sores    Morphine Nausea and Vomiting     Prior to Admission medications    Medication Sig Start Date End Date Taking? Authorizing Provider   dicloxacillin (DYNAPEN) 500 mg capsule Take 1 Cap by mouth Before breakfast, lunch, dinner and at bedtime. 12/16/20   Venancio Solano MD   cefdinir (OMNICEF) 300 mg capsule Take 1 Cap by mouth every twelve (12) hours. 12/16/20   Venancio Solano MD   albuterol-ipratropium (DUO-NEB) 2.5 mg-0.5 mg/3 ml nebu 3 mL by Nebulization route every six (6) hours as needed for Wheezing. 12/14/20   Venancio Solano MD   cefixime (SUPRAX) 200 mg chew chewable tablet Take 2 Tabs by mouth daily. 12/14/20   Venancio Solano MD   albuterol-ipratropium (DUO-NEB) 2.5 mg-0.5 mg/3 ml nebu 3 mL by Nebulization route every six (6) hours as needed for Wheezing. 12/10/20   Venancio Solano MD   amiodarone (CORDARONE) 200 mg tablet Take 1 Tab by mouth daily.  12/11/20   Venancio Solano MD   aspirin (ASPIRIN) 325 mg tablet Take 1 Tab by mouth daily. 12/11/20   Abhilash Solano MD   atorvastatin (LIPITOR) 40 mg tablet Take 1 Tab by mouth nightly. 12/10/20   Abhilash Solano MD   dicloxacillin [facility]) 500 mg capsule Take 1 Cap by mouth Before breakfast, lunch, dinner and at bedtime. 12/10/20   Abhilash Solano MD   furosemide (LASIX) 40 mg tablet Take 40 mg by mouth daily. Provider, Historical   potassium chloride (K-DUR, KLOR-CON) 20 mEq tablet Take 20 mEq by mouth daily. Provider, Historical   omeprazole (PRILOSEC) 20 mg capsule Take 20 mg by mouth daily. Provider, Historical   levothyroxine (SYNTHROID) 75 mcg tablet Take 75 mcg by mouth daily (before breakfast). Indications: HYPOTHYROIDISM    Provider, Historical       Review of Systems   Unable to perform ROS: Dementia       Objective: There were no vitals taken for this visit. Physical Exam  Vitals signs reviewed. Constitutional:       Appearance: She is well-developed. Pulmonary:      Effort: Pulmonary effort is normal.   Musculoskeletal:      Right lower leg: Edema present. Left lower leg: Edema present. Right foot: Decreased range of motion. No deformity or bunion. Left foot: Decreased range of motion. No deformity or bunion. Feet:      Right foot:      Skin integrity: Ulcer present. Left foot:      Skin integrity: Ulcer present. Skin:     Coloration: Skin is not ashen or cyanotic. Comments: Large ulcers are noted to the anterior aspect of bilateral legs. Wound bases appear granular with mild periwound erythema. No purulence noted   Neurological:      Mental Status: She is alert. Mental status is at baseline. Psychiatric:         Mood and Affect: Mood is anxious. Affect is inappropriate. Behavior: Behavior is uncooperative. Data Review: No results found for this or any previous visit (from the past 24 hour(s)).       Impression:       ICD-10-CM ICD-9-CM    1. 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 Non-pressure chronic ulcer of right lower leg with necrosis of muscle ([facility].)  L97.913 707.10          Recommendation:     Patient seen and evaluated virtually  Discussed current medical condition with providing nurse practitioner. Updated wound care orders given  Instructed the nurse practitioner that she will most likely need additional graft applications in the near future. We will schedule for an office evaluation and possible surgical planning      This visit was performed virtually with audio and visual capabilities via doxy. me

## 2021-02-04 ENCOUNTER — HOSPITAL ENCOUNTER (INPATIENT)
Age: 76
LOS: 6 days | Discharge: NURSING, REHAB OR LONG-TERM CARE | DRG: 602 | End: 2021-02-11
Attending: INTERNAL MEDICINE | Admitting: INTERNAL MEDICINE
Payer: MEDICARE

## 2021-02-04 DIAGNOSIS — L03.116 BILATERAL CELLULITIS OF LOWER LEG: Primary | ICD-10-CM

## 2021-02-04 DIAGNOSIS — L03.90 WOUND CELLULITIS: ICD-10-CM

## 2021-02-04 DIAGNOSIS — L03.115 BILATERAL CELLULITIS OF LOWER LEG: Primary | ICD-10-CM

## 2021-02-04 DIAGNOSIS — E87.70 HYPERVOLEMIA, UNSPECIFIED HYPERVOLEMIA TYPE: ICD-10-CM

## 2021-02-04 PROCEDURE — 81001 URINALYSIS AUTO W/SCOPE: CPT

## 2021-02-04 PROCEDURE — 99285 EMERGENCY DEPT VISIT HI MDM: CPT

## 2021-02-04 PROCEDURE — 87086 URINE CULTURE/COLONY COUNT: CPT

## 2021-02-04 RX ORDER — LORAZEPAM 1 MG/1
1 TABLET ORAL
Status: COMPLETED | OUTPATIENT
Start: 2021-02-04 | End: 2021-02-05

## 2021-02-04 RX ORDER — LORAZEPAM 2 MG/ML
2 INJECTION INTRAMUSCULAR
Status: DISPENSED | OUTPATIENT
Start: 2021-02-04 | End: 2021-02-05

## 2021-02-05 ENCOUNTER — APPOINTMENT (OUTPATIENT)
Dept: GENERAL RADIOLOGY | Age: 76
DRG: 602 | End: 2021-02-05
Attending: INTERNAL MEDICINE
Payer: MEDICARE

## 2021-02-05 PROBLEM — L03.116 BILATERAL CELLULITIS OF LOWER LEG: Status: ACTIVE | Noted: 2021-02-05

## 2021-02-05 PROBLEM — L03.90 WOUND CELLULITIS: Status: ACTIVE | Noted: 2021-02-05

## 2021-02-05 PROBLEM — L03.115 BILATERAL CELLULITIS OF LOWER LEG: Status: ACTIVE | Noted: 2021-02-05

## 2021-02-05 LAB
ALBUMIN SERPL-MCNC: 2.2 G/DL (ref 3.5–5)
ALBUMIN/GLOB SERPL: 0.5 {RATIO} (ref 1.1–2.2)
ALP SERPL-CCNC: 730 U/L (ref 45–117)
ALT SERPL-CCNC: 73 U/L (ref 12–78)
ANION GAP SERPL CALC-SCNC: 4 MMOL/L (ref 5–15)
APPEARANCE UR: CLEAR
AST SERPL W P-5'-P-CCNC: 74 U/L (ref 15–37)
BACTERIA URNS QL MICRO: NEGATIVE /HPF
BASOPHILS # BLD: 0 K/UL (ref 0–0.1)
BASOPHILS NFR BLD: 0 % (ref 0–1)
BILIRUB SERPL-MCNC: 0.3 MG/DL (ref 0.2–1)
BILIRUB UR QL: NEGATIVE
BNP SERPL-MCNC: 8196 PG/ML
BUN SERPL-MCNC: 21 MG/DL (ref 6–20)
BUN/CREAT SERPL: 38 (ref 12–20)
CA-I BLD-MCNC: 8.3 MG/DL (ref 8.5–10.1)
CHLORIDE SERPL-SCNC: 106 MMOL/L (ref 97–108)
CO2 SERPL-SCNC: 30 MMOL/L (ref 21–32)
COLOR UR: ABNORMAL
CREAT SERPL-MCNC: 0.56 MG/DL (ref 0.55–1.02)
DIFFERENTIAL METHOD BLD: ABNORMAL
EOSINOPHIL # BLD: 0.1 K/UL (ref 0–0.4)
EOSINOPHIL NFR BLD: 1 % (ref 0–7)
ERYTHROCYTE [DISTWIDTH] IN BLOOD BY AUTOMATED COUNT: 16.4 % (ref 11.5–14.5)
GLOBULIN SER CALC-MCNC: 4.3 G/DL (ref 2–4)
GLUCOSE SERPL-MCNC: 117 MG/DL (ref 65–100)
GLUCOSE UR STRIP.AUTO-MCNC: NEGATIVE MG/DL
HCT VFR BLD AUTO: 30 % (ref 35–47)
HGB BLD-MCNC: 8.8 G/DL (ref 11.5–16)
HGB UR QL STRIP: NEGATIVE
IMM GRANULOCYTES # BLD AUTO: 0 K/UL (ref 0–0.04)
IMM GRANULOCYTES NFR BLD AUTO: 0 % (ref 0–0.5)
KETONES UR QL STRIP.AUTO: NEGATIVE MG/DL
LACTATE SERPL-SCNC: 1.2 MMOL/L (ref 0.4–2)
LEUKOCYTE ESTERASE UR QL STRIP.AUTO: NEGATIVE
LYMPHOCYTES # BLD: 0.8 K/UL (ref 0.8–3.5)
LYMPHOCYTES NFR BLD: 7 % (ref 12–49)
MCH RBC QN AUTO: 25.7 PG (ref 26–34)
MCHC RBC AUTO-ENTMCNC: 29.3 G/DL (ref 30–36.5)
MCV RBC AUTO: 87.7 FL (ref 80–99)
MONOCYTES # BLD: 0.9 K/UL (ref 0–1)
MONOCYTES NFR BLD: 8 % (ref 5–13)
MUCOUS THREADS URNS QL MICRO: ABNORMAL /LPF
NEUTS SEG # BLD: 9.1 K/UL (ref 1.8–8)
NEUTS SEG NFR BLD: 84 % (ref 32–75)
NITRITE UR QL STRIP.AUTO: NEGATIVE
PH UR STRIP: 5 [PH] (ref 5–8)
PLATELET # BLD AUTO: 490 K/UL (ref 150–400)
PMV BLD AUTO: 9.1 FL (ref 8.9–12.9)
POTASSIUM SERPL-SCNC: 3.8 MMOL/L (ref 3.5–5.1)
PROT SERPL-MCNC: 6.5 G/DL (ref 6.4–8.2)
PROT UR STRIP-MCNC: 30 MG/DL
RBC # BLD AUTO: 3.42 M/UL (ref 3.8–5.2)
RBC #/AREA URNS HPF: ABNORMAL /HPF (ref 0–5)
SARS-COV-2, COV2: NORMAL
SODIUM SERPL-SCNC: 140 MMOL/L (ref 136–145)
SP GR UR REFRACTOMETRY: 1.03 (ref 1–1.03)
UA: UC IF INDICATED,UAUC: ABNORMAL
UROBILINOGEN UR QL STRIP.AUTO: 2 EU/DL (ref 0.1–1)
WBC # BLD AUTO: 10.9 K/UL (ref 3.6–11)
WBC URNS QL MICRO: ABNORMAL /HPF (ref 0–4)

## 2021-02-05 PROCEDURE — 71045 X-RAY EXAM CHEST 1 VIEW: CPT

## 2021-02-05 PROCEDURE — 80053 COMPREHEN METABOLIC PANEL: CPT

## 2021-02-05 PROCEDURE — 65270000029 HC RM PRIVATE

## 2021-02-05 PROCEDURE — 87077 CULTURE AEROBIC IDENTIFY: CPT

## 2021-02-05 PROCEDURE — U0003 INFECTIOUS AGENT DETECTION BY NUCLEIC ACID (DNA OR RNA); SEVERE ACUTE RESPIRATORY SYNDROME CORONAVIRUS 2 (SARS-COV-2) (CORONAVIRUS DISEASE [COVID-19]), AMPLIFIED PROBE TECHNIQUE, MAKING USE OF HIGH THROUGHPUT TECHNOLOGIES AS DESCRIBED BY CMS-2020-01-R: HCPCS

## 2021-02-05 PROCEDURE — 74011250636 HC RX REV CODE- 250/636: Performed by: INTERNAL MEDICINE

## 2021-02-05 PROCEDURE — 74011250636 HC RX REV CODE- 250/636: Performed by: NURSE PRACTITIONER

## 2021-02-05 PROCEDURE — 87147 CULTURE TYPE IMMUNOLOGIC: CPT

## 2021-02-05 PROCEDURE — 74011250637 HC RX REV CODE- 250/637: Performed by: NURSE PRACTITIONER

## 2021-02-05 PROCEDURE — 83605 ASSAY OF LACTIC ACID: CPT

## 2021-02-05 PROCEDURE — 87205 SMEAR GRAM STAIN: CPT

## 2021-02-05 PROCEDURE — 87040 BLOOD CULTURE FOR BACTERIA: CPT

## 2021-02-05 PROCEDURE — 87186 SC STD MICRODIL/AGAR DIL: CPT

## 2021-02-05 PROCEDURE — 36569 INSJ PICC 5 YR+ W/O IMAGING: CPT

## 2021-02-05 PROCEDURE — 74011000258 HC RX REV CODE- 258: Performed by: NURSE PRACTITIONER

## 2021-02-05 PROCEDURE — 74011000258 HC RX REV CODE- 258: Performed by: INTERNAL MEDICINE

## 2021-02-05 PROCEDURE — 83880 ASSAY OF NATRIURETIC PEPTIDE: CPT

## 2021-02-05 PROCEDURE — 02HV33Z INSERTION OF INFUSION DEVICE INTO SUPERIOR VENA CAVA, PERCUTANEOUS APPROACH: ICD-10-PCS | Performed by: INTERNAL MEDICINE

## 2021-02-05 PROCEDURE — 85025 COMPLETE CBC W/AUTO DIFF WBC: CPT

## 2021-02-05 RX ORDER — ZINC SULFATE 50(220)MG
1 CAPSULE ORAL DAILY
Status: DISCONTINUED | OUTPATIENT
Start: 2021-02-06 | End: 2021-02-11 | Stop reason: HOSPADM

## 2021-02-05 RX ORDER — ASCORBIC ACID 500 MG
500 TABLET ORAL DAILY
Status: DISCONTINUED | OUTPATIENT
Start: 2021-02-06 | End: 2021-02-11 | Stop reason: HOSPADM

## 2021-02-05 RX ORDER — HEPARIN SODIUM 5000 [USP'U]/ML
5000 INJECTION, SOLUTION INTRAVENOUS; SUBCUTANEOUS EVERY 12 HOURS
Status: DISCONTINUED | OUTPATIENT
Start: 2021-02-05 | End: 2021-02-11 | Stop reason: HOSPADM

## 2021-02-05 RX ORDER — HEPARIN SODIUM 10000 [USP'U]/ML
5000 INJECTION, SOLUTION INTRAVENOUS; SUBCUTANEOUS EVERY 12 HOURS
Status: DISCONTINUED | OUTPATIENT
Start: 2021-02-05 | End: 2021-02-05 | Stop reason: DRUGHIGH

## 2021-02-05 RX ORDER — HALOPERIDOL 5 MG/ML
3 INJECTION INTRAMUSCULAR
Status: DISCONTINUED | OUTPATIENT
Start: 2021-02-05 | End: 2021-02-05

## 2021-02-05 RX ORDER — ACETAMINOPHEN 500 MG
500 TABLET ORAL
Status: DISCONTINUED | OUTPATIENT
Start: 2021-02-05 | End: 2021-02-11 | Stop reason: HOSPADM

## 2021-02-05 RX ORDER — FUROSEMIDE 10 MG/ML
20 INJECTION INTRAMUSCULAR; INTRAVENOUS
Status: COMPLETED | OUTPATIENT
Start: 2021-02-05 | End: 2021-02-05

## 2021-02-05 RX ADMIN — FUROSEMIDE 20 MG: 10 INJECTION, SOLUTION INTRAMUSCULAR; INTRAVENOUS at 05:09

## 2021-02-05 RX ADMIN — HEPARIN SODIUM 5000 UNITS: 5000 INJECTION INTRAVENOUS; SUBCUTANEOUS at 18:36

## 2021-02-05 RX ADMIN — PIPERACILLIN AND TAZOBACTAM 3.38 G: 3; .375 INJECTION, POWDER, LYOPHILIZED, FOR SOLUTION INTRAVENOUS at 18:38

## 2021-02-05 RX ADMIN — PIPERACILLIN AND TAZOBACTAM 3.38 G: 3; .375 INJECTION, POWDER, LYOPHILIZED, FOR SOLUTION INTRAVENOUS at 05:09

## 2021-02-05 RX ADMIN — LORAZEPAM 1 MG: 1 TABLET ORAL at 00:11

## 2021-02-05 NOTE — PROGRESS NOTES
Vancomycin - 02/05/2021    Pharmacy consulted for vancomycin dosing for indication of SSTI. Patient was ordered 1000mg loading dose in ER at 0436 that was never given. Labs and clinical notes reviewed. Will start patient on vancomycin 1000mg q12hr @ 1700 and check level Sunday 02/07 @ 1500. Pharmacist will follow patient daily and make adjustments based on clinical status.

## 2021-02-05 NOTE — PROGRESS NOTES
Reason for Admission:   cellulitis                  RUR Score:                  PCP: First and Last name:     Name of Practice:    Are you a current patient: Yes/No:    Approximate date of last visit:    Can you participate in a virtual visit if needed:     Do you (patient/family) have any concerns for transition/discharge?                    Plan for utilizing home health:       Current Advanced Directive/Advance Care Plan:              Transition of Care Plan:        back

## 2021-02-05 NOTE — ED NOTES
Assumed care of patient. Patient awake and alert; patient oriented with intermittent confusion. No acute distress; no respiratory distress.

## 2021-02-05 NOTE — ED PROVIDER NOTES
EMERGENCY DEPARTMENT HISTORY AND PHYSICAL EXAM      Date: 2/4/2021  Patient Name: Gema Andrews      History of Presenting Illness     Chief Complaint   Patient presents with    Leg Swelling       History Provided By: Patient  And records from facility  HPI: Gema Andrews, 66 y.o. female with a past medical history significant cellulitis to bilateral lower extremities, oxygen dependent on 2 L, hypothyroidism, hyperlipidemia presents to the ED by EMS from [facility] with cc of TDO for cellulitis and wound evalaution. Pt has been refusing care and symptoms are worsening with erythema, edema, warmth, purulent drainage and dry skin to site. Facility had to obtain medical ECO due to pt refusing care. Patient is alert and oriented x3 on exam.  She denies any fever, chills, nausea, vomiting, abdominal pain, cough, shortness of breath, chest pain. There are no other complaints, changes, or physical findings at this time.     PCP: UNKNOWN    Current Facility-Administered Medications   Medication Dose Route Frequency Provider Last Rate Last Admin    haloperidol lactate (HALDOL) injection 3 mg  3 mg IntraMUSCular NOW Abby Calvillo NP        piperacillin-tazobactam (ZOSYN) 3.375 g in 0.9% sodium chloride (MBP/ADV) 100 mL MBP  3.375 g IntraVENous NOW Abby Calvillo  mL/hr at 02/05/21 0509 3.375 g at 02/05/21 0509    vancomycin (VANCOCIN) 1,000 mg in 0.9% sodium chloride 250 mL (VIAL-MATE)  1,000 mg IntraVENous NOW Abby Calvillo NP        LORazepam (ATIVAN) injection 2 mg  2 mg IntraMUSCular NOW Coretta Peñaloza NP   Stopped at 02/04/21 2331       Past History     Past Medical History:  Past Medical History:   Diagnosis Date    Altered mental status     Cognitive communication deficit     Endocrine disease     Hypothyroidism     Pneumonia     Psychiatric disorder     Respiratory failure ([facility] [address]        Past Surgical History:  No past surgical history on file.    Family History:  No family history on file. Social History:  Social History     Tobacco Use    Smoking status: Not on file   Substance Use Topics    Alcohol use: Not on file    Drug use: Not on file       Allergies: Allergies   Allergen Reactions    Morphine Unknown (comments)    Tetracyclines Unknown (comments)         Review of Systems     Review of Systems   Constitutional: Negative for chills and fever. Respiratory: Negative for cough and shortness of breath. 2L of oxygen    Cardiovascular: Positive for leg swelling. Negative for chest pain. Gastrointestinal: Negative for nausea and vomiting. Genitourinary: Negative. Skin: Positive for wound. Psychiatric/Behavioral: Positive for agitation. Physical Exam     Physical Exam  Constitutional:       General: She is not in acute distress. Appearance: Normal appearance. She is not ill-appearing. HENT:      Head: Normocephalic. Mouth/Throat:      Mouth: Mucous membranes are moist.   Eyes:      Extraocular Movements: Extraocular movements intact. Pupils: Pupils are equal, round, and reactive to light. Neck:      Musculoskeletal: Normal range of motion and neck supple. Cardiovascular:      Rate and Rhythm: Normal rate and regular rhythm. Pulses: Normal pulses. Radial pulses are 2+ on the right side and 2+ on the left side. Dorsalis pedis pulses are 2+ on the right side and 2+ on the left side. Heart sounds: Normal heart sounds. Pulmonary:      Effort: Pulmonary effort is normal. No respiratory distress. Breath sounds: No wheezing or rhonchi. Abdominal:      General: Bowel sounds are normal.      Palpations: Abdomen is soft. Musculoskeletal:      Right lower le+ Pitting Edema present. Left lower le+ Pitting Edema present. Skin:     General: Skin is warm. Capillary Refill: Capillary refill takes less than 2 seconds.       Findings: Erythema, rash and wound present. Neurological:      Mental Status: She is alert and oriented to person, place, and time. Lab and Diagnostic Study Results     Labs -     Recent Results (from the past 12 hour(s))   URINALYSIS W/ REFLEX CULTURE    Collection Time: 02/04/21 11:00 PM    Specimen: Urine   Result Value Ref Range    Color Yellow/Straw      Appearance Clear Clear      Specific gravity 1.028 1.003 - 1.030      pH (UA) 5.0 5.0 - 8.0      Protein 30 (A) Negative mg/dL    Glucose Negative Negative mg/dL    Ketone Negative Negative mg/dL    Bilirubin Negative Negative      Blood Negative Negative      Urobilinogen 2.0 (H) 0.1 - 1.0 EU/dL    Nitrites Negative Negative      Leukocyte Esterase Negative Negative      UA:UC IF INDICATED Urine Culture Ordered (A) Culture not indicated by UA result      WBC 0-5 0 - 4 /hpf    RBC 0-5 0 - 5 /hpf    Bacteria Negative Negative /hpf    Mucus 1+ /lpf   BNP    Collection Time: 02/05/21 12:05 AM   Result Value Ref Range    NT pro-BNP 8,196 (H) <450 pg/mL   CBC WITH AUTOMATED DIFF    Collection Time: 02/05/21 12:05 AM   Result Value Ref Range    WBC 10.9 3.6 - 11.0 K/uL    RBC 3.42 (L) 3.80 - 5.20 M/uL    HGB 8.8 (L) 11.5 - 16.0 g/dL    HCT 30.0 (L) 35.0 - 47.0 %    MCV 87.7 80.0 - 99.0 FL    MCH 25.7 (L) 26.0 - 34.0 PG    MCHC 29.3 (L) 30.0 - 36.5 g/dL    RDW 16.4 (H) 11.5 - 14.5 %    PLATELET 572 (H) 031 - 400 K/uL    MPV 9.1 8.9 - 12.9 FL    NEUTROPHILS 84 (H) 32 - 75 %    LYMPHOCYTES 7 (L) 12 - 49 %    MONOCYTES 8 5 - 13 %    EOSINOPHILS 1 0 - 7 %    BASOPHILS 0 0 - 1 %    IMMATURE GRANULOCYTES 0 0.0 - 0.5 %    ABS. NEUTROPHILS 9.1 (H) 1.8 - 8.0 K/UL    ABS. LYMPHOCYTES 0.8 0.8 - 3.5 K/UL    ABS. MONOCYTES 0.9 0.0 - 1.0 K/UL    ABS. EOSINOPHILS 0.1 0.0 - 0.4 K/UL    ABS. BASOPHILS 0.0 0.0 - 0.1 K/UL    ABS. IMM.  GRANS. 0.0 0.00 - 0.04 K/UL    DF AUTOMATED     METABOLIC PANEL, COMPREHENSIVE    Collection Time: 02/05/21 12:05 AM   Result Value Ref Range    Sodium 140 136 - 145 mmol/L Potassium 3.8 3.5 - 5.1 mmol/L    Chloride 106 97 - 108 mmol/L    CO2 30 21 - 32 mmol/L    Anion gap 4 (L) 5 - 15 mmol/L    Glucose 117 (H) 65 - 100 mg/dL    BUN 21 (H) 6 - 20 mg/dL    Creatinine 0.56 0.55 - 1.02 mg/dL    BUN/Creatinine ratio 38 (H) 12 - 20      GFR est AA >60 >60 ml/min/1.73m2    GFR est non-AA >60 >60 ml/min/1.73m2    Calcium 8.3 (L) 8.5 - 10.1 mg/dL    Bilirubin, total 0.3 0.2 - 1.0 mg/dL    AST (SGOT) 74 (H) 15 - 37 U/L    ALT (SGPT) 73 12 - 78 U/L    Alk. phosphatase 730 (H) 45 - 117 U/L    Protein, total 6.5 6.4 - 8.2 g/dL    Albumin 2.2 (L) 3.5 - 5.0 g/dL    Globulin 4.3 (H) 2.0 - 4.0 g/dL    A-G Ratio 0.5 (L) 1.1 - 2.2         Radiologic Studies -   [unfilled]  CT Results  (Last 48 hours)    None        CXR Results  (Last 48 hours)    None          Medical Decision Making and ED Course   - I am the first and primary provider for this patient AND AM THE PRIMARY PROVIDER OF RECORD. - I reviewed the vital signs, available nursing notes, past medical history, past surgical history, family history and social history. - Initial assessment performed. The patients presenting problems have been discussed, and the staff are in agreement with the care plan formulated and outlined with them. I have encouraged them to ask questions as they arise throughout their visit. Vital Signs-Reviewed the patient's vital signs. Patient Vitals for the past 12 hrs:   Temp Pulse Resp BP SpO2   02/05/21 0509  85      02/05/21 0500   18 111/67 98 %   02/05/21 0200   18 112/67 97 %   02/04/21 2130   18 119/73 97 %   02/04/21 1752 98.1 °F (36.7 °C) 80 20 120/70 99 %         Records Reviewed: Nursing noted    The patient presents with leg swelling with a differential diagnosis of cellulitis, DVT, dependent edema, wound infection    ED Course:              Provider Notes (Medical Decision Making):   Patient under medical TDO.   Increased agitation on exam patient given Ativan p.o. with improvement in symptoms. Patient afebrile, not tachycardic, satting 98% with 2 L of oxygen at her baseline. No dyspnea no rales rhonchi or wheezing noted no acute respiratory distress WBC 10.9 UA negative for UTI, still pending lactic acid however low concern for sepsis at this time. Patient started on Zosyn and vancomycin for management of cellulitis, Lasix given IV for elevated BNP and pitting edema to bilateral lower extremities. Site is warm, tender, purulent drainage noted consistent with cellulitis. Patient has been dealing with symptoms chronically however worsening due to refusing treatment at facility. Patient currently compliant at this time advised the need for admission for further management of cellulitis Dr. Claribel Hurtado consulted patient verbalized understanding stable at this time          Consultations:       Consultations: Dr. Claribel Hurtado         Procedures and Critical Care       Performed by: SERGIO Chaidez NP        Disposition     Disposition:    Admitted      DISCHARGE PLAN:  1. There are no discharge medications for this patient. 2.   Follow-up Information    None       3. Return to ED if worse   4. There are no discharge medications for this patient. Diagnosis     Clinical Impression:   1. Bilateral cellulitis of lower leg    2. Wound cellulitis        Attestations:    Melecio Amaya NP    Please note that this dictation was completed with [employer], the computer voice recognition software. Quite often unanticipated grammatical, syntax, homophones, and other interpretive errors are inadvertently transcribed by the computer software. Please disregard these errors. Please excuse any errors that have escaped final proofreading. Thank you.

## 2021-02-05 NOTE — OP NOTES
PICC Placement Note    PRE-PROCEDURE VERIFICATION  Correct Procedure: yes  Correct Site:  yes  Temperature: Temp: 98.1 °F (36.7 °C), Temperature Source: Temp Source: Oral  Recent Labs     02/05/21  0005   BUN 21*   CREA 0.56   *   WBC 10.9     Allergies: Morphine and Tetracyclines  Education materials for PICC Care given to family: no. See Patient Education activity for further details. PICC Booklet placed on chart: yes    PROCEDURE DETAIL  A double lumen PICC line was started for antibiotic therapy. The following documentation is in addition to the PICC properties in the lines/airways flowsheet :  Lot #: [id number]  xylocaine used: yes  Mid-Arm Circumference: 20 (cm)  Internal Catheter Length: 31 (cm)  Internal Catheter Total Length: 31 (cm)  Vein Selection for PICC:right basilic  Central Line Bundle followed yes  Complication Related to Insertion: other: patient had pacer and hard plate in chest preventing visualization of picc tip with 3cg technology    The placement was verified by X-ray: yes. The x-ray results state the tip location is on the bilateral, right, awaiting xray read. Line is okay to use: no not until xray is read.     Gege Buckley, RN

## 2021-02-05 NOTE — H&P
History and Physical (Inpatient)    Patient:    Betty Ruiz   66 y.o. Chief Complaint:   Chief Complaint   Patient presents with    Leg Swelling         History of Present Illness: This is a 73-year-old  female with a history of heart disease status post pacemaker placement resident of nursing home who presents with bilateral lower extremity swelling and infection. She is not much of a historian. She tells me that she has a reaction to her water pill ( Lasix ) it is unclear how long her legs have been like this. She was brought in for further care. She denies any pain or recent trauma. She says she walks and she has not fallen. She denies any chest pain shortness of breath. She denies any fever chills diarrhea. ROS: She denies any chronic headache blurry vision. She denies any URI symptoms. She denies any chest pain palpitation. She is not short of breath at on exertion or lying down. She denies any abdominal pain nausea vomiting. She moves her bowels okay. She has incontinence of urine. She denies any leg pain but she says she has a reaction to her water pill. She said her weight is okay. She sleeps okay. She feels weak. History:  Past Medical History:   Diagnosis Date    Altered mental status     Cognitive communication deficit     Endocrine disease     Hypothyroidism     Pneumonia     Psychiatric disorder     Respiratory failure ([address]    Possible congestive heart failure unknown based on leg swelling and water pill    Past surgical history  She status post pacemaker placement on the right side of the chest.      Relationships   Social connections    Talks on phone: Not on file    Gets together: Not on file    Attends Mu-ism service: Not on file    Active member of club or organization: Not on file    Attends meetings of clubs or organizations: Not on file    Relationship status: Not on file     No family history on file.   Family history unknown      Social history  She is . She lives at the nursing home. She has 2 children. She denies alcohol or tobacco use. She walks with a cane. Allergies: Allergies   Allergen Reactions    Morphine Unknown (comments)    Tetracyclines Unknown (comments)       Current Medications:    Current Facility-Administered Medications:     vancomycin (VANCOCIN) 1,000 mg in 0.9% sodium chloride 250 mL (VIAL-MATE), 1,000 mg, IntraVENous, NOW, Abby Calvillo, NP    piperacillin-tazobactam (ZOSYN) 2.25 g in 0.9% sodium chloride (MBP/ADV) 50 mL MBP, 2.25 g, IntraVENous, Q8H, Rekha Desir MD    heparin porcine injection 5,000 Units, 5,000 Units, SubCUTAneous, Q12H, Rekha Delgadillo MD    acetaminophen (TYLENOL) tablet 500 mg, 500 mg, Oral, Q6H PRN, Joy Barajas MD  No current outpatient medications on file. Physical Exam:  Visit Vitals  /67   Pulse 85   Temp 98.1 °F (36.7 °C)   Resp 18   Ht 5' 4\" (1.626 m)   Wt 50.3 kg (111 lb)   SpO2 98%   BMI 19.05 kg/m²     On examination she is an elderly  female lying in bed appears comfortable in no respiratory distress. HEENT normocephalic atraumatic anicteric sclera mild conjunctiva pallor. Her mucosa dry. Multiple dental work. No mucosal lesion. Neck without any distended neck vein or adenopathy. Lungs clear anteriorly bilaterally no coarse crackles or wheeze. Chest wall pain. Heart S1-S2 regular. Abdomen soft nontender nondistended. Positive bowel sounds.  she is in a diaper. Lower extremities bilateral significant redness from the knee all the way to the ankle area. There is exfoliating dry scaly rash. There is purulent none foul-smelling discharge in the anterior shin large area extensive slightly less than the foot in length and about 6 cm or more in diameter. Neuro alert and oriented x3 follows command moves extremities. Psych cooperative.   Impression this is a 75-year-old  female with history of possible heart failure status post pacemaker placement who presents with bilateral lower extremity necrotizing cellulitis with purulent discharge admitted for further care.    Findings/Diagnosis: Acute bilateral lower extremity necrotizing cellulitis with purulent discharge.  #2 underlying CHF most likely chronic and stable  #3 frailty weakness  #4 anemia most likely chronic  #5 history of hypothyroidism      Laboratory:      Recent Results (from the past 24 hour(s))   URINALYSIS W/ REFLEX CULTURE    Collection Time: 02/04/21 11:00 PM    Specimen: Urine   Result Value Ref Range    Color Yellow/Straw      Appearance Clear Clear      Specific gravity 1.028 1.003 - 1.030      pH (UA) 5.0 5.0 - 8.0      Protein 30 (A) Negative mg/dL    Glucose Negative Negative mg/dL    Ketone Negative Negative mg/dL    Bilirubin Negative Negative      Blood Negative Negative      Urobilinogen 2.0 (H) 0.1 - 1.0 EU/dL    Nitrites Negative Negative      Leukocyte Esterase Negative Negative      UA:UC IF INDICATED Urine Culture Ordered (A) Culture not indicated by UA result      WBC 0-5 0 - 4 /hpf    RBC 0-5 0 - 5 /hpf    Bacteria Negative Negative /hpf    Mucus 1+ /lpf   BNP    Collection Time: 02/05/21 12:05 AM   Result Value Ref Range    NT pro-BNP 8,196 (H) <450 pg/mL   CBC WITH AUTOMATED DIFF    Collection Time: 02/05/21 12:05 AM   Result Value Ref Range    WBC 10.9 3.6 - 11.0 K/uL    RBC 3.42 (L) 3.80 - 5.20 M/uL    HGB 8.8 (L) 11.5 - 16.0 g/dL    HCT 30.0 (L) 35.0 - 47.0 %    MCV 87.7 80.0 - 99.0 FL    MCH 25.7 (L) 26.0 - 34.0 PG    MCHC 29.3 (L) 30.0 - 36.5 g/dL    RDW 16.4 (H) 11.5 - 14.5 %    PLATELET 490 (H) 150 - 400 K/uL    MPV 9.1 8.9 - 12.9 FL    NEUTROPHILS 84 (H) 32 - 75 %    LYMPHOCYTES 7 (L) 12 - 49 %    MONOCYTES 8 5 - 13 %    EOSINOPHILS 1 0 - 7 %    BASOPHILS 0 0 - 1 %    IMMATURE GRANULOCYTES 0 0.0 - 0.5 %    ABS. NEUTROPHILS 9.1 (H) 1.8 - 8.0 K/UL    ABS. LYMPHOCYTES 0.8 0.8 - 3.5 K/UL    ABS. MONOCYTES 0.9 0.0 -  1.0 K/UL    ABS. EOSINOPHILS 0.1 0.0 - 0.4 K/UL    ABS. BASOPHILS 0.0 0.0 - 0.1 K/UL    ABS. IMM. GRANS. 0.0 0.00 - 0.04 K/UL    DF AUTOMATED     METABOLIC PANEL, COMPREHENSIVE    Collection Time: 02/05/21 12:05 AM   Result Value Ref Range    Sodium 140 136 - 145 mmol/L    Potassium 3.8 3.5 - 5.1 mmol/L    Chloride 106 97 - 108 mmol/L    CO2 30 21 - 32 mmol/L    Anion gap 4 (L) 5 - 15 mmol/L    Glucose 117 (H) 65 - 100 mg/dL    BUN 21 (H) 6 - 20 mg/dL    Creatinine 0.56 0.55 - 1.02 mg/dL    BUN/Creatinine ratio 38 (H) 12 - 20      GFR est AA >60 >60 ml/min/1.73m2    GFR est non-AA >60 >60 ml/min/1.73m2    Calcium 8.3 (L) 8.5 - 10.1 mg/dL    Bilirubin, total 0.3 0.2 - 1.0 mg/dL    AST (SGOT) 74 (H) 15 - 37 U/L    ALT (SGPT) 73 12 - 78 U/L    Alk. phosphatase 730 (H) 45 - 117 U/L    Protein, total 6.5 6.4 - 8.2 g/dL    Albumin 2.2 (L) 3.5 - 5.0 g/dL    Globulin 4.3 (H) 2.0 - 4.0 g/dL    A-G Ratio 0.5 (L) 1.1 - 2.2     LACTIC ACID    Collection Time: 02/05/21 12:05 AM   Result Value Ref Range    Lactic acid 1.2 0.4 - 2.0 mmol/L   SARS-COV-2    Collection Time: 02/05/21  5:15 AM   Result Value Ref Range    SARS-CoV-2 Please find results under separate order         No orders to display       Plan of Care/Planned Procedure: Plan admit to the hospital.  Empiric double coverage antibiotics to cover MRSA and Pseudomonas. Pain medication as needed. DVT prophylaxis. Consult wound care and surgery for wound debridement. Send wound culture and blood culture. Matias Sandoval

## 2021-02-05 NOTE — PROGRESS NOTES
Patients picc line makes a course turn at [facility]. Patient repositioned in bed as straight as patient would allow, picc access re-wired and power flushed with 40 cc saline flush in an attempt to reposition the line. I am unable to use 3CG technology because of the patient having a pacemaker and will only be able to repeat the xray to check positioning. If the picc does not straighten out the line should not be used until it can be straighten under fluoroscopy in IR and use a peripheral access in the mean time or be removed.

## 2021-02-06 LAB
BACTERIA SPEC CULT: NORMAL
COLONY COUNT,CNT: NORMAL
SARS-COV-2, COV2NT: NOT DETECTED
SPECIAL REQUESTS,SREQ: NORMAL

## 2021-02-06 PROCEDURE — 74011250637 HC RX REV CODE- 250/637: Performed by: INTERNAL MEDICINE

## 2021-02-06 PROCEDURE — 65270000029 HC RM PRIVATE

## 2021-02-06 PROCEDURE — 74011000258 HC RX REV CODE- 258: Performed by: INTERNAL MEDICINE

## 2021-02-06 PROCEDURE — 74011250636 HC RX REV CODE- 250/636: Performed by: INTERNAL MEDICINE

## 2021-02-06 PROCEDURE — 94760 N-INVAS EAR/PLS OXIMETRY 1: CPT

## 2021-02-06 RX ADMIN — SODIUM CHLORIDE 1000 MG: 9 INJECTION, SOLUTION INTRAVENOUS at 17:57

## 2021-02-06 RX ADMIN — SODIUM CHLORIDE 1000 MG: 9 INJECTION, SOLUTION INTRAVENOUS at 06:00

## 2021-02-06 RX ADMIN — PIPERACILLIN AND TAZOBACTAM 3.38 G: 3; .375 INJECTION, POWDER, LYOPHILIZED, FOR SOLUTION INTRAVENOUS at 20:55

## 2021-02-06 RX ADMIN — PIPERACILLIN AND TAZOBACTAM 3.38 G: 3; .375 INJECTION, POWDER, LYOPHILIZED, FOR SOLUTION INTRAVENOUS at 00:50

## 2021-02-06 RX ADMIN — ZINC SULFATE 220 MG (50 MG) CAPSULE 1 CAPSULE: CAPSULE at 10:49

## 2021-02-06 RX ADMIN — HEPARIN SODIUM 5000 UNITS: 5000 INJECTION INTRAVENOUS; SUBCUTANEOUS at 18:00

## 2021-02-06 RX ADMIN — OXYCODONE HYDROCHLORIDE AND ACETAMINOPHEN 500 MG: 500 TABLET ORAL at 10:49

## 2021-02-06 RX ADMIN — PIPERACILLIN AND TAZOBACTAM 3.38 G: 3; .375 INJECTION, POWDER, LYOPHILIZED, FOR SOLUTION INTRAVENOUS at 18:04

## 2021-02-06 NOTE — ED NOTES
Found pt's IVL and PICC line laying on bedside table. Pt states she had to remove them because she does not need them right now. States \"you put them in the wrong arm. I am right handed I need to use my right hand. Come back in thirty minutes when I finish eating and I'll show you where you can put them\". Instructed pt that she is in the hospital and she needs the IV access to get her meds. She states, \"I know where I am. I don't need them right now\". Attempted to furhter explain to pt but unsuccessful.

## 2021-02-06 NOTE — PROGRESS NOTES
ER contacted and told to have the admitting physician review radiologist xray read and state wether the picc should be used or not.

## 2021-02-06 NOTE — CONSULTS
Consult    NAME: Otis Garcia   :  1945   MRN:  [id number]     Date/Time:  2021 3:12 PM    Patient PCP: UNKNOWN  ________________________________________________________________________      Subjective:   CHIEF COMPLAINT: Patient presented to the emergency room and apparently has cellulitis and infected wound and needs to undergo debridement. HISTORY OF PRESENT ILLNESS: Chart reviewed. Patient has been admitted because of cellulitis and needs to undergo debridement. She has a pacemaker insertion but denied any chest tightness or shortness of breath. Because of these cardiac risk stratification is invited. Past Medical History:   Diagnosis Date    Altered mental status     Cognitive communication deficit     Endocrine disease     Hypothyroidism     Pneumonia     Psychiatric disorder     Respiratory failure ([address]    S/p pacemaker insertion. No past surgical history on file. Allergies   Allergen Reactions    Morphine Unknown (comments)    Tetracyclines Unknown (comments)      Meds:  See below  Social History     Tobacco Use    Smoking status: Not on file   Substance Use Topics    Alcohol use: Not on file   Negative for smoking drinking or drugs. No family history on file. FAMILY HISTORY: Mother  at the age of 76 and she had a some kind of heart condition and father  at the age of 66 and he used to drink very heavily. PERSONAL HISTORY : Patient is  and has 2 children and she is a retired nurse.     REVIEW OF SYSTEMS:     []         Unable to obtain  ROS due to ---   [x]         Total of 12 systems reviewed as follows:    Constitutional: negative fever, positive for fatigue  Eyes:   negative visual changes  ENT:   negative sore throat, tongue or lip swelling  Respiratory:  negative cough, negative dyspnea  Cards:  negative for chest pain, palpitations, positive for lower extremity edema  GI:   negative for nausea, vomiting, diarrhea, and abdominal pain  Genitourinary: negative for frequency, dysuria  Integument:  negative for rash   Hematologic:  negative for easy bruising and gum/nose bleeding  Musculoskel: negative for myalgias, positive for back pain  Neurological:  negative for headaches, dizziness, vertigo, weakness  Behavl/Psych: Patient has a history of behavioral health problem. Pertinent Positives include :    Objective:      Physical Exam:    Last 24hrs VS reviewed since prior progress note. Most recent are:    Visit Vitals  BP (!) 116/59 (BP 1 Location: Left upper arm, BP Patient Position: Sitting)   Pulse 88   Temp 98.1 °F (36.7 °C)   Resp 20   Ht 5' 4\" (1.626 m)   Wt 50.3 kg (111 lb)   SpO2 99%   BMI 19.05 kg/m²     No intake or output data in the 24 hours ending 02/06/21 1512     General Appearance: Well developed, well nourished, alert & oriented x 3,    no acute distress. Ears/Nose/Mouth/Throat: Pupils equal and round, Hearing grossly normal.  Neck: Supple. JVP within normal limits. Carotids good upstrokes, with no bruit. Chest: Lungs clear to auscultation bilaterally. Pacemaker insertion evident  Cardiovascular: Regular rate and rhythm, S1S2 normal, no murmur, rubs, gallops. Abdomen: Soft, non-tender, bowel sounds are active. No organomegaly. Extremities: Lower extremity edema and infected wound and cellulitis. .  Skin: Warm and dry. Neuro: CN II-XII grossly intact, Strength and sensation grossly intact. []         Post-cath site without hematoma, bruit, tenderness, or thrill. Distal pulses intact. Data:      Telemetry:    EKG:  []  No new EKG for review. Prior to Admission medications    Not on File       No results found for this or any previous visit (from the past 24 hour(s)). Assessment:   Patient has a cellulitis and infected wound. History of pacemaker insertion. Severe kyphosis. Patient probably needs to undergo debridement of the wound.   1.         Plan:   I will check echocardiogram.  I will probably arrange pacemaker interrogation. Otherwise continue present care. Wound debridement as per surgeon. Thank you.   1.       []        High complexity decision making was performed    Edie Fritz MD

## 2021-02-06 NOTE — PROGRESS NOTES
PROGRESS NOTE       Subjective: Uneventful night. She is comfortable without any fever or chills. No pain in the legs. Visit Vitals  BP (!) 116/59 (BP 1 Location: Left upper arm, BP Patient Position: Sitting)   Pulse 88   Temp 98.1 °F (36.7 °C)   Resp 20   Ht 5' 4\" (1.626 m)   Wt 50.3 kg (111 lb)   SpO2 99%   BMI 19.05 kg/m²       Current Facility-Administered Medications:     acetaminophen (TYLENOL) tablet 500 mg, 500 mg, Oral, Q6H PRN, Rekha Chapman MD    piperacillin-tazobactam (ZOSYN) 3.375 g in 0.9% sodium chloride (MBP/ADV) 100 mL MBP, 3.375 g, IntraVENous, Q8H, Rekha Desir MD, Last Rate: 25 mL/hr at 02/06/21 0050, 3.375 g at 02/06/21 0050    zinc sulfate (ZINCATE) 220 (50) mg capsule 1 Cap, 1 Cap, Oral, DAILY, Rekha Desir MD, 1 Cap at 02/06/21 1049    ascorbic acid (vitamin C) (VITAMIN C) tablet 500 mg, 500 mg, Oral, DAILY, Rekha Desir MD, 500 mg at 02/06/21 1049    heparin (porcine) injection 5,000 Units, 5,000 Units, SubCUTAneous, Q12H, Bruce MASCORRO MD, 5,000 Units at 02/05/21 1836    Vancomycin dose per pharmacy protocol, , Other, Rx Dosing/Monitoring, Bruce Daly MD    vancomycin (VANCOCIN) 1,000 mg in 0.9% sodium chloride 250 mL (VIAL-MATE), 1,000 mg, IntraVENous, Q12H, Rekha Chapman MD, 1,000 mg at 02/06/21 0600  No current outpatient medications on file. No results found for this or any previous visit (from the past 24 hour(s)). XR CHEST PORT   Final Result   1. PICC line tip projects over SVC. 2. Cardiomegaly with vascular congestion. 3. Underexpanded lungs. 4. No pneumothorax. XR CHEST PORT   Final Result   Right PICC with course curling over the SVC and the tip in unclear position. HEENT: Normocephalic atraumatic. Anicteric sclera.   Neck: No distended neck vein  Lungs: Fine crackles posteriorly probably due to kyphosis  Heart: Regular  Abdomen: Soft nontender nondistended positive bowel sounds  Lower Extremities: Large bilateral lower extremity cellulitis with improvement and skin exfoliation. She might have had bullae formation from chronic leg edema that got infected and ruptured. CNS: Alert and oriented follows command   psych: Cooperative     Assessment: #1  staph aureus and beta-hemolytic strep infection cellulitis of both lower leg  with some improvement  #2 severe kyphosis  #3 history of pacemaker placement  Recent PICC line placement       Plan: Continue antibiotics. Discussed with Dr. Merritt Bella who plans debridement on Monday. Will need long-term wound care, IV antibiotics and possibly skin grafting depending on the progress.   Case management for discharge planning

## 2021-02-07 ENCOUNTER — APPOINTMENT (OUTPATIENT)
Dept: NON INVASIVE DIAGNOSTICS | Age: 76
DRG: 602 | End: 2021-02-07
Attending: INTERNAL MEDICINE
Payer: MEDICARE

## 2021-02-07 LAB
DATE LAST DOSE: ABNORMAL
REPORTED DOSE,DOSE: ABNORMAL UNITS
VANCOMYCIN TROUGH SERPL-MCNC: 17.5 UG/ML (ref 5–10)

## 2021-02-07 PROCEDURE — 94760 N-INVAS EAR/PLS OXIMETRY 1: CPT

## 2021-02-07 PROCEDURE — 65270000029 HC RM PRIVATE

## 2021-02-07 PROCEDURE — 36415 COLL VENOUS BLD VENIPUNCTURE: CPT

## 2021-02-07 PROCEDURE — 74011250636 HC RX REV CODE- 250/636: Performed by: INTERNAL MEDICINE

## 2021-02-07 PROCEDURE — 74011000258 HC RX REV CODE- 258: Performed by: INTERNAL MEDICINE

## 2021-02-07 PROCEDURE — 77010033678 HC OXYGEN DAILY

## 2021-02-07 PROCEDURE — 93306 TTE W/DOPPLER COMPLETE: CPT

## 2021-02-07 PROCEDURE — 74011250637 HC RX REV CODE- 250/637: Performed by: INTERNAL MEDICINE

## 2021-02-07 PROCEDURE — 80202 ASSAY OF VANCOMYCIN: CPT

## 2021-02-07 RX ADMIN — SODIUM CHLORIDE 1000 MG: 9 INJECTION, SOLUTION INTRAVENOUS at 15:37

## 2021-02-07 RX ADMIN — ZINC SULFATE 220 MG (50 MG) CAPSULE 1 CAPSULE: CAPSULE at 10:46

## 2021-02-07 RX ADMIN — PIPERACILLIN AND TAZOBACTAM 3.38 G: 3; .375 INJECTION, POWDER, LYOPHILIZED, FOR SOLUTION INTRAVENOUS at 05:11

## 2021-02-07 RX ADMIN — HEPARIN SODIUM 5000 UNITS: 5000 INJECTION INTRAVENOUS; SUBCUTANEOUS at 15:37

## 2021-02-07 RX ADMIN — HEPARIN SODIUM 5000 UNITS: 5000 INJECTION INTRAVENOUS; SUBCUTANEOUS at 05:10

## 2021-02-07 RX ADMIN — PIPERACILLIN AND TAZOBACTAM 3.38 G: 3; .375 INJECTION, POWDER, LYOPHILIZED, FOR SOLUTION INTRAVENOUS at 14:19

## 2021-02-07 RX ADMIN — SODIUM CHLORIDE 1000 MG: 9 INJECTION, SOLUTION INTRAVENOUS at 05:10

## 2021-02-07 RX ADMIN — PIPERACILLIN AND TAZOBACTAM 3.38 G: 3; .375 INJECTION, POWDER, LYOPHILIZED, FOR SOLUTION INTRAVENOUS at 20:34

## 2021-02-07 RX ADMIN — OXYCODONE HYDROCHLORIDE AND ACETAMINOPHEN 500 MG: 500 TABLET ORAL at 10:46

## 2021-02-07 NOTE — PROGRESS NOTES
PROGRESS NOTE      Subjective: Uneventful night. Patient denies any pain. Appetite is okay. Oxygen saturation lower, nasal cannula oxygen increased. Patient pulled out her PICC line. Visit Vitals  /70 (BP 1 Location: Left upper arm)   Pulse 81   Temp 97.4 °F (36.3 °C)   Resp 24   Ht 5' 4\" (1.626 m)   Wt 50.3 kg (111 lb)   SpO2 94%   BMI 19.05 kg/m²       Current Facility-Administered Medications:     acetaminophen (TYLENOL) tablet 500 mg, 500 mg, Oral, Q6H PRN, Rekha Wilson MD    piperacillin-tazobactam (ZOSYN) 3.375 g in 0.9% sodium chloride (MBP/ADV) 100 mL MBP, 3.375 g, IntraVENous, Q8H, Rekha Desir MD, Last Rate: 25 mL/hr at 02/07/21 0511, 3.375 g at 02/07/21 0511    zinc sulfate (ZINCATE) 220 (50) mg capsule 1 Cap, 1 Cap, Oral, DAILY, Rekha Desir MD, 1 Cap at 02/06/21 1049    ascorbic acid (vitamin C) (VITAMIN C) tablet 500 mg, 500 mg, Oral, DAILY, Rekha Desir MD, 500 mg at 02/06/21 1049    heparin (porcine) injection 5,000 Units, 5,000 Units, SubCUTAneous, Q12H, Pauline MASCORRO MD, 5,000 Units at 02/07/21 0510    Vancomycin dose per pharmacy protocol, , Other, Rx Dosing/Monitoring, Pauline Vicente MD    vancomycin (VANCOCIN) 1,000 mg in 0.9% sodium chloride 250 mL (VIAL-MATE), 1,000 mg, IntraVENous, Q12H, Rekha Wilson MD, 1,000 mg at 02/07/21 0510  No results found for this or any previous visit (from the past 24 hour(s)). XR CHEST PORT   Final Result   1. PICC line tip projects over SVC. 2. Cardiomegaly with vascular congestion. 3. Underexpanded lungs. 4. No pneumothorax. XR CHEST PORT   Final Result   Right PICC with course curling over the SVC and the tip in unclear position.                    HEENT: Normocephalic atraumatic mild conjunctival pallor  Neck: No distended neck vein  Lungs: Basal crackles, severe kyphosis  Heart: Regular  Abdomen: Soft nontender nondistended positive bowel sounds  Lower Extremities: Extensive bilateral necrotizing cellulitis with purulent drainage  CNS: Comfortable alert and oriented to person and place  Psych: Cooperative     Assessment:     #1 staph aureus and beta-hemolytic strep necrotizing cellulitis of both lower leg with some improvement   #2 severe kyphosis   #3 history of pacemaker placement   #4 possible chronic CHF unknown ejection fraction  #5 chronic anemia  Recent PICC line placement and accidental removal by patient       Plan: We will give small dose of Lasix now. Continue IV antibiotic. We will reinsert PICC line before discharge. For wound debridement on Monday. Appreciate consultant input.   Discussed with nurse

## 2021-02-07 NOTE — PROGRESS NOTES
Initial skin assessment completed by myself and Pieter sanchez R.N. . patient has stage 2 to sacrum. And bilateral lower extremities have open, draining, crusty wounds. Patient is refusing and dressings to be applied.

## 2021-02-07 NOTE — PROGRESS NOTES
Spoke with anesthesiologist Dr. Carmela Schulte, we were trying to obtain consent for the debridement procedure/anesthesia; we were unable to reach either son; spoke with patient's sister [facility], whom pt states handles her financial affairs. I have tried several times since this afternoon to reach either son to obtain procedure consent. Will try again before I leave.

## 2021-02-07 NOTE — CONSULTS
[address] [facility] SURGERY CONSULT          Chief Complaint: Bilateral infected leg wounds    History of Present Illness:    Ms. John Miranda is a 66y.o. year old * female presents to [facility] ER with bilateral lower extremity swelling, purulent drainage from her bilateral leg open wounds she had for the past few months. Details unavailable. She is not a good historian. Asked to evaluate the open wounds. Past Medical History:   Past Medical History:   Diagnosis Date    Altered mental status     Cognitive communication deficit     Endocrine disease     Hypothyroidism     Pneumonia     Psychiatric disorder     Respiratory failure ([address]        Past Surgical History: Pacemaker implantation     Allergy:  Allergies   Allergen Reactions    Morphine Unknown (comments)    Tetracyclines Unknown (comments)       Social History:[place] resident       Family History:No family history on file. Current Medications:  Current Facility-Administered Medications:     acetaminophen (TYLENOL) tablet 500 mg, 500 mg, Oral, Q6H PRN, Rekha Delgadillo MD    piperacillin-tazobactam (ZOSYN) 3.375 g in 0.9% sodium chloride (MBP/ADV) 100 mL MBP, 3.375 g, IntraVENous, Q8H, Rekha Desir MD, Last Rate: 25 mL/hr at 02/06/21 1804, 3.375 g at 02/06/21 1804    zinc sulfate (ZINCATE) 220 (50) mg capsule 1 Cap, 1 Cap, Oral, DAILY, Rekha Desir MD, 1 Cap at 02/06/21 1049    ascorbic acid (vitamin C) (VITAMIN C) tablet 500 mg, 500 mg, Oral, DAILY, Rekha Desir MD, 500 mg at 02/06/21 1049    heparin (porcine) injection 5,000 Units, 5,000 Units, SubCUTAneous, Q12H, Elida MASCORRO MD, 5,000 Units at 02/06/21 1800    Vancomycin dose per pharmacy protocol, , Other, Rx Dosing/Monitoring, Elida Leyva MD    vancomycin (VANCOCIN) 1,000 mg in 0.9% sodium chloride 250 mL (VIAL-MATE), 1,000 mg, IntraVENous, Q12H, Rekha Delgadillo MD, 1,000 mg at 02/06/21 1757  No current outpatient medications on file. Immunization History: There is no immunization history on file for this patient. Complete    Review of Systems:     Constitutional:  no fever,  no chills,  no sweats, No weakness, No fatigue, No decreased activity. Eye: No recent visual problem, No icterus, No discharge, No double vision. Ear/Nose/Mouth/Throat: No decreased hearing, No ear pain, No nasal congestion, No sore throat. Respiratory: No shortness of breath, No cough, No sputum production, No hemoptysis, No wheezing, No cyanosis. Cardiovascular: No chest pain, No palpitations, No bradycardia, No tachycardia, No peripheral edema, No syncope. Gastrointestinal: No nausea,  No vomiting, No diarrhea, No constipation, No heartburn,  No abdominal pain. Genitourinary: No dysuria, No hematuria, No change in urine stream, No urethral discharge, No lesions. Hematology/Lymphatics: No bruising tendency, No bleeding tendency, No petechiae, No swollen lymph glands. Endocrine: No excessive thirst, No polyuria, No cold intolerance, No heat intolerance, No excessive hunger. Immunologic: Not immunocompromised, No recurrent fevers, No recurrent infections. Musculoskeletal: No back pain, No neck pain, No joint pain, No muscle pain, No claudication, No decreased range of motion, No trauma. Integumentary: No rash, No pruritus, No abrasions, open wounds bilateral legs  Neurologic: Alert and oriented X4, No abnormal balance, No headache, No confusion, No numbness, No tingling. Psychiatric: No anxiety, No depression, No hussain. Physical Exam:     Vitals & Measurements:     Wt Readings from Last 3 Encounters:   02/04/21 50.3 kg (111 lb)     Temp Readings from Last 3 Encounters:   02/06/21 98.7 °F (37.1 °C)     BP Readings from Last 3 Encounters:   02/06/21 120/76     Pulse Readings from Last 3 Encounters:   02/06/21 77      Ht Readings from Last 3 Encounters:   02/04/21 5' 4\" (1.626 m)          General: well appearing, no acute distress  Head: Normal  Face: Nornal  HEENT: atraumatic, PERRLA, moist mucosa, normal pharynx, normal tonsils and adenoids, normal tongue, no fluid in sinuses  Neck: Trachea midline, no carotid bruit, no masses  Chest: Normal, Pacemaker+  Respiratory: Normal chest wall expansion, CTA B, no r/r/w, no rubs  Cardiovascular: RRR, no m/r/g, Normal S1 and S2  Abdomen: Soft, non tender, non-distended, normal bowel sounds in all quadrants, no hepatosplenomegaly, no tympany. Incision scar:   Genitourinary: No inguinal hernia, normal external gentalia, no renal angle tenderness  Rectal: deferred  Musculoskeletal: normal ROM in upper and lower extremities, No joint swelling. Integumentary: Warm, dry, and pink, with no rash, purpura, or petechia  BLE: Both anterior legs have large open wounds with slough & purulent drainage  Heme/Lymph: No lymphadenopathy, no bruises  Neurological:Cranial Nerves II-XII grossly intact, no gross motor or sensory deficit  Psychiatric: Cooperative with normal mood, affect, and cognition      Laboratory Values: No results found for this or any previous visit (from the past 24 hour(s)). XR CHEST PORT   Final Result   1. PICC line tip projects over SVC. 2. Cardiomegaly with vascular congestion. 3. Underexpanded lungs. 4. No pneumothorax. XR CHEST PORT   Final Result   Right PICC with course curling over the SVC and the tip in unclear position. Assessment:  Problem List Items Addressed This Visit        Other    Wound cellulitis    Bilateral cellulitis of lower leg - Primary      Other Visit Diagnoses     Hypervolemia, unspecified hypervolemia type             BLE infected open wounds with purulent drainage    Plan:    1. Admission  2. Diet: NPO after midnight   3. IV fluids  4. IS  5. Pain medications  6. Antibiotics  7. Nausea medication  8. Labs  9. Consul: Cardiologyt  10. OR on 2/7/2021  11. Procedure/Surgery: Excisional Debridement of bilateral infected leg ulcers  12.  Risk, benefits and complications including bleeding, infection, dvt, pe, mi, stroke, sepsis discussed, questions answered, patient & family clearly understands and agrees with plan. All her questions were answered to her satisfaction. RN was present during entire conversation. Thank you for the consultation & allowing me to participate in the care of this patient.

## 2021-02-07 NOTE — WOUND CARE
Wound Consult Wound care consulted for \"bilateral lower extremity necrotizing cellulitis\" Patient admitted on 2/4/21 with necrotizing cellulitis to lower legs Patient lives at [facility] PAST MEDICAL HISTORY Past Medical History:  
Diagnosis Date  Altered mental status  Cognitive communication deficit  Endocrine disease  Hypothyroidism  Pneumonia  Psychiatric disorder  Respiratory failure ([address] SOCIAL HISTORY Social History Tobacco Use  Smoking status: Former Smoker  Smokeless tobacco: Never Used Substance Use Topics  Alcohol use: Never Frequency: Never  Drug use: Never Diet: NPO at this time before sugery Wt Readings from Last 3 Encounters:  
02/04/21 50.3 kg (111 lb) Visit Vitals /70 (BP 1 Location: Left upper arm) Pulse 81 Temp 97.4 °F (36.3 °C) Resp 24 Ht 5' 4\" (1.626 m) Wt 50.3 kg (111 lb) SpO2 94% BMI 19.05 kg/m² LABS ASSESSMENT 
 
A&O x 3 Last Dustin Score   15 Mobility Patient is independent with mobility in bed Continence Incontinent of urine. Continent of stool PLAN 
 
 Patients lower extremity wounds open to air. Patient states \"they need to be open in the air to heal\". Explained to patient the importance of moist wound healing, the disadvantages of drying a wound out, and risk of further infection by leaving the wounds uncovered. Patient is touching wounds with bare hands. Explained the importance of not touching the open wounds and the risk of spreading infection. Patient still disagrees with the education provided. Patient is non-adherent and unpleasant. Patient agreed to have wounds covered and dressed at this time. Legs and wounds cleansed with chlorhexidine wash patient refusing to have the large amount of dried drainage crust cleaned off intact skin. \"leave it alone, the doctor will clean it later\" Patient has a documented stage 2 to sacrum. Primary nurse said it is a small area. Patient refusing to have area assessed. Due to incontinence, recommend z-guard BID and prn. Please reconsult wound care if area worsens Skin Care & Pressure Relief Recommendations Speciality bed waffle overlay Minimize layers of linen Pads under patient to optimize support surface Turn/reposition approximately every 2 hours Manage incontinence Promote continence; Skin Protective lotion/cream to buttocks and sacrum daily and as needed with incontinence care Offload heels pillows Use only 1 absorbent pad under patient, do not use incontinent briefs/diapers Continue purewick for urine containment Wound Care Recommendations Patient to get surgical debridement by general surgery today or tomorrow. Recommend to cleanse wounds with saline, cover with xeroform, abd pads, and secure with roll gauze until surgery Teaching completed with  
[x] Patient          
[] Family member      
[] Caregiver         
[] Nursing 
[] Other Topics Discussed: moist wound healing, infection prevention, wound care Patient/Caregiver Teaching Level of patient/caregiver understanding able to: [] Indicates understanding       [] Needs reinforcement 
[] Unsuccessful      [] Verbal Understanding 
[] Demonstrated understanding       [x] No evidence of learning 
[] Refused teaching         [] N/A Coretta Kim RN, BSN, [employer]

## 2021-02-07 NOTE — PROGRESS NOTES
Spoke with Jakob, Care Nurse. Patient has removed her PICC placed on 02/05/21. Jakob states PICC will be needed for discharge on long term antibiotics and requests PICC be placed prior to discharge.

## 2021-02-07 NOTE — PROGRESS NOTES
Patient scheduled for surgery today. However in spite of NPO status she was given breakfast.   Also consent from her Children is awaited since patient is unable to sign for herself. Sx will be rescheduled for tomorrow provided consent is available. D/W [facility].

## 2021-02-07 NOTE — PROGRESS NOTES
Progress Note      2/7/2021 4:04 PM  NAME: Sanya Narvaez   MRN:  [id number]   Admit Diagnosis: Bilateral cellulitis of lower leg [L03.116, L03.115]  Wound cellulitis [L03.90]      Subjective:   Chart reviewed. Discussed with the nurse. Patient is supposed to have a debridement of her wound of the leg. She denies any chest pain or shortness of breath. Review of Systems:    Symptom Y/N Comments  Symptom Y/N Comments   Fever/Chills n   Chest Pain     Poor Appetite    Edema y    Cough    Abdominal Pain     Sputum    Joint Pain     SOB/HEREDIA y   Pruritis/Rash     Nausea/vomit     cellulitis and wound of the lower extremities y    Diarrhea         Constipation           Could NOT obtain due to:      Objective:          Physical Exam:    Last 24hrs VS reviewed since prior progress note. Most recent are:    Visit Vitals  BP (!) 141/82 (BP 1 Location: Left upper arm, BP Patient Position: At rest;Supine)   Pulse 86   Temp 98.7 °F (37.1 °C)   Resp 22   Ht 5' 4\" (1.626 m)   Wt 50.3 kg (111 lb)   SpO2 91%   BMI 19.05 kg/m²       Intake/Output Summary (Last 24 hours) at 2/7/2021 1604  Last data filed at 2/7/2021 0052  Gross per 24 hour   Intake 240 ml   Output 300 ml   Net -60 ml        General Appearance: Well developed, well nourished, alert & oriented x 3,    no acute distress. Ears/Nose/Mouth/Throat: Hearing grossly normal.  Neck: Supple. Chest: Lungs clear to auscultation bilaterally. Cardiovascular: Regular rate and rhythm, S1,S2 normal, no murmur. Abdomen: Soft, non-tender, bowel sounds are active. Extremities: No edema bilaterally. Skin: Warm and dry. []         Post-cath site without hematoma, bruit, tenderness, or thrill. Distal pulses intact.     PMH/SH reviewed - no change compared to H&P    Data Review    Telemetry: normal sinus rhythm     EKG:   []  No new EKG for review    Lab Data Personally Reviewed:    Recent Labs     02/05/21  0005   WBC 10.9   HGB 8.8*   HCT 30.0*   *     No results for input(s): INR, PTP, APTT, INREXT in the last 72 hours. Recent Labs     02/05/21  0005      K 3.8      CO2 30   BUN 21*   CREA 0.56   *   CA 8.3*     No results for input(s): CPK, CKNDX, TROIQ in the last 72 hours. No lab exists for component: CPKMB  No results found for: CHOL, CHOLX, CHLST, CHOLV, HDL, HDLP, LDL, LDLC, DLDLP, TGLX, TRIGL, TRIGP, CHHD, CHHDX    Recent Labs     02/05/21  0005   *   TP 6.5   ALB 2.2*   GLOB 4.3*     No results for input(s): PH, PCO2, PO2 in the last 72 hours. Medications Personally Reviewed:    Current Facility-Administered Medications   Medication Dose Route Frequency    acetaminophen (TYLENOL) tablet 500 mg  500 mg Oral Q6H PRN    piperacillin-tazobactam (ZOSYN) 3.375 g in 0.9% sodium chloride (MBP/ADV) 100 mL MBP  3.375 g IntraVENous Q8H    zinc sulfate (ZINCATE) 220 (50) mg capsule 1 Cap  1 Cap Oral DAILY    ascorbic acid (vitamin C) (VITAMIN C) tablet 500 mg  500 mg Oral DAILY    heparin (porcine) injection 5,000 Units  5,000 Units SubCUTAneous Q12H    Vancomycin dose per pharmacy protocol   Other Rx Dosing/Monitoring    vancomycin (VANCOCIN) 1,000 mg in 0.9% sodium chloride 250 mL (VIAL-MATE)  1,000 mg IntraVENous Q12H           Problem List:   BNP is elevated and is concerning for heart failure. Patient has a history of her pacemaker. Lower extremity cellulitis and infected wound need to be debrided. 1.      Assessment/Plan:   I will check echocardiogram when available. Otherwise we will continue present care and will follow surgeon's recommendation and agree for the debridement of the wound. Thank you. 1.          []       High complexity decision making was performed in this patient at high risk for decompensation with multiple organ involvement.     Lesly Frausto MD

## 2021-02-07 NOTE — WOUND CARE
Wound Consult Wound care consulted for \"bilateral lower extremity necrotizing cellulitis\" Patient admitted on 2/4/21 with necrotizing cellulits to bilateral lower legs Patient lives at [facility] and [facility] PAST MEDICAL HISTORY Past Medical History:  
Diagnosis Date  Altered mental status  Cognitive communication deficit  Endocrine disease  Hypothyroidism  Pneumonia  Psychiatric disorder  Respiratory failure ([address] SOCIAL HISTORY Social History Tobacco Use  Smoking status: Former Smoker  Smokeless tobacco: Never Used Substance Use Topics  Alcohol use: Never Frequency: Never  Drug use: Never Diet: NPO prior to surgery Wt Readings from Last 3 Encounters:  
02/04/21 50.3 kg (111 lb) Visit Vitals /70 (BP 1 Location: Left upper arm) Pulse 81 Temp 97.4 °F (36.3 °C) Resp 24 Ht 5' 4\" (1.626 m) Wt 50.3 kg (111 lb) SpO2 94% BMI 19.05 kg/m² LABS ASSESSMENT 
 
A&O x 3 Last Dustin Score   15 Mobility Independent with mobility in bed Continence Incontinent of urine, continent of stool 02/07/21 1146 Wound Leg lower Anterior; Left wheeping, draining wound; with yellow crusting Date First Assessed/Time First Assessed: 02/04/21 1900   Present on Hospital Admission: Yes  Location: Leg lower  Wound Location Orientation: Anterior; Left  Wound Description: wheeping, draining wound; with yellow crusting Wound Image Wound Etiology Other (Comment) (necrotizing cellulitis) Dressing Status New dressing applied Cleansed Other (Comment) (chlorhexidine wash) Dressing/Treatment Non-adherent;ABD pad;Roll gauze Wound Length (cm) 13.5 cm Wound Width (cm) 8.5 cm Wound Depth (cm) 0.1 cm Wound Surface Area (cm^2) 114.75 cm^2 Wound Volume (cm^3) 11.48 cm^3 Wound Assessment Pink/red Drainage Amount Large Drainage Description Serosanguinous Wound Odor None Dorothy-Wound/Incision Assessment Intact; Other (Comment) 
(woundsurrounded by thick dried drainage crust) Edges Attached edges Wound Thickness Description Full thickness Wound Pretibial Right open draining crusty Date First Assessed/Time First Assessed: 02/06/21 4014   Present on Hospital Admission: Yes  Location: Pretibial  Wound Location Orientation: Right  Wound Description: open draining crusty Wound Image Wound Etiology Other (Comment) (necrotizing cellulitis) Dressing Status New dressing applied Cleansed Other (Comment) (chlorhexidine wash) Dressing/Treatment Non-adherent;ABD pad;Roll gauze Wound Length (cm) 9 cm Wound Width (cm) 5 cm Wound Depth (cm) 0.1 cm Wound Surface Area (cm^2) 45 cm^2 Wound Volume (cm^3) 4.5 cm^3 Wound Assessment Pink/red Drainage Amount Large Drainage Description Serosanguinous Wound Odor None Dorothy-Wound/Incision Assessment Intact; Other (Comment) 
(wound surrounded by thick dried drainage crust) Edges Attached edges Wound Thickness Description Full thickness PLAN 
 
  
Patients lower extremity wounds open to air. Patient states \"they need to be open in the air to heal\". Explained to patient the importance of moist wound healing, the disadvantages of drying a wound out, and risk of further infection by leaving the wounds uncovered. Patient is touching wounds with bare hands. Explained the importance of not touching the open wounds and the risk of spreading infection. Patient still disagrees with the education provided. Patient is non-adherent and unpleasant. Patient agreed to have wounds covered and dressed at this time. Legs and wounds cleansed with chlorhexidine wash patient refusing to have the large amount of dried drainage crust cleaned off intact skin.  \"leave it alone, the doctor will clean it later\" 
  
 Patient has a documented stage 2 to sacrum. Primary nurse said it is a small area. Patient refusing to have area assessed. Due to incontinence, recommend z-guard BID and prn. Please reconsult wound care if area worsens Skin Care & Pressure Relief Recommendations Speciality bed waffle overlay Minimize layers of linen Pads under patient to optimize support surface Turn/reposition approximately every 2 hours Manage incontinence Promote continence; Skin Protective lotion/cream to buttocks and sacrum daily and as needed with incontinence care Offload heels pillows Use only 1 absorbent pad under patient, do not use incontinent briefs/diapers Continue use of purewick for urine containment Wound Care Recommendations Patient for surgical debridement of wounds today or tomorrow. Recommend to keep wound covered until surgery. Cleanse wounds with saline, apply xeroform, cover with abd pads, secure with kerlix daily and prn if becomes soiled Teaching completed with  
[x] Patient          
[] Family member      
[] Caregiver         
[] Nursing 
[] Other Topics Discussed: Moist wound healing, infection prevention, wound care, importance of keeping wounds covered Patient/Caregiver Teaching Level of patient/caregiver understanding able to:  
[] Indicates understanding       [] Needs reinforcement 
[] Unsuccessful      [] Verbal Understanding 
[] Demonstrated understanding       [x] No evidence of learning 
[] Refused teaching         [] N/A Perfecto Proctor RN, BSN, [employer]

## 2021-02-07 NOTE — ROUTINE PROCESS
.. TRANSFER - OUT REPORT: 
 
Verbal report given to Pedro Luis Mijares RN (name) on Emilie Melton  being transferred to  (unit) for routine progression of care Report consisted of patients Situation, Background, Assessment and  
Recommendations(SBAR). Information from the following report(s) SBAR was reviewed with the receiving nurse. Lines:  
Peripheral IV 02/06/21 Right; Lower Arm (Active) Site Assessment Clean, dry, & intact 02/06/21 1948 Phlebitis Assessment 0 02/06/21 1948 Dressing Status Clean, dry, & intact 02/06/21 1948 Dressing Type Transparent 02/06/21 1948 Hub Color/Line Status Blue 02/06/21 1948 Action Taken Other (comment) 02/06/21 1948 Alcohol Cap Used Yes 02/06/21 1948 Opportunity for questions and clarification was provided. Patient transported with: 
 Registered Nurse Tech

## 2021-02-08 LAB
CREAT SERPL-MCNC: 0.41 MG/DL (ref 0.55–1.02)
ECHO AO ROOT DIAM: 3.8 CM
ECHO AV PEAK GRADIENT: 9 MMHG
ECHO LA MAJOR AXIS: 5 CM
ECHO LA MINOR AXIS: 3.28 CM
ECHO LA TO AORTIC ROOT RATIO: 1.32
ECHO LV EDV A2C: 616 CM3
ECHO LV EJECTION FRACTION A2C: 13 %
ECHO LV EJECTION FRACTION BIPLANE: 31.6 % (ref 55–100)
ECHO LV EJECTION FRACTION BIPLANE: 35.1 % (ref 55–100)
ECHO LV ESV A2C: 238 CM3
ECHO LV ESV A2C: 400 CM3
ECHO LV INTERNAL DIMENSION DIASTOLIC MMODE: 8.04 CM
ECHO LV INTERNAL DIMENSION DIASTOLIC: 8.51 CM (ref 3.9–5.3)
ECHO LV INTERNAL DIMENSION SYSTOLIC MMODE: 6.78 CM
ECHO LV INTERNAL DIMENSION SYSTOLIC: 7.37 CM
ECHO LV IVSD MMODE: 1.43 CM
ECHO LV IVSD: 1.31 CM (ref 0.6–0.9)
ECHO LV MASS 2D: 625.7 G (ref 67–162)
ECHO LV MASS INDEX 2D: 410.8 G/M2 (ref 43–95)
ECHO LV POSTERIOR WALL DIASTOLIC MMODE: 1.34 CM
ECHO LV POSTERIOR WALL DIASTOLIC: 1.28 CM (ref 0.6–0.9)
ECHO LVOT PEAK GRADIENT: 2 MMHG
ECHO LVOT SV: 208 CM3
ECHO MV E DECELERATION TIME (DT): 85 MS
ECHO MV E VELOCITY: 129 CM/S
ECHO PV PEAK INSTANTANEOUS GRADIENT SYSTOLIC: 5 MMHG
ECHO PV PEAK INSTANTANEOUS GRADIENT SYSTOLIC: 7 MMHG
ECHO PV REGURGITANT MAX VELOCITY: 112 CM/S
ECHO PV REGURGITANT MAX VELOCITY: 131 CM/S
ECHO PV REGURGITANT MAX VELOCITY: 149 CM/S
ECHO PV REGURGITANT MAX VELOCITY: 72.2 CM/S
ECHO TV MAX VELOCITY: 287 CM/S
ECHO TV REGURGITANT PEAK GRADIENT: 33 MMHG

## 2021-02-08 PROCEDURE — 94760 N-INVAS EAR/PLS OXIMETRY 1: CPT

## 2021-02-08 PROCEDURE — 65270000029 HC RM PRIVATE

## 2021-02-08 PROCEDURE — 74011250637 HC RX REV CODE- 250/637: Performed by: INTERNAL MEDICINE

## 2021-02-08 PROCEDURE — 74011000258 HC RX REV CODE- 258: Performed by: INTERNAL MEDICINE

## 2021-02-08 PROCEDURE — 82565 ASSAY OF CREATININE: CPT

## 2021-02-08 PROCEDURE — 36415 COLL VENOUS BLD VENIPUNCTURE: CPT

## 2021-02-08 PROCEDURE — 77010033678 HC OXYGEN DAILY

## 2021-02-08 PROCEDURE — 74011250636 HC RX REV CODE- 250/636: Performed by: INTERNAL MEDICINE

## 2021-02-08 RX ORDER — LISINOPRIL 5 MG/1
2.5 TABLET ORAL DAILY
Status: DISCONTINUED | OUTPATIENT
Start: 2021-02-09 | End: 2021-02-11 | Stop reason: HOSPADM

## 2021-02-08 RX ORDER — CARVEDILOL 3.12 MG/1
3.12 TABLET ORAL 2 TIMES DAILY WITH MEALS
Status: DISCONTINUED | OUTPATIENT
Start: 2021-02-08 | End: 2021-02-11 | Stop reason: HOSPADM

## 2021-02-08 RX ADMIN — SODIUM CHLORIDE 1000 MG: 9 INJECTION, SOLUTION INTRAVENOUS at 21:27

## 2021-02-08 RX ADMIN — PIPERACILLIN AND TAZOBACTAM 3.38 G: 3; .375 INJECTION, POWDER, LYOPHILIZED, FOR SOLUTION INTRAVENOUS at 05:13

## 2021-02-08 RX ADMIN — PIPERACILLIN AND TAZOBACTAM 3.38 G: 3; .375 INJECTION, POWDER, LYOPHILIZED, FOR SOLUTION INTRAVENOUS at 15:59

## 2021-02-08 RX ADMIN — SODIUM CHLORIDE 1000 MG: 9 INJECTION, SOLUTION INTRAVENOUS at 05:11

## 2021-02-08 NOTE — PROGRESS NOTES
Attempted to call patients son in regards to surgical consent. Kasia Brower at [phone]. No answer.

## 2021-02-08 NOTE — PROGRESS NOTES
Physician Progress Note      PATIENTDufm Guardian  CSN #:                  [id number]  :                       1945  ADMIT DATE:       2021 5:42 PM  [address] [place] DATE:  RESPONDING  PROVIDER #:        Les Rodriguez MD          QUERY TEXT:    Patient admitted with CELLULITIS LOWER EXT. Per  ADMISSION SKIN ASSESSMENT PN, noted to also have pressure ulcer. If possible, please document in progress notes and discharge summary the stage of the pressure ulcer: The medical record reflects the following:  Risk Factors: CELLULITIS, HEART FAILURE, ANEMIA, HYPOTHYROID, COGNITIVE DEFICIT  Clinical Indicators:  ADMISSION SKIN ASSESSMENT PN:  patient has stage 2 to sacrum.  WC PN: Patient has a documented stage 2 to sacrum. Primary nurse said it is a small. Treatment: WC NURSE CONSULT, Due to incontinence, recommend z-guard BID and prn. Turn/reposition approximately every 2 hours. LAB MONITORING.     Stage 1:  Non-blanchable erythema of intact skin  Stage 2:  Abrasion, Blister, Partial-thickness skin loss, with exposed dermis  Stage 3:  Full-thickness skin loss with damage or necrosis of subcutaneous tissue  Stage 4:  Full-thickness skin & soft tissue loss through to underlying muscle, tendon or bone    Thank you,  ISRA Haq, RN, CDI Specialist  [phone] or [email or web address]  Options provided:  -- Stage 2 Pressure Ulcer of SACRUM present on admission  -- Stage 3 Pressure Ulcer of SACRUM present on admission  -- Stage 4 Pressure Ulcer of SACRUM present on admission  -- Stage 1 Pressure Ulcer of SACRUM present on admission  -- Pressure Ulcer of SACRUM NOT present on admission  -- Other - I will add my own diagnosis  -- Disagree - Not applicable / Not valid  -- Disagree - Clinically unable to determine / Unknown  -- Refer to Clinical Documentation Reviewer    PROVIDER RESPONSE TEXT:    This patient has a stage 3 pressure ulcer of the SACRUM which was present on admission.     Query created by: Shoaib Jones on 2/8/2021 10:43 AM      Electronically signed by:  Efrem Jonas MD 2/8/2021 11:21 AM

## 2021-02-08 NOTE — PROGRESS NOTES
Patient arrived to Patient Hold via patient bed. Patient alert/oriented to person, place, and general date. Patient stated her name, birthday, that it was 2023. She did not know the exact date as \"I don't keep track of that sort of thing. \"    Patient able to verbalize that Owen Barnard is president. Stated her family made her come to the hospital.  She wanted to go home. Asked the patient what surgery she was here for today. Patient became upset with this RN and stated she was not having any surgery at all today. Stated it was a lie. She had surgery last month. Attempted to reorient the patient and tell her she had infections in her legs. Patient adamantly  Denied this. This RN cut off lower leg dressings and demonstrated the wounds on her legs to the patient. Vasoline dressing, abd pad, and kerlex reapplied with patient's permission. Tolerated well. Patient still stated she did not need surgery for that. \"Just give me some medications. \"    This RN contacted the OR RN [facility] to come to the bedside. Paged Dr. Long Moreno at [phone]. [facility] and This Rn found that There was a TDO issued on February 3, 2021 that  24 hours after being issued. There is no ECO on the chart. There is no documented Medical POA. Attempted to contact the son, Marlon Miller. No answer. Did not leave Voice mail. When asked, patient stated that she made her own decisions. When asked who she preferred to make decisions for her in the event that she couldn't, patient stated \"Not my sons. My sister can make my decisions when I don't. \"    [facility] and This RN examined the electronic chart, unable to locate Medical POA, Current TDO, or Current ECO orders. Alejandro Walker RN contacted Case Management Amanda Chow RN. Discussed the situation. Stated that she needed a psychiatric consult to deem the patient incompetent for the procedure to continue.     Per report, patient was here previously and deemed incompetent to make decisions. No current psychiatric consult completed for this admission. Returned to patient bedside. Patient did not have any active IV access. Patient stated she didn't need IV access and that she \"Gently removed her line herself yesterday. \"    This RN Informed Dr. Tejal King, Anesthesiology. Informed him of the situation. Confirmed that we cannot force the person/patient to undergo surgery or anesthesia at this time. PICC team consulted for Line Placement. Patient refused to have new IV inserted. Stated that she wanted to speak to the doctor. Stated that She wanted food. Verbalized understanding that she now needed and IV line for medications after showing the patient her active draining wounds on her legs. Stated that she would allow staff to place a new IV line if she was allowed to have something to eat. As the surgery was most likely not to take place today, this RN gave the patient a diet gingerale and crackers. Patient allowed staff to place a peripheral IV in her left arm by the PICC Team.    Paged Dr. Merritt Bella again. Awaiting call back at [phone]. This RN Informed Dr. Tejal King. Agreed that a psychiatric consult was needed to effectively determine if patient is able to make her own decisions as she is alert and oriented x3 at this time and refusing further treatment. This RN called Dr. Eveline Vieira and informed him of the situation. Stated he would assess the patient. This RN called report to Emeli Burns as the patient would be brought back to the patient's room to await further evaluation. This RN Gave report to Arnav Cesar RN . This RN also gave report to the primary RN, [address] rn. This RN Called and left voicemail with Dr. Merritt Bella, awaiting call back at [phone].

## 2021-02-08 NOTE — PROGRESS NOTES
Notified by OR staff that patient pulled out her PICC line & she refused her peripheral IV. Anesthesia unable to give anesthesia since the patient refused IV. They were unable to get a consent for the procedure since yesterday. Then there was question of patient's competency with regard to consent. Patient let the RN start IV after giving some crackers. Recommend local wound care. Will plan on procedure tomorrow once consent issue is sorted out.

## 2021-02-08 NOTE — PROGRESS NOTES
02/08/21 1507   Peripheral IV 02/08/21 Anterior;Left;Mid Forearm   Placement Date/Time: 02/08/21 1505   Number of Attempts: 1  Inserted By: Leia Wiseman  Present on Admission/Arrival: Yes  Size: 20 G  Orientation: Anterior;Left;Mid  Location: Forearm   Site Assessment Clean, dry, & intact   Phlebitis Assessment 0   Infiltration Assessment 0   Dressing Status New;Clean, dry, & intact   Dressing Type Tape;Transparent   Hub Color/Line Status Pink;Capped;Flushed;Patent   Action Taken Other (comment)  (US Guided)   Alcohol Cap Used Yes

## 2021-02-08 NOTE — PROGRESS NOTES
Consult for Vancomycin Dosing by Pharmacy by Dr. Rodolfo Damian provided for this 66y.o. year old female , for indication of SSTI. Day of Therapy: 3  Goal of Level(s): 15-20mcg/dL    Other Current Antibiotics: Zosyn    Significant Cultures:       Date                             Culture                                       Organism      2/5                                Wound                                       GPC, Proteus    Serum Creatinine Creatinine   Date Value Ref Range Status   02/05/2021 0.56 0.55 - 1.02 mg/dL Final      Creatinine Clearance Estimated Creatinine Clearance: 53.4 mL/min (by C-G formula based on SCr of 0.56 mg/dL). BUN Lab Results   Component Value Date/Time    BUN 21 (H) 02/05/2021 12:05 AM      WBC Lab Results   Component Value Date/Time    WBC 10.9 02/05/2021 12:05 AM      Temp 98.2 °F (36.8 °C)     Last Level:   Vancomycin,trough   Date Value Ref Range Status   02/07/2021 17.5 (H) 5.0 - 10.0 ug/mL Final     Comment:        Trough levels of 15-20 ug/mL should be targeted for patients with coagulase negative Staphylococcus and MRSA pneumonia, endocarditis,osteomyelitis, meningitis, and bacteremia, as well as patients not responding to lower levels. Trough levels of 10-15 ug/mL for infections from other sources (e.g. urinary tract, cellulitis) are appropriate. All patients receiving concomitant nephrotoxic therapies should have their function closely monitored regardless of peak or trough levels. Ht Readings from Last 1 Encounters:   02/04/21 162.6 cm (64\")        Wt Readings from Last 1 Encounters:   02/04/21 50.3 kg (111 lb)     Ideal body weight: 54.7 kg (120 lb 9.5 oz)     Previous Regimen: 1000mg IV q12h    New Regimen:   Vancomycin trough is therapeutic. Continue the current regimen. Pharmacy to follow daily and will make changes to dose and/or frequency based on clinical status.   _________________________________     Pharmacist Haven Reyes

## 2021-02-08 NOTE — PROGRESS NOTES
Called and updated Dr. Breanna Carballo on the postponement of the procedure. He said to let the patient eat and will reaccess tomorrow. Will wrap the IV to prevent it being pulled at with her confusion.

## 2021-02-08 NOTE — PROGRESS NOTES
Called patient son Kirsten Wagoner" Regina and received consent for the debridement of bilateral leg ulcers procedure today 2-8-2021. Called and two nurse verified procedural consent with myself and Frankie Alatorre RN. Consent is signed and on chart. Called and notified other son Kami Longview of procedure as well. Patient has been NPO since midnight. Patient will continue to be kept NPO for procedure this afternoon. Dr. Jeffery Messer made aware. Will continue to monitor.

## 2021-02-08 NOTE — PERIOP NOTES
Went to see assess patient in patient hold area. Carole York RN informed me that the patient is refusing obtaining IV access and the scheduled procedure. A chart review for further investigation was performed and found an  TDO for her previous facility for 24 hours that  on 2021. I spoke with  Chantel Viveors RN about if there is a TDO/ECO for this facility the for this admission. He informed me the patient was deemed incompetent during her last admission and that one of her sons is suppose to be providing consent for the patient. Unable to find any documentation with information about POA at this time.

## 2021-02-08 NOTE — PROGRESS NOTES
Progress Note      2/8/2021 4:04 PM  NAME: Katheryn Mathew   MRN:  [id number]   Admit Diagnosis: Bilateral cellulitis of lower leg [L03.116, L03.115]  Wound cellulitis [L03.90]      Subjective:   Chart reviewed. Discussed with the nurse. Patient is supposed to have a debridement of her wound of the leg today. She denies any chest pain or shortness of breath. Review of Systems:    Symptom Y/N Comments  Symptom Y/N Comments   Fever/Chills n   Chest Pain     Poor Appetite    Edema y    Cough    Abdominal Pain     Sputum    Joint Pain     SOB/HEREDIA y   Pruritis/Rash     Nausea/vomit     cellulitis and wound of the lower extremities y    Diarrhea         Constipation           Could NOT obtain due to:      Objective:          Physical Exam:    Last 24hrs VS reviewed since prior progress note. Most recent are:    Visit Vitals  /82   Pulse 90   Temp 98 °F (36.7 °C)   Resp 20   Ht 5' 4\" (1.626 m)   Wt 50.3 kg (111 lb)   SpO2 95%   BMI 19.05 kg/m²       Intake/Output Summary (Last 24 hours) at 2/8/2021 1039  Last data filed at 2/8/2021 0058  Gross per 24 hour   Intake 240 ml   Output    Net 240 ml        General Appearance: Well developed, well nourished, alert & oriented x 3,    no acute distress. Ears/Nose/Mouth/Throat: Hearing grossly normal.  Neck: Supple. Chest: Lungs clear to auscultation bilaterally. Cardiovascular: Regular rate and rhythm, S1,S2 normal, no murmur. Abdomen: Soft, non-tender, bowel sounds are active. Extremities: No edema bilaterally. Cellulitis and ulcers on the both lower extremities. Skin: Warm and dry. []         Post-cath site without hematoma, bruit, tenderness, or thrill. Distal pulses intact.     PMH/SH reviewed - no change compared to H&P    Data Review    Telemetry: normal sinus rhythm     EKG:   []  No new EKG for review    Lab Data Personally Reviewed:    No results for input(s): WBC, HGB, HCT, PLT, HGBEXT, HCTEXT, PLTEXT, HGBEXT, HCTEXT, PLTEXT in the last 72 hours. No results for input(s): INR, PTP, APTT, INREXT, INREXT in the last 72 hours. No results for input(s): NA, K, CL, CO2, BUN, CREA, GLU, CA, MG in the last 72 hours. No results for input(s): CPK, CKNDX, TROIQ in the last 72 hours. No lab exists for component: CPKMB  No results found for: CHOL, CHOLX, CHLST, CHOLV, HDL, HDLP, LDL, LDLC, DLDLP, TGLX, TRIGL, TRIGP, CHHD, CHHDX    No results for input(s): AP, TBIL, TP, ALB, GLOB, GGT, AML, LPSE in the last 72 hours. No lab exists for component: SGOT, GPT, AMYP, HLPSE  No results for input(s): PH, PCO2, PO2 in the last 72 hours. Medications Personally Reviewed:    Current Facility-Administered Medications   Medication Dose Route Frequency    acetaminophen (TYLENOL) tablet 500 mg  500 mg Oral Q6H PRN    piperacillin-tazobactam (ZOSYN) 3.375 g in 0.9% sodium chloride (MBP/ADV) 100 mL MBP  3.375 g IntraVENous Q8H    zinc sulfate (ZINCATE) 220 (50) mg capsule 1 Cap  1 Cap Oral DAILY    ascorbic acid (vitamin C) (VITAMIN C) tablet 500 mg  500 mg Oral DAILY    heparin (porcine) injection 5,000 Units  5,000 Units SubCUTAneous Q12H    Vancomycin dose per pharmacy protocol   Other Rx Dosing/Monitoring    vancomycin (VANCOCIN) 1,000 mg in 0.9% sodium chloride 250 mL (VIAL-MATE)  1,000 mg IntraVENous Q12H           Problem List:   BNP is elevated and is concerning for heart failure. Patient has a history of her pacemaker. Lower extremity cellulitis and infected wound and patient is scheduled for a wound debridement today. 1.      Assessment/Plan:   I will check echocardiogram when available. Otherwise we will continue present care and will follow surgeon's recommendation and agree for the debridement of the wound today. Thank you. 1.          []       High complexity decision making was performed in this patient at high risk for decompensation with multiple organ involvement.     Bette Barfield MD

## 2021-02-08 NOTE — PROGRESS NOTES
CM reviewed chart for patient. Patient has been preliminarily accepted at [facility] in [place] when medically stable for discharge. CM team will continue to follow case.

## 2021-02-08 NOTE — PROGRESS NOTES
Patient is declining waffle mattress at this time. She has pulled off dressings applied by wound nurse. Will replace.

## 2021-02-08 NOTE — PROGRESS NOTES
PROGRESS NOTE      Subjective: Hungry. Pleasantly confused. She has been clean. She is get irritated decubital ulcer with secondary skin thickening. Upper back pressure ulcer to with kyphosis.      Visit Vitals  /82   Pulse 90   Temp 98 °F (36.7 °C)   Resp 20   Ht 5' 4\" (1.626 m)   Wt 50.3 kg (111 lb)   SpO2 95%   BMI 19.05 kg/m²       Current Facility-Administered Medications:     acetaminophen (TYLENOL) tablet 500 mg, 500 mg, Oral, Q6H PRN, Rekha Manuel MD    piperacillin-tazobactam (ZOSYN) 3.375 g in 0.9% sodium chloride (MBP/ADV) 100 mL MBP, 3.375 g, IntraVENous, Q8H, Rekha Desir MD, Last Rate: 25 mL/hr at 02/08/21 0513, 3.375 g at 02/08/21 0513    zinc sulfate (ZINCATE) 220 (50) mg capsule 1 Cap, 1 Cap, Oral, DAILY, Rekha Manuel MD, Stopped at 02/08/21 0900    ascorbic acid (vitamin C) (VITAMIN C) tablet 500 mg, 500 mg, Oral, DAILY, Lazaro MASCORRO MD, Stopped at 02/08/21 0900    heparin (porcine) injection 5,000 Units, 5,000 Units, SubCUTAneous, Q12H, Lazaro MASCORRO MD, Stopped at 02/07/21 1800    Vancomycin dose per pharmacy protocol, , Other, Rx Dosing/Monitoring, Lazaro Mesa MD    vancomycin (VANCOCIN) 1,000 mg in 0.9% sodium chloride 250 mL (VIAL-MATE), 1,000 mg, IntraVENous, Q12H, Rekha Manuel MD, 1,000 mg at 02/08/21 0511  Recent Results (from the past 24 hour(s))   ECHO ADULT COMPLETE    Collection Time: 02/07/21  4:31 PM   Result Value Ref Range    Pulmonic Regurgitant End Max Velocity 149.00 cm/s    AoV PG 9.00 mmHg    Ao Root D 3.80 cm    IVSd 1.31 (A) 0.6 - 0.9 cm    IVSd (M-mode) 1.43 (A) cm    LVIDd 8.51 (A) 3.9 - 5.3 cm    LVIDd (M-mode) 8.04 (A) cm    LVIDs 7.37 cm    LVIDs (M-mode) 6.78 cm    Pulmonic Regurgitant End Max Velocity 72.20 cm/s    LVOT Peak Gradient 2.00 mmHg    LVPWd 1.28 (A) 0.6 - 0.9 cm    LVPWd (M-mode) 1.34 (A) cm    LV ED Vol A2C 616.00 cm3    BP EF 35.1 (A) 55 - 100 %    BP EF 31.6 (A) 55 - 100 %    LV ES Vol A2C 400.00 cm3    LV ES Vol A2C 238.00 cm3    LV Ejection Fraction MOD 2C 13 %    LV Mass .7 (A) 67 - 162 g    LV Mass AL Index 410.8 (A) 43 - 95 g/m2    LVOT .0 cm3    Left Atrium Major Axis 5.00 cm    Left Atrium to Aortic Root Ratio 1.32     Mitral Valve E Wave Deceleration Time 85.00 ms    MV E Jamshid 129.00 cm/s    Pulmonic Regurgitant End Max Velocity 131.00 cm/s    Pulmonic Valve Systolic Peak Instantaneous Gradient 7.00 mmHg    Pulmonic Regurgitant End Max Velocity 112.00 cm/s    Pulmonic Valve Systolic Peak Instantaneous Gradient 5.00 mmHg    Tricuspid Valve Max Velocity 287.00 cm/s    Triscuspid Valve Regurgitation Peak Gradient 33.00 mmHg    Left Atrium Minor Axis 3.28 cm   VANCOMYCIN, TROUGH    Collection Time: 02/07/21  5:03 PM   Result Value Ref Range    Vancomycin,trough 17.5 (H) 5.0 - 10.0 ug/mL    Reported dose date Not available      Reported dose: Not available Units     XR CHEST PORT   Final Result   1. PICC line tip projects over SVC. 2. Cardiomegaly with vascular congestion. 3. Underexpanded lungs. 4. No pneumothorax. XR CHEST PORT   Final Result   Right PICC with course curling over the SVC and the tip in unclear position. HEENT: Cephalic atraumatic anicteric sclera   neck: No distended neck vein  Lungs: Basilar fine crepitus no rhonchi or wheeze  Heart: Regular  Abdomen: Soft nontender nondistended positive bowel sounds  Lower Extremities: Bilateral lower extremity cellulitis with purulent drainage better  CNS: Alert and oriented x2. Pleasantly confused. Psych: Cooperative.      Assessment:     #1 staph aureus and beta-hemolytic strep with Proteus mirabilis necrotizing cellulitis of both lower leg with some improvement   #2 severe kyphosis   #3 history of pacemaker placement   #4  chronic CHF with reduced EF ejection fraction 25%  #5 chronic anemia   #6 sacral stage II-III and back ulcer stage II present on admission  Recent PICC line placement which was pulled out by patient       Plan: For skin debridement today.  Continue IV antibiotic.  She will need a replacement of a PICC line when going home.  We will add low-dose beta-blocker and Zestril as blood pressure can tolerate.  Discharge planning back to nursing home with wound care PICC line IV antibiotics in the next 24 to 48 hrs.

## 2021-02-09 LAB
BACTERIA SPEC CULT: NORMAL
CREAT SERPL-MCNC: 0.38 MG/DL (ref 0.55–1.02)
GRAM STN SPEC: NORMAL
GRAM STN SPEC: NORMAL
SPECIAL REQUESTS,SREQ: NORMAL

## 2021-02-09 PROCEDURE — 94760 N-INVAS EAR/PLS OXIMETRY 1: CPT

## 2021-02-09 PROCEDURE — 74011250636 HC RX REV CODE- 250/636: Performed by: INTERNAL MEDICINE

## 2021-02-09 PROCEDURE — 82565 ASSAY OF CREATININE: CPT

## 2021-02-09 PROCEDURE — 74011000258 HC RX REV CODE- 258: Performed by: INTERNAL MEDICINE

## 2021-02-09 PROCEDURE — 74011250637 HC RX REV CODE- 250/637: Performed by: INTERNAL MEDICINE

## 2021-02-09 PROCEDURE — 65270000029 HC RM PRIVATE

## 2021-02-09 PROCEDURE — 77010033678 HC OXYGEN DAILY

## 2021-02-09 PROCEDURE — 36415 COLL VENOUS BLD VENIPUNCTURE: CPT

## 2021-02-09 RX ORDER — SULFAMETHOXAZOLE AND TRIMETHOPRIM 800; 160 MG/1; MG/1
1 TABLET ORAL EVERY 12 HOURS
Status: DISCONTINUED | OUTPATIENT
Start: 2021-02-09 | End: 2021-02-11 | Stop reason: HOSPADM

## 2021-02-09 RX ADMIN — PIPERACILLIN AND TAZOBACTAM 3.38 G: 3; .375 INJECTION, POWDER, LYOPHILIZED, FOR SOLUTION INTRAVENOUS at 14:18

## 2021-02-09 RX ADMIN — PIPERACILLIN AND TAZOBACTAM 3.38 G: 3; .375 INJECTION, POWDER, LYOPHILIZED, FOR SOLUTION INTRAVENOUS at 20:46

## 2021-02-09 RX ADMIN — HEPARIN SODIUM 5000 UNITS: 5000 INJECTION INTRAVENOUS; SUBCUTANEOUS at 05:06

## 2021-02-09 RX ADMIN — SULFAMETHOXAZOLE AND TRIMETHOPRIM 1 TABLET: 800; 160 TABLET ORAL at 20:46

## 2021-02-09 RX ADMIN — PIPERACILLIN AND TAZOBACTAM 3.38 G: 3; .375 INJECTION, POWDER, LYOPHILIZED, FOR SOLUTION INTRAVENOUS at 08:31

## 2021-02-09 RX ADMIN — SODIUM CHLORIDE 1000 MG: 9 INJECTION, SOLUTION INTRAVENOUS at 12:14

## 2021-02-09 RX ADMIN — PIPERACILLIN AND TAZOBACTAM 3.38 G: 3; .375 INJECTION, POWDER, LYOPHILIZED, FOR SOLUTION INTRAVENOUS at 00:03

## 2021-02-09 NOTE — PROGRESS NOTES
Progress Note      2/9/2021 4:04 PM  NAME: Lianne Loco   MRN:  [id number]   Admit Diagnosis: Bilateral cellulitis of lower leg [L03.116, L03.115]  Wound cellulitis [L03.90]      Subjective:   Chart reviewed. Discussed with the nurse. Patient is supposed to have a debridement of her wound of the leg but did not happen due to variety of reasons. . She denies any chest pain or shortness of breath. Simple questions she answers appropriately but is confused. Review of Systems:    Symptom Y/N Comments  Symptom Y/N Comments   Fever/Chills n   Chest Pain     Poor Appetite    Edema y    Cough    Abdominal Pain     Sputum    Joint Pain     SOB/HEREDIA y   Pruritis/Rash     Nausea/vomit     cellulitis and wound of the lower extremities y    Diarrhea         Constipation           Could NOT obtain due to:      Objective:          Physical Exam:    Last 24hrs VS reviewed since prior progress note. Most recent are:    Visit Vitals  /84 (BP 1 Location: Right upper arm, BP Patient Position: Sitting)   Pulse 84   Temp 97.2 °F (36.2 °C)   Resp 20   Ht 5' 4\" (1.626 m)   Wt 50.3 kg (111 lb)   SpO2 95%   BMI 19.05 kg/m²     No intake or output data in the 24 hours ending 02/09/21 1117     General Appearance: Well developed, well nourished, alert & oriented x 3,    no acute distress. Ears/Nose/Mouth/Throat: Hearing grossly normal.  Neck: Supple. Chest: Lungs clear to auscultation bilaterally. Cardiovascular: Regular rate and rhythm, S1,S2 normal, no murmur. Abdomen: Soft, non-tender, bowel sounds are active. Extremities: No edema bilaterally. Cellulitis and ulcers on the both lower extremities. Skin: Warm and dry. Leg wound noted    []         Post-cath site without hematoma, bruit, tenderness, or thrill. Distal pulses intact.     PMH/SH reviewed - no change compared to H&P    Data Review    Telemetry: normal sinus rhythm     EKG:   []  No new EKG for review    Lab Data Personally Reviewed:    No results for input(s): WBC, HGB, HCT, PLT, HGBEXT, HCTEXT, PLTEXT, HGBEXT, HCTEXT, PLTEXT in the last 72 hours. No results for input(s): INR, PTP, APTT, INREXT, INREXT in the last 72 hours. Recent Labs     02/08/21  1001   CREA 0.41*     No results for input(s): CPK, CKNDX, TROIQ in the last 72 hours. No lab exists for component: CPKMB  No results found for: CHOL, CHOLX, CHLST, CHOLV, HDL, HDLP, LDL, LDLC, DLDLP, TGLX, TRIGL, TRIGP, CHHD, CHHDX    No results for input(s): AP, TBIL, TP, ALB, GLOB, GGT, AML, LPSE in the last 72 hours. No lab exists for component: SGOT, GPT, AMYP, HLPSE  No results for input(s): PH, PCO2, PO2 in the last 72 hours. Medications Personally Reviewed:    Current Facility-Administered Medications   Medication Dose Route Frequency    lisinopriL (PRINIVIL, ZESTRIL) tablet 2.5 mg  2.5 mg Oral DAILY    carvediloL (COREG) tablet 3.125 mg  3.125 mg Oral BID WITH MEALS    acetaminophen (TYLENOL) tablet 500 mg  500 mg Oral Q6H PRN    piperacillin-tazobactam (ZOSYN) 3.375 g in 0.9% sodium chloride (MBP/ADV) 100 mL MBP  3.375 g IntraVENous Q8H    zinc sulfate (ZINCATE) 220 (50) mg capsule 1 Cap  1 Cap Oral DAILY    ascorbic acid (vitamin C) (VITAMIN C) tablet 500 mg  500 mg Oral DAILY    heparin (porcine) injection 5,000 Units  5,000 Units SubCUTAneous Q12H    Vancomycin dose per pharmacy protocol   Other Rx Dosing/Monitoring    vancomycin (VANCOCIN) 1,000 mg in 0.9% sodium chloride 250 mL (VIAL-MATE)  1,000 mg IntraVENous Q12H           Problem List:   BNP is elevated and is concerning for heart failure. Patient has a history of her pacemaker. Lower extremity cellulitis and infected wound. 1.      Assessment/Plan:   I will check echocardiogram when available. Otherwise we will continue present care and will follow surgeon's recommendation. Thank you.   1.          []       High complexity decision making was performed in this patient at high risk for decompensation with multiple organ involvement.     Benny Sood MD

## 2021-02-09 NOTE — PROGRESS NOTES
Patient refused procedure in OR. Patient was ordered a psych consult. Patient needs a POA and for that POA to give consent for the procedure. Plan is to follow up in morning with plan for procedure at some point tomorrow. Patient NPO at midnight.

## 2021-02-09 NOTE — CONSULTS
[facility]    Name:  Sharan Arreola  MR#:  [id number]  :  1945  ACCOUNT #:  [de-identified]  DATE OF SERVICE:  2021    PSYCHIATRIC CONSULTATION    REASON FOR CONSULTATION:  Competency. HISTORY OF PRESENT ILLNESS:  I was called by the PACU staff, Marbin Guillory , but the patient is scheduled to have debridement at this visit. Poor insight, poor judgment, quite a bit of denial of problem; somehow, she can take care of herself. She is a patient who needs assessment of her competency. I saw the patient. Chart reviewed. Apparently, the patient was medically TDO'ed from the rehab center she was in and deteriorating cellulitis. MENTAL STATUS:  The patient is alert, polite, but not oriented to time, lost on the questions, she is trying to avoid it. When I tried to repeat and asked for clarity, she said, Fahad Calixto you are asking over and over again? \"  She thought this is April, and that she was born on 1945, that she is 74years old. She knew she is in the hospital.  She knew she has cellulitis, but she seems to be not fully know the extent of cellulitis, complication of untreated, and she is severely underestimating her cellulitis and underestimating the need for any treatment. She says she could take care of it. She could not tell me how she could take care of it. There are no clear delusions, hallucinations, paranoia, but there is a gross underestimating, rather denial of her cellulitis, risks of untreated cellulitis and her ability to take care of it. Has not recognized the kind of treatment she needs for her cellulitis treatment. Her insight is poor. Judgment is poor. She somehow believes that she can just refuse it, and she can be easily agitated. She herself was not capable of competency to make decisions. She needs someone to help with that. The patient . Insight is poor.   Judgment has remained poor to an extent that she is not competent to make decisions.       Shannan Bourne MD      [id number]  D:  02/08/2021 23:14  T:  02/09/2021 3:37  JOB #:  [id number]

## 2021-02-09 NOTE — PROGRESS NOTES
PROGRESS NOTE      Subjective: No new development. No fever no chills. No pain in the legs. Discussed with nurse regarding confusion about who signs consent for surgery. Patient upset because she has not eaten breakfast she is kept n.p.o.     Visit Vitals  /84 (BP 1 Location: Right upper arm, BP Patient Position: Sitting)   Pulse 84   Temp 97.2 °F (36.2 °C)   Resp 20   Ht 5' 4\" (1.626 m)   Wt 50.3 kg (111 lb)   SpO2 95%   BMI 19.05 kg/m²       Current Facility-Administered Medications:     lisinopriL (PRINIVIL, ZESTRIL) tablet 2.5 mg, 2.5 mg, Oral, DAILY, Rekha Steinberg MD    carvediloL (COREG) tablet 3.125 mg, 3.125 mg, Oral, BID WITH MEALS, Rob Vidal MD    acetaminophen (TYLENOL) tablet 500 mg, 500 mg, Oral, Q6H PRN, Rekha Steinberg MD    piperacillin-tazobactam (ZOSYN) 3.375 g in 0.9% sodium chloride (MBP/ADV) 100 mL MBP, 3.375 g, IntraVENous, Q8H, Rekha Desir MD, Last Rate: 25 mL/hr at 02/09/21 0831, 3.375 g at 02/09/21 0831    zinc sulfate (ZINCATE) 220 (50) mg capsule 1 Cap, 1 Cap, Oral, DAILY, Rekha Steinberg MD, Stopped at 02/08/21 0900    ascorbic acid (vitamin C) (VITAMIN C) tablet 500 mg, 500 mg, Oral, DAILY, Rekha Steinberg MD, Stopped at 02/08/21 0900    heparin (porcine) injection 5,000 Units, 5,000 Units, SubCUTAneous, Q12H, Rob MASCORRO MD, 5,000 Units at 02/09/21 0506    Vancomycin dose per pharmacy protocol, , Other, Rx Dosing/Monitoring, Rob Vidal MD    vancomycin (VANCOCIN) 1,000 mg in 0.9% sodium chloride 250 mL (VIAL-MATE), 1,000 mg, IntraVENous, Q12H, Rekha Desir MD, 1,000 mg at 02/09/21 1214  No results found for this or any previous visit (from the past 24 hour(s)). XR CHEST PORT   Final Result   1. PICC line tip projects over SVC. 2. Cardiomegaly with vascular congestion. 3. Underexpanded lungs. 4. No pneumothorax.       XR CHEST PORT   Final Result   Right PICC with course curling over the SVC and the tip in unclear position. HEENT: Normocephalic and atraumatic. Neck: No prominent neck veins or distention  Lungs: Fine crackles at the base. Severe kyphosis. Heart: Regular soft ejection systolic murmur  Abdomen: Soft nontender nondistended positive bowel sounds  Lower Extremities: Bilateral lower extremity cellulitis with skin necrosis  CNS: Alert and oriented x2  Psych: Cooperative     Assessment:        #1 staph aureus and beta-hemolytic strep with Proteus mirabilis necrotizing cellulitis of both lower leg with some improvement   #2 severe kyphosis   #3 history of pacemaker placement   #4 chronic CHF with reduced EF ejection fraction 25%   #5 chronic anemia   #6 sacral stage II-III and back ulcer stage II present on admission   Recent PICC pulled out    Plan: Discussed with surgeon. He still awaiting for surgery. Perhaps we could do bedside debridement but I will defer to surgeon. PICC line reinsertion when patient is about to go home. Change antibiotic per WILLIAM.

## 2021-02-09 NOTE — PROGRESS NOTES
Patient refused wound care      Wound care completed. Patient disoriented to situation/severity of wounds. States she doesn't see any wounds, just redness. Educated on importance of I&D procedure.

## 2021-02-10 PROCEDURE — 74011250636 HC RX REV CODE- 250/636: Performed by: INTERNAL MEDICINE

## 2021-02-10 PROCEDURE — 65270000029 HC RM PRIVATE

## 2021-02-10 PROCEDURE — 94760 N-INVAS EAR/PLS OXIMETRY 1: CPT

## 2021-02-10 PROCEDURE — 74011250637 HC RX REV CODE- 250/637: Performed by: INTERNAL MEDICINE

## 2021-02-10 PROCEDURE — 77010033678 HC OXYGEN DAILY

## 2021-02-10 PROCEDURE — 74011000258 HC RX REV CODE- 258: Performed by: INTERNAL MEDICINE

## 2021-02-10 RX ORDER — CARVEDILOL 3.12 MG/1
3.12 TABLET ORAL 2 TIMES DAILY WITH MEALS
Qty: 60 TAB | Refills: 0 | Status: SHIPPED | OUTPATIENT
Start: 2021-02-10 | End: 2021-03-12

## 2021-02-10 RX ORDER — ASCORBIC ACID 500 MG
500 TABLET ORAL DAILY
Qty: 30 TAB | Refills: 0 | Status: SHIPPED | OUTPATIENT
Start: 2021-02-10 | End: 2021-03-12

## 2021-02-10 RX ORDER — LISINOPRIL 2.5 MG/1
2.5 TABLET ORAL DAILY
Qty: 30 TAB | Refills: 0 | Status: SHIPPED | OUTPATIENT
Start: 2021-02-10 | End: 2021-03-12

## 2021-02-10 RX ORDER — ZINC SULFATE 50(220)MG
220 CAPSULE ORAL DAILY
Qty: 30 CAP | Refills: 0 | Status: SHIPPED | OUTPATIENT
Start: 2021-02-10 | End: 2021-03-12

## 2021-02-10 RX ORDER — AMPICILLIN 500 MG/1
500 CAPSULE ORAL
Qty: 40 CAP | Refills: 0 | Status: SHIPPED | OUTPATIENT
Start: 2021-02-10 | End: 2021-02-20

## 2021-02-10 RX ORDER — SULFAMETHOXAZOLE AND TRIMETHOPRIM 800; 160 MG/1; MG/1
1 TABLET ORAL EVERY 12 HOURS
Qty: 20 TAB | Refills: 0 | Status: SHIPPED | OUTPATIENT
Start: 2021-02-10 | End: 2021-02-20

## 2021-02-10 RX ADMIN — CARVEDILOL 3.12 MG: 3.12 TABLET, FILM COATED ORAL at 11:28

## 2021-02-10 RX ADMIN — PIPERACILLIN AND TAZOBACTAM 3.38 G: 3; .375 INJECTION, POWDER, LYOPHILIZED, FOR SOLUTION INTRAVENOUS at 05:42

## 2021-02-10 RX ADMIN — PIPERACILLIN AND TAZOBACTAM 3.38 G: 3; .375 INJECTION, POWDER, LYOPHILIZED, FOR SOLUTION INTRAVENOUS at 14:54

## 2021-02-10 RX ADMIN — SULFAMETHOXAZOLE AND TRIMETHOPRIM 1 TABLET: 800; 160 TABLET ORAL at 11:28

## 2021-02-10 RX ADMIN — ZINC SULFATE 220 MG (50 MG) CAPSULE 1 CAPSULE: CAPSULE at 11:28

## 2021-02-10 RX ADMIN — OXYCODONE HYDROCHLORIDE AND ACETAMINOPHEN 500 MG: 500 TABLET ORAL at 11:28

## 2021-02-10 NOTE — PROGRESS NOTES
Progress Note      2/10/2021 4:04 PM  NAME: Danyel Miller   MRN:  [id number]   Admit Diagnosis: Bilateral cellulitis of lower leg [L03.116, L03.115]  Wound cellulitis [L03.90]      Subjective:   Chart reviewed. Discussed with the nurse. Patient is supposed to have a debridement of her wound of the leg but did not happen due to variety of reasons. . She denies any chest pain or shortness of breath. Simple questions she answers appropriately but is confused. Review of Systems:    Symptom Y/N Comments  Symptom Y/N Comments   Fever/Chills n   Chest Pain     Poor Appetite    Edema y    Cough    Abdominal Pain     Sputum    Joint Pain     SOB/HEREDIA y   Pruritis/Rash     Nausea/vomit     cellulitis and wound of the lower extremities y    Diarrhea         Constipation           Could NOT obtain due to:      Objective:          Physical Exam:    Last 24hrs VS reviewed since prior progress note. Most recent are:    Visit Vitals  /83   Pulse 87   Temp 97.2 °F (36.2 °C)   Resp 18   Ht 5' 4\" (1.626 m)   Wt 50.3 kg (111 lb)   SpO2 96%   BMI 19.05 kg/m²     No intake or output data in the 24 hours ending 02/10/21 1041     General Appearance: Well developed, well nourished, alert & oriented x 3,    no acute distress. Ears/Nose/Mouth/Throat: Hearing grossly normal.  Neck: Supple. Chest: Lungs clear to auscultation bilaterally. Cardiovascular: Regular rate and rhythm, S1,S2 normal, no murmur. Abdomen: Soft, non-tender, bowel sounds are active. Extremities: No edema bilaterally. Cellulitis and ulcers on the both lower extremities. Skin: Warm and dry. Leg wound noted    []         Post-cath site without hematoma, bruit, tenderness, or thrill. Distal pulses intact.     PMH/SH reviewed - no change compared to H&P    Data Review    Telemetry: normal sinus rhythm     EKG:   []  No new EKG for review    Lab Data Personally Reviewed:    No results for input(s): WBC, HGB, HCT, PLT, HGBEXT, HCTEXT, PLTEXT, HGBEXT, HCTEXT, PLTEXT in the last 72 hours. No results for input(s): INR, PTP, APTT, INREXT, INREXT in the last 72 hours. Recent Labs     02/09/21  1631 02/08/21  1001   CREA 0.38* 0.41*     No results for input(s): CPK, CKNDX, TROIQ in the last 72 hours. No lab exists for component: CPKMB  No results found for: CHOL, CHOLX, CHLST, CHOLV, HDL, HDLP, LDL, LDLC, DLDLP, TGLX, TRIGL, TRIGP, CHHD, CHHDX    No results for input(s): AP, TBIL, TP, ALB, GLOB, GGT, AML, LPSE in the last 72 hours. No lab exists for component: SGOT, GPT, AMYP, HLPSE  No results for input(s): PH, PCO2, PO2 in the last 72 hours. Medications Personally Reviewed:    Current Facility-Administered Medications   Medication Dose Route Frequency    trimethoprim-sulfamethoxazole (BACTRIM DS, SEPTRA DS) 160-800 mg per tablet 1 Tab  1 Tab Oral Q12H    lisinopriL (PRINIVIL, ZESTRIL) tablet 2.5 mg  2.5 mg Oral DAILY    carvediloL (COREG) tablet 3.125 mg  3.125 mg Oral BID WITH MEALS    acetaminophen (TYLENOL) tablet 500 mg  500 mg Oral Q6H PRN    piperacillin-tazobactam (ZOSYN) 3.375 g in 0.9% sodium chloride (MBP/ADV) 100 mL MBP  3.375 g IntraVENous Q8H    zinc sulfate (ZINCATE) 220 (50) mg capsule 1 Cap  1 Cap Oral DAILY    ascorbic acid (vitamin C) (VITAMIN C) tablet 500 mg  500 mg Oral DAILY    heparin (porcine) injection 5,000 Units  5,000 Units SubCUTAneous Q12H           Problem List:   BNP is elevated and is concerning for heart failure. Left ventricular systolic dysfunction. Patient has a history of her pacemaker. Lower extremity cellulitis and infected wound. 1.      Assessment/Plan:   If you can get consent from power of  probably patient may undergo debridement. Otherwise we will continue present care and will follow surgeon's recommendation. Thank you. 1.          []       High complexity decision making was performed in this patient at high risk for decompensation with multiple organ involvement.     Rox Saint, MD

## 2021-02-10 NOTE — PROGRESS NOTES
Physician Progress Note      PATIENTDufm Guardian  CSN #:                  [id number]  :                       1945  ADMIT DATE:       2021 5:42 PM  [address] [place] DATE:  RESPONDING  PROVIDER #:        Les Rodriguez MD          QUERY TEXT:    Pt admitted with CELLULITIS. Pt noted to have OXYGEN DEPENDENCE ON 2L at [facility]. If possible, please document in the progress notes and discharge summary if you are evaluating and/or treating any of the following: The medical record reflects the following:  Risk Factors: OXYGEN DEPENDENCE, CHF, ANEMIA, KYPHOSIS, PACEMAKER  Clinical Indicators: ED PN: HPI: OXYGEN DEPENDENT ON 2L NC AT NURSING FACILITY.  .  Treatment: Continued O2 NC, CXR, Lab monitoring    Thank you,  ISRA Haq, RN, CDI Specialist  [phone] or [email or web address]  Options provided:  -- Chronic respiratory failure with hypoxia  -- No chronic respiratory failure with hypoxia  -- Other - I will add my own diagnosis  -- Disagree - Not applicable / Not valid  -- Disagree - Clinically unable to determine / Unknown  -- Refer to Clinical Documentation Reviewer    PROVIDER RESPONSE TEXT:    See my note    Query created by: Sue Torres on 2021 9:55 AM      Electronically signed by:  Les Rodriguez MD 2/10/2021 10:45 AM

## 2021-02-10 NOTE — PROGRESS NOTES
Chief Complaint: None      Subjective:  Feels fine. Does not want any surgery. Denies any pain, fever or chills. Review of Systems:   Constitutional:  no fever, no chills,  no sweats, No weakness, No fatigue, No decreased activity. Respiratory: No shortness of breath, No cough, No sputum production, No hemoptysis, No wheezing, No cyanosis. Cardiovascular: No chest pain, No palpitations, No bradycardia, No tachycardia, No peripheral edema, No syncope. Gastrointestinal: No nausea, No vomiting, No diarrhea, No constipation, No heartburn, No abdominal pain. Genitourinary: No dysuria, No hematuria, No change in urine stream, No urethral discharge, No lesions. Hematology/Lymphatics: No bruising tendency, No bleeding tendency, No petechiae, No swollen lymph glands. Endocrine: No excessive thirst, No polyuria, No cold intolerance, No heat intolerance, No excessive hunger. Musculoskeletal: No back pain, No neck pain, No joint pain, No muscle pain, No claudication, No decreased range of motion, No trauma. Integumentary: No rash, No pruritus, No abrasions. Neurologic: Alert and oriented X4, No abnormal balance, No headache, No confusion, No numbness, No tingling. Psychiatric: No anxiety, No depression, No hussain. Physical Exam:     Vitals & Measurements:     Wt Readings from Last 3 Encounters:   02/04/21 50.3 kg (111 lb)     Temp Readings from Last 3 Encounters:   02/09/21 98.7 °F (37.1 °C)     BP Readings from Last 3 Encounters:   02/09/21 124/73     Pulse Readings from Last 3 Encounters:   02/09/21 90      Ht Readings from Last 3 Encounters:   02/04/21 5' 4\" (1.626 m)      Date 02/08/21 1900 - 02/09/21 0659 02/09/21 0700 - 02/10/21 0659   Shift 5248-5191 24 Hour Total 1009-5373 5040-1708 24 Hour Total   INTAKE   Shift Total(mL/kg)        OUTPUT   Urine(mL/kg/hr)          Urine Occurrence(s) 3 x 3 x      Emesis/NG output          Emesis Occurrence(s) 0 x 0 x      Stool          Stool Occurrence(s) 1 x 1 x Shift Total(mL/kg)        NET        Weight (kg) 50.3 50.3 50.3 50.3 50.3       General: well appearing, no acute distress  Head: Normal  Face: Nornal  HEENT: atraumatic, PERRLA, moist mucosa, normal pharynx, normal tonsils and adenoids, normal tongue, no fluid in sinuses  Neck: Trachea midline, no carotid bruit, no masses  Chest: Normal.  Respiratory: normal chest wall expansion, CTA B, no r/r/w, no rubs  Cardiovascular: RRR, no m/r/g, Normal S1 and S2  Abdomen: Soft, non tender, non-distended, normal bowel sounds in all quadrants, no hepatosplenomegaly, no tympany. Incision scar:   Musculoskeletal: Normal ROM in upper and lower extremities, No joint swelling. Integumentary: Warm, dry, and pink, with no rash, purpura, or petechia  Heme/Lymph: No lymphadenopathy, no bruises  Neurological: Cranial Nerves II-XII grossly intact, No gross sensory or motor deficit. Psychiatric: Cooperative with normal mood, affect, and cognition    BLE: Anterior mid leg ulcers + The scab covering both legs have been cleared by local dressing. No active drainage. Laboratory Values:   Recent Results (from the past 24 hour(s))   CREATININE    Collection Time: 02/09/21  4:31 PM   Result Value Ref Range    Creatinine 0.38 (L) 0.55 - 1.02 mg/dL         XR CHEST PORT   Final Result   1. PICC line tip projects over SVC. 2. Cardiomegaly with vascular congestion. 3. Underexpanded lungs. 4. No pneumothorax. XR CHEST PORT   Final Result   Right PICC with course curling over the SVC and the tip in unclear position. Assessment:  Problem List Items Addressed This Visit        Other    Wound cellulitis    Bilateral cellulitis of lower leg - Primary      Other Visit Diagnoses     Hypervolemia, unspecified hypervolemia type             BLE leg ulcers  Cultures were positive for Staph & Proteus    Plan:  Consent & clarifying POA had been an issue. However after an evaluation today I do not think she will need a debridement. She will benefit from Whirlpool therapy with local wound care by applying AquacelAg & dry gauze & PO antibiotics to cover Staph & Proteus. She can follow up with me in the [facility] next Friday. Thank you for allowing me to participate in the care of this patient.

## 2021-02-10 NOTE — PROGRESS NOTES
Chief Complaint: None      Subjective:  Feels fine. Did not receive Whirlpool therapy today. Denies any pain, fever or chills. Review of Systems:   Constitutional:  no fever, no chills,  no sweats, No weakness, No fatigue, No decreased activity. Respiratory: No shortness of breath, No cough, No sputum production, No hemoptysis, No wheezing, No cyanosis. Cardiovascular: No chest pain, No palpitations, No bradycardia, No tachycardia, No peripheral edema, No syncope. Gastrointestinal: No nausea, No vomiting, No diarrhea, No constipation, No heartburn, No abdominal pain. Genitourinary: No dysuria, No hematuria, No change in urine stream, No urethral discharge, No lesions. Hematology/Lymphatics: No bruising tendency, No bleeding tendency, No petechiae, No swollen lymph glands. Endocrine: No excessive thirst, No polyuria, No cold intolerance, No heat intolerance, No excessive hunger. Musculoskeletal: No back pain, No neck pain, No joint pain, No muscle pain, No claudication, No decreased range of motion, No trauma. Integumentary: No rash, No pruritus, No abrasions. Neurologic: Alert and oriented X4, No abnormal balance, No headache, No confusion, No numbness, No tingling. Psychiatric: No anxiety, No depression, No hussain. Physical Exam:     Vitals & Measurements:     Wt Readings from Last 3 Encounters:   02/04/21 50.3 kg (111 lb)     Temp Readings from Last 3 Encounters:   02/10/21 97.2 °F (36.2 °C)     BP Readings from Last 3 Encounters:   02/10/21 131/83     Pulse Readings from Last 3 Encounters:   02/10/21 87      Ht Readings from Last 3 Encounters:   02/10/21 5' 4.02\" (1.626 m)          General: well appearing, no acute distress  Head: Normal  Face: Nornal  HEENT: atraumatic, PERRLA, moist mucosa, normal pharynx, normal tonsils and adenoids, normal tongue, no fluid in sinuses  Neck: Trachea midline, no carotid bruit, no masses  Chest: Normal.  Respiratory: normal chest wall expansion, CTA B, no r/r/w, no rubs  Cardiovascular: RRR, no m/r/g, Normal S1 and S2  Abdomen: Soft, non tender, non-distended, normal bowel sounds in all quadrants, no hepatosplenomegaly, no tympany. Incision scar:   Musculoskeletal: Normal ROM in upper and lower extremities, No joint swelling. Integumentary: Warm, dry, and pink, with no rash, purpura, or petechia  Heme/Lymph: No lymphadenopathy, no bruises  Neurological: Cranial Nerves II-XII grossly intact, No gross sensory or motor deficit. Psychiatric: Cooperative with normal mood, affect, and cognition    BLE: Anterior mid leg ulcers + The scab covering both legs have been cleared by local dressing. No active drainage. Laboratory Values:   No results found for this or any previous visit (from the past 24 hour(s)). XR CHEST PORT   Final Result   1. PICC line tip projects over SVC. 2. Cardiomegaly with vascular congestion. 3. Underexpanded lungs. 4. No pneumothorax. XR CHEST PORT   Final Result   Right PICC with course curling over the SVC and the tip in unclear position. Assessment:  Problem List Items Addressed This Visit        Other    Wound cellulitis    Bilateral cellulitis of lower leg - Primary      Other Visit Diagnoses     Hypervolemia, unspecified hypervolemia type             BLE leg ulcers  Cultures were positive for Staph & Proteus    Plan:  Transfer SNF. PO antibiotics. Local wound care. Follow up in nearest [facility] as oupetient. Thank you for allowing me to participate in the care of this patient.

## 2021-02-10 NOTE — DISCHARGE SUMMARY
Discharge Summary     Fela Moreno     Admit Date:   2/4/2021  5:42 PM  Discharge Date:   2/10/2021  Discharge Condition:    Improved, stable  Discharge Diagnosis       1 staph aureus and beta-hemolytic strep with Proteus mirabilis necrotizing cellulitis of both lower leg with some improvement   #2 severe kyphosis   #3 history of pacemaker placement   #4 chronic CHF with reduced EF ejection fraction 25% with some hypoxia  #5 chronic anemia   #6 sacral stage II-III and back ulcer stage II present on admission       Hospital Stay  Narrative of Hospital Course: This is a 75-year-old  female resident of nursing home with history of pacemaker severe kyphosis who presents with bilateral lower extremity cellulitis. She felt it was related to Lasix that she was taking for leg edema. It is presumed that she has chronic CHF. She was admitted and started on IV antibiotic after culture has been done. Culture grew polymicrobial infection. Consultation was obtained with general surgeon for debridement. This caused a lot of delirium commotion regarding wished to sign consent for surgery. Eventually surgery was canceled because we could not ascertain who the true power of  is as patient is intermittently confused and at times refuses surgery. She had had preop cardiovascular evaluation because of leg swelling and history of pacemaker and elevated BNP. Echocardiogram report reviewed which showed severely reduced ejection fraction. She did have some diuretics. Patient had a PICC line placement for possible long-term antibiotics and she removed it. The bacteria is sensitive to p.o. antibiotic so she will be discharged with double coverage p.o. antibiotics. On examination today she is comfortable pleasantly confused in no distress.   HEENT normocephalic atraumatic anicteric sclera  Back severely kyphotic  Lungs basilar fine crackles  Heart S1-S2 regular  Chest with pacemaker  Abdomen soft nontender nondistended positive bowel sounds  Lower extremities markedly improved upon bilateral leg ulcer no weeping now, no blisters  CNS alert and oriented to person and place follows simple command  Psych cooperative      Consultants:  Cardiology by Dr. Benjamin Castaneda  for general surgery  Psychiatry for competency evaluation     Surgeries/procedures Performed:    Echocardiogram  IV antibiotics       Discharge Plan:    [facility]/Incidental Findings Requiring Follow Up:     Patient Instructions:     Antibiotics. Wound care. Work full. Diet: Heart healthy     Activity: As tolerated  For number of days (if applicable): Other Instructions:    Fluid restriction 1000 cc/day  Provider Follow-Up: With physician and wound care. Follow-up Appointments   Procedures    FOLLOW UP VISIT Appointment in: 3 - 5 Days Needs whirlpool for wound care Dressing changes daily with bacitracin gauze. Needs whirlpool for wound care  Dressing changes daily with bacitracin gauze. Standing Status:   Standing     Number of Occurrences:   1     Order Specific Question:   Appointment in     Answer:   3 - 5 Days        Significant Diagnostic Studies:     XR CHEST PORT   Final Result   1. PICC line tip projects over SVC. 2. Cardiomegaly with vascular congestion. 3. Underexpanded lungs. 4. No pneumothorax. XR CHEST PORT   Final Result   Right PICC with course curling over the SVC and the tip in unclear position. Recent Results (from the past 24 hour(s))   CREATININE    Collection Time: 02/09/21  4:31 PM   Result Value Ref Range    Creatinine 0.38 (L) 0.55 - 1.02 mg/dL       Discharge Medications:  Current Discharge Medication List      START taking these medications    Details   ascorbic acid, vitamin C, (VITAMIN C) 500 mg tablet Take 1 Tab by mouth daily for 30 days.   Qty: 30 Tab, Refills: 0      carvediloL (COREG) 3.125 mg tablet Take 1 Tab by mouth two (2) times daily (with meals) for 30 days. Qty: 60 Tab, Refills: 0      lisinopriL (PRINIVIL, ZESTRIL) 2.5 mg tablet Take 1 Tab by mouth daily for 30 days. Qty: 30 Tab, Refills: 0      trimethoprim-sulfamethoxazole (BACTRIM DS, SEPTRA DS) 160-800 mg per tablet Take 1 Tab by mouth every twelve (12) hours for 10 days. Qty: 20 Tab, Refills: 0      zinc sulfate (ZINCATE) 220 (50) mg capsule Take 1 Cap by mouth daily for 30 days. Qty: 30 Cap, Refills: 0      ampicillin (PRINCIPEN) 500 mg capsule Take 1 Cap by mouth Before breakfast, lunch, dinner and at bedtime for 10 days.   Qty: 40 Cap, Refills: 0              Time Spent on Discharge: About 30 minutes

## 2021-02-10 NOTE — DISCHARGE INSTRUCTIONS

## 2021-02-10 NOTE — PROGRESS NOTES
CM reviewed chart. Patient was deemed incompetent by Psychiatry on previous admission and also this admission, therefor all consents are given by POA if patient has a POA. In this particular case, patient does not have a POA documented or on file here, therefor decision making defaults to the [facility] [phone]. Patient has been preliminarily accepted back to [facility]. Per general surgeon documentation, no debridement will be necessary and patient can have PO abx on discharge, with follow up in wound care center next Friday 2/19/2021. CM reached out to [facility] this morning to see if they will have a bed available for patients return today. CM pending response.

## 2021-02-10 NOTE — PROGRESS NOTES
Comprehensive Nutrition Assessment    Type and Reason for Visit: Initial, Wound(low geriatric BMI)    Nutrition Recommendations/Plan:     Adjust to Cardiac diet  Add Ensure Enlive TID  Add Bert BID    Nursing to document %meal and supplement intakes in I/Os  Obtain weekly measured wts    Nutrition Assessment:  Admitted from NH d/t worsening wounds and pt refusing care at rehab center. PICC placed 2/5 for abx, pt removed 2/7. Previously with plans for surgical debridement of cellulitis wounds, however pt refusing for replacement of IV access and consent questionable. MD recd local wound care, whirlpool therapy and OP f/u. Psych deemed pt incompetent to make medical decisions. Possible d/c back to NH. Ordered Renal diet (unclear as to why, no hx of CKD, prev Cardiac diets ordered), appetite documented as good with no intakes recorded. Per RN, pt did not receive breakfast in error, ate 50% of lunch. While NPO, pt highly upset food being withheld, noted pt appears to have good appetite, intakes vary based on mood. RD familiar with pt from previous prolonged hospital admit. Noted hx of good acceptance of Ensure Strawberry. Will add. Labs and meds reviewed. Malnutrition Assessment:  Malnutrition Status:  Mild malnutrition    Context:  Acute illness     Findings of the 6 clinical characteristics of malnutrition:   Energy Intake:  No significant decrease in energy intake  Weight Loss:  No significant weight loss     Body Fat Loss:  No significant body fat loss,     Muscle Mass Loss:  1 - Mild muscle mass loss, Clavicles (pectoralis & deltoids), Temples (temporalis)  Fluid Accumulation:  No significant fluid accumulation,        Estimated Daily Nutrient Needs:  Energy (kcal): 1410kcal (28kcal/kg); Weight Used for Energy Requirements: Current  Protein (g): 65g (1.3g/kg); Weight Used for Protein Requirements: Current  Fluid (ml/day): 1410mL;  Method Used for Fluid Requirements: 1 ml/kcal       Needs for wound healing, wt gain vs decreased fx status    Nutrition Related Findings:  Limited NFPE finding mild UE wasting. Unable to assess LE. Pt denied c/s difficulty. Poor dentition visualized. No n/v or c/d. BM documented 2/9. No edema.       Wounds:    (LE cellulitis weeping wounds)       Current Nutrition Therapies:  DIET RENAL Regular    Anthropometric Measures:  · Height:  5' 4.02\" (162.6 cm)  · Current Body Wt:  50.3 kg (110 lb 14.3 oz)   · Ideal Body Wt:  120 lbs:  92.4 %   · BMI Category:  Underweight (BMI less than 22) age over 72     No wt hx available to assess    Nutrition Diagnosis:   · Increased nutrient needs related to increased demand for energy/nutrients as evidenced by wounds, BMI      Nutrition Interventions:   Food and/or Nutrient Delivery: Start oral nutrition supplement, Modify current diet(adjust to Cardiac)  Nutrition Education and Counseling: Education not appropriate  Coordination of Nutrition Care: Continue to monitor while inpatient    Goals:  Meet >75% EENs x 5-7 days  Wt gain 0.5kg/week  Improved skin integrity       Nutrition Monitoring and Evaluation:   Behavioral-Environmental Outcomes: None identified  Food/Nutrient Intake Outcomes: Food and nutrient intake, Supplement intake  Physical Signs/Symptoms Outcomes: Chewing or swallowing, GI status, Weight, Skin    Discharge Planning:    Continue oral nutrition supplement     Electronically signed by Vannesa Man on 2/10/2021 at 1:34 PM    Contact: [phone]

## 2021-02-10 NOTE — PROGRESS NOTES
Patient has been accepted for return to [facility] for admission today. Patient will be going into room 112B. Nurse to call report to [phone]. Address: [address] [place] [address], [address] [place] [address]. CM notified [facility]/Marietta Jackson Stavictor hugo [phone] and Christa Joaquin [phone]. We will set up transportation for patient. Discharge plan of care/case management plan validated with provider discharge order.   CM and primary nurse have completed discharge checklist.

## 2021-02-11 VITALS
RESPIRATION RATE: 18 BRPM | HEIGHT: 64 IN | DIASTOLIC BLOOD PRESSURE: 82 MMHG | WEIGHT: 111 LBS | OXYGEN SATURATION: 95 % | BODY MASS INDEX: 18.95 KG/M2 | TEMPERATURE: 98.3 F | SYSTOLIC BLOOD PRESSURE: 128 MMHG | HEART RATE: 85 BPM

## 2021-02-11 PROCEDURE — 94760 N-INVAS EAR/PLS OXIMETRY 1: CPT

## 2021-02-11 PROCEDURE — 77010033678 HC OXYGEN DAILY

## 2021-02-11 NOTE — PROGRESS NOTES
Chelsea Rhodes arrived for transport and patient reused to leave. She states she wanted to go to her sisters. She then states that it is too late at night to be leaving. Attempted to speak with sister Edmar Zazueta and son Mark Anthony Leyva. Both were aware of the situation and would not give me information on POA and who to contact. Nursing supervisor was notified and advised that discharge be attempted again in the AM. Physician notified. 2:30 am: pt has been sleeping throughout entire shift, refused care and assessments.

## 2021-02-11 NOTE — PROGRESS NOTES
Pt discharged back to [facility] this a.m. via [facility], report was called yesterday by dayshift RN. Vitals stable, no lines present, wound dressings intact. Belongings and paperwork was sent with [facility] crew.

## 2021-02-11 NOTE — PROGRESS NOTES
Progress Note      2/11/2021 4:04 PM  NAME: Mariella Burns   MRN:  [id number]   Admit Diagnosis: Bilateral cellulitis of lower leg [L03.116, L03.115]  Wound cellulitis [L03.90]      Subjective:   Chart reviewed. Patient is supposed to have a debridement of her wound of the leg but did not happen due to variety of reasons. . She denies any chest pain or shortness of breath. Simple questions she answers appropriately but is confused. She states that she has no problem with her legs. Review of Systems:    Symptom Y/N Comments  Symptom Y/N Comments   Fever/Chills n   Chest Pain     Poor Appetite    Edema y    Cough    Abdominal Pain     Sputum    Joint Pain     SOB/HEREDIA y   Pruritis/Rash     Nausea/vomit     cellulitis and wound of the lower extremities y    Diarrhea         Constipation           Could NOT obtain due to:      Objective:          Physical Exam:    Last 24hrs VS reviewed since prior progress note. Most recent are:    Visit Vitals  /82   Pulse 85   Temp 98.3 °F (36.8 °C)   Resp 18   Ht 5' 4.02\" (1.626 m)   Wt 50.3 kg (111 lb)   SpO2 96%   BMI 19.04 kg/m²     No intake or output data in the 24 hours ending 02/11/21 0919     General Appearance: Well developed, well nourished, alert & oriented x 3,    no acute distress. Ears/Nose/Mouth/Throat: Hearing grossly normal.  Neck: Supple. Chest: Lungs clear to auscultation bilaterally. Cardiovascular: Regular rate and rhythm, S1,S2 normal, no murmur. Abdomen: Soft, non-tender, bowel sounds are active. Extremities: No edema bilaterally. Cellulitis and ulcers on the both lower extremities. Skin: Warm and dry. Leg wound noted    []         Post-cath site without hematoma, bruit, tenderness, or thrill. Distal pulses intact.     PMH/SH reviewed - no change compared to H&P    Data Review    Telemetry: normal sinus rhythm     EKG:   []  No new EKG for review    Lab Data Personally Reviewed:    No results for input(s): WBC, HGB, HCT, PLT, HGBEXT, HCTEXT, PLTEXT, HGBEXT, HCTEXT, PLTEXT in the last 72 hours. No results for input(s): INR, PTP, APTT, INREXT, INREXT in the last 72 hours. Recent Labs     02/09/21  1631 02/08/21  1001   CREA 0.38* 0.41*     No results for input(s): CPK, CKNDX, TROIQ in the last 72 hours. No lab exists for component: CPKMB  No results found for: CHOL, CHOLX, CHLST, CHOLV, HDL, HDLP, LDL, LDLC, DLDLP, TGLX, TRIGL, TRIGP, CHHD, CHHDX    No results for input(s): AP, TBIL, TP, ALB, GLOB, GGT, AML, LPSE in the last 72 hours. No lab exists for component: SGOT, GPT, AMYP, HLPSE  No results for input(s): PH, PCO2, PO2 in the last 72 hours. Medications Personally Reviewed:    Current Facility-Administered Medications   Medication Dose Route Frequency    trimethoprim-sulfamethoxazole (BACTRIM DS, SEPTRA DS) 160-800 mg per tablet 1 Tab  1 Tab Oral Q12H    lisinopriL (PRINIVIL, ZESTRIL) tablet 2.5 mg  2.5 mg Oral DAILY    carvediloL (COREG) tablet 3.125 mg  3.125 mg Oral BID WITH MEALS    acetaminophen (TYLENOL) tablet 500 mg  500 mg Oral Q6H PRN    piperacillin-tazobactam (ZOSYN) 3.375 g in 0.9% sodium chloride (MBP/ADV) 100 mL MBP  3.375 g IntraVENous Q8H    zinc sulfate (ZINCATE) 220 (50) mg capsule 1 Cap  1 Cap Oral DAILY    ascorbic acid (vitamin C) (VITAMIN C) tablet 500 mg  500 mg Oral DAILY    heparin (porcine) injection 5,000 Units  5,000 Units SubCUTAneous Q12H           Problem List:   BNP is elevated and is concerning for heart failure. Left ventricular systolic dysfunction. Patient has a history of her pacemaker. Lower extremity cellulitis and infected wound. 1.      Assessment/Plan:   If you can get consent from power of  probably patient may undergo debridement. Otherwise we will continue present care and will follow surgeon's recommendation. Thank you.   1.          []       High complexity decision making was performed in this patient at high risk for decompensation with multiple organ involvement.     Lisandro Mari MD

## 2021-02-11 NOTE — PROGRESS NOTES
Discharge plan of care/case management plan validated with provider discharge order. Patient discharge materials, prescriptions, and education provided and discussed at bedside. Patient verbalized understanding. Nurse recognizes that the information will need to be reinforced as the patient does not display full understanding. Patient IV removed. Report called to Nurse yuen at [facility]. Patient materials all placed in [facility] folder. [facility] transport was set up for 1600. Received a call around 1800 that they would be delayed. Report given to Kim Chopra RN. Will continue to monitor until discharge transport arrives.

## 2021-02-12 LAB
BACTERIA SPEC CULT: NORMAL
SPECIAL REQUESTS,SREQ: NORMAL

## 2021-08-03 PROBLEM — J18.9 PNA (PNEUMONIA): Status: RESOLVED | Noted: 2020-11-25 | Resolved: 2021-08-03

## 2022-03-18 PROBLEM — L97.913 NON-PRESSURE CHRONIC ULCER OF RIGHT LOWER LEG WITH NECROSIS OF MUSCLE (HCC): Status: ACTIVE | Noted: 2021-01-29

## 2022-03-19 PROBLEM — L03.116 BILATERAL CELLULITIS OF LOWER LEG: Status: ACTIVE | Noted: 2021-02-05

## 2022-03-19 PROBLEM — L03.115 BILATERAL CELLULITIS OF LOWER LEG: Status: ACTIVE | Noted: 2021-02-05

## 2022-03-19 PROBLEM — J18.9 PNEUMONIA: Status: ACTIVE | Noted: 2020-11-25

## 2022-03-20 PROBLEM — L97.923 NON-PRESSURE CHRONIC ULCER OF LEFT LOWER LEG WITH NECROSIS OF MUSCLE (HCC): Status: ACTIVE | Noted: 2021-01-29

## 2022-03-20 PROBLEM — L03.90 WOUND CELLULITIS: Status: ACTIVE | Noted: 2021-02-05

## 2023-05-11 RX ORDER — ASPIRIN 325 MG
TABLET ORAL DAILY
COMMUNITY
Start: 2020-12-11

## 2023-05-11 RX ORDER — CEFDINIR 300 MG/1
CAPSULE ORAL EVERY 12 HOURS
COMMUNITY
Start: 2020-12-16

## 2023-05-11 RX ORDER — ATORVASTATIN CALCIUM 40 MG/1
TABLET, FILM COATED ORAL
COMMUNITY
Start: 2020-12-10

## 2023-05-11 RX ORDER — AMIODARONE HYDROCHLORIDE 200 MG/1
TABLET ORAL DAILY
COMMUNITY
Start: 2020-12-11

## 2023-05-11 RX ORDER — IPRATROPIUM BROMIDE AND ALBUTEROL SULFATE 2.5; .5 MG/3ML; MG/3ML
SOLUTION RESPIRATORY (INHALATION) EVERY 6 HOURS PRN
COMMUNITY
Start: 2020-12-10

## 2023-05-11 RX ORDER — FUROSEMIDE 40 MG/1
40 TABLET ORAL DAILY
COMMUNITY

## 2023-05-11 RX ORDER — POTASSIUM CHLORIDE 20 MEQ/1
TABLET, EXTENDED RELEASE ORAL DAILY
COMMUNITY

## 2023-05-11 RX ORDER — LEVOTHYROXINE SODIUM 0.07 MG/1
TABLET ORAL
COMMUNITY

## 2023-05-11 RX ORDER — OMEPRAZOLE 20 MG/1
20 CAPSULE, DELAYED RELEASE ORAL DAILY
COMMUNITY

## (undated) DEVICE — ROCKER SWITCH PENCIL BLADE ELECTRODE, HOLSTER: Brand: EDGE

## (undated) DEVICE — SUTURE VCRL SZ 4-0 L18IN ABSRB UD L13MM P-3 3/8 CIR PRIM J494H

## (undated) DEVICE — REM POLYHESIVE ADULT PATIENT RETURN ELECTRODE: Brand: VALLEYLAB

## (undated) DEVICE — SET IRRIG TB DISP FOR BONESCALPEL

## (undated) DEVICE — PROBE US DEB DISP SONICVAC

## (undated) DEVICE — GARMENT CMPR STD UNV CALF FLWT -- RN VENTILATE NS DISP 17- IN

## (undated) DEVICE — PADDING CST 4IN STERILE --

## (undated) DEVICE — PAD,PREPPING,CUFFED,24X48,7",NONSTERILE: Brand: MEDLINE

## (undated) DEVICE — GAUZE,SPONGE,4"X4",16PLY,STRL,LF,10/TRAY: Brand: MEDLINE

## (undated) DEVICE — DRESSING GZ W3XL16IN CELOS ACETT OIL EMUL N ADH

## (undated) DEVICE — BANDAGE, ELASTIC, POLYESTER/SPANDEX, SELF CLOSURE, NON-STERILE, LATEX-FREE, WHITE, 4"X5YD: Brand: TRONEX INTERNATIONAL, INC.

## (undated) DEVICE — COVER LT HNDL BLU PLAS

## (undated) DEVICE — MINOR EXTREMITY PACK: Brand: MEDLINE INDUSTRIES, INC.

## (undated) DEVICE — KERLIX BANDAGE ROLL: Brand: KERLIX

## (undated) DEVICE — TUBING SUCTION UNIV 3/16 INX20 FT W/ SCALLOPED CONN STRL

## (undated) DEVICE — DRAPE,U/ SHT,SPLIT,PLAS,STERIL: Brand: MEDLINE

## (undated) DEVICE — SOL INJ SOD CL 0.9% 100ML BG --

## (undated) DEVICE — TURNOVER KIT OR CUST CMB FLD GEN LF

## (undated) DEVICE — SOL IRR NACL 0.9% 500ML POUR --

## (undated) DEVICE — PREP SKN CHLRAPRP 26ML TNT -- CONVERT TO ITEM 373320

## (undated) DEVICE — BANDAGE,GAUZE,BULKEE II,4.5"X4.1YD,STRL: Brand: MEDLINE

## (undated) DEVICE — SYRINGE IRRIG 60ML SFT PLIABLE BLB EZ TO GRP 1 HND USE W/

## (undated) DEVICE — BANDAGE, ELASTIC, POLYESTER/SPANDEX, SELF CLOSURE, NON-STERILE, LATEX-FREE, WHITE, 6"X5YD: Brand: TRONEX INTERNATIONAL, INC.

## (undated) DEVICE — BASIC SINGLE BASIN-LF: Brand: MEDLINE INDUSTRIES, INC.

## (undated) DEVICE — PAD,ABDOMINAL,8"X7.5",STERILE,LF,1/PK: Brand: MEDLINE

## (undated) DEVICE — TURNOVER KIT A GEN SURG

## (undated) DEVICE — ZIMMER® STERILE DISPOSABLE TOURNIQUET CUFF WITH PLC, SINGLE PORT, SINGLE BLADDER, 24 IN. (61 CM)

## (undated) DEVICE — Device

## (undated) DEVICE — STERILE POLYISOPRENE POWDER-FREE SURGICAL GLOVES: Brand: PROTEXIS

## (undated) DEVICE — STERILE LATEX POWDER-FREE SURGICAL GLOVESWITH NITRILE AND EMOLLIENT COATINGS: Brand: PROTEXIS